# Patient Record
Sex: FEMALE | Race: WHITE | Employment: OTHER | URBAN - METROPOLITAN AREA
[De-identification: names, ages, dates, MRNs, and addresses within clinical notes are randomized per-mention and may not be internally consistent; named-entity substitution may affect disease eponyms.]

---

## 2017-01-25 ENCOUNTER — HOSPITAL ENCOUNTER (OUTPATIENT)
Dept: GENERAL RADIOLOGY | Age: 82
Discharge: HOME | End: 2017-01-25
Attending: INTERNAL MEDICINE
Payer: MEDICARE

## 2017-01-25 ENCOUNTER — HOSPITAL ENCOUNTER (OUTPATIENT)
Dept: LAB | Age: 82
Discharge: HOME | End: 2017-01-25
Attending: INTERNAL MEDICINE
Payer: MEDICARE

## 2017-01-25 DIAGNOSIS — I50.32 CHRONIC DIASTOLIC HEART FAILURE (HCC): ICD-10-CM

## 2017-01-25 DIAGNOSIS — R06.09 OTHER FORM OF DYSPNEA: ICD-10-CM

## 2017-01-25 DIAGNOSIS — D64.9 ANEMIA, UNSPECIFIED TYPE: ICD-10-CM

## 2017-01-25 PROBLEM — C90.01 MULTIPLE MYELOMA IN REMISSION (HCC): Status: ACTIVE | Noted: 2017-01-25

## 2017-01-25 PROBLEM — R06.00 DYSPNEA: Status: ACTIVE | Noted: 2017-01-25

## 2017-01-25 LAB
ANION GAP BLD CALC-SCNC: 7 MMOL/L
BASOPHILS # BLD AUTO: 0 K/UL (ref 0–0.2)
BASOPHILS # BLD: 0 % (ref 0–2)
BNP SERPL-MCNC: 366 PG/ML
BUN SERPL-MCNC: 18 MG/DL (ref 8–23)
CALCIUM SERPL-MCNC: 8.4 MG/DL (ref 8.3–10.4)
CHLORIDE SERPL-SCNC: 96 MMOL/L (ref 98–107)
CO2 SERPL-SCNC: 32 MMOL/L (ref 23–32)
CREAT SERPL-MCNC: 1.2 MG/DL (ref 0.6–1)
DIFFERENTIAL METHOD BLD: ABNORMAL
EOSINOPHIL # BLD: 0.1 K/UL (ref 0–0.8)
EOSINOPHIL NFR BLD: 2 % (ref 0.5–7.8)
ERYTHROCYTE [DISTWIDTH] IN BLOOD BY AUTOMATED COUNT: 13.9 % (ref 11.9–14.6)
GLUCOSE SERPL-MCNC: 98 MG/DL (ref 65–100)
HCT VFR BLD AUTO: 29.5 % (ref 35.8–46.3)
HGB BLD-MCNC: 9.5 G/DL (ref 11.7–15.4)
LYMPHOCYTES # BLD AUTO: 45 % (ref 13–44)
LYMPHOCYTES # BLD: 1.5 K/UL (ref 0.5–4.6)
MCH RBC QN AUTO: 31.3 PG (ref 26.1–32.9)
MCHC RBC AUTO-ENTMCNC: 32.2 G/DL (ref 31.4–35)
MCV RBC AUTO: 97 FL (ref 79.6–97.8)
MONOCYTES # BLD: 0.4 K/UL (ref 0.1–1.3)
MONOCYTES NFR BLD AUTO: 11 % (ref 4–12)
NEUTS SEG # BLD: 1.5 K/UL (ref 1.7–8.2)
NEUTS SEG NFR BLD AUTO: 42 % (ref 43–78)
PLATELET # BLD AUTO: 183 K/UL (ref 150–450)
PMV BLD AUTO: 10.9 FL (ref 10.8–14.1)
POTASSIUM SERPL-SCNC: 3.2 MMOL/L (ref 3.5–5.1)
RBC # BLD AUTO: 3.04 M/UL (ref 4.05–5.25)
SODIUM SERPL-SCNC: 135 MMOL/L (ref 136–145)
WBC # BLD AUTO: 3.5 K/UL (ref 4.3–11.1)

## 2017-01-25 PROCEDURE — 83880 ASSAY OF NATRIURETIC PEPTIDE: CPT | Performed by: INTERNAL MEDICINE

## 2017-01-25 PROCEDURE — 85025 COMPLETE CBC W/AUTO DIFF WBC: CPT | Performed by: INTERNAL MEDICINE

## 2017-01-25 PROCEDURE — 80048 BASIC METABOLIC PNL TOTAL CA: CPT | Performed by: INTERNAL MEDICINE

## 2017-01-25 PROCEDURE — 71020 XR CHEST PA LAT: CPT

## 2017-01-25 PROCEDURE — 36415 COLL VENOUS BLD VENIPUNCTURE: CPT | Performed by: INTERNAL MEDICINE

## 2017-02-02 ENCOUNTER — HOSPITAL ENCOUNTER (OUTPATIENT)
Dept: LAB | Age: 82
Discharge: HOME | End: 2017-02-02
Payer: MEDICARE

## 2017-02-02 ENCOUNTER — PATIENT OUTREACH (OUTPATIENT)
Dept: CASE MANAGEMENT | Age: 82
End: 2017-02-02

## 2017-02-02 DIAGNOSIS — E78.2 MIXED HYPERLIPIDEMIA: ICD-10-CM

## 2017-02-02 DIAGNOSIS — C90.00 MULTIPLE MYELOMA, REMISSION STATUS UNSPECIFIED (HCC): Chronic | ICD-10-CM

## 2017-02-02 DIAGNOSIS — I10 ESSENTIAL HYPERTENSION, BENIGN: Chronic | ICD-10-CM

## 2017-02-02 DIAGNOSIS — I48.19 PERSISTENT ATRIAL FIBRILLATION (HCC): Chronic | ICD-10-CM

## 2017-02-02 DIAGNOSIS — Z95.0 PRESENCE OF CARDIAC PACEMAKER: Chronic | ICD-10-CM

## 2017-02-02 LAB
ALBUMIN SERPL BCP-MCNC: 3.3 G/DL (ref 3.2–4.6)
ALBUMIN/GLOB SERPL: 0.8 {RATIO} (ref 1.2–3.5)
ALP SERPL-CCNC: 87 U/L (ref 50–136)
ALT SERPL-CCNC: 64 U/L (ref 12–65)
ANION GAP BLD CALC-SCNC: 4 MMOL/L (ref 7–16)
AST SERPL W P-5'-P-CCNC: 54 U/L (ref 15–37)
BASOPHILS # BLD AUTO: 0.1 K/UL (ref 0–0.2)
BASOPHILS NFR BLD MANUAL: 2 % (ref 0–2)
BILIRUB SERPL-MCNC: 0.5 MG/DL (ref 0.2–1.1)
BUN SERPL-MCNC: 21 MG/DL (ref 8–23)
CALCIUM SERPL-MCNC: 8.4 MG/DL (ref 8.3–10.4)
CHLORIDE SERPL-SCNC: 98 MMOL/L (ref 98–107)
CO2 SERPL-SCNC: 32 MMOL/L (ref 23–32)
CREAT SERPL-MCNC: 1.42 MG/DL (ref 0.6–1)
DIFFERENTIAL METHOD BLD: ABNORMAL
ERYTHROCYTE [DISTWIDTH] IN BLOOD BY AUTOMATED COUNT: 14.5 % (ref 11.9–14.6)
GLOBULIN SER CALC-MCNC: 3.9 G/DL (ref 2.3–3.5)
GLUCOSE SERPL-MCNC: 105 MG/DL (ref 65–100)
HCT VFR BLD AUTO: 29.1 % (ref 35.8–46.3)
HGB BLD-MCNC: 9.2 G/DL (ref 11.7–15.4)
LYMPHOCYTES # BLD: 1.6 K/UL (ref 0.5–4.6)
LYMPHOCYTES NFR BLD MANUAL: 43 % (ref 16–44)
MCH RBC QN AUTO: 30.1 PG (ref 26.1–32.9)
MCHC RBC AUTO-ENTMCNC: 31.6 G/DL (ref 31.4–35)
MCV RBC AUTO: 95.1 FL (ref 79.6–97.8)
MONOCYTES # BLD: 0.1 K/UL (ref 0.1–1.3)
MONOCYTES NFR BLD MANUAL: 4 % (ref 3–9)
NEUTS BAND NFR BLD MANUAL: 8 % (ref 0–10)
NEUTS SEG # BLD: 1.9 K/UL (ref 1.7–8.2)
NEUTS SEG NFR BLD MANUAL: 43 % (ref 47–75)
NRBC # BLD: 0 K/UL (ref 0–0.2)
PLATELET # BLD AUTO: 192 K/UL (ref 150–450)
PLATELET COMMENTS,PCOM: ADEQUATE
PMV BLD AUTO: 10.3 FL (ref 10.8–14.1)
POTASSIUM SERPL-SCNC: 3.4 MMOL/L (ref 3.5–5.1)
PROT SERPL-MCNC: 7.2 G/DL (ref 6.3–8.2)
RBC # BLD AUTO: 3.06 M/UL (ref 4.05–5.25)
RBC MORPH BLD: ABNORMAL
RBC MORPH BLD: ABNORMAL
SODIUM SERPL-SCNC: 134 MMOL/L (ref 136–145)
WBC # BLD AUTO: 3.7 K/UL (ref 4.3–11.1)
WBC MORPH BLD: ABNORMAL

## 2017-02-02 PROCEDURE — 84165 PROTEIN E-PHORESIS SERUM: CPT | Performed by: INTERNAL MEDICINE

## 2017-02-02 PROCEDURE — 80053 COMPREHEN METABOLIC PANEL: CPT | Performed by: INTERNAL MEDICINE

## 2017-02-02 PROCEDURE — 36415 COLL VENOUS BLD VENIPUNCTURE: CPT | Performed by: INTERNAL MEDICINE

## 2017-02-02 PROCEDURE — 85025 COMPLETE CBC W/AUTO DIFF WBC: CPT | Performed by: INTERNAL MEDICINE

## 2017-02-02 PROCEDURE — 86334 IMMUNOFIX E-PHORESIS SERUM: CPT | Performed by: INTERNAL MEDICINE

## 2017-02-03 LAB
ALBUMIN SERPL ELPH-MCNC: 3.51 G/DL (ref 3.2–5.6)
ALBUMIN/GLOB SERPL: 1.1 {RATIO}
ALPHA1 GLOB SERPL ELPH-MCNC: 0.3 G/DL (ref 0.1–0.4)
ALPHA2 GLOB SERPL ELPH-MCNC: 0.7 G/DL (ref 0.4–1.2)
B-GLOBULIN SERPL QL ELPH: 0.91 G/DL (ref 0.6–1.3)
GAMMA GLOB MFR SERPL ELPH: 1.27 G/DL (ref 0.5–1.6)
IGA SERPL-MCNC: 29 MG/DL (ref 85–499)
IGG SERPL-MCNC: 1345 MG/DL (ref 610–1616)
IGM SERPL-MCNC: 23 MG/DL (ref 35–242)
M PROTEIN SERPL ELPH-MCNC: 0.94 G/DL
PROT PATTERN SERPL ELPH-IMP: ABNORMAL
PROT PATTERN SPEC IFE-IMP: ABNORMAL
PROT SERPL-MCNC: 6.7 G/DL (ref 6.3–8.2)

## 2017-03-09 ENCOUNTER — HOSPITAL ENCOUNTER (OUTPATIENT)
Dept: LAB | Age: 82
Discharge: HOME | End: 2017-03-09
Payer: MEDICARE

## 2017-03-09 ENCOUNTER — PATIENT OUTREACH (OUTPATIENT)
Dept: CASE MANAGEMENT | Age: 82
End: 2017-03-09

## 2017-03-09 DIAGNOSIS — C90.00 MULTIPLE MYELOMA, REMISSION STATUS UNSPECIFIED (HCC): Chronic | ICD-10-CM

## 2017-03-09 DIAGNOSIS — I10 ESSENTIAL HYPERTENSION WITH GOAL BLOOD PRESSURE LESS THAN 130/85: ICD-10-CM

## 2017-03-09 DIAGNOSIS — D50.8 OTHER IRON DEFICIENCY ANEMIA: Chronic | ICD-10-CM

## 2017-03-09 DIAGNOSIS — I10 ESSENTIAL HYPERTENSION, BENIGN: Chronic | ICD-10-CM

## 2017-03-09 DIAGNOSIS — E78.2 MIXED HYPERLIPIDEMIA: ICD-10-CM

## 2017-03-09 LAB
ALBUMIN SERPL BCP-MCNC: 3.1 G/DL (ref 3.2–4.6)
ALBUMIN/GLOB SERPL: 0.8 {RATIO} (ref 1.2–3.5)
ALP SERPL-CCNC: 98 U/L (ref 50–136)
ALT SERPL-CCNC: 32 U/L (ref 12–65)
ANION GAP BLD CALC-SCNC: 9 MMOL/L (ref 7–16)
AST SERPL W P-5'-P-CCNC: 25 U/L (ref 15–37)
BASOPHILS # BLD AUTO: 0 K/UL (ref 0–0.2)
BASOPHILS # BLD: 1 % (ref 0–2)
BILIRUB SERPL-MCNC: 0.5 MG/DL (ref 0.2–1.1)
BUN SERPL-MCNC: 20 MG/DL (ref 8–23)
CALCIUM SERPL-MCNC: 8.5 MG/DL (ref 8.3–10.4)
CHLORIDE SERPL-SCNC: 98 MMOL/L (ref 98–107)
CO2 SERPL-SCNC: 27 MMOL/L (ref 23–32)
CREAT SERPL-MCNC: 1.34 MG/DL (ref 0.6–1)
DIFFERENTIAL METHOD BLD: ABNORMAL
EOSINOPHIL # BLD: 0 K/UL (ref 0–0.8)
EOSINOPHIL NFR BLD: 1 % (ref 0.5–7.8)
ERYTHROCYTE [DISTWIDTH] IN BLOOD BY AUTOMATED COUNT: 14.4 % (ref 11.9–14.6)
GLOBULIN SER CALC-MCNC: 4 G/DL (ref 2.3–3.5)
GLUCOSE SERPL-MCNC: 95 MG/DL (ref 65–100)
HCT VFR BLD AUTO: 27.4 % (ref 35.8–46.3)
HGB BLD-MCNC: 8.7 G/DL (ref 11.7–15.4)
LYMPHOCYTES # BLD AUTO: 46 % (ref 13–44)
LYMPHOCYTES # BLD: 1.4 K/UL (ref 0.5–4.6)
MCH RBC QN AUTO: 29.9 PG (ref 26.1–32.9)
MCHC RBC AUTO-ENTMCNC: 31.8 G/DL (ref 31.4–35)
MCV RBC AUTO: 94.2 FL (ref 79.6–97.8)
MONOCYTES # BLD: 0.3 K/UL (ref 0.1–1.3)
MONOCYTES NFR BLD AUTO: 9 % (ref 4–12)
NEUTS SEG # BLD: 1.3 K/UL (ref 1.7–8.2)
NEUTS SEG NFR BLD AUTO: 43 % (ref 43–78)
NRBC # BLD: 0 K/UL (ref 0–0.2)
NRBC BLD-RTO: 0 PER 100 WBC (ref 0–2)
PLATELET # BLD AUTO: 233 K/UL (ref 150–450)
PLATELET COMMENTS,PCOM: ADEQUATE
PMV BLD AUTO: 10.2 FL (ref 10.8–14.1)
POTASSIUM SERPL-SCNC: 3.4 MMOL/L (ref 3.5–5.1)
PROT SERPL-MCNC: 7.1 G/DL (ref 6.3–8.2)
RBC # BLD AUTO: 2.91 M/UL (ref 4.05–5.25)
RBC MORPH BLD: ABNORMAL
SODIUM SERPL-SCNC: 134 MMOL/L (ref 136–145)
WBC # BLD AUTO: 3 K/UL (ref 4.3–11.1)
WBC MORPH BLD: ABNORMAL

## 2017-03-09 PROCEDURE — 36415 COLL VENOUS BLD VENIPUNCTURE: CPT | Performed by: INTERNAL MEDICINE

## 2017-03-09 PROCEDURE — 84165 PROTEIN E-PHORESIS SERUM: CPT | Performed by: INTERNAL MEDICINE

## 2017-03-09 PROCEDURE — 86334 IMMUNOFIX E-PHORESIS SERUM: CPT | Performed by: INTERNAL MEDICINE

## 2017-03-09 PROCEDURE — 80053 COMPREHEN METABOLIC PANEL: CPT | Performed by: INTERNAL MEDICINE

## 2017-03-09 PROCEDURE — 83883 ASSAY NEPHELOMETRY NOT SPEC: CPT | Performed by: INTERNAL MEDICINE

## 2017-03-09 PROCEDURE — 85025 COMPLETE CBC W/AUTO DIFF WBC: CPT | Performed by: INTERNAL MEDICINE

## 2017-03-09 NOTE — ACP (ADVANCE CARE PLANNING)
3/9/17 Dr Abelino Pearson reviewed labs with patient and family. New orders received to add Ninlaro to Revlimid. This is one pill once a week 3 weeks on 1 week off. Анна Moon took RX to [facility]. Patient is to return in 4 weeks for labs and OV. Chemo education scheduled.

## 2017-03-10 LAB
ALBUMIN SERPL ELPH-MCNC: 3.29 G/DL (ref 3.2–5.6)
ALBUMIN/GLOB SERPL: 1 {RATIO}
ALPHA1 GLOB SERPL ELPH-MCNC: 0.4 G/DL (ref 0.1–0.4)
ALPHA2 GLOB SERPL ELPH-MCNC: 0.86 G/DL (ref 0.4–1.2)
B-GLOBULIN SERPL QL ELPH: 0.89 G/DL (ref 0.6–1.3)
GAMMA GLOB MFR SERPL ELPH: 1.16 G/DL (ref 0.5–1.6)
IGA SERPL-MCNC: 25 MG/DL (ref 85–499)
IGG SERPL-MCNC: 1233 MG/DL (ref 610–1616)
IGM SERPL-MCNC: 26 MG/DL (ref 35–242)
KAPPA LC FREE SER-MCNC: 342.7 MG/L (ref 3.3–19.4)
KAPPA LC FREE/LAMBDA FREE SER: 19.37 {RATIO} (ref 0.26–1.65)
LAMBDA LC FREE SERPL-MCNC: 17.69 MG/L (ref 5.71–26.3)
M PROTEIN SERPL ELPH-MCNC: 0.75 G/DL
PROT PATTERN SERPL ELPH-IMP: ABNORMAL
PROT PATTERN SPEC IFE-IMP: ABNORMAL
PROT SERPL-MCNC: 6.6 G/DL (ref 6.3–8.2)

## 2017-03-13 ENCOUNTER — DOCUMENTATION ONLY (OUTPATIENT)
Dept: HEMATOLOGY | Age: 82
End: 2017-03-13

## 2017-03-13 NOTE — PROGRESS NOTES
I spoke with Gabriela Mendes regarding her Medicare and Brianna Craig. Patient has met Ded and OOP Max. Patient and her daughter had   no concerns at the present time for the cost of treatment. Stated that insurance has been covering all medications. I gave Ms. Kennedy the Oncology Care Model participation letter. I let her know that the average patient responsibility for 6 months of treatment for type cancer is $6,054 after Medicare pays. Next, I spoke with patient regarding potential oral medication authorizations. I told her that if she ever had any problems getting her oral medications filled to give the dedicated [address]  coordinator Brinda Rajput a call. Most of the time, it is simply an authorization that needs to be done with the insurance company. Next, I spoke with Ms. Kennedy regarding enrolling with [facility]. I went over some of the services that [facility] offers and the enrollment process. Lastly, I gave Ms. Karla Gomez a form with various resource organizations that could assist with specific needs (example:  transportation, lodging, preparing meals, home cleaning)                Faxed Patient Referral form to the [address] [place], [place] [address]kenzie Arreola at [phone]. Phone [phone]. Form scanned into chart. Faxed Physician's Statement to the [address] [place] [address] at [phone]. Phone [phone]. Form scanned into chart.

## 2017-03-20 ENCOUNTER — HOSPITAL ENCOUNTER (OUTPATIENT)
Dept: GENERAL RADIOLOGY | Age: 82
Discharge: HOME | End: 2017-03-20
Attending: FAMILY MEDICINE
Payer: MEDICARE

## 2017-03-20 DIAGNOSIS — M54.50 ACUTE MIDLINE LOW BACK PAIN WITHOUT SCIATICA: ICD-10-CM

## 2017-03-20 DIAGNOSIS — M25.561 ACUTE PAIN OF RIGHT KNEE: ICD-10-CM

## 2017-03-20 PROCEDURE — 72100 X-RAY EXAM L-S SPINE 2/3 VWS: CPT

## 2017-04-06 ENCOUNTER — PATIENT OUTREACH (OUTPATIENT)
Dept: CASE MANAGEMENT | Age: 82
End: 2017-04-06

## 2017-04-06 ENCOUNTER — HOSPITAL ENCOUNTER (OUTPATIENT)
Dept: LAB | Age: 82
Discharge: HOME | End: 2017-04-06
Payer: MEDICARE

## 2017-04-06 DIAGNOSIS — C90.00 MULTIPLE MYELOMA, REMISSION STATUS UNSPECIFIED (HCC): Chronic | ICD-10-CM

## 2017-04-06 LAB
ALBUMIN SERPL BCP-MCNC: 3.2 G/DL (ref 3.2–4.6)
ALBUMIN/GLOB SERPL: 1 {RATIO} (ref 1.2–3.5)
ALP SERPL-CCNC: 86 U/L (ref 50–136)
ALT SERPL-CCNC: 42 U/L (ref 12–65)
ANION GAP BLD CALC-SCNC: 8 MMOL/L (ref 7–16)
AST SERPL W P-5'-P-CCNC: 38 U/L (ref 15–37)
BASOPHILS # BLD AUTO: 0 K/UL (ref 0–0.2)
BASOPHILS # BLD: 1 % (ref 0–2)
BILIRUB SERPL-MCNC: 0.3 MG/DL (ref 0.2–1.1)
BUN SERPL-MCNC: 21 MG/DL (ref 8–23)
CALCIUM SERPL-MCNC: 8.2 MG/DL (ref 8.3–10.4)
CHLORIDE SERPL-SCNC: 102 MMOL/L (ref 98–107)
CO2 SERPL-SCNC: 24 MMOL/L (ref 23–32)
CREAT SERPL-MCNC: 1.95 MG/DL (ref 0.6–1)
DIFFERENTIAL METHOD BLD: ABNORMAL
EOSINOPHIL # BLD: 0 K/UL (ref 0–0.8)
EOSINOPHIL NFR BLD: 1 % (ref 0.5–7.8)
ERYTHROCYTE [DISTWIDTH] IN BLOOD BY AUTOMATED COUNT: 17.7 % (ref 11.9–14.6)
GLOBULIN SER CALC-MCNC: 3.2 G/DL (ref 2.3–3.5)
GLUCOSE SERPL-MCNC: 107 MG/DL (ref 65–100)
HCT VFR BLD AUTO: 25.7 % (ref 35.8–46.3)
HGB BLD-MCNC: 8.1 G/DL (ref 11.7–15.4)
LYMPHOCYTES # BLD AUTO: 47 % (ref 13–44)
LYMPHOCYTES # BLD: 1.5 K/UL (ref 0.5–4.6)
MCH RBC QN AUTO: 30.7 PG (ref 26.1–32.9)
MCHC RBC AUTO-ENTMCNC: 31.5 G/DL (ref 31.4–35)
MCV RBC AUTO: 97.3 FL (ref 79.6–97.8)
MONOCYTES # BLD: 0.3 K/UL (ref 0.1–1.3)
MONOCYTES NFR BLD AUTO: 10 % (ref 4–12)
NEUTS SEG # BLD: 1.3 K/UL (ref 1.7–8.2)
NEUTS SEG NFR BLD AUTO: 42 % (ref 43–78)
NRBC # BLD: 0 K/UL (ref 0–0.2)
PLATELET # BLD AUTO: 133 K/UL (ref 150–450)
PMV BLD AUTO: 12 FL (ref 10.8–14.1)
POTASSIUM SERPL-SCNC: 4.2 MMOL/L (ref 3.5–5.1)
PROT SERPL-MCNC: 6.4 G/DL (ref 6.3–8.2)
RBC # BLD AUTO: 2.64 M/UL (ref 4.05–5.25)
SODIUM SERPL-SCNC: 134 MMOL/L (ref 136–145)
WBC # BLD AUTO: 3.1 K/UL (ref 4.3–11.1)

## 2017-04-06 PROCEDURE — 85025 COMPLETE CBC W/AUTO DIFF WBC: CPT | Performed by: INTERNAL MEDICINE

## 2017-04-06 PROCEDURE — 86334 IMMUNOFIX E-PHORESIS SERUM: CPT | Performed by: INTERNAL MEDICINE

## 2017-04-06 PROCEDURE — 84165 PROTEIN E-PHORESIS SERUM: CPT | Performed by: INTERNAL MEDICINE

## 2017-04-06 PROCEDURE — 36415 COLL VENOUS BLD VENIPUNCTURE: CPT | Performed by: INTERNAL MEDICINE

## 2017-04-06 PROCEDURE — 80053 COMPREHEN METABOLIC PANEL: CPT | Performed by: INTERNAL MEDICINE

## 2017-04-06 NOTE — ACP (ADVANCE CARE PLANNING)
4/6/17 Dr Joseph Sanchez reviewed labs with patient and family. Patient is to continue current treatment of Revlimid every other day and Ninlaro once a week for 3 weeks and off 1 week. Patient to return in 4 weeks and aware of appointments.

## 2017-04-07 LAB
ALBUMIN SERPL ELPH-MCNC: 3.45 G/DL (ref 3.2–5.6)
ALBUMIN/GLOB SERPL: 1.2 {RATIO}
ALPHA1 GLOB SERPL ELPH-MCNC: 0.32 G/DL (ref 0.1–0.4)
ALPHA2 GLOB SERPL ELPH-MCNC: 0.77 G/DL (ref 0.4–1.2)
B-GLOBULIN SERPL QL ELPH: 0.83 G/DL (ref 0.6–1.3)
GAMMA GLOB MFR SERPL ELPH: 1.02 G/DL (ref 0.5–1.6)
IGA SERPL-MCNC: 20 MG/DL (ref 85–499)
IGG SERPL-MCNC: 945 MG/DL (ref 610–1616)
IGM SERPL-MCNC: 18 MG/DL (ref 35–242)
M PROTEIN SERPL ELPH-MCNC: 0.55 G/DL
PROT PATTERN SERPL ELPH-IMP: ABNORMAL
PROT PATTERN SPEC IFE-IMP: ABNORMAL
PROT SERPL-MCNC: 6.4 G/DL (ref 6.3–8.2)

## 2017-05-04 ENCOUNTER — HOSPITAL ENCOUNTER (OUTPATIENT)
Dept: LAB | Age: 82
Discharge: HOME | End: 2017-05-04
Payer: MEDICARE

## 2017-05-04 ENCOUNTER — PATIENT OUTREACH (OUTPATIENT)
Dept: CASE MANAGEMENT | Age: 82
End: 2017-05-04

## 2017-05-04 DIAGNOSIS — I10 ESSENTIAL HYPERTENSION, BENIGN: Chronic | ICD-10-CM

## 2017-05-04 DIAGNOSIS — C90.02 MULTIPLE MYELOMA IN RELAPSE (HCC): Chronic | ICD-10-CM

## 2017-05-04 DIAGNOSIS — E78.5 OTHER AND UNSPECIFIED HYPERLIPIDEMIA: Chronic | ICD-10-CM

## 2017-05-04 DIAGNOSIS — D50.9 IRON DEFICIENCY ANEMIA, UNSPECIFIED IRON DEFICIENCY ANEMIA TYPE: Chronic | ICD-10-CM

## 2017-05-04 LAB
ALBUMIN SERPL BCP-MCNC: 3.1 G/DL (ref 3.2–4.6)
ALBUMIN/GLOB SERPL: 1 {RATIO} (ref 1.2–3.5)
ALP SERPL-CCNC: 91 U/L (ref 50–136)
ALT SERPL-CCNC: 32 U/L (ref 12–65)
ANION GAP BLD CALC-SCNC: 6 MMOL/L (ref 7–16)
AST SERPL W P-5'-P-CCNC: 32 U/L (ref 15–37)
BASOPHILS # BLD AUTO: 0 K/UL (ref 0–0.2)
BASOPHILS # BLD: 1 % (ref 0–2)
BILIRUB SERPL-MCNC: 0.7 MG/DL (ref 0.2–1.1)
BUN SERPL-MCNC: 14 MG/DL (ref 8–23)
CALCIUM SERPL-MCNC: 8.3 MG/DL (ref 8.3–10.4)
CHLORIDE SERPL-SCNC: 98 MMOL/L (ref 98–107)
CO2 SERPL-SCNC: 31 MMOL/L (ref 21–32)
CREAT SERPL-MCNC: 1.14 MG/DL (ref 0.6–1)
DIFFERENTIAL METHOD BLD: ABNORMAL
EOSINOPHIL # BLD: 0 K/UL (ref 0–0.8)
EOSINOPHIL NFR BLD: 2 % (ref 0.5–7.8)
ERYTHROCYTE [DISTWIDTH] IN BLOOD BY AUTOMATED COUNT: 16.8 % (ref 11.9–14.6)
GLOBULIN SER CALC-MCNC: 3.1 G/DL (ref 2.3–3.5)
GLUCOSE SERPL-MCNC: 97 MG/DL (ref 65–100)
HCT VFR BLD AUTO: 25.6 % (ref 35.8–46.3)
HGB BLD-MCNC: 8.2 G/DL (ref 11.7–15.4)
LYMPHOCYTES # BLD AUTO: 54 % (ref 13–44)
LYMPHOCYTES # BLD: 1 K/UL (ref 0.5–4.6)
MCH RBC QN AUTO: 31.3 PG (ref 26.1–32.9)
MCHC RBC AUTO-ENTMCNC: 32 G/DL (ref 31.4–35)
MCV RBC AUTO: 97.7 FL (ref 79.6–97.8)
MONOCYTES # BLD: 0.1 K/UL (ref 0.1–1.3)
MONOCYTES NFR BLD AUTO: 8 % (ref 4–12)
NEUTS SEG # BLD: 0.7 K/UL (ref 1.7–8.2)
NEUTS SEG NFR BLD AUTO: 36 % (ref 43–78)
NRBC # BLD: 0 K/UL (ref 0–0.2)
NRBC BLD-RTO: 0 PER 100 WBC (ref 0–2)
PLATELET # BLD AUTO: 85 K/UL (ref 150–450)
PMV BLD AUTO: 12.5 FL (ref 10.8–14.1)
POTASSIUM SERPL-SCNC: 3.3 MMOL/L (ref 3.5–5.1)
PROT SERPL-MCNC: 6.2 G/DL (ref 6.3–8.2)
RBC # BLD AUTO: 2.62 M/UL (ref 4.05–5.25)
SODIUM SERPL-SCNC: 135 MMOL/L (ref 136–145)
WBC # BLD AUTO: 1.9 K/UL (ref 4.3–11.1)

## 2017-05-04 PROCEDURE — 36415 COLL VENOUS BLD VENIPUNCTURE: CPT | Performed by: INTERNAL MEDICINE

## 2017-05-04 PROCEDURE — 85025 COMPLETE CBC W/AUTO DIFF WBC: CPT | Performed by: INTERNAL MEDICINE

## 2017-05-04 PROCEDURE — 86334 IMMUNOFIX E-PHORESIS SERUM: CPT | Performed by: INTERNAL MEDICINE

## 2017-05-04 PROCEDURE — 84165 PROTEIN E-PHORESIS SERUM: CPT | Performed by: INTERNAL MEDICINE

## 2017-05-04 PROCEDURE — 80053 COMPREHEN METABOLIC PANEL: CPT | Performed by: INTERNAL MEDICINE

## 2017-05-04 NOTE — PROGRESS NOTES
Pt seen and labs reviewed by Dr. Yovanny Berman. Lab called with critical WBC 1.9. Dr Yovanny Berman aware. Will change Revlimid to 10 mg every 3 days (instead of every other day) to allow for count recovery. Will continue Current dose of Ninlaro once a week, 3 weeks on 1 week off. Both scripts [facility] to [facility]. Will add type and screen to labs when pt returns on June 8 in case pt needs transfusion. Pt c/o fatigue, but hopefully change in Revlimid will help with anemia. Pt aware of future appts. Will cont to follow.

## 2017-05-05 LAB
ALBUMIN SERPL ELPH-MCNC: 3.24 G/DL (ref 3.2–5.6)
ALBUMIN/GLOB SERPL: 1.1 {RATIO}
ALPHA1 GLOB SERPL ELPH-MCNC: 0.33 G/DL (ref 0.1–0.4)
ALPHA2 GLOB SERPL ELPH-MCNC: 0.75 G/DL (ref 0.4–1.2)
B-GLOBULIN SERPL QL ELPH: 0.82 G/DL (ref 0.6–1.3)
GAMMA GLOB MFR SERPL ELPH: 0.97 G/DL (ref 0.5–1.6)
IGA SERPL-MCNC: 24 MG/DL (ref 85–499)
IGG SERPL-MCNC: 897 MG/DL (ref 610–1616)
IGM SERPL-MCNC: 13 MG/DL (ref 35–242)
M PROTEIN SERPL ELPH-MCNC: 0.58 G/DL
PROT PATTERN SERPL ELPH-IMP: ABNORMAL
PROT PATTERN SPEC IFE-IMP: ABNORMAL
PROT SERPL-MCNC: 6.1 G/DL (ref 6.3–8.2)

## 2017-06-08 ENCOUNTER — PATIENT OUTREACH (OUTPATIENT)
Dept: CASE MANAGEMENT | Age: 82
End: 2017-06-08

## 2017-06-08 ENCOUNTER — HOSPITAL ENCOUNTER (OUTPATIENT)
Dept: LAB | Age: 82
Discharge: HOME | End: 2017-06-08
Payer: MEDICARE

## 2017-06-08 DIAGNOSIS — I10 ESSENTIAL HYPERTENSION WITH GOAL BLOOD PRESSURE LESS THAN 130/85: ICD-10-CM

## 2017-06-08 DIAGNOSIS — I10 ESSENTIAL HYPERTENSION, BENIGN: Chronic | ICD-10-CM

## 2017-06-08 DIAGNOSIS — F41.9 ANXIETY: ICD-10-CM

## 2017-06-08 DIAGNOSIS — C90.00 MULTIPLE MYELOMA, REMISSION STATUS UNSPECIFIED (HCC): Chronic | ICD-10-CM

## 2017-06-08 DIAGNOSIS — D50.9 IRON DEFICIENCY ANEMIA, UNSPECIFIED IRON DEFICIENCY ANEMIA TYPE: Chronic | ICD-10-CM

## 2017-06-08 DIAGNOSIS — C90.02 MULTIPLE MYELOMA IN RELAPSE (HCC): Chronic | ICD-10-CM

## 2017-06-08 DIAGNOSIS — E78.5 OTHER AND UNSPECIFIED HYPERLIPIDEMIA: Chronic | ICD-10-CM

## 2017-06-08 DIAGNOSIS — E78.2 MIXED HYPERLIPIDEMIA: ICD-10-CM

## 2017-06-08 LAB
ALBUMIN SERPL BCP-MCNC: 3.4 G/DL (ref 3.2–4.6)
ALBUMIN/GLOB SERPL: 1 {RATIO} (ref 1.2–3.5)
ALP SERPL-CCNC: 92 U/L (ref 50–136)
ALT SERPL-CCNC: 36 U/L (ref 12–65)
ANION GAP BLD CALC-SCNC: 7 MMOL/L (ref 7–16)
AST SERPL W P-5'-P-CCNC: 36 U/L (ref 15–37)
BASOPHILS # BLD AUTO: 0 K/UL (ref 0–0.2)
BASOPHILS # BLD: 1 % (ref 0–2)
BILIRUB SERPL-MCNC: 0.5 MG/DL (ref 0.2–1.1)
BUN SERPL-MCNC: 17 MG/DL (ref 8–23)
CALCIUM SERPL-MCNC: 8.3 MG/DL (ref 8.3–10.4)
CHLORIDE SERPL-SCNC: 102 MMOL/L (ref 98–107)
CO2 SERPL-SCNC: 29 MMOL/L (ref 21–32)
CREAT SERPL-MCNC: 1.11 MG/DL (ref 0.6–1)
DIFFERENTIAL METHOD BLD: ABNORMAL
EOSINOPHIL # BLD: 0.1 K/UL (ref 0–0.8)
EOSINOPHIL NFR BLD: 1 % (ref 0.5–7.8)
ERYTHROCYTE [DISTWIDTH] IN BLOOD BY AUTOMATED COUNT: 15 % (ref 11.9–14.6)
GLOBULIN SER CALC-MCNC: 3.4 G/DL (ref 2.3–3.5)
GLUCOSE SERPL-MCNC: 86 MG/DL (ref 65–100)
HCT VFR BLD AUTO: 25.9 % (ref 35.8–46.3)
HGB BLD-MCNC: 8.2 G/DL (ref 11.7–15.4)
LYMPHOCYTES # BLD AUTO: 35 % (ref 13–44)
LYMPHOCYTES # BLD: 1.4 K/UL (ref 0.5–4.6)
MCH RBC QN AUTO: 31.1 PG (ref 26.1–32.9)
MCHC RBC AUTO-ENTMCNC: 31.7 G/DL (ref 31.4–35)
MCV RBC AUTO: 98.1 FL (ref 79.6–97.8)
MONOCYTES # BLD: 0.4 K/UL (ref 0.1–1.3)
MONOCYTES NFR BLD AUTO: 10 % (ref 4–12)
NEUTS SEG # BLD: 2.1 K/UL (ref 1.7–8.2)
NEUTS SEG NFR BLD AUTO: 53 % (ref 43–78)
NRBC # BLD: 0 K/UL (ref 0–0.2)
PLATELET # BLD AUTO: 178 K/UL (ref 150–450)
PMV BLD AUTO: 12.4 FL (ref 10.8–14.1)
POTASSIUM SERPL-SCNC: 3.7 MMOL/L (ref 3.5–5.1)
PROT SERPL-MCNC: 6.8 G/DL (ref 6.3–8.2)
RBC # BLD AUTO: 2.64 M/UL (ref 4.05–5.25)
SODIUM SERPL-SCNC: 138 MMOL/L (ref 136–145)
WBC # BLD AUTO: 4 K/UL (ref 4.3–11.1)

## 2017-06-08 PROCEDURE — 80053 COMPREHEN METABOLIC PANEL: CPT | Performed by: INTERNAL MEDICINE

## 2017-06-08 PROCEDURE — 86644 CMV ANTIBODY: CPT | Performed by: INTERNAL MEDICINE

## 2017-06-08 PROCEDURE — 85025 COMPLETE CBC W/AUTO DIFF WBC: CPT | Performed by: INTERNAL MEDICINE

## 2017-06-08 PROCEDURE — 86900 BLOOD TYPING SEROLOGIC ABO: CPT | Performed by: INTERNAL MEDICINE

## 2017-06-08 PROCEDURE — 86923 COMPATIBILITY TEST ELECTRIC: CPT | Performed by: INTERNAL MEDICINE

## 2017-06-08 PROCEDURE — 36415 COLL VENOUS BLD VENIPUNCTURE: CPT | Performed by: INTERNAL MEDICINE

## 2017-06-10 ENCOUNTER — HOSPITAL ENCOUNTER (OUTPATIENT)
Dept: INFUSION THERAPY | Age: 82
Discharge: HOME | End: 2017-06-10
Payer: MEDICARE

## 2017-06-10 VITALS
TEMPERATURE: 97.9 F | DIASTOLIC BLOOD PRESSURE: 62 MMHG | WEIGHT: 125.6 LBS | HEART RATE: 60 BPM | OXYGEN SATURATION: 99 % | RESPIRATION RATE: 18 BRPM | BODY MASS INDEX: 22.25 KG/M2 | SYSTOLIC BLOOD PRESSURE: 137 MMHG

## 2017-06-10 PROCEDURE — 74011250636 HC RX REV CODE- 250/636: Performed by: INTERNAL MEDICINE

## 2017-06-10 PROCEDURE — 74011250637 HC RX REV CODE- 250/637: Performed by: INTERNAL MEDICINE

## 2017-06-10 PROCEDURE — P9040 RBC LEUKOREDUCED IRRADIATED: HCPCS | Performed by: INTERNAL MEDICINE

## 2017-06-10 PROCEDURE — 77030018667 ADMN ST IV BLD FENW -A

## 2017-06-10 PROCEDURE — 36430 TRANSFUSION BLD/BLD COMPNT: CPT

## 2017-06-10 RX ORDER — SODIUM CHLORIDE 9 MG/ML
250 INJECTION, SOLUTION INTRAVENOUS AS NEEDED
Status: COMPLETED | OUTPATIENT
Start: 2017-06-10 | End: 2017-06-10

## 2017-06-10 RX ORDER — DIPHENHYDRAMINE HCL 25 MG
25 CAPSULE ORAL ONCE
Status: COMPLETED | OUTPATIENT
Start: 2017-06-10 | End: 2017-06-10

## 2017-06-10 RX ORDER — ACETAMINOPHEN 325 MG/1
650 TABLET ORAL ONCE
Status: COMPLETED | OUTPATIENT
Start: 2017-06-10 | End: 2017-06-10

## 2017-06-10 RX ADMIN — ACETAMINOPHEN 650 MG: 325 TABLET ORAL at 12:05

## 2017-06-10 RX ADMIN — DIPHENHYDRAMINE HYDROCHLORIDE 25 MG: 25 CAPSULE ORAL at 12:05

## 2017-06-10 RX ADMIN — SODIUM CHLORIDE 250 ML: 900 INJECTION, SOLUTION INTRAVENOUS at 11:46

## 2017-06-10 NOTE — PROGRESS NOTES
Arrived to the [facility]. Transfusion completed. Patient tolerated well. PIV removed intact, site clear. Any issues or concerns during appointment: None. Patient aware no future infusion appointments. Patient to follow up with physician. Discharged via wheelchair in stable condition accompanied by nurse and family.

## 2017-06-11 LAB
ABO + RH BLD: NORMAL
BLD PROD TYP BPU: NORMAL
BLD PROD TYP BPU: NORMAL
BLOOD GROUP ANTIBODIES SERPL: NORMAL
BPU ID: NORMAL
BPU ID: NORMAL
CROSSMATCH RESULT,%XM: NORMAL
CROSSMATCH RESULT,%XM: NORMAL
SPECIMEN EXP DATE BLD: NORMAL
STATUS OF UNIT,%ST: NORMAL
STATUS OF UNIT,%ST: NORMAL
UNIT DIVISION, %UDIV: 0
UNIT DIVISION, %UDIV: 0

## 2017-07-06 ENCOUNTER — PATIENT OUTREACH (OUTPATIENT)
Dept: CASE MANAGEMENT | Age: 82
End: 2017-07-06

## 2017-07-06 ENCOUNTER — HOSPITAL ENCOUNTER (OUTPATIENT)
Dept: LAB | Age: 82
Discharge: HOME | End: 2017-07-06
Payer: MEDICARE

## 2017-07-06 DIAGNOSIS — C90.02 MULTIPLE MYELOMA IN RELAPSE (HCC): Chronic | ICD-10-CM

## 2017-07-06 LAB
ALBUMIN SERPL BCP-MCNC: 3.2 G/DL (ref 3.2–4.6)
ALBUMIN/GLOB SERPL: 1 {RATIO} (ref 1.2–3.5)
ALP SERPL-CCNC: 94 U/L (ref 50–136)
ALT SERPL-CCNC: 38 U/L (ref 12–65)
ANION GAP BLD CALC-SCNC: 8 MMOL/L (ref 7–16)
AST SERPL W P-5'-P-CCNC: 43 U/L (ref 15–37)
BASOPHILS # BLD AUTO: 0 K/UL (ref 0–0.2)
BASOPHILS # BLD: 1 % (ref 0–2)
BILIRUB SERPL-MCNC: 0.6 MG/DL (ref 0.2–1.1)
BUN SERPL-MCNC: 15 MG/DL (ref 8–23)
CALCIUM SERPL-MCNC: 7.8 MG/DL (ref 8.3–10.4)
CHLORIDE SERPL-SCNC: 102 MMOL/L (ref 98–107)
CO2 SERPL-SCNC: 29 MMOL/L (ref 21–32)
CREAT SERPL-MCNC: 1.14 MG/DL (ref 0.6–1)
DIFFERENTIAL METHOD BLD: ABNORMAL
EOSINOPHIL # BLD: 0.1 K/UL (ref 0–0.8)
EOSINOPHIL NFR BLD: 1 % (ref 0.5–7.8)
ERYTHROCYTE [DISTWIDTH] IN BLOOD BY AUTOMATED COUNT: 14.9 % (ref 11.9–14.6)
GLOBULIN SER CALC-MCNC: 3.2 G/DL (ref 2.3–3.5)
GLUCOSE SERPL-MCNC: 87 MG/DL (ref 65–100)
HCT VFR BLD AUTO: 29.3 % (ref 35.8–46.3)
HGB BLD-MCNC: 9.5 G/DL (ref 11.7–15.4)
LYMPHOCYTES # BLD AUTO: 37 % (ref 13–44)
LYMPHOCYTES # BLD: 1.6 K/UL (ref 0.5–4.6)
MCH RBC QN AUTO: 30.4 PG (ref 26.1–32.9)
MCHC RBC AUTO-ENTMCNC: 32.4 G/DL (ref 31.4–35)
MCV RBC AUTO: 93.9 FL (ref 79.6–97.8)
MONOCYTES # BLD: 0.3 K/UL (ref 0.1–1.3)
MONOCYTES NFR BLD AUTO: 8 % (ref 4–12)
NEUTS SEG # BLD: 2.3 K/UL (ref 1.7–8.2)
NEUTS SEG NFR BLD AUTO: 54 % (ref 43–78)
NRBC # BLD: 0 K/UL (ref 0–0.2)
NRBC BLD-RTO: 0 PER 100 WBC (ref 0–2)
PLATELET # BLD AUTO: 126 K/UL (ref 150–450)
PMV BLD AUTO: 12.2 FL (ref 10.8–14.1)
POTASSIUM SERPL-SCNC: 2.9 MMOL/L (ref 3.5–5.1)
PROT SERPL-MCNC: 6.4 G/DL (ref 6.3–8.2)
RBC # BLD AUTO: 3.12 M/UL (ref 4.05–5.25)
SODIUM SERPL-SCNC: 139 MMOL/L (ref 136–145)
WBC # BLD AUTO: 4.3 K/UL (ref 4.3–11.1)

## 2017-07-06 PROCEDURE — 85025 COMPLETE CBC W/AUTO DIFF WBC: CPT | Performed by: INTERNAL MEDICINE

## 2017-07-06 PROCEDURE — 84165 PROTEIN E-PHORESIS SERUM: CPT | Performed by: INTERNAL MEDICINE

## 2017-07-06 PROCEDURE — 86334 IMMUNOFIX E-PHORESIS SERUM: CPT | Performed by: INTERNAL MEDICINE

## 2017-07-06 PROCEDURE — 80053 COMPREHEN METABOLIC PANEL: CPT | Performed by: INTERNAL MEDICINE

## 2017-07-06 PROCEDURE — 36415 COLL VENOUS BLD VENIPUNCTURE: CPT | Performed by: INTERNAL MEDICINE

## 2017-07-06 NOTE — PROGRESS NOTES
7/6/17 Dr Ese Subramanian reviewed labs. Patient continue Rev every other 3 days and Ninlaro once a week. Increase Potassium to 10 meq take 2 pills BID.  Patient to return in 4 weeks

## 2017-07-07 LAB
ALBUMIN SERPL ELPH-MCNC: 3.38 G/DL (ref 3.2–5.6)
ALBUMIN/GLOB SERPL: 1.2 {RATIO}
ALPHA1 GLOB SERPL ELPH-MCNC: 0.26 G/DL (ref 0.1–0.4)
ALPHA2 GLOB SERPL ELPH-MCNC: 0.7 G/DL (ref 0.4–1.2)
B-GLOBULIN SERPL QL ELPH: 0.82 G/DL (ref 0.6–1.3)
GAMMA GLOB MFR SERPL ELPH: 1.04 G/DL (ref 0.5–1.6)
IGA SERPL-MCNC: 42 MG/DL (ref 85–499)
IGG SERPL-MCNC: 939 MG/DL (ref 610–1616)
IGM SERPL-MCNC: 27 MG/DL (ref 35–242)
M PROTEIN SERPL ELPH-MCNC: 0.49 G/DL
PROT PATTERN SERPL ELPH-IMP: ABNORMAL
PROT PATTERN SPEC IFE-IMP: ABNORMAL
PROT SERPL-MCNC: 6.2 G/DL (ref 6.3–8.2)

## 2017-08-03 ENCOUNTER — HOSPITAL ENCOUNTER (OUTPATIENT)
Dept: LAB | Age: 82
Discharge: HOME | End: 2017-08-03
Payer: MEDICARE

## 2017-08-03 DIAGNOSIS — I10 ESSENTIAL HYPERTENSION, BENIGN: Chronic | ICD-10-CM

## 2017-08-03 DIAGNOSIS — I48.19 PERSISTENT ATRIAL FIBRILLATION (HCC): Chronic | ICD-10-CM

## 2017-08-03 DIAGNOSIS — E78.5 OTHER AND UNSPECIFIED HYPERLIPIDEMIA: Chronic | ICD-10-CM

## 2017-08-03 DIAGNOSIS — C90.00 MULTIPLE MYELOMA NOT HAVING ACHIEVED REMISSION (HCC): Chronic | ICD-10-CM

## 2017-08-03 LAB
ALBUMIN SERPL BCP-MCNC: 3.2 G/DL (ref 3.2–4.6)
ALBUMIN/GLOB SERPL: 0.9 {RATIO} (ref 1.2–3.5)
ALP SERPL-CCNC: 100 U/L (ref 50–136)
ALT SERPL-CCNC: 31 U/L (ref 12–65)
ANION GAP BLD CALC-SCNC: 5 MMOL/L (ref 7–16)
AST SERPL W P-5'-P-CCNC: 31 U/L (ref 15–37)
BASOPHILS # BLD AUTO: 0 K/UL (ref 0–0.2)
BASOPHILS # BLD: 0 % (ref 0–2)
BILIRUB SERPL-MCNC: 0.4 MG/DL (ref 0.2–1.1)
BUN SERPL-MCNC: 17 MG/DL (ref 8–23)
CALCIUM SERPL-MCNC: 8.4 MG/DL (ref 8.3–10.4)
CHLORIDE SERPL-SCNC: 99 MMOL/L (ref 98–107)
CO2 SERPL-SCNC: 31 MMOL/L (ref 21–32)
CREAT SERPL-MCNC: 1.17 MG/DL (ref 0.6–1)
DIFFERENTIAL METHOD BLD: ABNORMAL
EOSINOPHIL # BLD: 0 K/UL (ref 0–0.8)
EOSINOPHIL NFR BLD: 1 % (ref 0.5–7.8)
ERYTHROCYTE [DISTWIDTH] IN BLOOD BY AUTOMATED COUNT: 15.6 % (ref 11.9–14.6)
GLOBULIN SER CALC-MCNC: 3.5 G/DL (ref 2.3–3.5)
GLUCOSE SERPL-MCNC: 92 MG/DL (ref 65–100)
HCT VFR BLD AUTO: 28.4 % (ref 35.8–46.3)
HGB BLD-MCNC: 9.1 G/DL (ref 11.7–15.4)
LYMPHOCYTES # BLD AUTO: 38 % (ref 13–44)
LYMPHOCYTES # BLD: 1.6 K/UL (ref 0.5–4.6)
MAGNESIUM SERPL-MCNC: 2.2 MG/DL (ref 1.8–2.4)
MCH RBC QN AUTO: 30.8 PG (ref 26.1–32.9)
MCHC RBC AUTO-ENTMCNC: 32 G/DL (ref 31.4–35)
MCV RBC AUTO: 96.3 FL (ref 79.6–97.8)
MONOCYTES # BLD: 0.4 K/UL (ref 0.1–1.3)
MONOCYTES NFR BLD AUTO: 11 % (ref 4–12)
NEUTS SEG # BLD: 2.1 K/UL (ref 1.7–8.2)
NEUTS SEG NFR BLD AUTO: 50 % (ref 43–78)
NRBC # BLD: 0 K/UL (ref 0–0.2)
PLATELET # BLD AUTO: 95 K/UL (ref 150–450)
PMV BLD AUTO: 13.4 FL (ref 10.8–14.1)
POTASSIUM SERPL-SCNC: 4 MMOL/L (ref 3.5–5.1)
PROT SERPL-MCNC: 6.7 G/DL (ref 6.3–8.2)
RBC # BLD AUTO: 2.95 M/UL (ref 4.05–5.25)
SODIUM SERPL-SCNC: 135 MMOL/L (ref 136–145)
WBC # BLD AUTO: 4.1 K/UL (ref 4.3–11.1)

## 2017-08-03 PROCEDURE — 85025 COMPLETE CBC W/AUTO DIFF WBC: CPT | Performed by: INTERNAL MEDICINE

## 2017-08-03 PROCEDURE — 83735 ASSAY OF MAGNESIUM: CPT | Performed by: INTERNAL MEDICINE

## 2017-08-03 PROCEDURE — 80053 COMPREHEN METABOLIC PANEL: CPT | Performed by: INTERNAL MEDICINE

## 2017-08-03 PROCEDURE — 36415 COLL VENOUS BLD VENIPUNCTURE: CPT | Performed by: INTERNAL MEDICINE

## 2017-08-31 ENCOUNTER — PATIENT OUTREACH (OUTPATIENT)
Dept: CASE MANAGEMENT | Age: 82
End: 2017-08-31

## 2017-08-31 ENCOUNTER — HOSPITAL ENCOUNTER (OUTPATIENT)
Dept: LAB | Age: 82
Discharge: HOME | End: 2017-08-31
Payer: MEDICARE

## 2017-08-31 DIAGNOSIS — C90.00 MULTIPLE MYELOMA, REMISSION STATUS UNSPECIFIED (HCC): Chronic | ICD-10-CM

## 2017-08-31 DIAGNOSIS — I48.19 PERSISTENT ATRIAL FIBRILLATION (HCC): Chronic | ICD-10-CM

## 2017-08-31 DIAGNOSIS — I10 ESSENTIAL HYPERTENSION, BENIGN: Chronic | ICD-10-CM

## 2017-08-31 DIAGNOSIS — E78.5 OTHER AND UNSPECIFIED HYPERLIPIDEMIA: Chronic | ICD-10-CM

## 2017-08-31 DIAGNOSIS — E03.9 ACQUIRED HYPOTHYROIDISM: Chronic | ICD-10-CM

## 2017-08-31 LAB
ALBUMIN SERPL-MCNC: 2.8 G/DL (ref 3.2–4.6)
ALBUMIN/GLOB SERPL: 0.8 {RATIO} (ref 1.2–3.5)
ALP SERPL-CCNC: 83 U/L (ref 50–136)
ALT SERPL-CCNC: 32 U/L (ref 12–65)
ANION GAP SERPL CALC-SCNC: 7 MMOL/L (ref 7–16)
AST SERPL-CCNC: 33 U/L (ref 15–37)
BASOPHILS # BLD: 0 K/UL (ref 0–0.2)
BASOPHILS NFR BLD: 1 % (ref 0–2)
BILIRUB SERPL-MCNC: 0.4 MG/DL (ref 0.2–1.1)
BUN SERPL-MCNC: 15 MG/DL (ref 8–23)
CALCIUM SERPL-MCNC: 8 MG/DL (ref 8.3–10.4)
CHLORIDE SERPL-SCNC: 104 MMOL/L (ref 98–107)
CO2 SERPL-SCNC: 23 MMOL/L (ref 21–32)
CREAT SERPL-MCNC: 1.21 MG/DL (ref 0.6–1)
DIFFERENTIAL METHOD BLD: ABNORMAL
EOSINOPHIL # BLD: 0 K/UL (ref 0–0.8)
EOSINOPHIL NFR BLD: 1 % (ref 0.5–7.8)
ERYTHROCYTE [DISTWIDTH] IN BLOOD BY AUTOMATED COUNT: 15 % (ref 11.9–14.6)
GLOBULIN SER CALC-MCNC: 3.5 G/DL (ref 2.3–3.5)
GLUCOSE SERPL-MCNC: 99 MG/DL (ref 65–100)
HCT VFR BLD AUTO: 30.9 % (ref 35.8–46.3)
HGB BLD-MCNC: 9.4 G/DL (ref 11.7–15.4)
LYMPHOCYTES # BLD: 1.5 K/UL (ref 0.5–4.6)
LYMPHOCYTES NFR BLD: 44 % (ref 13–44)
MAGNESIUM SERPL-MCNC: 2.2 MG/DL (ref 1.8–2.4)
MCH RBC QN AUTO: 30.5 PG (ref 26.1–32.9)
MCHC RBC AUTO-ENTMCNC: 30.4 G/DL (ref 31.4–35)
MCV RBC AUTO: 100.3 FL (ref 79.6–97.8)
MONOCYTES # BLD: 0.4 K/UL (ref 0.1–1.3)
MONOCYTES NFR BLD: 11 % (ref 4–12)
NEUTS SEG # BLD: 1.6 K/UL (ref 1.7–8.2)
NEUTS SEG NFR BLD: 43 % (ref 43–78)
NRBC # BLD: 0 K/UL (ref 0–0.2)
PLATELET # BLD AUTO: 94 K/UL (ref 150–450)
PMV BLD AUTO: 12.9 FL (ref 10.8–14.1)
POTASSIUM SERPL-SCNC: 4.1 MMOL/L (ref 3.5–5.1)
PROT SERPL-MCNC: 6.3 G/DL (ref 6.3–8.2)
RBC # BLD AUTO: 3.08 M/UL (ref 4.05–5.25)
SODIUM SERPL-SCNC: 134 MMOL/L (ref 136–145)
WBC # BLD AUTO: 3.5 K/UL (ref 4.3–11.1)

## 2017-08-31 PROCEDURE — 85025 COMPLETE CBC W/AUTO DIFF WBC: CPT | Performed by: INTERNAL MEDICINE

## 2017-08-31 PROCEDURE — 83735 ASSAY OF MAGNESIUM: CPT | Performed by: INTERNAL MEDICINE

## 2017-08-31 PROCEDURE — 86334 IMMUNOFIX E-PHORESIS SERUM: CPT | Performed by: INTERNAL MEDICINE

## 2017-08-31 PROCEDURE — 80053 COMPREHEN METABOLIC PANEL: CPT | Performed by: INTERNAL MEDICINE

## 2017-08-31 PROCEDURE — 84165 PROTEIN E-PHORESIS SERUM: CPT | Performed by: INTERNAL MEDICINE

## 2017-08-31 PROCEDURE — 36415 COLL VENOUS BLD VENIPUNCTURE: CPT | Performed by: INTERNAL MEDICINE

## 2017-09-01 LAB
ALBUMIN SERPL ELPH-MCNC: 3.56 G/DL (ref 3.2–5.6)
ALBUMIN/GLOB SERPL: 1.3 {RATIO}
ALPHA1 GLOB SERPL ELPH-MCNC: 0.25 G/DL (ref 0.1–0.4)
ALPHA2 GLOB SERPL ELPH-MCNC: 0.74 G/DL (ref 0.4–1.2)
B-GLOBULIN SERPL QL ELPH: 0.83 G/DL (ref 0.6–1.3)
GAMMA GLOB MFR SERPL ELPH: 0.91 G/DL (ref 0.5–1.6)
IGA SERPL-MCNC: 39 MG/DL (ref 85–499)
IGG SERPL-MCNC: 887 MG/DL (ref 610–1616)
IGM SERPL-MCNC: 33 MG/DL (ref 35–242)
M PROTEIN SERPL ELPH-MCNC: 0.25 G/DL
PROT PATTERN SERPL ELPH-IMP: ABNORMAL
PROT PATTERN SPEC IFE-IMP: ABNORMAL
PROT SERPL-MCNC: 6.3 G/DL (ref 6.3–8.2)

## 2017-09-28 ENCOUNTER — PATIENT OUTREACH (OUTPATIENT)
Dept: CASE MANAGEMENT | Age: 82
End: 2017-09-28

## 2017-09-28 ENCOUNTER — HOSPITAL ENCOUNTER (OUTPATIENT)
Dept: LAB | Age: 82
Discharge: HOME | End: 2017-09-28
Payer: MEDICARE

## 2017-09-28 DIAGNOSIS — C90.00 MULTIPLE MYELOMA NOT HAVING ACHIEVED REMISSION (HCC): ICD-10-CM

## 2017-09-28 LAB
ALBUMIN SERPL-MCNC: 3.2 G/DL (ref 3.2–4.6)
ALBUMIN/GLOB SERPL: 0.8 {RATIO}
ALP SERPL-CCNC: 107 U/L (ref 50–136)
ALT SERPL-CCNC: 31 U/L (ref 12–65)
ANION GAP SERPL CALC-SCNC: 8 MMOL/L
AST SERPL-CCNC: 31 U/L (ref 15–37)
BASOPHILS # BLD: 0 K/UL (ref 0–0.2)
BASOPHILS NFR BLD: 1 % (ref 0–2)
BILIRUB SERPL-MCNC: 0.5 MG/DL (ref 0.2–1.1)
BUN SERPL-MCNC: 14 MG/DL (ref 8–23)
CALCIUM SERPL-MCNC: 8.2 MG/DL (ref 8.3–10.4)
CHLORIDE SERPL-SCNC: 97 MMOL/L (ref 98–107)
CO2 SERPL-SCNC: 31 MMOL/L (ref 21–32)
CREAT SERPL-MCNC: 1 MG/DL (ref 0.6–1)
DIFFERENTIAL METHOD BLD: ABNORMAL
EOSINOPHIL # BLD: 0 K/UL (ref 0–0.8)
EOSINOPHIL NFR BLD: 0 % (ref 0.5–7.8)
ERYTHROCYTE [DISTWIDTH] IN BLOOD BY AUTOMATED COUNT: 14.6 % (ref 11.9–14.6)
GLOBULIN SER CALC-MCNC: 3.8 G/DL
GLUCOSE SERPL-MCNC: 96 MG/DL (ref 65–100)
HCT VFR BLD AUTO: 30.7 % (ref 35.8–46.3)
HGB BLD-MCNC: 9.9 G/DL (ref 11.7–15.4)
LYMPHOCYTES # BLD: 1.3 K/UL (ref 0.5–4.6)
LYMPHOCYTES NFR BLD: 35 % (ref 13–44)
MCH RBC QN AUTO: 29.6 PG (ref 26.1–32.9)
MCHC RBC AUTO-ENTMCNC: 32.2 G/DL (ref 31.4–35)
MCV RBC AUTO: 91.9 FL (ref 79.6–97.8)
MONOCYTES # BLD: 0.4 K/UL (ref 0.1–1.3)
MONOCYTES NFR BLD: 12 % (ref 4–12)
NEUTS SEG # BLD: 2 K/UL (ref 1.7–8.2)
NEUTS SEG NFR BLD: 52 % (ref 43–78)
NRBC # BLD: 0 K/UL (ref 0–0.2)
NRBC BLD-RTO: 0 PER 100 WBC (ref 0–2)
PLATELET # BLD AUTO: 155 K/UL (ref 150–450)
PMV BLD AUTO: 12.9 FL (ref 10.8–14.1)
POTASSIUM SERPL-SCNC: 3.2 MMOL/L (ref 3.5–5.1)
PROT SERPL-MCNC: 7 G/DL (ref 6.3–8.2)
RBC # BLD AUTO: 3.34 M/UL (ref 4.05–5.25)
SODIUM SERPL-SCNC: 136 MMOL/L (ref 136–145)
WBC # BLD AUTO: 3.7 K/UL (ref 4.3–11.1)

## 2017-09-28 PROCEDURE — 85025 COMPLETE CBC W/AUTO DIFF WBC: CPT | Performed by: INTERNAL MEDICINE

## 2017-09-28 PROCEDURE — 36415 COLL VENOUS BLD VENIPUNCTURE: CPT | Performed by: INTERNAL MEDICINE

## 2017-09-28 PROCEDURE — 80053 COMPREHEN METABOLIC PANEL: CPT | Performed by: INTERNAL MEDICINE

## 2017-09-28 NOTE — PROGRESS NOTES
Pt in for OV with Kalina Prior. Continue on Revlimind and Ninlaro. Doxycycline 100 mg BID x30 days added due to sore on left lower leg. Pt also to keep applying topical antibiotic to area per Dr. Gilman Prior. Pt will return on 10/26 for labs and OV. New scripts also printed for Revilmind and Ninlaro.

## 2017-10-09 ENCOUNTER — HOSPITAL ENCOUNTER (OUTPATIENT)
Dept: GENERAL RADIOLOGY | Age: 82
Discharge: HOME | End: 2017-10-09
Payer: MEDICARE

## 2017-10-09 ENCOUNTER — HOSPITAL ENCOUNTER (OUTPATIENT)
Dept: LAB | Age: 82
Discharge: HOME | End: 2017-10-09
Payer: MEDICARE

## 2017-10-09 DIAGNOSIS — Z79.899 ON AMIODARONE THERAPY: ICD-10-CM

## 2017-10-09 LAB — BNP SERPL-MCNC: 360 PG/ML

## 2017-10-09 PROCEDURE — 71020 XR CHEST PA LAT: CPT

## 2017-10-09 PROCEDURE — 83880 ASSAY OF NATRIURETIC PEPTIDE: CPT | Performed by: INTERNAL MEDICINE

## 2017-10-09 PROCEDURE — 36415 COLL VENOUS BLD VENIPUNCTURE: CPT | Performed by: INTERNAL MEDICINE

## 2017-10-16 NOTE — PROGRESS NOTES
Please call patient - she has some fluid in lung so we are treating with lasix. How is she doing. Plan F/U 6 weeks.   SJ

## 2017-10-24 ENCOUNTER — HOSPITAL ENCOUNTER (OUTPATIENT)
Dept: LAB | Age: 82
Discharge: HOME | End: 2017-10-24
Payer: MEDICARE

## 2017-10-24 DIAGNOSIS — I48.19 PERSISTENT ATRIAL FIBRILLATION (HCC): Chronic | ICD-10-CM

## 2017-10-24 DIAGNOSIS — I50.32 CHRONIC DIASTOLIC HEART FAILURE (HCC): Chronic | ICD-10-CM

## 2017-10-24 LAB
ANION GAP SERPL CALC-SCNC: 7 MMOL/L
BUN SERPL-MCNC: 29 MG/DL (ref 8–23)
CALCIUM SERPL-MCNC: 8.6 MG/DL (ref 8.3–10.4)
CHLORIDE SERPL-SCNC: 95 MMOL/L (ref 98–107)
CO2 SERPL-SCNC: 34 MMOL/L (ref 21–32)
CREAT SERPL-MCNC: 1.6 MG/DL (ref 0.6–1)
GLUCOSE SERPL-MCNC: 91 MG/DL (ref 65–100)
POTASSIUM SERPL-SCNC: 3 MMOL/L (ref 3.5–5.1)
SODIUM SERPL-SCNC: 136 MMOL/L (ref 136–145)

## 2017-10-24 PROCEDURE — 80048 BASIC METABOLIC PNL TOTAL CA: CPT | Performed by: INTERNAL MEDICINE

## 2017-10-24 PROCEDURE — 36415 COLL VENOUS BLD VENIPUNCTURE: CPT | Performed by: INTERNAL MEDICINE

## 2017-10-26 ENCOUNTER — HOSPITAL ENCOUNTER (OUTPATIENT)
Dept: LAB | Age: 82
Discharge: HOME | End: 2017-10-26
Payer: MEDICARE

## 2017-10-26 ENCOUNTER — PATIENT OUTREACH (OUTPATIENT)
Dept: CASE MANAGEMENT | Age: 82
End: 2017-10-26

## 2017-10-26 DIAGNOSIS — C90.00 MULTIPLE MYELOMA, REMISSION STATUS UNSPECIFIED (HCC): Chronic | ICD-10-CM

## 2017-10-26 DIAGNOSIS — C90.01 MULTIPLE MYELOMA IN REMISSION (HCC): ICD-10-CM

## 2017-10-26 LAB
ALBUMIN SERPL-MCNC: 3.3 G/DL (ref 3.2–4.6)
ALBUMIN/GLOB SERPL: 1 {RATIO} (ref 1.2–3.5)
ALP SERPL-CCNC: 83 U/L (ref 50–136)
ALT SERPL-CCNC: 49 U/L (ref 12–65)
ANION GAP SERPL CALC-SCNC: 5 MMOL/L (ref 7–16)
AST SERPL-CCNC: 55 U/L (ref 15–37)
BASOPHILS # BLD: 0 K/UL (ref 0–0.2)
BASOPHILS NFR BLD: 1 % (ref 0–2)
BILIRUB SERPL-MCNC: 0.7 MG/DL (ref 0.2–1.1)
BUN SERPL-MCNC: 30 MG/DL (ref 8–23)
CALCIUM SERPL-MCNC: 9.1 MG/DL (ref 8.3–10.4)
CHLORIDE SERPL-SCNC: 95 MMOL/L (ref 98–107)
CO2 SERPL-SCNC: 33 MMOL/L (ref 21–32)
CREAT SERPL-MCNC: 1.42 MG/DL (ref 0.6–1)
DIFFERENTIAL METHOD BLD: ABNORMAL
EOSINOPHIL # BLD: 0 K/UL (ref 0–0.8)
EOSINOPHIL NFR BLD: 1 % (ref 0.5–7.8)
ERYTHROCYTE [DISTWIDTH] IN BLOOD BY AUTOMATED COUNT: 15 % (ref 11.9–14.6)
GLOBULIN SER CALC-MCNC: 3.3 G/DL (ref 2.3–3.5)
GLUCOSE SERPL-MCNC: 98 MG/DL (ref 65–100)
HCT VFR BLD AUTO: 29.1 % (ref 35.8–46.3)
HGB BLD-MCNC: 9.5 G/DL (ref 11.7–15.4)
LYMPHOCYTES # BLD: 1.1 K/UL (ref 0.5–4.6)
LYMPHOCYTES NFR BLD: 29 % (ref 13–44)
MCH RBC QN AUTO: 29.5 PG (ref 26.1–32.9)
MCHC RBC AUTO-ENTMCNC: 32.6 G/DL (ref 31.4–35)
MCV RBC AUTO: 90.4 FL (ref 79.6–97.8)
MONOCYTES # BLD: 0.4 K/UL (ref 0.1–1.3)
MONOCYTES NFR BLD: 10 % (ref 4–12)
NEUTS SEG # BLD: 2.4 K/UL (ref 1.7–8.2)
NEUTS SEG NFR BLD: 61 % (ref 43–78)
NRBC # BLD: 0.01 K/UL (ref 0–0.2)
PLATELET # BLD AUTO: 85 K/UL (ref 150–450)
PMV BLD AUTO: 14.4 FL (ref 10.8–14.1)
POTASSIUM SERPL-SCNC: 3.5 MMOL/L (ref 3.5–5.1)
PROT SERPL-MCNC: 6.6 G/DL (ref 6.3–8.2)
RBC # BLD AUTO: 3.22 M/UL (ref 4.05–5.25)
SODIUM SERPL-SCNC: 133 MMOL/L (ref 136–145)
WBC # BLD AUTO: 3.9 K/UL (ref 4.3–11.1)

## 2017-10-26 PROCEDURE — 86334 IMMUNOFIX E-PHORESIS SERUM: CPT | Performed by: INTERNAL MEDICINE

## 2017-10-26 PROCEDURE — 86923 COMPATIBILITY TEST ELECTRIC: CPT | Performed by: INTERNAL MEDICINE

## 2017-10-26 PROCEDURE — 80053 COMPREHEN METABOLIC PANEL: CPT | Performed by: INTERNAL MEDICINE

## 2017-10-26 PROCEDURE — 84165 PROTEIN E-PHORESIS SERUM: CPT | Performed by: INTERNAL MEDICINE

## 2017-10-26 PROCEDURE — 36415 COLL VENOUS BLD VENIPUNCTURE: CPT | Performed by: INTERNAL MEDICINE

## 2017-10-26 PROCEDURE — 86900 BLOOD TYPING SEROLOGIC ABO: CPT | Performed by: INTERNAL MEDICINE

## 2017-10-26 PROCEDURE — 85025 COMPLETE CBC W/AUTO DIFF WBC: CPT | Performed by: INTERNAL MEDICINE

## 2017-10-26 NOTE — PROGRESS NOTES
Pt and family in [address] Labs reviewed by Dr. Lalita Read. Pt Hgb at 9.5 but pt states he is veery weak and has a lot of fatigue. Orders for 2 units of PRBC for 10/28. Pt is to stop Ninlaro for now and continue with Revlimid. Pt will be seen on 11/30 for labs and OV. New script of revlimid written today.

## 2017-10-27 LAB
ALBUMIN SERPL ELPH-MCNC: 3.47 G/DL (ref 3.2–5.6)
ALBUMIN/GLOB SERPL: 1.2 {RATIO}
ALPHA1 GLOB SERPL ELPH-MCNC: 0.34 G/DL (ref 0.1–0.4)
ALPHA2 GLOB SERPL ELPH-MCNC: 0.72 G/DL (ref 0.4–1.2)
B-GLOBULIN SERPL QL ELPH: 0.79 G/DL (ref 0.6–1.3)
GAMMA GLOB MFR SERPL ELPH: 1.08 G/DL (ref 0.5–1.6)
IGA SERPL-MCNC: 47 MG/DL (ref 85–499)
IGG SERPL-MCNC: 1055 MG/DL (ref 610–1616)
IGM SERPL-MCNC: 31 MG/DL (ref 35–242)
M PROTEIN SERPL ELPH-MCNC: 0.49 G/DL
PROT PATTERN SERPL ELPH-IMP: ABNORMAL
PROT PATTERN SPEC IFE-IMP: ABNORMAL
PROT SERPL-MCNC: 6.4 G/DL (ref 6.3–8.2)

## 2017-10-28 ENCOUNTER — HOSPITAL ENCOUNTER (OUTPATIENT)
Dept: INFUSION THERAPY | Age: 82
Discharge: HOME | End: 2017-10-28
Payer: MEDICARE

## 2017-10-28 VITALS
BODY MASS INDEX: 21.15 KG/M2 | SYSTOLIC BLOOD PRESSURE: 138 MMHG | TEMPERATURE: 97.6 F | DIASTOLIC BLOOD PRESSURE: 68 MMHG | HEART RATE: 60 BPM | WEIGHT: 119.4 LBS | OXYGEN SATURATION: 99 % | RESPIRATION RATE: 18 BRPM

## 2017-10-28 DIAGNOSIS — C90.01 MULTIPLE MYELOMA IN REMISSION (HCC): ICD-10-CM

## 2017-10-28 PROCEDURE — 36430 TRANSFUSION BLD/BLD COMPNT: CPT

## 2017-10-28 PROCEDURE — P9040 RBC LEUKOREDUCED IRRADIATED: HCPCS | Performed by: INTERNAL MEDICINE

## 2017-10-28 PROCEDURE — 74011250637 HC RX REV CODE- 250/637: Performed by: INTERNAL MEDICINE

## 2017-10-28 PROCEDURE — 74011250636 HC RX REV CODE- 250/636: Performed by: INTERNAL MEDICINE

## 2017-10-28 PROCEDURE — 77030018667 ADMN ST IV BLD FENW -A

## 2017-10-28 RX ORDER — DIPHENHYDRAMINE HCL 25 MG
25 CAPSULE ORAL ONCE
Status: COMPLETED | OUTPATIENT
Start: 2017-10-28 | End: 2017-10-28

## 2017-10-28 RX ORDER — SODIUM CHLORIDE 0.9 % (FLUSH) 0.9 %
10 SYRINGE (ML) INJECTION AS NEEDED
Status: ACTIVE | OUTPATIENT
Start: 2017-10-28 | End: 2017-10-28

## 2017-10-28 RX ORDER — SODIUM CHLORIDE 9 MG/ML
250 INJECTION, SOLUTION INTRAVENOUS ONCE
Status: COMPLETED | OUTPATIENT
Start: 2017-10-28 | End: 2017-10-28

## 2017-10-28 RX ORDER — ACETAMINOPHEN 325 MG/1
650 TABLET ORAL
Status: DISCONTINUED | OUTPATIENT
Start: 2017-10-28 | End: 2017-11-01 | Stop reason: HOSPADM

## 2017-10-28 RX ADMIN — Medication 10 ML: at 13:45

## 2017-10-28 RX ADMIN — ACETAMINOPHEN 650 MG: 325 TABLET ORAL at 13:45

## 2017-10-28 RX ADMIN — SODIUM CHLORIDE 250 ML: 900 INJECTION, SOLUTION INTRAVENOUS at 13:45

## 2017-10-28 RX ADMIN — DIPHENHYDRAMINE HYDROCHLORIDE 25 MG: 25 CAPSULE ORAL at 13:44

## 2017-10-28 NOTE — PROGRESS NOTES
Pt arrived via wheelchair today at 1334, to receive 2 units of blood. Pt tolerated without difficulty. Patient discharged via wheelchair accompanied by daughter. Instructed to notify physician of any problems, questions or concerns. Allowed opportunity for patient/family to ask questions. Verbalized understanding. Next appointment is Nov 30 at [phone] with Carrillo Sutton.

## 2017-10-29 LAB
ABO + RH BLD: NORMAL
BLD PROD TYP BPU: NORMAL
BLD PROD TYP BPU: NORMAL
BLOOD BANK CMNT PATIENT-IMP: NORMAL
BLOOD GROUP ANTIBODIES SERPL: NORMAL
BPU ID: NORMAL
BPU ID: NORMAL
CROSSMATCH RESULT,%XM: NORMAL
CROSSMATCH RESULT,%XM: NORMAL
SPECIMEN EXP DATE BLD: NORMAL
STATUS OF UNIT,%ST: NORMAL
STATUS OF UNIT,%ST: NORMAL
UNIT DIVISION, %UDIV: 0
UNIT DIVISION, %UDIV: 0

## 2017-11-27 PROBLEM — N18.30 STAGE 3 CHRONIC KIDNEY DISEASE (HCC): Status: ACTIVE | Noted: 2017-11-27

## 2017-11-30 ENCOUNTER — HOSPITAL ENCOUNTER (OUTPATIENT)
Dept: LAB | Age: 82
Discharge: HOME | End: 2017-11-30
Payer: MEDICARE

## 2017-11-30 ENCOUNTER — PATIENT OUTREACH (OUTPATIENT)
Dept: CASE MANAGEMENT | Age: 82
End: 2017-11-30

## 2017-11-30 ENCOUNTER — HOSPITAL ENCOUNTER (OUTPATIENT)
Dept: LAB | Age: 82
Discharge: HOME | End: 2017-11-30

## 2017-11-30 DIAGNOSIS — D50.9 IRON DEFICIENCY ANEMIA, UNSPECIFIED IRON DEFICIENCY ANEMIA TYPE: Chronic | ICD-10-CM

## 2017-11-30 DIAGNOSIS — I10 ESSENTIAL HYPERTENSION WITH GOAL BLOOD PRESSURE LESS THAN 130/85: ICD-10-CM

## 2017-11-30 DIAGNOSIS — C90.00 MULTIPLE MYELOMA, REMISSION STATUS UNSPECIFIED (HCC): Chronic | ICD-10-CM

## 2017-11-30 DIAGNOSIS — E78.2 MIXED HYPERLIPIDEMIA: ICD-10-CM

## 2017-11-30 DIAGNOSIS — I10 ESSENTIAL HYPERTENSION, BENIGN: Chronic | ICD-10-CM

## 2017-11-30 DIAGNOSIS — C90.01 MULTIPLE MYELOMA IN REMISSION (HCC): ICD-10-CM

## 2017-11-30 DIAGNOSIS — C90.02 MULTIPLE MYELOMA IN RELAPSE (HCC): Chronic | ICD-10-CM

## 2017-11-30 DIAGNOSIS — F41.9 CHRONIC ANXIETY: Chronic | ICD-10-CM

## 2017-11-30 LAB
ALBUMIN SERPL-MCNC: 3.2 G/DL (ref 3.2–4.6)
ALBUMIN SERPL-MCNC: 3.2 G/DL (ref 3.2–4.6)
ALBUMIN/GLOB SERPL: 1 {RATIO} (ref 1.2–3.5)
ALBUMIN/GLOB SERPL: 1 {RATIO} (ref 1.2–3.5)
ALP SERPL-CCNC: 78 U/L (ref 50–136)
ALP SERPL-CCNC: 78 U/L (ref 50–136)
ALT SERPL-CCNC: 47 U/L (ref 12–65)
ALT SERPL-CCNC: 47 U/L (ref 12–65)
ANION GAP SERPL CALC-SCNC: 7 MMOL/L (ref 7–16)
ANION GAP SERPL CALC-SCNC: 7 MMOL/L (ref 7–16)
AST SERPL-CCNC: 47 U/L (ref 15–37)
AST SERPL-CCNC: 47 U/L (ref 15–37)
BASOPHILS # BLD: 0 K/UL (ref 0–0.2)
BASOPHILS NFR BLD: 1 % (ref 0–2)
BILIRUB SERPL-MCNC: 0.9 MG/DL (ref 0.2–1.1)
BILIRUB SERPL-MCNC: 0.9 MG/DL (ref 0.2–1.1)
BUN SERPL-MCNC: 20 MG/DL (ref 8–23)
BUN SERPL-MCNC: 20 MG/DL (ref 8–23)
CALCIUM SERPL-MCNC: 8.6 MG/DL (ref 8.3–10.4)
CALCIUM SERPL-MCNC: 8.6 MG/DL (ref 8.3–10.4)
CHLORIDE SERPL-SCNC: 99 MMOL/L (ref 98–107)
CHLORIDE SERPL-SCNC: 99 MMOL/L (ref 98–107)
CO2 SERPL-SCNC: 30 MMOL/L (ref 21–32)
CO2 SERPL-SCNC: 30 MMOL/L (ref 21–32)
CREAT SERPL-MCNC: 1.09 MG/DL (ref 0.6–1)
CREAT SERPL-MCNC: 1.09 MG/DL (ref 0.6–1)
DIFFERENTIAL METHOD BLD: ABNORMAL
EOSINOPHIL # BLD: 0 K/UL (ref 0–0.8)
EOSINOPHIL NFR BLD: 1 % (ref 0.5–7.8)
ERYTHROCYTE [DISTWIDTH] IN BLOOD BY AUTOMATED COUNT: 16 % (ref 11.9–14.6)
GLOBULIN SER CALC-MCNC: 3.3 G/DL (ref 2.3–3.5)
GLOBULIN SER CALC-MCNC: 3.3 G/DL (ref 2.3–3.5)
GLUCOSE SERPL-MCNC: 89 MG/DL (ref 65–100)
GLUCOSE SERPL-MCNC: 89 MG/DL (ref 65–100)
HCT VFR BLD AUTO: 32.5 % (ref 35.8–46.3)
HGB BLD-MCNC: 10.7 G/DL (ref 11.7–15.4)
LYMPHOCYTES # BLD: 1.2 K/UL (ref 0.5–4.6)
LYMPHOCYTES NFR BLD: 34 % (ref 13–44)
MCH RBC QN AUTO: 30.2 PG (ref 26.1–32.9)
MCHC RBC AUTO-ENTMCNC: 32.9 G/DL (ref 31.4–35)
MCV RBC AUTO: 91.8 FL (ref 79.6–97.8)
MONOCYTES # BLD: 0.3 K/UL (ref 0.1–1.3)
MONOCYTES NFR BLD: 9 % (ref 4–12)
NEUTS SEG # BLD: 1.9 K/UL (ref 1.7–8.2)
NEUTS SEG NFR BLD: 54 % (ref 43–78)
NRBC # BLD: 0 K/UL (ref 0–0.2)
PLATELET # BLD AUTO: 120 K/UL (ref 150–450)
PMV BLD AUTO: 11.9 FL (ref 10.8–14.1)
POTASSIUM SERPL-SCNC: 3.2 MMOL/L (ref 3.5–5.1)
POTASSIUM SERPL-SCNC: 3.2 MMOL/L (ref 3.5–5.1)
PROT SERPL-MCNC: 6.5 G/DL (ref 6.3–8.2)
PROT SERPL-MCNC: 6.5 G/DL (ref 6.3–8.2)
RBC # BLD AUTO: 3.54 M/UL (ref 4.05–5.25)
SODIUM SERPL-SCNC: 136 MMOL/L (ref 136–145)
SODIUM SERPL-SCNC: 136 MMOL/L (ref 136–145)
WBC # BLD AUTO: 3.4 K/UL (ref 4.3–11.1)

## 2017-11-30 PROCEDURE — 36415 COLL VENOUS BLD VENIPUNCTURE: CPT | Performed by: INTERNAL MEDICINE

## 2017-11-30 PROCEDURE — 85025 COMPLETE CBC W/AUTO DIFF WBC: CPT | Performed by: INTERNAL MEDICINE

## 2017-11-30 PROCEDURE — 80053 COMPREHEN METABOLIC PANEL: CPT | Performed by: INTERNAL MEDICINE

## 2017-11-30 NOTE — PROGRESS NOTES
Pt in [place]. Labs reviewed by Dr. Rah Agrawal. Pt will cont with Revlimind. We will add in Panobinostat she will take this twice a week. She is concerned about some issues with her skin. She will make an appt with Dr. Vishal Lemus her dermatologist. I advised her if they cant get her in soon to call me and will call for her. Printed scripts given to [facility] for chemo Ed with new oral chemo. Pt will return on 12/28 with CBC, CMP and SPEP to see Dr. Rah Agrawal. Advised pt to call with any further questions or concerns.

## 2017-12-05 ENCOUNTER — DOCUMENTATION ONLY (OUTPATIENT)
Dept: HEMATOLOGY | Age: 82
End: 2017-12-05

## 2017-12-05 NOTE — PROGRESS NOTES
I spoke with Kitty Wesley regarding her Medicare and [employer]. Ms. Darek Funez has no financial issues at this time. Her [facility] will [facility] from [facility]. Next, I spoke with Ms. Kennedy regarding potential oral medication authorizations. I told her that if she ever had any problems getting her oral medications filled to give the dedicated  [address] [place] Dom Oropeza  Ariella Pettit a call. Most of the time, it is simply an authorization that needs to be done with the insurance company. Next, I spoke with Ms. Kennedy regarding enrolling with [facility] and [facility]. I went over some of the services that [facility] and [facility] offers and the enrollment process. Ms. Darek Funez declined. Lastly, I gave Ms. Darek Funez a form with various resource organizations that could assist with specific needs (example:  transportation, lodging, preparing meals, home cleaning)

## 2018-01-08 ENCOUNTER — PATIENT OUTREACH (OUTPATIENT)
Dept: CASE MANAGEMENT | Age: 83
End: 2018-01-08

## 2018-01-08 ENCOUNTER — HOSPITAL ENCOUNTER (OUTPATIENT)
Dept: LAB | Age: 83
Discharge: HOME | End: 2018-01-08
Payer: MEDICARE

## 2018-01-08 DIAGNOSIS — C90.00 MULTIPLE MYELOMA NOT HAVING ACHIEVED REMISSION (HCC): ICD-10-CM

## 2018-01-08 LAB
ALBUMIN SERPL-MCNC: 3.2 G/DL (ref 3.2–4.6)
ALBUMIN/GLOB SERPL: 0.9 {RATIO} (ref 1.2–3.5)
ALP SERPL-CCNC: 97 U/L (ref 50–136)
ALT SERPL-CCNC: 41 U/L (ref 12–65)
ANION GAP SERPL CALC-SCNC: 8 MMOL/L (ref 7–16)
AST SERPL-CCNC: 35 U/L (ref 15–37)
BASOPHILS # BLD: 0 K/UL (ref 0–0.2)
BASOPHILS NFR BLD: 1 % (ref 0–2)
BILIRUB SERPL-MCNC: 0.7 MG/DL (ref 0.2–1.1)
BUN SERPL-MCNC: 22 MG/DL (ref 8–23)
CALCIUM SERPL-MCNC: 8.5 MG/DL (ref 8.3–10.4)
CHLORIDE SERPL-SCNC: 97 MMOL/L (ref 98–107)
CO2 SERPL-SCNC: 31 MMOL/L (ref 21–32)
CREAT SERPL-MCNC: 1.17 MG/DL (ref 0.6–1)
DIFFERENTIAL METHOD BLD: ABNORMAL
EOSINOPHIL # BLD: 0 K/UL (ref 0–0.8)
EOSINOPHIL NFR BLD: 0 % (ref 0.5–7.8)
ERYTHROCYTE [DISTWIDTH] IN BLOOD BY AUTOMATED COUNT: 17.2 % (ref 11.9–14.6)
GLOBULIN SER CALC-MCNC: 3.7 G/DL (ref 2.3–3.5)
GLUCOSE SERPL-MCNC: 93 MG/DL (ref 65–100)
HCT VFR BLD AUTO: 29.2 % (ref 35.8–46.3)
HGB BLD-MCNC: 9.6 G/DL (ref 11.7–15.4)
LYMPHOCYTES # BLD: 1.2 K/UL (ref 0.5–4.6)
LYMPHOCYTES NFR BLD: 36 % (ref 13–44)
MCH RBC QN AUTO: 31.5 PG (ref 26.1–32.9)
MCHC RBC AUTO-ENTMCNC: 32.9 G/DL (ref 31.4–35)
MCV RBC AUTO: 95.7 FL (ref 79.6–97.8)
MONOCYTES # BLD: 0.4 K/UL (ref 0.1–1.3)
MONOCYTES NFR BLD: 12 % (ref 4–12)
NEUTS SEG # BLD: 1.7 K/UL (ref 1.7–8.2)
NEUTS SEG NFR BLD: 51 % (ref 43–78)
NRBC # BLD: 0 K/UL (ref 0–0.2)
PLATELET # BLD AUTO: 231 K/UL (ref 150–450)
PMV BLD AUTO: 10.7 FL (ref 10.8–14.1)
POTASSIUM SERPL-SCNC: 3.5 MMOL/L (ref 3.5–5.1)
PROT SERPL-MCNC: 6.9 G/DL (ref 6.3–8.2)
RBC # BLD AUTO: 3.05 M/UL (ref 4.05–5.25)
SODIUM SERPL-SCNC: 136 MMOL/L (ref 136–145)
WBC # BLD AUTO: 3.3 K/UL (ref 4.3–11.1)

## 2018-01-08 PROCEDURE — 36415 COLL VENOUS BLD VENIPUNCTURE: CPT | Performed by: INTERNAL MEDICINE

## 2018-01-08 PROCEDURE — 85025 COMPLETE CBC W/AUTO DIFF WBC: CPT | Performed by: INTERNAL MEDICINE

## 2018-01-08 PROCEDURE — 84165 PROTEIN E-PHORESIS SERUM: CPT | Performed by: INTERNAL MEDICINE

## 2018-01-08 PROCEDURE — 80053 COMPREHEN METABOLIC PANEL: CPT | Performed by: INTERNAL MEDICINE

## 2018-01-08 PROCEDURE — 86334 IMMUNOFIX E-PHORESIS SERUM: CPT | Performed by: INTERNAL MEDICINE

## 2018-01-08 NOTE — PROGRESS NOTES
1/8/18:  Patient in for follow-up with Dr. Matthew Sparrow. Patient on revlimid currently but waiting on appeal for panobinostat. Dr. Matthew Sparrow would like the patient to continue revlimid and return in one month with labs (CBC, CMP only).

## 2018-01-09 LAB
ALBUMIN SERPL ELPH-MCNC: 3.15 G/DL (ref 3.2–5.6)
ALBUMIN/GLOB SERPL: 1 {RATIO}
ALPHA1 GLOB SERPL ELPH-MCNC: 0.39 G/DL (ref 0.1–0.4)
ALPHA2 GLOB SERPL ELPH-MCNC: 0.92 G/DL (ref 0.4–1.2)
B-GLOBULIN SERPL QL ELPH: 1 G/DL (ref 0.6–1.3)
GAMMA GLOB MFR SERPL ELPH: 0.93 G/DL (ref 0.5–1.6)
IGA SERPL-MCNC: 47 MG/DL (ref 85–499)
IGG SERPL-MCNC: 775 MG/DL (ref 610–1616)
IGM SERPL-MCNC: 20 MG/DL (ref 35–242)
M PROTEIN SERPL ELPH-MCNC: 0.33 G/DL
PROT PATTERN SERPL ELPH-IMP: ABNORMAL
PROT PATTERN SPEC IFE-IMP: ABNORMAL
PROT SERPL-MCNC: 6.4 G/DL (ref 6.3–8.2)

## 2018-01-30 ENCOUNTER — DOCUMENTATION ONLY (OUTPATIENT)
Dept: CASE MANAGEMENT | Age: 83
End: 2018-01-30

## 2018-01-30 NOTE — PROGRESS NOTES
Spoke to pt daughter natalya about pt having diarrhea after taking her panobinostat . She said that her BP was low yesterday 98/52 and the home health nurse had her drink and it came up to 110/62. She is going to check on her today and advised her to call me to see if she is better. If not we will need to see her for labs and replacements. Also educated daughter on pt taking imodium when she takes this medication as this is a side effect and to take one with every diarrhea episode up to x8 daily. Daughter VU and will call me back once she sees her mother today and speaks with nurse. She did not have the nurses number to give me at the time of conversation.

## 2018-01-31 ENCOUNTER — DOCUMENTATION ONLY (OUTPATIENT)
Dept: CASE MANAGEMENT | Age: 83
End: 2018-01-31

## 2018-01-31 NOTE — PROGRESS NOTES
Late entry 1/30/2018 16:45. Spoke with pt daughter Bobby Chino about how pt was feeling. She said her breathing was off and that the home health nurse was concerned. She said her BP had come up but that she had some SOB. Ricardo Button since the office was near closing that she would need to take her to the ER. Khanh Nye stated she would take pt to the ER.  Advised her to call in am.

## 2018-01-31 NOTE — PROGRESS NOTES
Spoke to Benjy pt daughter and she says she is feeling better but still has some diarrhea and SOB when ambulating. Adding pt to been seen in am with labs and possible replacements. Will notify natalya with times once confirmed with scheduling.

## 2018-02-01 ENCOUNTER — HOSPITAL ENCOUNTER (OUTPATIENT)
Dept: INFUSION THERAPY | Age: 83
Discharge: HOME | End: 2018-02-01
Payer: MEDICARE

## 2018-02-01 ENCOUNTER — HOSPITAL ENCOUNTER (OUTPATIENT)
Dept: NON INVASIVE DIAGNOSTICS | Age: 83
Discharge: HOME | End: 2018-02-01
Attending: NURSE PRACTITIONER
Payer: MEDICARE

## 2018-02-01 ENCOUNTER — PATIENT OUTREACH (OUTPATIENT)
Dept: CASE MANAGEMENT | Age: 83
End: 2018-02-01

## 2018-02-01 ENCOUNTER — HOSPITAL ENCOUNTER (OUTPATIENT)
Dept: GENERAL RADIOLOGY | Age: 83
Discharge: HOME | End: 2018-02-01
Payer: MEDICARE

## 2018-02-01 ENCOUNTER — APPOINTMENT (OUTPATIENT)
Dept: NON INVASIVE DIAGNOSTICS | Age: 83
End: 2018-02-01
Attending: NURSE PRACTITIONER
Payer: MEDICARE

## 2018-02-01 ENCOUNTER — HOSPITAL ENCOUNTER (OUTPATIENT)
Dept: LAB | Age: 83
Discharge: HOME | End: 2018-02-01
Payer: MEDICARE

## 2018-02-01 DIAGNOSIS — C90.01 MULTIPLE MYELOMA IN REMISSION (HCC): ICD-10-CM

## 2018-02-01 DIAGNOSIS — E86.0 DEHYDRATION: ICD-10-CM

## 2018-02-01 LAB
ALBUMIN SERPL-MCNC: 3 G/DL (ref 3.2–4.6)
ALBUMIN/GLOB SERPL: 0.9 {RATIO} (ref 1.2–3.5)
ALP SERPL-CCNC: 76 U/L (ref 50–136)
ALT SERPL-CCNC: 34 U/L (ref 12–65)
ANION GAP SERPL CALC-SCNC: 7 MMOL/L (ref 7–16)
AST SERPL-CCNC: 35 U/L (ref 15–37)
ATRIAL RATE: 60 BPM
BASOPHILS # BLD: 0 K/UL (ref 0–0.2)
BASOPHILS NFR BLD: 0 % (ref 0–2)
BILIRUB SERPL-MCNC: 1 MG/DL (ref 0.2–1.1)
BUN SERPL-MCNC: 24 MG/DL (ref 8–23)
CALCIUM SERPL-MCNC: 8.5 MG/DL (ref 8.3–10.4)
CALCULATED P AXIS, ECG09: 59 DEGREES
CALCULATED R AXIS, ECG10: 70 DEGREES
CALCULATED T AXIS, ECG11: -99 DEGREES
CHLORIDE SERPL-SCNC: 102 MMOL/L (ref 98–107)
CO2 SERPL-SCNC: 28 MMOL/L (ref 21–32)
CREAT SERPL-MCNC: 1.4 MG/DL (ref 0.6–1)
DIAGNOSIS, 93000: NORMAL
DIFFERENTIAL METHOD BLD: ABNORMAL
EOSINOPHIL # BLD: 0 K/UL (ref 0–0.8)
EOSINOPHIL NFR BLD: 1 % (ref 0.5–7.8)
ERYTHROCYTE [DISTWIDTH] IN BLOOD BY AUTOMATED COUNT: 17.1 % (ref 11.9–14.6)
GLOBULIN SER CALC-MCNC: 3.2 G/DL (ref 2.3–3.5)
GLUCOSE SERPL-MCNC: 94 MG/DL (ref 65–100)
HCT VFR BLD AUTO: 29 % (ref 35.8–46.3)
HGB BLD-MCNC: 9.5 G/DL (ref 11.7–15.4)
LYMPHOCYTES # BLD: 0.9 K/UL (ref 0.5–4.6)
LYMPHOCYTES NFR BLD: 21 % (ref 13–44)
MAGNESIUM SERPL-MCNC: 2.3 MG/DL (ref 1.8–2.4)
MCH RBC QN AUTO: 33.5 PG (ref 26.1–32.9)
MCHC RBC AUTO-ENTMCNC: 32.8 G/DL (ref 31.4–35)
MCV RBC AUTO: 102.1 FL (ref 79.6–97.8)
MONOCYTES # BLD: 1 K/UL (ref 0.1–1.3)
MONOCYTES NFR BLD: 26 % (ref 4–12)
NEUTS SEG # BLD: 2.1 K/UL (ref 1.7–8.2)
NEUTS SEG NFR BLD: 52 % (ref 43–78)
NRBC # BLD: 0 K/UL (ref 0–0.2)
P-R INTERVAL, ECG05: 324 MS
PLATELET # BLD AUTO: 156 K/UL (ref 150–450)
PMV BLD AUTO: 11.2 FL (ref 10.8–14.1)
POTASSIUM SERPL-SCNC: 4 MMOL/L (ref 3.5–5.1)
PROT SERPL-MCNC: 6.2 G/DL (ref 6.3–8.2)
Q-T INTERVAL, ECG07: 480 MS
QRS DURATION, ECG06: 90 MS
QTC CALCULATION (BEZET), ECG08: 480 MS
RBC # BLD AUTO: 2.84 M/UL (ref 4.05–5.25)
SODIUM SERPL-SCNC: 137 MMOL/L (ref 136–145)
VENTRICULAR RATE, ECG03: 60 BPM
WBC # BLD AUTO: 4 K/UL (ref 4.3–11.1)

## 2018-02-01 PROCEDURE — 93005 ELECTROCARDIOGRAM TRACING: CPT | Performed by: NURSE PRACTITIONER

## 2018-02-01 PROCEDURE — 96360 HYDRATION IV INFUSION INIT: CPT

## 2018-02-01 PROCEDURE — 36415 COLL VENOUS BLD VENIPUNCTURE: CPT | Performed by: INTERNAL MEDICINE

## 2018-02-01 PROCEDURE — 74011250636 HC RX REV CODE- 250/636: Performed by: NURSE PRACTITIONER

## 2018-02-01 PROCEDURE — 93306 TTE W/DOPPLER COMPLETE: CPT

## 2018-02-01 PROCEDURE — 80053 COMPREHEN METABOLIC PANEL: CPT | Performed by: INTERNAL MEDICINE

## 2018-02-01 PROCEDURE — 85025 COMPLETE CBC W/AUTO DIFF WBC: CPT | Performed by: INTERNAL MEDICINE

## 2018-02-01 PROCEDURE — 71046 X-RAY EXAM CHEST 2 VIEWS: CPT

## 2018-02-01 PROCEDURE — 83735 ASSAY OF MAGNESIUM: CPT | Performed by: INTERNAL MEDICINE

## 2018-02-01 RX ORDER — SODIUM CHLORIDE 9 MG/ML
1000 INJECTION, SOLUTION INTRAVENOUS ONCE
Status: COMPLETED | OUTPATIENT
Start: 2018-02-01 | End: 2018-02-01

## 2018-02-01 RX ADMIN — SODIUM CHLORIDE 1000 ML: 900 INJECTION, SOLUTION INTRAVENOUS at 11:10

## 2018-02-01 NOTE — PROGRESS NOTES
Arrived to the [facility]. Hydration completed. Patient tolerated well. Any issues or concerns during appointment: none  Patient does not require next infusion appointment. Discharged via wheelchair with daughter.

## 2018-02-01 NOTE — PROGRESS NOTES
Pt seen today by Np due to daughter calling and stating she has had uncontrolled diarrhea and SOB. Labs reviewed by NP. Pt will have Stat EKG, Chest xray and then will have echo next week before next appt. With Dr. Ofe Arvizu to R/O anything heart related. Pt and daughter given instructions from NP on taking imodium with Lalo Cerrato to try to help with diarrhea. Pt added on for NS due to creatine. Advised daughter to call with any further questions or concerns between now and next appt with Dr. Ofe Arvizu. Will advise on results on test today.

## 2018-02-09 ENCOUNTER — HOSPITAL ENCOUNTER (OUTPATIENT)
Dept: LAB | Age: 83
Discharge: HOME | End: 2018-02-09
Payer: MEDICARE

## 2018-02-09 ENCOUNTER — PATIENT OUTREACH (OUTPATIENT)
Dept: CASE MANAGEMENT | Age: 83
End: 2018-02-09

## 2018-02-09 DIAGNOSIS — I10 ESSENTIAL HYPERTENSION, BENIGN: Chronic | ICD-10-CM

## 2018-02-09 DIAGNOSIS — C90.00 MULTIPLE MYELOMA NOT HAVING ACHIEVED REMISSION (HCC): ICD-10-CM

## 2018-02-09 DIAGNOSIS — D64.9 ANEMIA, UNSPECIFIED TYPE: ICD-10-CM

## 2018-02-09 LAB
ALBUMIN SERPL-MCNC: 3.1 G/DL (ref 3.2–4.6)
ALBUMIN/GLOB SERPL: 0.9 {RATIO} (ref 1.2–3.5)
ALP SERPL-CCNC: 72 U/L (ref 50–136)
ALT SERPL-CCNC: 34 U/L (ref 12–65)
ANION GAP SERPL CALC-SCNC: 6 MMOL/L (ref 7–16)
AST SERPL-CCNC: 40 U/L (ref 15–37)
BASOPHILS # BLD: 0 K/UL (ref 0–0.2)
BASOPHILS NFR BLD: 1 % (ref 0–2)
BILIRUB SERPL-MCNC: 0.6 MG/DL (ref 0.2–1.1)
BUN SERPL-MCNC: 28 MG/DL (ref 8–23)
CALCIUM SERPL-MCNC: 8 MG/DL (ref 8.3–10.4)
CHLORIDE SERPL-SCNC: 101 MMOL/L (ref 98–107)
CO2 SERPL-SCNC: 31 MMOL/L (ref 21–32)
CREAT SERPL-MCNC: 1.53 MG/DL (ref 0.6–1)
DIFFERENTIAL METHOD BLD: ABNORMAL
EOSINOPHIL # BLD: 0 K/UL (ref 0–0.8)
EOSINOPHIL NFR BLD: 0 % (ref 0.5–7.8)
ERYTHROCYTE [DISTWIDTH] IN BLOOD BY AUTOMATED COUNT: 17 % (ref 11.9–14.6)
GLOBULIN SER CALC-MCNC: 3.3 G/DL (ref 2.3–3.5)
GLUCOSE SERPL-MCNC: 80 MG/DL (ref 65–100)
HCT VFR BLD AUTO: 29.2 % (ref 35.8–46.3)
HGB BLD-MCNC: 9.5 G/DL (ref 11.7–15.4)
LYMPHOCYTES # BLD: 1.6 K/UL (ref 0.5–4.6)
LYMPHOCYTES NFR BLD: 35 % (ref 13–44)
MCH RBC QN AUTO: 33.7 PG (ref 26.1–32.9)
MCHC RBC AUTO-ENTMCNC: 32.5 G/DL (ref 31.4–35)
MCV RBC AUTO: 103.5 FL (ref 79.6–97.8)
MONOCYTES # BLD: 0.7 K/UL (ref 0.1–1.3)
MONOCYTES NFR BLD: 15 % (ref 4–12)
NEUTS SEG # BLD: 2.1 K/UL (ref 1.7–8.2)
NEUTS SEG NFR BLD: 49 % (ref 43–78)
NRBC # BLD: 0 K/UL (ref 0–0.2)
PLATELET # BLD AUTO: 116 K/UL (ref 150–450)
PMV BLD AUTO: 11.9 FL (ref 10.8–14.1)
POTASSIUM SERPL-SCNC: 3.8 MMOL/L (ref 3.5–5.1)
PROT SERPL-MCNC: 6.4 G/DL (ref 6.3–8.2)
RBC # BLD AUTO: 2.82 M/UL (ref 4.05–5.25)
SODIUM SERPL-SCNC: 138 MMOL/L (ref 136–145)
WBC # BLD AUTO: 4.4 K/UL (ref 4.3–11.1)

## 2018-02-09 PROCEDURE — 85025 COMPLETE CBC W/AUTO DIFF WBC: CPT | Performed by: INTERNAL MEDICINE

## 2018-02-09 PROCEDURE — 80053 COMPREHEN METABOLIC PANEL: CPT | Performed by: INTERNAL MEDICINE

## 2018-02-09 PROCEDURE — 36415 COLL VENOUS BLD VENIPUNCTURE: CPT | Performed by: INTERNAL MEDICINE

## 2018-02-09 NOTE — PROGRESS NOTES
Pt was seen and labs reviewed by Dr. Ni Marroquin. Pt states she has been having SOB even when only walking for very short distances and doing ADLs. O2 sat 99% here. Pt also reports extreme fatigue. Pt was instructed to stop [place] now. She should continue her current Revlimid Rx until it runs out, then she will get a break from therapy to help her body recover. Pt will return to clinic on 3/16 and she will start Pomalyst 4 mg 21 days on, 7 days off. Will send Rx to [facility], but patient and family verbalize understanding to not start taking the Pom until after she sees Dr. Ni Marroquin. Pt instructed to call provider with concerns.

## 2018-03-05 ENCOUNTER — APPOINTMENT (RX ONLY)
Dept: URBAN - METROPOLITAN AREA CLINIC 23 | Facility: CLINIC | Age: 83
Setting detail: DERMATOLOGY
End: 2018-03-05

## 2018-03-05 DIAGNOSIS — L57.0 ACTINIC KERATOSIS: ICD-10-CM

## 2018-03-05 DIAGNOSIS — D485 NEOPLASM OF UNCERTAIN BEHAVIOR OF SKIN: ICD-10-CM

## 2018-03-05 DIAGNOSIS — L82.1 OTHER SEBORRHEIC KERATOSIS: ICD-10-CM

## 2018-03-05 PROBLEM — H91.90 UNSPECIFIED HEARING LOSS, UNSPECIFIED EAR: Status: ACTIVE | Noted: 2018-03-05

## 2018-03-05 PROBLEM — D48.5 NEOPLASM OF UNCERTAIN BEHAVIOR OF SKIN: Status: ACTIVE | Noted: 2018-03-05

## 2018-03-05 PROCEDURE — ? LIQUID NITROGEN

## 2018-03-05 PROCEDURE — 11100: CPT | Mod: 59

## 2018-03-05 PROCEDURE — 17000 DESTRUCT PREMALG LESION: CPT

## 2018-03-05 PROCEDURE — ? COUNSELING

## 2018-03-05 PROCEDURE — ? BIOPSY BY SHAVE METHOD

## 2018-03-05 PROCEDURE — 17003 DESTRUCT PREMALG LES 2-14: CPT

## 2018-03-05 PROCEDURE — 99213 OFFICE O/P EST LOW 20 MIN: CPT | Mod: 25

## 2018-03-05 ASSESSMENT — LOCATION SIMPLE DESCRIPTION DERM
LOCATION SIMPLE: LEFT ZYGOMA
LOCATION SIMPLE: LEFT CHEEK
LOCATION SIMPLE: LEFT PRETIBIAL REGION
LOCATION SIMPLE: RIGHT PRETIBIAL REGION

## 2018-03-05 ASSESSMENT — LOCATION DETAILED DESCRIPTION DERM
LOCATION DETAILED: RIGHT MEDIAL DISTAL PRETIBIAL REGION
LOCATION DETAILED: LEFT LATERAL ZYGOMA
LOCATION DETAILED: LEFT CENTRAL ZYGOMA
LOCATION DETAILED: LEFT SUPERIOR CENTRAL MALAR CHEEK
LOCATION DETAILED: LEFT LATERAL DISTAL PRETIBIAL REGION

## 2018-03-05 ASSESSMENT — LOCATION ZONE DERM
LOCATION ZONE: LEG
LOCATION ZONE: FACE

## 2018-03-05 NOTE — PROCEDURE: BIOPSY BY SHAVE METHOD
Anticipated Plan (Based On Presumed Biopsy Results): MOHs
Consent: Written consent was obtained and risks were reviewed including but not limited to scarring, infection, bleeding, scabbing, incomplete removal, nerve damage and allergy to anesthesia.
Additional Anesthesia Volume In Cc (Will Not Render If 0): 0
Cryotherapy Text: The wound bed was treated with cryotherapy after the biopsy was performed.
Detail Level: Detailed
Wound Care: Vaseline
Biopsy Type: H and E
Silver Nitrate Text: The wound bed was treated with silver nitrate after the biopsy was performed.
Curettage Text: The wound bed was treated with curettage after the biopsy was performed.
Notification Instructions: Patient will be notified of biopsy results. However, patient instructed to call the office if not contacted within 2 weeks.
Biopsy Method: Dermablade
Post-Care Instructions: I reviewed with the patient in detail post-care instructions. Patient is to keep the biopsy site dry overnight, and then apply bacitracin twice daily until healed. Patient may apply hydrogen peroxide soaks to remove any crusting.
Type Of Destruction Used: Curettage
Dressing: bandage
Billing Type: Third-Party Bill
Electrodesiccation And Curettage Text: The wound bed was treated with electrodesiccation and curettage after the biopsy was performed.
Destruction After The Procedure: No
Electrodesiccation Text: The wound bed was treated with electrodesiccation after the biopsy was performed.
Accession #: [id number]
Anesthesia Type: 1% lidocaine with epinephrine
Anesthesia Volume In Cc: 0.5
Hemostasis: Aluminum Chloride

## 2018-03-05 NOTE — PROCEDURE: LIQUID NITROGEN
Detail Level: Detailed
Duration Of Freeze Thaw-Cycle (Seconds): 20
Render Post-Care Instructions In Note?: yes
Post-Care Instructions: I reviewed with the patient in detail post-care instructions. Patient is to wear sunprotection, and avoid picking at any of the treated lesions. Pt may apply Vaseline to crusted or scabbing areas.
Consent: The patient's consent was obtained including but not limited to risks of crusting, scabbing, blistering, scarring, darker or lighter pigmentary change, recurrence, incomplete removal and infection.
Number Of Freeze-Thaw Cycles: 1 freeze-thaw cycle

## 2018-03-05 NOTE — HPI: SKIN LESION
How Severe Is Your Skin Lesion?: moderate
Has Your Skin Lesion Been Treated?: not been treated
Is This A New Presentation, Or A Follow-Up?: Skin Lesion
Additional History: Pt was diagnosed with melonnia blood cancer in 2016.\\nStates that the cancer center gave her Abx ointment but that it didn't help

## 2018-03-16 ENCOUNTER — PATIENT OUTREACH (OUTPATIENT)
Dept: CASE MANAGEMENT | Age: 83
End: 2018-03-16

## 2018-03-16 ENCOUNTER — HOSPITAL ENCOUNTER (OUTPATIENT)
Dept: LAB | Age: 83
Discharge: HOME | End: 2018-03-16
Payer: MEDICARE

## 2018-03-16 DIAGNOSIS — C90.00 MULTIPLE MYELOMA NOT HAVING ACHIEVED REMISSION (HCC): ICD-10-CM

## 2018-03-16 PROBLEM — N18.9 ANEMIA DUE TO CHRONIC RENAL FAILURE TREATED WITH ERYTHROPOIETIN, UNSPECIFIED STAGE: Status: ACTIVE | Noted: 2018-03-16

## 2018-03-16 PROBLEM — D63.1 ANEMIA DUE TO CHRONIC RENAL FAILURE TREATED WITH ERYTHROPOIETIN, UNSPECIFIED STAGE: Status: ACTIVE | Noted: 2018-03-16

## 2018-03-16 LAB
ALBUMIN SERPL-MCNC: 3.3 G/DL (ref 3.2–4.6)
ALBUMIN/GLOB SERPL: 1 {RATIO} (ref 1.2–3.5)
ALP SERPL-CCNC: 71 U/L (ref 50–136)
ALT SERPL-CCNC: 21 U/L (ref 12–65)
ANION GAP SERPL CALC-SCNC: 8 MMOL/L (ref 7–16)
AST SERPL-CCNC: 21 U/L (ref 15–37)
BASOPHILS # BLD: 0 K/UL (ref 0–0.2)
BASOPHILS NFR BLD: 0 % (ref 0–2)
BILIRUB SERPL-MCNC: 0.4 MG/DL (ref 0.2–1.1)
BUN SERPL-MCNC: 24 MG/DL (ref 8–23)
CALCIUM SERPL-MCNC: 8.2 MG/DL (ref 8.3–10.4)
CHLORIDE SERPL-SCNC: 105 MMOL/L (ref 98–107)
CO2 SERPL-SCNC: 28 MMOL/L (ref 21–32)
CREAT SERPL-MCNC: 1.14 MG/DL (ref 0.6–1)
DIFFERENTIAL METHOD BLD: ABNORMAL
EOSINOPHIL # BLD: 0 K/UL (ref 0–0.8)
EOSINOPHIL NFR BLD: 0 % (ref 0.5–7.8)
ERYTHROCYTE [DISTWIDTH] IN BLOOD BY AUTOMATED COUNT: 15.8 % (ref 11.9–14.6)
GLOBULIN SER CALC-MCNC: 3.4 G/DL (ref 2.3–3.5)
GLUCOSE SERPL-MCNC: 89 MG/DL (ref 65–100)
HCT VFR BLD AUTO: 26.9 % (ref 35.8–46.3)
HGB BLD-MCNC: 8.7 G/DL (ref 11.7–15.4)
LYMPHOCYTES # BLD: 1.1 K/UL (ref 0.5–4.6)
LYMPHOCYTES NFR BLD: 41 % (ref 13–44)
MCH RBC QN AUTO: 34.5 PG (ref 26.1–32.9)
MCHC RBC AUTO-ENTMCNC: 32.3 G/DL (ref 31.4–35)
MCV RBC AUTO: 106.7 FL (ref 79.6–97.8)
MONOCYTES # BLD: 0.3 K/UL (ref 0.1–1.3)
MONOCYTES NFR BLD: 9 % (ref 4–12)
NEUTS SEG # BLD: 1.3 K/UL (ref 1.7–8.2)
NEUTS SEG NFR BLD: 49 % (ref 43–78)
NRBC # BLD: 0 K/UL (ref 0–0.2)
PLATELET # BLD AUTO: 104 K/UL (ref 150–450)
PMV BLD AUTO: 9.8 FL (ref 10.8–14.1)
POTASSIUM SERPL-SCNC: 3.7 MMOL/L (ref 3.5–5.1)
PROT SERPL-MCNC: 6.7 G/DL (ref 6.3–8.2)
RBC # BLD AUTO: 2.52 M/UL (ref 4.05–5.25)
SODIUM SERPL-SCNC: 141 MMOL/L (ref 136–145)
WBC # BLD AUTO: 2.7 K/UL (ref 4.3–11.1)

## 2018-03-16 PROCEDURE — 36415 COLL VENOUS BLD VENIPUNCTURE: CPT | Performed by: INTERNAL MEDICINE

## 2018-03-16 PROCEDURE — 86334 IMMUNOFIX E-PHORESIS SERUM: CPT | Performed by: INTERNAL MEDICINE

## 2018-03-16 PROCEDURE — 85025 COMPLETE CBC W/AUTO DIFF WBC: CPT | Performed by: INTERNAL MEDICINE

## 2018-03-16 PROCEDURE — 84165 PROTEIN E-PHORESIS SERUM: CPT | Performed by: INTERNAL MEDICINE

## 2018-03-16 PROCEDURE — 80053 COMPREHEN METABOLIC PANEL: CPT | Performed by: INTERNAL MEDICINE

## 2018-03-16 NOTE — PROGRESS NOTES
Pt seen today by Dr. Natalie Peters. Labs reviewed. Pt has stopped taking Revlimid last week. We will give her another week break. She will start taking her Pomalyst 3/23. She has this at home. She will return on 4/13 the day she ends her 21 days of pomalyst to see Dr. Natalie Peters. Procrit will be added in for infusion on 4/13. Advised to call with any further questions or concerns before next visit.

## 2018-03-19 LAB
ALBUMIN SERPL ELPH-MCNC: 3.61 G/DL (ref 3.2–5.6)
ALBUMIN/GLOB SERPL: 1.3 {RATIO}
ALPHA1 GLOB SERPL ELPH-MCNC: 0.31 G/DL (ref 0.1–0.4)
ALPHA2 GLOB SERPL ELPH-MCNC: 0.7 G/DL (ref 0.4–1.2)
B-GLOBULIN SERPL QL ELPH: 0.96 G/DL (ref 0.6–1.3)
GAMMA GLOB MFR SERPL ELPH: 0.82 G/DL (ref 0.5–1.6)
IGA SERPL-MCNC: 38 MG/DL (ref 85–499)
IGG SERPL-MCNC: 921 MG/DL (ref 610–1616)
IGM SERPL-MCNC: 21 MG/DL (ref 35–242)
M PROTEIN SERPL ELPH-MCNC: 0.3 G/DL
PROT PATTERN SERPL ELPH-IMP: ABNORMAL
PROT PATTERN SPEC IFE-IMP: ABNORMAL
PROT SERPL-MCNC: 6.4 G/DL (ref 6.3–8.2)

## 2018-04-10 ENCOUNTER — RX ONLY (OUTPATIENT)
Age: 83
Setting detail: RX ONLY
End: 2018-04-10

## 2018-04-10 ENCOUNTER — APPOINTMENT (RX ONLY)
Dept: URBAN - METROPOLITAN AREA CLINIC 23 | Facility: CLINIC | Age: 83
Setting detail: DERMATOLOGY
End: 2018-04-10

## 2018-04-10 PROBLEM — D04.71 CARCINOMA IN SITU OF SKIN OF RIGHT LOWER LIMB, INCLUDING HIP: Status: ACTIVE | Noted: 2018-04-10

## 2018-04-10 PROCEDURE — 17313 MOHS 1 STAGE T/A/L: CPT

## 2018-04-10 PROCEDURE — ? MOHS SURGERY

## 2018-04-10 PROCEDURE — 17314 MOHS ADDL STAGE T/A/L: CPT

## 2018-04-10 RX ORDER — CEPHALEXIN 500 MG/1
CAPSULE ORAL
Qty: 20 | Refills: 0 | Status: ERX

## 2018-04-10 RX ORDER — FLUCONAZOLE 150 MG/1
TABLET ORAL
Qty: 2 | Refills: 1 | Status: ERX | COMMUNITY
Start: 2018-04-10

## 2018-04-10 NOTE — PROCEDURE: MOHS SURGERY
Partial Purse String (Intermediate) Text: Given the location of the defect and the characteristics of the surrounding skin an intermediate purse string closure was deemed most appropriate.  Undermining was performed circumfirentially around the surgical defect.  A purse string suture was then placed and tightened. Wound tension only allowed a partial closure of the circular defect.
Integrate Histology Into Note?: No
Quadrants Reporting?: 0
Stage 14: Additional Anesthesia Type: 1% lidocaine with epinephrine
Ear Wedge Repair Text: A wedge excision was completed by carrying down an excision through the full thickness of the ear and cartilage with an inward facing Burow's triangle. The wound was then closed in a layered fashion.
Referring Physician (Optional): Magda
Include Size Of Lesion In Location Indication Statement: Yes
O-T Plasty Text: The defect edges were debeveled with a #15 scalpel blade.  Given the location of the defect, shape of the defect and the proximity to free margins an O-T plasty was deemed most appropriate.  Using a sterile surgical marker, an appropriate O-T plasty was drawn incorporating the defect and placing the expected incisions within the relaxed skin tension lines where possible.    The area thus outlined was incised deep to adipose tissue with a #15 scalpel blade.  The skin margins were undermined to an appropriate distance in all directions utilizing iris scissors.
Modified Advancement Flap Text: The defect edges were debeveled with a #15 scalpel blade.  Given the location of the defect, shape of the defect and the proximity to free margins a modified advancement flap was deemed most appropriate.  Using a sterile surgical marker, an appropriate advancement flap was drawn incorporating the defect and placing the expected incisions within the relaxed skin tension lines where possible.    The area thus outlined was incised deep to adipose tissue with a #15 scalpel blade.  The skin margins were undermined to an appropriate distance in all directions utilizing iris scissors.
Advancement Flap (Double) Text: The defect edges were debeveled with a #15 scalpel blade.  Given the location of the defect and the proximity to free margins a double advancement flap was deemed most appropriate.  Using a sterile surgical marker, the appropriate advancement flaps were drawn incorporating the defect and placing the expected incisions within the relaxed skin tension lines where possible.    The area thus outlined was incised deep to adipose tissue with a #15 scalpel blade.  The skin margins were undermined to an appropriate distance in all directions utilizing iris scissors.
Cheek Interpolation Flap Text: A decision was made to reconstruct the defect utilizing an interpolation axial flap and a staged reconstruction.  A telfa template was made of the defect.  This telfa template was then used to outline the Cheek Interpolation flap.  The donor area for the pedicle flap was then injected with anesthesia.  The flap was excised through the skin and subcutaneous tissue down to the layer of the underlying musculature.  The interpolation flap was carefully excised within this deep plane to maintain its blood supply.  The edges of the donor site were undermined.   The donor site was closed in a primary fashion.  The pedicle was then rotated into position and sutured.  Once the tube was sutured into place, adequate blood supply was confirmed with blanching and refill.  The pedicle was then wrapped with xeroform gauze and dressed appropriately with a telfa and gauze bandage to ensure continued blood supply and protect the attached pedicle.
Closure 4 Information: This tab is for additional flaps and grafts above and beyond our usual structured repairs.  Please note if you enter information here it will not currently bill and you will need to add the billing information manually.
Intermediate Repair Preamble Text (Leave Blank If You Do Not Want): Undermining was performed with blunt dissection.
Mastoid Interpolation Flap Text: A decision was made to reconstruct the defect utilizing an interpolation axial flap and a staged reconstruction.  A telfa template was made of the defect.  This telfa template was then used to outline the mastoid interpolation flap.  The donor area for the pedicle flap was then injected with anesthesia.  The flap was excised through the skin and subcutaneous tissue down to the layer of the underlying musculature.  The pedicle flap was carefully excised within this deep plane to maintain its blood supply.  The edges of the donor site were undermined.   The donor site was closed in a primary fashion.  The pedicle was then rotated into position and sutured.  Once the tube was sutured into place, adequate blood supply was confirmed with blanching and refill.  The pedicle was then wrapped with xeroform gauze and dressed appropriately with a telfa and gauze bandage to ensure continued blood supply and protect the attached pedicle.
Star Wedge Flap Text: The defect edges were debeveled with a #15 scalpel blade.  Given the location of the defect, shape of the defect and the proximity to free margins a star wedge flap was deemed most appropriate.  Using a sterile surgical marker, an appropriate rotation flap was drawn incorporating the defect and placing the expected incisions within the relaxed skin tension lines where possible. The area thus outlined was incised deep to adipose tissue with a #15 scalpel blade.  The skin margins were undermined to an appropriate distance in all directions utilizing iris scissors.
Trilobed Flap Text: The defect edges were debeveled with a #15 scalpel blade.  Given the location of the defect and the proximity to free margins a trilobed flap was deemed most appropriate.  Using a sterile surgical marker, an appropriate trilobed flap drawn around the defect.    The area thus outlined was incised deep to adipose tissue with a #15 scalpel blade.  The skin margins were undermined to an appropriate distance in all directions utilizing iris scissors.
Tarsorrhaphy Text: A tarsorrhaphy was performed using Frost sutures.
Referred To Mid-Level For Closure Text (Leave Blank If You Do Not Want): After obtaining clear surgical margins the patient was sent to a mid-level provider for surgical repair.  The patient understands they will receive post-surgical care and follow-up from the mid-level provider.
Cheiloplasty (Less Than 50%) Text: A decision was made to reconstruct the defect with a  cheiloplasty.  The defect was undermined extensively.  Additional obicularis oris muscle was excised with a 15 blade scalpel.  The defect was converted into a full thickness wedge, of less than 50% of the vertical height of the lip, to facilite a better cosmetic result.  Small vessels were then tied off with 5-0 monocyrl. The obicularis oris, superficial fascia, adipose and dermis were then reapproximated.  After the deeper layers were approximated the epidermis was reapproximated with particular care given to realign the vermillion border.
Mohs Case Number: [id number]
Purse String (Simple) Text: Given the location of the defect and the characteristics of the surrounding skin a pursestring closure was deemed most appropriate.  Undermining was performed circumfirentially around the surgical defect.  A purstring suture was then placed and tightened.
Bilobed Flap Text: The defect edges were debeveled with a #15 scalpel blade.  Given the location of the defect and the proximity to free margins a bilobe flap was deemed most appropriate.  Using a sterile surgical marker, an appropriate bilobe flap drawn around the defect.    The area thus outlined was incised deep to adipose tissue with a #15 scalpel blade.  The skin margins were undermined to an appropriate distance in all directions utilizing iris scissors.
Estimated Blood Loss (Cc): minimal
Anesthesia Volume In Cc: 2
Initial Size Of Lesion: 1.4
Consent (Temporal Branch)/Introductory Paragraph: The rationale for Mohs was explained to the patient and consent was obtained. The risks, benefits and alternatives to therapy were discussed in detail. Specifically, the risks of damage to the temporal branch of the facial nerve, infection, scarring, bleeding, prolonged wound healing, incomplete removal, allergy to anesthesia, and recurrence were addressed. Prior to the procedure, the treatment site was clearly identified and confirmed by the patient. All components of Universal Protocol/PAUSE Rule completed.
Crescentic Complex Repair Preamble Text (Leave Blank If You Do Not Want): Extensive wide undermining was performed.
Consent (Near Eyelid Margin)/Introductory Paragraph: The rationale for Mohs was explained to the patient and consent was obtained. The risks, benefits and alternatives to therapy were discussed in detail. Specifically, the risks of ectropion or eyelid deformity, infection, scarring, bleeding, prolonged wound healing, incomplete removal, allergy to anesthesia, nerve injury and recurrence were addressed. Prior to the procedure, the treatment site was clearly identified and confirmed by the patient. All components of Universal Protocol/PAUSE Rule completed.
Area L Indication Text: Tumors in this location are included in Area L (trunk and extremities).  Mohs surgery is indicated for larger tumors, 2 cm or larger, in these anatomic locations.
Subsequent Stages Histo Method Verbiage: Using a similar technique to that described above, a thin layer of tissue was removed from all areas where tumor was visible on the previous stage.  The tissue was again oriented, mapped, dyed, and processed as above.
Paramedian Forehead Flap Text: A decision was made to reconstruct the defect utilizing an interpolation axial flap and a staged reconstruction.  A telfa template was made of the defect.  This telfa template was then used to outline the paramedian forehead pedicle flap.  The donor area for the pedicle flap was then injected with anesthesia.  The flap was excised through the skin and subcutaneous tissue down to the layer of the underlying musculature.  The pedicle flap was carefully excised within this deep plane to maintain its blood supply.  The edges of the donor site were undermined.   The donor site was closed in a primary fashion.  The pedicle was then rotated into position and sutured.  Once the tube was sutured into place, adequate blood supply was confirmed with blanching and refill.  The pedicle was then wrapped with xeroform gauze and dressed appropriately with a telfa and gauze bandage to ensure continued blood supply and protect the attached pedicle.
Mauc Instructions: By selecting yes to the question below the MAUC number will be added into the note.  This will be calculated automatically based on the diagnosis chosen, the size entered, the body zone selected (H,M,L) and the specific indications you chose. You will also have the option to override the Mohs AUC if you disagree with the automatically calculated number and this option is found in the Case Summary tab.
Alternatives Discussed Intro (Do Not Add Period): I discussed alternative treatments to Mohs surgery and specifically discussed the risks and benefits of
Additional Epidermal Closure (Optional): vertical mattress
Manual Repair Warning Statement: We plan on removing the manually selected variable below in favor of our much easier automatic structured text blocks found in the previous tab. We decided to do this to help make the flow better and give you the full power of structured data. Manual selection is never going to be ideal in our platform and I would encourage you to avoid using manual selection from this point on, especially since I will be sunsetting this feature. It is important that you do one of two things with the customized text below. First, you can save all of the text in a word file so you can have it for future reference. Second, transfer the text to the appropriate area in the Library tab. Lastly, if there is a flap or graft type which we do not have you need to let us know right away so I can add it in before the variable is hidden. No need to panic, we plan to give you roughly 6 months to make the change.
Secondary Intention Text (Leave Blank If You Do Not Want): The defect will heal with secondary intention.
O-T Advancement Flap Text: The defect edges were debeveled with a #15 scalpel blade.  Given the location of the defect, shape of the defect and the proximity to free margins an O-T advancement flap was deemed most appropriate.  Using a sterile surgical marker, an appropriate advancement flap was drawn incorporating the defect and placing the expected incisions within the relaxed skin tension lines where possible.    The area thus outlined was incised deep to adipose tissue with a #15 scalpel blade.  The skin margins were undermined to an appropriate distance in all directions utilizing iris scissors.
Full Thickness Lip Wedge Repair (Flap) Text: Given the location of the defect and the proximity to free margins a full thickness wedge repair was deemed most appropriate.  Using a sterile surgical marker, the appropriate repair was drawn incorporating the defect and placing the expected incisions perpendicular to the vermillion border.  The vermillion border was also meticulously outlined to ensure appropriate reapproximation during the repair.  The area thus outlined was incised through and through with a #15 scalpel blade.  The muscularis and dermis were reaproximated with deep sutures following hemostasis. Care was taken to realign the vermillion border before proceeding with the superficial closure.  Once the vermillion was realigned the superfical and mucosal closure was finished.
Location Indication Override (Is Already Calculated Based On Selected Body Location): Area M
Post-Care Instructions: I reviewed with the patient in detail post-care instructions. Patient is not to engage in any heavy lifting, exercise, or swimming for the next 14 days. Should the patient develop any fevers, chills, bleeding, severe pain patient will contact the office immediately..\\n.
V-Y Flap Text: The defect edges were debeveled with a #15 scalpel blade.  Given the location of the defect, shape of the defect and the proximity to free margins a V-Y flap was deemed most appropriate.  Using a sterile surgical marker, an appropriate advancement flap was drawn incorporating the defect and placing the expected incisions within the relaxed skin tension lines where possible.    The area thus outlined was incised deep to adipose tissue with a #15 scalpel blade.  The skin margins were undermined to an appropriate distance in all directions utilizing iris scissors.
Complex Repair And Flap Additional Text (Will Appearing After The Standard Complex Repair Text): The complex repair was not sufficient to completely close the primary defect. The remaining additional defect was repaired with the flap mentioned below.
V-Y Plasty Text: The defect edges were debeveled with a #15 scalpel blade.  Given the location of the defect, shape of the defect and the proximity to free margins an V-Y advancement flap was deemed most appropriate.  Using a sterile surgical marker, an appropriate advancement flap was drawn incorporating the defect and placing the expected incisions within the relaxed skin tension lines where possible.    The area thus outlined was incised deep to adipose tissue with a #15 scalpel blade.  The skin margins were undermined to an appropriate distance in all directions utilizing iris scissors.
Xenograft Text: The defect edges were debeveled with a #15 scalpel blade.  Given the location of the defect, shape of the defect and the proximity to free margins a xenograft was deemed most appropriate.  The graft was then trimmed to fit the size of the defect.  The graft was then placed in the primary defect and oriented appropriately.
Posterior Auricular Interpolation Flap Text: A decision was made to reconstruct the defect utilizing an interpolation axial flap and a staged reconstruction.  A telfa template was made of the defect.  This telfa template was then used to outline the posterior auricular interpolation flap.  The donor area for the pedicle flap was then injected with anesthesia.  The flap was excised through the skin and subcutaneous tissue down to the layer of the underlying musculature.  The pedicle flap was carefully excised within this deep plane to maintain its blood supply.  The edges of the donor site were undermined.   The donor site was closed in a primary fashion.  The pedicle was then rotated into position and sutured.  Once the tube was sutured into place, adequate blood supply was confirmed with blanching and refill.  The pedicle was then wrapped with xeroform gauze and dressed appropriately with a telfa and gauze bandage to ensure continued blood supply and protect the attached pedicle.
Island Pedicle Flap With Canthal Suspension Text: The defect edges were debeveled with a #15 scalpel blade.  Given the location of the defect, shape of the defect and the proximity to free margins an island pedicle advancement flap was deemed most appropriate.  Using a sterile surgical marker, an appropriate advancement flap was drawn incorporating the defect, outlining the appropriate donor tissue and placing the expected incisions within the relaxed skin tension lines where possible. The area thus outlined was incised deep to adipose tissue with a #15 scalpel blade.  The skin margins were undermined to an appropriate distance in all directions around the primary defect and laterally outward around the island pedicle utilizing iris scissors.  There was minimal undermining beneath the pedicle flap. A suspension suture was placed in the canthal tendon to prevent tension and prevent ectropion.
Dermal Autograft Text: The defect edges were debeveled with a #15 scalpel blade.  Given the location of the defect, shape of the defect and the proximity to free margins a dermal autograft was deemed most appropriate.  Using a sterile surgical marker, the primary defect shape was transferred to the donor site. The area thus outlined was incised deep to adipose tissue with a #15 scalpel blade.  The harvested graft was then trimmed of adipose and epidermal tissue until only dermis was left.  The skin graft was then placed in the primary defect and oriented appropriately.
A-T Advancement Flap Text: The defect edges were debeveled with a #15 scalpel blade.  Given the location of the defect, shape of the defect and the proximity to free margins an A-T advancement flap was deemed most appropriate.  Using a sterile surgical marker, an appropriate advancement flap was drawn incorporating the defect and placing the expected incisions within the relaxed skin tension lines where possible.    The area thus outlined was incised deep to adipose tissue with a #15 scalpel blade.  The skin margins were undermined to an appropriate distance in all directions utilizing iris scissors.
Referred To Asc For Closure Text (Leave Blank If You Do Not Want): After obtaining clear surgical margins the patient was sent to an ASC for surgical repair.  The patient understands they will receive post-surgical care and follow-up from the ASC physician.
Referred To Otolaryngology For Closure Text (Leave Blank If You Do Not Want): After obtaining clear surgical margins the patient was sent to otolaryngology for surgical repair.  The patient understands they will receive post-surgical care and follow-up from the referring physician's office.
Anesthesia Type: 1% lidocaine without epinephrine and a 1:10 solution of 8.4% sodium bicarbonate
Postop Diagnosis: same
Localized Dermabrasion With Wire Brush Text: The patient was draped in routine manner.  Localized dermabrasion using 3 x 17 mm wire brush was performed in routine manner to papillary dermis. This spot dermabrasion is being performed to complete skin cancer reconstruction. It also will eliminate the other sun damaged precancerous cells that are known to be part of the regional effect of a lifetime's worth of sun exposure. This localized dermabrasion is therapeutic and should not be considered cosmetic in any regard.
Cheek-To-Nose Interpolation Flap Text: A decision was made to reconstruct the defect utilizing an interpolation axial flap and a staged reconstruction.  A telfa template was made of the defect.  This telfa template was then used to outline the Cheek-To-Nose Interpolation flap.  The donor area for the pedicle flap was then injected with anesthesia.  The flap was excised through the skin and subcutaneous tissue down to the layer of the underlying musculature.  The interpolation flap was carefully excised within this deep plane to maintain its blood supply.  The edges of the donor site were undermined.   The donor site was closed in a primary fashion.  The pedicle was then rotated into position and sutured.  Once the tube was sutured into place, adequate blood supply was confirmed with blanching and refill.  The pedicle was then wrapped with xeroform gauze and dressed appropriately with a telfa and gauze bandage to ensure continued blood supply and protect the attached pedicle.
Crescentic Advancement Flap Text: The defect edges were debeveled with a #15 scalpel blade.  Given the location of the defect and the proximity to free margins a crescentic advancement flap was deemed most appropriate.  Using a sterile surgical marker, the appropriate advancement flap was drawn incorporating the defect and placing the expected incisions within the relaxed skin tension lines where possible.    The area thus outlined was incised deep to adipose tissue with a #15 scalpel blade.  The skin margins were undermined to an appropriate distance in all directions utilizing iris scissors.
Double Island Pedicle Flap Text: The defect edges were debeveled with a #15 scalpel blade.  Given the location of the defect, shape of the defect and the proximity to free margins a double island pedicle advancement flap was deemed most appropriate.  Using a sterile surgical marker, an appropriate advancement flap was drawn incorporating the defect, outlining the appropriate donor tissue and placing the expected incisions within the relaxed skin tension lines where possible.    The area thus outlined was incised deep to adipose tissue with a #15 scalpel blade.  The skin margins were undermined to an appropriate distance in all directions around the primary defect and laterally outward around the island pedicle utilizing iris scissors.  There was minimal undermining beneath the pedicle flap.
Composite Graft Text: The defect edges were debeveled with a #15 scalpel blade.  Given the location of the defect, shape of the defect, the proximity to free margins and the fact the defect was full thickness a composite graft was deemed most appropriate.  The defect was outline and then transferred to the donor site.  A full thickness graft was then excised from the donor site. The graft was then placed in the primary defect, oriented appropriately and then sutured into place.  The secondary defect was then repaired using a primary closure.
Mucosal Advancement Flap Text: Given the location of the defect, shape of the defect and the proximity to free margins a mucosal advancement flap was deemed most appropriate. Incisions were made with a 15 blade scalpel in the appropriate fashion along the cutaneous vermillion border and the mucosal lip. The remaining actinically damaged mucosal tissue was excised.  The mucosal advancement flap was then elevated to the gingival sulcus with care taken to preserve the neurovascular structures and advanced into the primary defect. Care was taken to ensure that precise realignment of the vermillion border was achieved.
Complex Repair And Graft Additional Text (Will Appearing After The Standard Complex Repair Text): The complex repair was not sufficient to completely close the primary defect. The remaining additional defect was repaired with the graft mentioned below.
Helical Rim Advancement Flap Text: The defect edges were debeveled with a #15 blade scalpel.  Given the location of the defect and the proximity to free margins (helical rim) a double helical rim advancement flap was deemed most appropriate.  Using a sterile surgical marker, the appropriate advancement flaps were drawn incorporating the defect and placing the expected incisions between the helical rim and antihelix where possible.  The area thus outlined was incised through and through with a #15 scalpel blade.  With a skin hook and iris scissors, the flaps were gently and sharply undermined and freed up.
Partial Purse String (Simple) Text: Given the location of the defect and the characteristics of the surrounding skin a simple purse string closure was deemed most appropriate.  Undermining was performed circumfirentially around the surgical defect.  A purse string suture was then placed and tightened. Wound tension only allowed a partial closure of the circular defect.
Deep Sutures: 5-0 Vicryl
Graft Donor Site Dermal Sutures (Optional): 5-0 PDS
Consent Type: Consent 1 (Standard)
Skin Substitute Text: The defect edges were debeveled with a #15 scalpel blade.  Given the location of the defect, shape of the defect and the proximity to free margins a skin substitute graft was deemed most appropriate.  The graft material was trimmed to fit the size of the defect. The graft was then placed in the primary defect and oriented appropriately.
Wound Care: Bacitracin
Same Histology In Subsequent Stages Text: The pattern and morphology of the tumor is as described in the first stage.
Mohs Rapid Report Verbiage: The area of clinically evident tumor was marked with skin marking ink and appropriately hatched.  The initial incision was made following the Mohs approach through the skin.  The specimen was taken to the lab, divided into the necessary number of pieces, chromacoded and processed according to the Mohs protocol.  This was repeated in successive stages until a tumor free defect was achieved.
Dorsal Nasal Flap Text: The defect edges were debeveled with a #15 scalpel blade.  Given the location of the defect and the proximity to free margins a dorsal nasal flap was deemed most appropriate.  Using a sterile surgical marker, an appropriate dorsal nasal flap was drawn around the defect.    The area thus outlined was incised deep to adipose tissue with a #15 scalpel blade.  The skin margins were undermined to an appropriate distance in all directions utilizing iris scissors.
Spiral Flap Text: The defect edges were debeveled with a #15 scalpel blade.  Given the location of the defect, shape of the defect and the proximity to free margins a spiral flap was deemed most appropriate.  Using a sterile surgical marker, an appropriate rotation flap was drawn incorporating the defect and placing the expected incisions within the relaxed skin tension lines where possible. The area thus outlined was incised deep to adipose tissue with a #15 scalpel blade.  The skin margins were undermined to an appropriate distance in all directions utilizing iris scissors.
Split-Thickness Skin Graft Text: The defect edges were debeveled with a #15 scalpel blade.  Given the location of the defect, shape of the defect and the proximity to free margins a split thickness skin graft was deemed most appropriate.  Using a sterile surgical marker, the primary defect shape was transferred to the donor site. The split thickness graft was then harvested.  The skin graft was then placed in the primary defect and oriented appropriately.
No Repair - Repaired With Adjacent Surgical Defect Text (Leave Blank If You Do Not Want): After obtaining clear surgical margins the defect was repaired concurrently with another surgical defect which was in close approximation.
Z Plasty Text: The lesion was extirpated to the level of the fat with a #15 scalpel blade.  Given the location of the defect, shape of the defect and the proximity to free margins a Z-plasty was deemed most appropriate for repair.  Using a sterile surgical marker, the appropriate transposition arms of the Z-plasty were drawn incorporating the defect and placing the expected incisions within the relaxed skin tension lines where possible.    The area thus outlined was incised deep to adipose tissue with a #15 scalpel blade.  The skin margins were undermined to an appropriate distance in all directions utilizing iris scissors.  The opposing transposition arms were then transposed into place in opposite direction and anchored with interrupted buried subcutaneous sutures.
Melolabial Interpolation Flap Text: A decision was made to reconstruct the defect utilizing an interpolation axial flap and a staged reconstruction.  A telfa template was made of the defect.  This telfa template was then used to outline the melolabial interpolation flap.  The donor area for the pedicle flap was then injected with anesthesia.  The flap was excised through the skin and subcutaneous tissue down to the layer of the underlying musculature.  The pedicle flap was carefully excised within this deep plane to maintain its blood supply.  The edges of the donor site were undermined.   The donor site was closed in a primary fashion.  The pedicle was then rotated into position and sutured.  Once the tube was sutured into place, adequate blood supply was confirmed with blanching and refill.  The pedicle was then wrapped with xeroform gauze and dressed appropriately with a telfa and gauze bandage to ensure continued blood supply and protect the attached pedicle.
Consent (Nose)/Introductory Paragraph: The rationale for Mohs was explained to the patient and consent was obtained. The risks, benefits and alternatives to therapy were discussed in detail. Specifically, the risks of nasal deformity, changes in the flow of air through the nose, infection, scarring, bleeding, prolonged wound healing, incomplete removal, allergy to anesthesia, nerve injury and recurrence were addressed. Prior to the procedure, the treatment site was clearly identified and confirmed by the patient. All components of Universal Protocol/PAUSE Rule completed.
Wound Care (No Sutures): Petrolatum
Bcc Histology Text: There were numerous aggregates of basaloid cells.
Island Pedicle Flap Text: The defect edges were debeveled with a #15 scalpel blade.  Given the location of the defect, shape of the defect and the proximity to free margins an island pedicle advancement flap was deemed most appropriate.  Using a sterile surgical marker, an appropriate advancement flap was drawn incorporating the defect, outlining the appropriate donor tissue and placing the expected incisions within the relaxed skin tension lines where possible.    The area thus outlined was incised deep to adipose tissue with a #15 scalpel blade.  The skin margins were undermined to an appropriate distance in all directions around the primary defect and laterally outward around the island pedicle utilizing iris scissors.  There was minimal undermining beneath the pedicle flap.
Muscle Hinge Flap Text: The defect edges were debeveled with a #15 scalpel blade.  Given the size, depth and location of the defect and the proximity to free margins a muscle hinge flap was deemed most appropriate.  Using a sterile surgical marker, an appropriate hinge flap was drawn incorporating the defect. The area thus outlined was incised with a #15 scalpel blade.  The skin margins were undermined to an appropriate distance in all directions utilizing iris scissors.
Bi-Rhombic Flap Text: The defect edges were debeveled with a #15 scalpel blade.  Given the location of the defect and the proximity to free margins a bi-rhombic flap was deemed most appropriate.  Using a sterile surgical marker, an appropriate rhombic flap was drawn incorporating the defect. The area thus outlined was incised deep to adipose tissue with a #15 scalpel blade.  The skin margins were undermined to an appropriate distance in all directions utilizing iris scissors.
Mohs Method Verbiage: An incision at a 45 degree angle following the standard Mohs approach was done and the specimen was harvested as a microscopic controlled layer.
Epidermal Sutures: 5-0 Prolene
Consent 1/Introductory Paragraph: The rationale for Mohs was explained to the patient and consent was obtained. The risks, benefits and alternatives to therapy were discussed in detail. Specifically, the risks of infection, scarring, bleeding, prolonged wound healing, incomplete removal, allergy to anesthesia, nerve injury and recurrence were addressed. Prior to the procedure, the treatment site was clearly identified and confirmed by the patient. All components of Universal Protocol/PAUSE Rule completed.
Stage 3: Additional Anesthesia Type: 0.5% lidocaine with 1:200,000 epinephrine and a 1:10 solution of 8.4% sodium bicarbonate
No Residual Tumor Seen Histology Text: There were no malignant cells seen in the sections examined.
Inflammation Suggestive Of Cancer Camouflage Histology Text: There was a dense lymphocytic infiltrate which prevented adequate histologic evaluation of adjacent structures.
Burow's Advancement Flap Text: The defect edges were debeveled with a #15 scalpel blade.  Given the location of the defect and the proximity to free margins a Burow's advancement flap was deemed most appropriate.  Using a sterile surgical marker, the appropriate advancement flap was drawn incorporating the defect and placing the expected incisions within the relaxed skin tension lines where possible.    The area thus outlined was incised deep to adipose tissue with a #15 scalpel blade.  The skin margins were undermined to an appropriate distance in all directions utilizing iris scissors.
Medical Necessity Statement: Based on my medical judgement, Mohs surgery is the most appropriate treatment for this cancer compared to other treatments.
Advancement-Rotation Flap Text: The defect edges were debeveled with a #15 scalpel blade.  Given the location of the defect, shape of the defect and the proximity to free margins an advancement-rotation flap was deemed most appropriate.  Using a sterile surgical marker, an appropriate flap was drawn incorporating the defect and placing the expected incisions within the relaxed skin tension lines where possible. The area thus outlined was incised deep to adipose tissue with a #15 scalpel blade.  The skin margins were undermined to an appropriate distance in all directions utilizing iris scissors.
Surgeon: Daniele suarez
Bilateral Helical Rim Advancement Flap Text: The defect edges were debeveled with a #15 blade scalpel.  Given the location of the defect and the proximity to free margins (helical rim) a bilateral helical rim advancement flap was deemed most appropriate.  Using a sterile surgical marker, the appropriate advancement flaps were drawn incorporating the defect and placing the expected incisions between the helical rim and antihelix where possible.  The area thus outlined was incised through and through with a #15 scalpel blade.  With a skin hook and iris scissors, the flaps were gently and sharply undermined and freed up.
Keystone Flap Text: The defect edges were debeveled with a #15 scalpel blade.  Given the location of the defect, shape of the defect a keystone flap was deemed most appropriate.  Using a sterile surgical marker, an appropriate keystone flap was drawn incorporating the defect, outlining the appropriate donor tissue and placing the expected incisions within the relaxed skin tension lines where possible. The area thus outlined was incised deep to adipose tissue with a #15 scalpel blade.  The skin margins were undermined to an appropriate distance in all directions around the primary defect and laterally outward around the flap utilizing iris scissors.
Surgeon/Pathologist Verbiage (Will Incorporate Name Of Surgeon From Intro If Not Blank): operated in two distinct and integrated capacities as the surgeon and pathologist.
Tumor Debulked?: curette
H Plasty Text: Given the location of the defect, shape of the defect and the proximity to free margins a H-plasty was deemed most appropriate for repair.  Using a sterile surgical marker, the appropriate advancement arms of the H-plasty were drawn incorporating the defect and placing the expected incisions within the relaxed skin tension lines where possible. The area thus outlined was incised deep to adipose tissue with a #15 scalpel blade. The skin margins were undermined to an appropriate distance in all directions utilizing iris scissors.  The opposing advancement arms were then advanced into place in opposite direction and anchored with interrupted buried subcutaneous sutures.
Hemostasis: Electrocautery
Donor Site Anesthesia Type: same as repair anesthesia
Hatchet Flap Text: The defect edges were debeveled with a #15 scalpel blade.  Given the location of the defect, shape of the defect and the proximity to free margins a hatchet flap was deemed most appropriate.  Using a sterile surgical marker, an appropriate hatchet flap was drawn incorporating the defect and placing the expected incisions within the relaxed skin tension lines where possible.    The area thus outlined was incised deep to adipose tissue with a #15 scalpel blade.  The skin margins were undermined to an appropriate distance in all directions utilizing iris scissors.
Interpolation Flap Text: A decision was made to reconstruct the defect utilizing an interpolation axial flap and a staged reconstruction.  A telfa template was made of the defect.  This telfa template was then used to outline the interpolation flap.  The donor area for the pedicle flap was then injected with anesthesia.  The flap was excised through the skin and subcutaneous tissue down to the layer of the underlying musculature.  The interpolation flap was carefully excised within this deep plane to maintain its blood supply.  The edges of the donor site were undermined.   The donor site was closed in a primary fashion.  The pedicle was then rotated into position and sutured.  Once the tube was sutured into place, adequate blood supply was confirmed with blanching and refill.  The pedicle was then wrapped with xeroform gauze and dressed appropriately with a telfa and gauze bandage to ensure continued blood supply and protect the attached pedicle.
Anesthesia Type: 1% lidocaine without epinephrine
Detail Level: Detailed
Tissue Cultured Epidermal Autograft Text: The defect edges were debeveled with a #15 scalpel blade.  Given the location of the defect, shape of the defect and the proximity to free margins a tissue cultured epidermal autograft was deemed most appropriate.  The graft was then trimmed to fit the size of the defect.  The graft was then placed in the primary defect and oriented appropriately.
S Plasty Text: Given the location and shape of the defect, and the orientation of relaxed skin tension lines, an S-plasty was deemed most appropriate for repair.  Using a sterile surgical marker, the appropriate outline of the S-plasty was drawn, incorporating the defect and placing the expected incisions within the relaxed skin tension lines where possible.  The area thus outlined was incised deep to adipose tissue with a #15 scalpel blade.  The skin margins were undermined to an appropriate distance in all directions utilizing iris scissors. The skin flaps were advanced over the defect.  The opposing margins were then approximated with interrupted buried subcutaneous sutures.
Repair Performed By Another Provider Text (Leave Blank If You Do Not Want): After obtaining clear surgical margins the defect was repaired by another provider.
Dressing (No Sutures): dry sterile dressing
Melolabial Transposition Flap Text: The defect edges were debeveled with a #15 scalpel blade.  Given the location of the defect and the proximity to free margins a melolabial flap was deemed most appropriate.  Using a sterile surgical marker, an appropriate melolabial transposition flap was drawn incorporating the defect.    The area thus outlined was incised deep to adipose tissue with a #15 scalpel blade.  The skin margins were undermined to an appropriate distance in all directions utilizing iris scissors.
Cartilage Graft Text: The defect edges were debeveled with a #15 scalpel blade.  Given the location of the defect, shape of the defect, the fact the defect involved a full thickness cartilage defect a cartilage graft was deemed most appropriate.  An appropriate donor site was identified, cleansed, and anesthetized. The cartilage graft was then harvested and transferred to the recipient site, oriented appropriately and then sutured into place.  The secondary defect was then repaired using a primary closure.
Epidermal Autograft Text: The defect edges were debeveled with a #15 scalpel blade.  Given the location of the defect, shape of the defect and the proximity to free margins an epidermal autograft was deemed most appropriate.  Using a sterile surgical marker, the primary defect shape was transferred to the donor site. The epidermal graft was then harvested.  The skin graft was then placed in the primary defect and oriented appropriately.
Area M Indication Text: Tumors in this location are included in Area M (cheek, forehead, scalp, neck, jawline and pretibial skin).  Mohs surgery is indicated for tumors 1 cm or larger in these anatomic locations.
Bcc Infiltrative Histology Text: There were numerous aggregates of basaloid cells demonstrating an infiltrative pattern.
Advancement Flap (Single) Text: The defect edges were debeveled with a #15 scalpel blade.  Given the location of the defect and the proximity to free margins a single advancement flap was deemed most appropriate.  Using a sterile surgical marker, an appropriate advancement flap was drawn incorporating the defect and placing the expected incisions within the relaxed skin tension lines where possible.    The area thus outlined was incised deep to adipose tissue with a #15 scalpel blade.  The skin margins were undermined to an appropriate distance in all directions utilizing iris scissors.
Eye Protection Verbiage: Before proceeding with the stage, a plastic scleral shield was inserted. The globe was anesthetized with a few drops of 1% lidocaine with 1:100,000 epinephrine. Then, an appropriate sized scleral shield was chosen and coated with lacrilube ointment. The shield was gently inserted and left in place for the duration of each stage. After the stage was completed, the shield was gently removed.
Consent (Spinal Accessory)/Introductory Paragraph: The rationale for Mohs was explained to the patient and consent was obtained. The risks, benefits and alternatives to therapy were discussed in detail. Specifically, the risks of damage to the spinal accessory nerve, infection, scarring, bleeding, prolonged wound healing, incomplete removal, allergy to anesthesia, and recurrence were addressed. Prior to the procedure, the treatment site was clearly identified and confirmed by the patient. All components of Universal Protocol/PAUSE Rule completed.
Consent 2/Introductory Paragraph: Mohs surgery was explained to the patient and consent was obtained. The risks, benefits and alternatives to therapy were discussed in detail. Specifically, the risks of infection, scarring, bleeding, prolonged wound healing, incomplete removal, allergy to anesthesia, nerve injury and recurrence were addressed. Prior to the procedure, the treatment site was clearly identified and confirmed by the patient. All components of Universal Protocol/PAUSE Rule completed.
Stage 2: Additional Anesthesia Type: 1% lidocaine with 1:100,000 epinephrine
Alar Island Pedicle Flap Text: The defect edges were debeveled with a #15 scalpel blade.  Given the location of the defect, shape of the defect and the proximity to the alar rim an island pedicle advancement flap was deemed most appropriate.  Using a sterile surgical marker, an appropriate advancement flap was drawn incorporating the defect, outlining the appropriate donor tissue and placing the expected incisions within the nasal ala running parallel to the alar rim. The area thus outlined was incised with a #15 scalpel blade.  The skin margins were undermined minimally to an appropriate distance in all directions around the primary defect and laterally outward around the island pedicle utilizing iris scissors.  There was minimal undermining beneath the pedicle flap.
Consent (Ear)/Introductory Paragraph: The rationale for Mohs was explained to the patient and consent was obtained. The risks, benefits and alternatives to therapy were discussed in detail. Specifically, the risks of ear deformity, infection, scarring, bleeding, prolonged wound healing, incomplete removal, allergy to anesthesia, nerve injury and recurrence were addressed. Prior to the procedure, the treatment site was clearly identified and confirmed by the patient. All components of Universal Protocol/PAUSE Rule completed.
Area H Indication Text: Tumors in this location are included in Area H (eyelids, eyebrows, nose, lips, chin, ear, pre-auricular, post-auricular, temple, genitalia, hands, feet, ankles and areola).  Tissue conservation is critical in these anatomic locations.
Consent (Scalp)/Introductory Paragraph: The rationale for Mohs was explained to the patient and consent was obtained. The risks, benefits and alternatives to therapy were discussed in detail. Specifically, the risks of changes in hair growth pattern secondary to repair, infection, scarring, bleeding, prolonged wound healing, incomplete removal, allergy to anesthesia, nerve injury and recurrence were addressed. Prior to the procedure, the treatment site was clearly identified and confirmed by the patient. All components of Universal Protocol/PAUSE Rule completed.
Consent 3/Introductory Paragraph: I gave the patient a chance to ask questions they had about the procedure.  Following this I explained the Mohs procedure and consent was obtained. The risks, benefits and alternatives to therapy were discussed in detail. Specifically, the risks of infection, scarring, bleeding, prolonged wound healing, incomplete removal, allergy to anesthesia, nerve injury and recurrence were addressed. Prior to the procedure, the treatment site was clearly identified and confirmed by the patient. All components of Universal Protocol/PAUSE Rule completed.
Consent (Marginal Mandibular)/Introductory Paragraph: The rationale for Mohs was explained to the patient and consent was obtained. The risks, benefits and alternatives to therapy were discussed in detail. Specifically, the risks of damage to the marginal mandibular branch of the facial nerve, infection, scarring, bleeding, prolonged wound healing, incomplete removal, allergy to anesthesia, and recurrence were addressed. Prior to the procedure, the treatment site was clearly identified and confirmed by the patient. All components of Universal Protocol/PAUSE Rule completed.
Mohs Histo Method Verbiage: The section(s) were then chromacoded and processed in the Mohs lab using the Mohs protocol and submitted for frozen section.
Repair Type: None (only Mohs)
Unna Boot Text: An Unna boot was placed to help immobilize the limb and facilitate more rapid healing.
Consent (Lip)/Introductory Paragraph: The rationale for Mohs was explained to the patient and consent was obtained. The risks, benefits and alternatives to therapy were discussed in detail. Specifically, the risks of lip deformity, changes in the oral aperture, infection, scarring, bleeding, prolonged wound healing, incomplete removal, allergy to anesthesia, nerve injury and recurrence were addressed. Prior to the procedure, the treatment site was clearly identified and confirmed by the patient. All components of Universal Protocol/PAUSE Rule completed.
Island Pedicle Flap-Requiring Vessel Identification Text: The defect edges were debeveled with a #15 scalpel blade.  Given the location of the defect, shape of the defect and the proximity to free margins an island pedicle advancement flap was deemed most appropriate.  Using a sterile surgical marker, an appropriate advancement flap was drawn, based on the axial vessel mentioned above, incorporating the defect, outlining the appropriate donor tissue and placing the expected incisions within the relaxed skin tension lines where possible.    The area thus outlined was incised deep to adipose tissue with a #15 scalpel blade.  The skin margins were undermined to an appropriate distance in all directions around the primary defect and laterally outward around the island pedicle utilizing iris scissors.  There was minimal undermining beneath the pedicle flap.
Rotation Flap Text: The defect edges were debeveled with a #15 scalpel blade.  Given the location of the defect, shape of the defect and the proximity to free margins a rotation flap was deemed most appropriate.  Using a sterile surgical marker, an appropriate rotation flap was drawn incorporating the defect and placing the expected incisions within the relaxed skin tension lines where possible.    The area thus outlined was incised deep to adipose tissue with a #15 scalpel blade.  The skin margins were undermined to an appropriate distance in all directions utilizing iris scissors.
Graft Donor Site Epidermal Sutures (Optional): 6-0 Prolene
Bilobed Transposition Flap Text: The defect edges were debeveled with a #15 scalpel blade.  Given the location of the defect and the proximity to free margins a bilobed transposition flap was deemed most appropriate.  Using a sterile surgical marker, an appropriate bilobe flap drawn around the defect.    The area thus outlined was incised deep to adipose tissue with a #15 scalpel blade.  The skin margins were undermined to an appropriate distance in all directions utilizing iris scissors.
O-Z Plasty Text: The defect edges were debeveled with a #15 scalpel blade.  Given the location of the defect, shape of the defect and the proximity to free margins an O-Z plasty (double transposition flap) was deemed most appropriate.  Using a sterile surgical marker, the appropriate transposition flaps were drawn incorporating the defect and placing the expected incisions within the relaxed skin tension lines where possible.    The area thus outlined was incised deep to adipose tissue with a #15 scalpel blade.  The skin margins were undermined to an appropriate distance in all directions utilizing iris scissors.  Hemostasis was achieved with electrocautery.  The flaps were then transposed into place, one clockwise and the other counterclockwise, and anchored with interrupted buried subcutaneous sutures.
Referred To Plastics For Closure Text (Leave Blank If You Do Not Want): After obtaining clear surgical margins the patient was sent to plastics for surgical repair.  The patient understands they will receive post-surgical care and follow-up from the referring physician's office.
O-L Flap Text: The defect edges were debeveled with a #15 scalpel blade.  Given the location of the defect, shape of the defect and the proximity to free margins an O-L flap was deemed most appropriate.  Using a sterile surgical marker, an appropriate advancement flap was drawn incorporating the defect and placing the expected incisions within the relaxed skin tension lines where possible.    The area thus outlined was incised deep to adipose tissue with a #15 scalpel blade.  The skin margins were undermined to an appropriate distance in all directions utilizing iris scissors.
Ftsg Text: The defect edges were debeveled with a #15 scalpel blade.  Given the location of the defect, shape of the defect and the proximity to free margins a full thickness skin graft was deemed most appropriate.  Using a sterile surgical marker, the primary defect shape was transferred to the donor site. The area thus outlined was incised deep to adipose tissue with a #15 scalpel blade.  The harvested graft was then trimmed of adipose tissue until only dermis and epidermis was left.  The skin margins of the secondary defect were undermined to an appropriate distance in all directions utilizing iris scissors.  The secondary defect was closed with interrupted buried subcutaneous sutures.  The skin edges were then re-apposed with running  sutures.  The skin graft was then placed in the primary defect and oriented appropriately.
Home Suture Removal Text: Patient was provided instructions on removing sutures and will remove their sutures at home.  If they have any questions or difficulties they will call the office.
Graft Donor Site Bandage (Optional-Leave Blank If You Don't Want In Note): Steri-strips and a pressure bandage were applied to the donor site.
Cheiloplasty (Complex) Text: A decision was made to reconstruct the defect with a  cheiloplasty.  The defect was undermined extensively.  Additional obicularis oris muscle was excised with a 15 blade scalpel.  The defect was converted into a full thickness wedge to facilite a better cosmetic result.  Small vessels were then tied off with 5-0 monocyrl. The obicularis oris, superficial fascia, adipose and dermis were then reapproximated.  After the deeper layers were approximated the epidermis was reapproximated with particular care given to realign the vermillion border.
Rhombic Flap Text: The defect edges were debeveled with a #15 scalpel blade.  Given the location of the defect and the proximity to free margins a rhombic flap was deemed most appropriate.  Using a sterile surgical marker, an appropriate rhombic flap was drawn incorporating the defect.    The area thus outlined was incised deep to adipose tissue with a #15 scalpel blade.  The skin margins were undermined to an appropriate distance in all directions utilizing iris scissors.
Ear Star Wedge Flap Text: The defect edges were debeveled with a #15 blade scalpel.  Given the location of the defect and the proximity to free margins (helical rim) an ear star wedge flap was deemed most appropriate.  Using a sterile surgical marker, the appropriate flap was drawn incorporating the defect and placing the expected incisions between the helical rim and antihelix where possible.  The area thus outlined was incised through and through with a #15 scalpel blade.
Repair Anesthesia Method: local infiltration
Epidermal Closure: running and interrupted
W Plasty Text: The lesion was extirpated to the level of the fat with a #15 scalpel blade.  Given the location of the defect, shape of the defect and the proximity to free margins a W-plasty was deemed most appropriate for repair.  Using a sterile surgical marker, the appropriate transposition arms of the W-plasty were drawn incorporating the defect and placing the expected incisions within the relaxed skin tension lines where possible.    The area thus outlined was incised deep to adipose tissue with a #15 scalpel blade.  The skin margins were undermined to an appropriate distance in all directions utilizing iris scissors.  The opposing transposition arms were then transposed into place in opposite direction and anchored with interrupted buried subcutaneous sutures.
Purse String (Intermediate) Text: Given the location of the defect and the characteristics of the surrounding skin a pursestring intermediate closure was deemed most appropriate.  Undermining was performed circumfirentially around the surgical defect.  A purstring suture was then placed and tightened.
Closure 2 Information: This tab is for additional flaps and grafts, including complex repair and grafts and complex repair and flaps. You can also specify a different location for the additional defect, if the location is the same you do not need to select a new one. We will insert the automated text for the repair you select below just as we do for solitary flaps and grafts. Please note that at this time if you select a location with a different insurance zone you will need to override the ICD10 and CPT if appropriate.
Transposition Flap Text: The defect edges were debeveled with a #15 scalpel blade.  Given the location of the defect and the proximity to free margins a transposition flap was deemed most appropriate.  Using a sterile surgical marker, an appropriate transposition flap was drawn incorporating the defect.    The area thus outlined was incised deep to adipose tissue with a #15 scalpel blade.  The skin margins were undermined to an appropriate distance in all directions utilizing iris scissors.
Referred To Oculoplastics For Closure Text (Leave Blank If You Do Not Want): After obtaining clear surgical margins the patient was sent to oculoplastics for surgical repair.  The patient understands they will receive post-surgical care and follow-up from the referring physician's office.

## 2018-04-13 ENCOUNTER — HOSPITAL ENCOUNTER (OUTPATIENT)
Dept: LAB | Age: 83
Discharge: HOME | End: 2018-04-13
Payer: MEDICARE

## 2018-04-13 ENCOUNTER — PATIENT OUTREACH (OUTPATIENT)
Dept: CASE MANAGEMENT | Age: 83
End: 2018-04-13

## 2018-04-13 ENCOUNTER — HOSPITAL ENCOUNTER (OUTPATIENT)
Dept: INFUSION THERAPY | Age: 83
Discharge: HOME | End: 2018-04-13
Payer: MEDICARE

## 2018-04-13 DIAGNOSIS — C90.01 MULTIPLE MYELOMA IN REMISSION (HCC): ICD-10-CM

## 2018-04-13 DIAGNOSIS — D63.1 ANEMIA DUE TO CHRONIC RENAL FAILURE TREATED WITH ERYTHROPOIETIN, UNSPECIFIED STAGE: ICD-10-CM

## 2018-04-13 DIAGNOSIS — C90.00 MULTIPLE MYELOMA NOT HAVING ACHIEVED REMISSION (HCC): ICD-10-CM

## 2018-04-13 DIAGNOSIS — N18.9 ANEMIA DUE TO CHRONIC RENAL FAILURE TREATED WITH ERYTHROPOIETIN, UNSPECIFIED STAGE: ICD-10-CM

## 2018-04-13 LAB
ALBUMIN SERPL-MCNC: 3.1 G/DL (ref 3.2–4.6)
ALBUMIN/GLOB SERPL: 1 {RATIO} (ref 1.2–3.5)
ALP SERPL-CCNC: 80 U/L (ref 50–136)
ALT SERPL-CCNC: 23 U/L (ref 12–65)
ANION GAP SERPL CALC-SCNC: 6 MMOL/L (ref 7–16)
AST SERPL-CCNC: 19 U/L (ref 15–37)
BILIRUB SERPL-MCNC: 0.5 MG/DL (ref 0.2–1.1)
BUN SERPL-MCNC: 20 MG/DL (ref 8–23)
CALCIUM SERPL-MCNC: 7.5 MG/DL (ref 8.3–10.4)
CHLORIDE SERPL-SCNC: 101 MMOL/L (ref 98–107)
CO2 SERPL-SCNC: 32 MMOL/L (ref 21–32)
CREAT SERPL-MCNC: 1.18 MG/DL (ref 0.6–1)
DIFFERENTIAL METHOD BLD: ABNORMAL
ERYTHROCYTE [DISTWIDTH] IN BLOOD BY AUTOMATED COUNT: 15 % (ref 11.9–14.6)
GLOBULIN SER CALC-MCNC: 3.2 G/DL (ref 2.3–3.5)
GLUCOSE SERPL-MCNC: 89 MG/DL (ref 65–100)
HCT VFR BLD AUTO: 28.1 % (ref 35.8–46.3)
HGB BLD-MCNC: 8.7 G/DL (ref 11.7–15.4)
LYMPHOCYTES # BLD: 0.6 K/UL (ref 0.5–4.6)
LYMPHOCYTES NFR BLD MANUAL: 47 % (ref 16–44)
MCH RBC QN AUTO: 33.1 PG (ref 26.1–32.9)
MCHC RBC AUTO-ENTMCNC: 31 G/DL (ref 31.4–35)
MCV RBC AUTO: 106.8 FL (ref 79.6–97.8)
MONOCYTES # BLD: 0.1 K/UL (ref 0.1–1.3)
MONOCYTES NFR BLD MANUAL: 10 % (ref 3–9)
NEUTS BAND NFR BLD MANUAL: 6 % (ref 0–10)
NEUTS SEG # BLD: 0.6 K/UL (ref 1.7–8.2)
NEUTS SEG NFR BLD MANUAL: 37 % (ref 47–75)
NRBC # BLD: 0.01 K/UL (ref 0–0.2)
PLATELET # BLD AUTO: 166 K/UL (ref 150–450)
PLATELET COMMENTS,PCOM: ADEQUATE
PMV BLD AUTO: 10.2 FL (ref 10.8–14.1)
POTASSIUM SERPL-SCNC: 3.2 MMOL/L (ref 3.5–5.1)
PROT SERPL-MCNC: 6.3 G/DL (ref 6.3–8.2)
RBC # BLD AUTO: 2.63 M/UL (ref 4.05–5.25)
RBC MORPH BLD: ABNORMAL
SODIUM SERPL-SCNC: 139 MMOL/L (ref 136–145)
WBC # BLD AUTO: 1.3 K/UL (ref 4.3–11.1)
WBC MORPH BLD: ABNORMAL

## 2018-04-13 PROCEDURE — 80053 COMPREHEN METABOLIC PANEL: CPT | Performed by: INTERNAL MEDICINE

## 2018-04-13 PROCEDURE — 96372 THER/PROPH/DIAG INJ SC/IM: CPT

## 2018-04-13 PROCEDURE — 74011250636 HC RX REV CODE- 250/636: Performed by: INTERNAL MEDICINE

## 2018-04-13 PROCEDURE — 86334 IMMUNOFIX E-PHORESIS SERUM: CPT | Performed by: INTERNAL MEDICINE

## 2018-04-13 PROCEDURE — 83090 ASSAY OF HOMOCYSTEINE: CPT | Performed by: INTERNAL MEDICINE

## 2018-04-13 PROCEDURE — 85025 COMPLETE CBC W/AUTO DIFF WBC: CPT | Performed by: INTERNAL MEDICINE

## 2018-04-13 PROCEDURE — 36415 COLL VENOUS BLD VENIPUNCTURE: CPT | Performed by: INTERNAL MEDICINE

## 2018-04-13 PROCEDURE — 84165 PROTEIN E-PHORESIS SERUM: CPT | Performed by: INTERNAL MEDICINE

## 2018-04-13 RX ADMIN — ERYTHROPOIETIN 40000 UNITS: 40000 INJECTION, SOLUTION INTRAVENOUS; SUBCUTANEOUS at 14:21

## 2018-04-13 NOTE — PROGRESS NOTES
Pt. Discharged via wheelchair accompanied by family. Tolerated injection well. No distress noted. To return to [facility] on 5/11/18.

## 2018-04-13 NOTE — PROGRESS NOTES
Pt seen today by Dr. Rey Parra. Labs reviewed. Pt will be reduced to 3 mg of pomalyst for 21 days instead of the 4mg she previously took. New prescription taken to [facility] since [facility] is not here. Pt will start procrit today. She will need this every 4 weeks until her Hgb improves. Advised to call if any issues with pomalsyt prescription as pt will start next Friday.

## 2018-04-16 LAB
ALBUMIN SERPL ELPH-MCNC: 3.29 G/DL (ref 3.2–5.6)
ALBUMIN/GLOB SERPL: 1.3 {RATIO}
ALPHA1 GLOB SERPL ELPH-MCNC: 0.28 G/DL (ref 0.1–0.4)
ALPHA2 GLOB SERPL ELPH-MCNC: 0.72 G/DL (ref 0.4–1.2)
B-GLOBULIN SERPL QL ELPH: 0.87 G/DL (ref 0.6–1.3)
GAMMA GLOB MFR SERPL ELPH: 0.65 G/DL (ref 0.5–1.6)
HCYS SERPL-SCNC: 13.4 UMOL/L (ref 0–15)
IGA SERPL-MCNC: 46 MG/DL (ref 85–499)
IGG SERPL-MCNC: 739 MG/DL (ref 610–1616)
IGM SERPL-MCNC: 21 MG/DL (ref 35–242)
M PROTEIN SERPL ELPH-MCNC: 0.33 G/DL
PROT PATTERN SERPL ELPH-IMP: ABNORMAL
PROT PATTERN SPEC IFE-IMP: ABNORMAL
PROT SERPL-MCNC: 5.8 G/DL (ref 6.3–8.2)

## 2018-04-19 ENCOUNTER — TELEPHONE (OUTPATIENT)
Dept: CASE MANAGEMENT | Age: 83
End: 2018-04-19

## 2018-04-19 ENCOUNTER — DOCUMENTATION ONLY (OUTPATIENT)
Dept: CASE MANAGEMENT | Age: 83
End: 2018-04-19

## 2018-04-24 ENCOUNTER — DOCUMENTATION ONLY (OUTPATIENT)
Dept: CASE MANAGEMENT | Age: 83
End: 2018-04-24

## 2018-04-24 NOTE — PROGRESS NOTES
Spoke to daughter Helder Guevara about note from [facility]. that pt stated she was swelling when taking her pomalsyt. Pt pomalyst was decreased to 3 mg at her last office visit. She received this on sat 4/21 and did not start taking it because she was afraid she would have peripheral edema again. Her daughter stated her cardiac MD increased his lasix to 40 mg daily since she last took the pomalyst This was discussed in her office visit with Dr. Norbert Suarez. Let her know to go ahead and start the 3mg of pomalyst and cont to take her lasix as her Dr advised and to call in a few days if the symptoms got worse. She also has a home health nurse whom visit's 3 times a week. Helder Ismael V/U and will call if anything changes. Pt denies SOB at this time and advised if this was to occur to call or if emergent to go to the ER.

## 2018-05-04 ENCOUNTER — DOCUMENTATION ONLY (OUTPATIENT)
Dept: CASE MANAGEMENT | Age: 83
End: 2018-05-04

## 2018-05-04 NOTE — PROGRESS NOTES
Daughter called and stated pt is having swelling from her toes to knees. She is taking 40 mg BID of lasix per daughter. She denies any SOB. Per Dr. Maximino Duverney could be procrit we started her on. But we will hold her pomalyst for 1 week and then resume every other day, When she returns we will reduce her dose to 2mg. Cinthya Gonzalez informed of changess and advised to call if anything changes.

## 2018-05-11 ENCOUNTER — HOSPITAL ENCOUNTER (OUTPATIENT)
Dept: INFUSION THERAPY | Age: 83
Discharge: HOME | End: 2018-05-11

## 2018-05-11 ENCOUNTER — PATIENT OUTREACH (OUTPATIENT)
Dept: CASE MANAGEMENT | Age: 83
End: 2018-05-11

## 2018-05-11 ENCOUNTER — HOSPITAL ENCOUNTER (OUTPATIENT)
Dept: LAB | Age: 83
Discharge: HOME | End: 2018-05-11
Payer: MEDICARE

## 2018-05-11 DIAGNOSIS — N18.9 ANEMIA DUE TO CHRONIC RENAL FAILURE TREATED WITH ERYTHROPOIETIN, UNSPECIFIED STAGE: ICD-10-CM

## 2018-05-11 DIAGNOSIS — D63.1 ANEMIA DUE TO CHRONIC RENAL FAILURE TREATED WITH ERYTHROPOIETIN, UNSPECIFIED STAGE: ICD-10-CM

## 2018-05-11 DIAGNOSIS — C90.00 MULTIPLE MYELOMA NOT HAVING ACHIEVED REMISSION (HCC): ICD-10-CM

## 2018-05-11 LAB
ALBUMIN SERPL-MCNC: 3 G/DL (ref 3.2–4.6)
ALBUMIN/GLOB SERPL: 0.8 {RATIO} (ref 1.2–3.5)
ALP SERPL-CCNC: 81 U/L (ref 50–136)
ALT SERPL-CCNC: 17 U/L (ref 12–65)
ANION GAP SERPL CALC-SCNC: 5 MMOL/L (ref 7–16)
AST SERPL-CCNC: 17 U/L (ref 15–37)
BASOPHILS # BLD: 0.1 K/UL (ref 0–0.2)
BASOPHILS NFR BLD: 2 % (ref 0–2)
BILIRUB SERPL-MCNC: 0.5 MG/DL (ref 0.2–1.1)
BUN SERPL-MCNC: 22 MG/DL (ref 8–23)
CALCIUM SERPL-MCNC: 8.5 MG/DL (ref 8.3–10.4)
CHLORIDE SERPL-SCNC: 104 MMOL/L (ref 98–107)
CO2 SERPL-SCNC: 30 MMOL/L (ref 21–32)
CREAT SERPL-MCNC: 1.15 MG/DL (ref 0.6–1)
DIFFERENTIAL METHOD BLD: ABNORMAL
EOSINOPHIL # BLD: 0.3 K/UL (ref 0–0.8)
EOSINOPHIL NFR BLD: 9 % (ref 0.5–7.8)
ERYTHROCYTE [DISTWIDTH] IN BLOOD BY AUTOMATED COUNT: 14 % (ref 11.9–14.6)
GLOBULIN SER CALC-MCNC: 3.6 G/DL (ref 2.3–3.5)
GLUCOSE SERPL-MCNC: 94 MG/DL (ref 65–100)
HCT VFR BLD AUTO: 32 % (ref 35.8–46.3)
HGB BLD-MCNC: 10 G/DL (ref 11.7–15.4)
LYMPHOCYTES # BLD: 1.1 K/UL (ref 0.5–4.6)
LYMPHOCYTES NFR BLD: 34 % (ref 13–44)
MAGNESIUM SERPL-MCNC: 2.4 MG/DL (ref 1.8–2.4)
MCH RBC QN AUTO: 32.5 PG (ref 26.1–32.9)
MCHC RBC AUTO-ENTMCNC: 31.3 G/DL (ref 31.4–35)
MCV RBC AUTO: 103.9 FL (ref 79.6–97.8)
MONOCYTES # BLD: 0.7 K/UL (ref 0.1–1.3)
MONOCYTES NFR BLD: 22 % (ref 4–12)
NEUTS SEG # BLD: 1.1 K/UL (ref 1.7–8.2)
NEUTS SEG NFR BLD: 33 % (ref 43–78)
NRBC # BLD: 0 K/UL (ref 0–0.2)
PLATELET # BLD AUTO: 171 K/UL (ref 150–450)
PMV BLD AUTO: 10 FL (ref 10.8–14.1)
POTASSIUM SERPL-SCNC: 3.9 MMOL/L (ref 3.5–5.1)
PROT SERPL-MCNC: 6.6 G/DL (ref 6.3–8.2)
RBC # BLD AUTO: 3.08 M/UL (ref 4.05–5.25)
SODIUM SERPL-SCNC: 139 MMOL/L (ref 136–145)
WBC # BLD AUTO: 3.3 K/UL (ref 4.3–11.1)

## 2018-05-11 PROCEDURE — 86334 IMMUNOFIX E-PHORESIS SERUM: CPT | Performed by: INTERNAL MEDICINE

## 2018-05-11 PROCEDURE — 36415 COLL VENOUS BLD VENIPUNCTURE: CPT | Performed by: INTERNAL MEDICINE

## 2018-05-11 PROCEDURE — 85025 COMPLETE CBC W/AUTO DIFF WBC: CPT | Performed by: INTERNAL MEDICINE

## 2018-05-11 PROCEDURE — 80053 COMPREHEN METABOLIC PANEL: CPT | Performed by: INTERNAL MEDICINE

## 2018-05-11 PROCEDURE — 82784 ASSAY IGA/IGD/IGG/IGM EACH: CPT | Performed by: INTERNAL MEDICINE

## 2018-05-11 PROCEDURE — 83735 ASSAY OF MAGNESIUM: CPT | Performed by: INTERNAL MEDICINE

## 2018-05-14 ENCOUNTER — APPOINTMENT (RX ONLY)
Dept: URBAN - METROPOLITAN AREA CLINIC 23 | Facility: CLINIC | Age: 83
Setting detail: DERMATOLOGY
End: 2018-05-14

## 2018-05-14 DIAGNOSIS — Z48.01 ENCOUNTER FOR CHANGE OR REMOVAL OF SURGICAL WOUND DRESSING: ICD-10-CM

## 2018-05-14 DIAGNOSIS — D485 NEOPLASM OF UNCERTAIN BEHAVIOR OF SKIN: ICD-10-CM

## 2018-05-14 DIAGNOSIS — Z85.828 PERSONAL HISTORY OF OTHER MALIGNANT NEOPLASM OF SKIN: ICD-10-CM

## 2018-05-14 PROBLEM — D48.5 NEOPLASM OF UNCERTAIN BEHAVIOR OF SKIN: Status: ACTIVE | Noted: 2018-05-14

## 2018-05-14 LAB
ALBUMIN SERPL ELPH-MCNC: 3.02 G/DL (ref 3.2–5.6)
ALBUMIN/GLOB SERPL: 1.1 {RATIO}
ALPHA1 GLOB SERPL ELPH-MCNC: 0.36 G/DL (ref 0.1–0.4)
ALPHA2 GLOB SERPL ELPH-MCNC: 0.89 G/DL (ref 0.4–1.2)
B-GLOBULIN SERPL QL ELPH: 0.98 G/DL (ref 0.6–1.3)
GAMMA GLOB MFR SERPL ELPH: 0.65 G/DL (ref 0.5–1.6)
IGA SERPL-MCNC: 47 MG/DL (ref 85–499)
IGG SERPL-MCNC: 722 MG/DL (ref 610–1616)
IGM SERPL-MCNC: 23 MG/DL (ref 35–242)
M PROTEIN SERPL ELPH-MCNC: 0.3 G/DL
PROT PATTERN SERPL ELPH-IMP: ABNORMAL
PROT PATTERN SPEC IFE-IMP: ABNORMAL
PROT SERPL-MCNC: 5.9 G/DL (ref 6.3–8.2)

## 2018-05-14 PROCEDURE — ? BIOPSY BY SHAVE METHOD

## 2018-05-14 PROCEDURE — 99213 OFFICE O/P EST LOW 20 MIN: CPT | Mod: 25

## 2018-05-14 PROCEDURE — 11100: CPT

## 2018-05-14 PROCEDURE — ? COUNSELING

## 2018-05-14 PROCEDURE — ? PHOTO-DOCUMENTATION

## 2018-05-14 ASSESSMENT — LOCATION DETAILED DESCRIPTION DERM
LOCATION DETAILED: LEFT SUPERIOR MEDIAL FOREHEAD
LOCATION DETAILED: LEFT DISTAL PRETIBIAL REGION
LOCATION DETAILED: RIGHT LATERAL ZYGOMA
LOCATION DETAILED: RIGHT DISTAL PRETIBIAL REGION
LOCATION DETAILED: LEFT PROXIMAL LATERAL PRETIBIAL REGION

## 2018-05-14 ASSESSMENT — LOCATION SIMPLE DESCRIPTION DERM
LOCATION SIMPLE: LEFT LOWER LEG
LOCATION SIMPLE: LEFT FOREHEAD
LOCATION SIMPLE: RIGHT ZYGOMA
LOCATION SIMPLE: LEFT PRETIBIAL REGION
LOCATION SIMPLE: RIGHT PRETIBIAL REGION

## 2018-05-14 ASSESSMENT — LOCATION ZONE DERM
LOCATION ZONE: FACE
LOCATION ZONE: LEG

## 2018-05-14 NOTE — PROCEDURE: BIOPSY BY SHAVE METHOD
Curettage Text: The wound bed was treated with curettage after the biopsy was performed.
Accession #: pc
Bill [id number] For Specimen Handling/Conveyance To Laboratory?: no
Electrodesiccation And Curettage Text: The wound bed was treated with electrodesiccation and curettage after the biopsy was performed.
Consent: Written consent was obtained and risks were reviewed including but not limited to scarring, infection, bleeding, scabbing, incomplete removal, nerve damage and allergy to anesthesia.
Wound Care: Vaseline
Silver Nitrate Text: The wound bed was treated with silver nitrate after the biopsy was performed.
Type Of Destruction Used: Curettage
Post-Care Instructions: I reviewed with the patient in detail post-care instructions. Patient is to keep the biopsy site dry overnight, and then apply bacitracin twice daily until healed. Patient may apply hydrogen peroxide soaks to remove any crusting.
Hemostasis: Aluminum Chloride
Size Of Lesion In Cm: 0
Biopsy Type: H and E
Notification Instructions: Patient will be notified of biopsy results. However, patient instructed to call the office if not contacted within 2 weeks.
Was A Bandage Applied: Yes
Dressing: bandage
Billing Type: Third-Party Bill
Electrodesiccation Text: The wound bed was treated with electrodesiccation after the biopsy was performed.
Anesthesia Type: 1% lidocaine with epinephrine
Anesthesia Volume In Cc: 0.5
Biopsy Method: Dermablade
Cryotherapy Text: The wound bed was treated with cryotherapy after the biopsy was performed.
Detail Level: Detailed

## 2018-05-14 NOTE — PROCEDURE: MIPS QUALITY
Quality 111:Pneumonia Vaccination Status For Older Adults: Pneumococcal Vaccination not Administered or Previously Received, Reason not Otherwise Specified
Detail Level: Detailed
Quality 110: Preventive Care And Screening: Influenza Immunization: Influenza Immunization not Administered because Patient Refused.
Quality 130: Documentation Of Current Medications In The Medical Record: Current Medications Documented

## 2018-05-26 ENCOUNTER — HOSPITAL ENCOUNTER (OUTPATIENT)
Dept: INFUSION THERAPY | Age: 83
End: 2018-05-26

## 2018-05-27 ENCOUNTER — APPOINTMENT (OUTPATIENT)
Dept: INFUSION THERAPY | Age: 83
End: 2018-05-27

## 2018-05-28 ENCOUNTER — APPOINTMENT (OUTPATIENT)
Dept: INFUSION THERAPY | Age: 83
End: 2018-05-28

## 2018-06-12 ENCOUNTER — RX ONLY (OUTPATIENT)
Age: 83
Setting detail: RX ONLY
End: 2018-06-12

## 2018-06-12 RX ORDER — FLUOROURACIL 2 G/40G
CREAM TOPICAL
Qty: 1 | Refills: 0 | Status: ERX | COMMUNITY
Start: 2018-06-12

## 2018-06-13 ENCOUNTER — PATIENT OUTREACH (OUTPATIENT)
Dept: CASE MANAGEMENT | Age: 83
End: 2018-06-13

## 2018-06-13 ENCOUNTER — HOSPITAL ENCOUNTER (OUTPATIENT)
Dept: INFUSION THERAPY | Age: 83
End: 2018-06-13

## 2018-06-13 ENCOUNTER — HOSPITAL ENCOUNTER (OUTPATIENT)
Dept: LAB | Age: 83
Discharge: HOME | End: 2018-06-13
Payer: MEDICARE

## 2018-06-13 DIAGNOSIS — C90.01 MULTIPLE MYELOMA IN REMISSION (HCC): ICD-10-CM

## 2018-06-13 PROBLEM — C90.00 MULTIPLE MYELOMA NOT HAVING ACHIEVED REMISSION (HCC): Status: ACTIVE | Noted: 2018-06-13

## 2018-06-13 LAB
ALBUMIN SERPL-MCNC: 3.3 G/DL (ref 3.2–4.6)
ALBUMIN/GLOB SERPL: 1 {RATIO} (ref 1.2–3.5)
ALP SERPL-CCNC: 93 U/L (ref 50–136)
ALT SERPL-CCNC: 13 U/L (ref 12–65)
ANION GAP SERPL CALC-SCNC: 6 MMOL/L (ref 7–16)
AST SERPL-CCNC: 16 U/L (ref 15–37)
BASOPHILS # BLD: 0 K/UL (ref 0–0.2)
BASOPHILS NFR BLD: 1 % (ref 0–2)
BILIRUB SERPL-MCNC: 0.8 MG/DL (ref 0.2–1.1)
BUN SERPL-MCNC: 20 MG/DL (ref 8–23)
CALCIUM SERPL-MCNC: 8.2 MG/DL (ref 8.3–10.4)
CHLORIDE SERPL-SCNC: 103 MMOL/L (ref 98–107)
CO2 SERPL-SCNC: 30 MMOL/L (ref 21–32)
CREAT SERPL-MCNC: 1.03 MG/DL (ref 0.6–1)
DIFFERENTIAL METHOD BLD: ABNORMAL
EOSINOPHIL # BLD: 0.1 K/UL (ref 0–0.8)
EOSINOPHIL NFR BLD: 3 % (ref 0.5–7.8)
ERYTHROCYTE [DISTWIDTH] IN BLOOD BY AUTOMATED COUNT: 14.7 % (ref 11.9–14.6)
GLOBULIN SER CALC-MCNC: 3.2 G/DL (ref 2.3–3.5)
GLUCOSE SERPL-MCNC: 93 MG/DL (ref 65–100)
HCT VFR BLD AUTO: 32 % (ref 35.8–46.3)
HGB BLD-MCNC: 10.2 G/DL (ref 11.7–15.4)
LYMPHOCYTES # BLD: 0.8 K/UL (ref 0.5–4.6)
LYMPHOCYTES NFR BLD: 39 % (ref 13–44)
MCH RBC QN AUTO: 31.8 PG (ref 26.1–32.9)
MCHC RBC AUTO-ENTMCNC: 31.9 G/DL (ref 31.4–35)
MCV RBC AUTO: 99.7 FL (ref 79.6–97.8)
MONOCYTES # BLD: 0.2 K/UL (ref 0.1–1.3)
MONOCYTES NFR BLD: 8 % (ref 4–12)
NEUTS SEG # BLD: 1 K/UL (ref 1.7–8.2)
NEUTS SEG NFR BLD: 49 % (ref 43–78)
NRBC # BLD: 0.01 K/UL (ref 0–0.2)
PLATELET # BLD AUTO: 201 K/UL (ref 150–450)
PLATELET COMMENTS,PCOM: ADEQUATE
PMV BLD AUTO: 9.8 FL (ref 10.8–14.1)
POTASSIUM SERPL-SCNC: 3.4 MMOL/L (ref 3.5–5.1)
PROT SERPL-MCNC: 6.5 G/DL (ref 6.3–8.2)
RBC # BLD AUTO: 3.21 M/UL (ref 4.05–5.25)
RBC MORPH BLD: ABNORMAL
RBC MORPH BLD: ABNORMAL
SODIUM SERPL-SCNC: 139 MMOL/L (ref 136–145)
WBC # BLD AUTO: 2.1 K/UL (ref 4.3–11.1)
WBC MORPH BLD: ABNORMAL

## 2018-06-13 PROCEDURE — 36415 COLL VENOUS BLD VENIPUNCTURE: CPT | Performed by: INTERNAL MEDICINE

## 2018-06-13 PROCEDURE — 84165 PROTEIN E-PHORESIS SERUM: CPT | Performed by: INTERNAL MEDICINE

## 2018-06-13 PROCEDURE — 80053 COMPREHEN METABOLIC PANEL: CPT | Performed by: INTERNAL MEDICINE

## 2018-06-13 PROCEDURE — 85025 COMPLETE CBC W/AUTO DIFF WBC: CPT | Performed by: INTERNAL MEDICINE

## 2018-06-13 PROCEDURE — 86334 IMMUNOFIX E-PHORESIS SERUM: CPT | Performed by: INTERNAL MEDICINE

## 2018-06-13 NOTE — PROGRESS NOTES
Pt seen today by Dr. Brisa De Los Santos. Labs reviewed. Pt will not need any procrit today. She will return in 5 weeks. Her daughter will call me tomorrow to let me know when she needs a refill on pomalyst. She should be completed this Sunday with her 21 day and restart on 6/25. Advised to call with any further questions or concerns before next appt.

## 2018-06-15 LAB
ALBUMIN SERPL ELPH-MCNC: 3.5 G/DL (ref 3.2–5.6)
ALBUMIN/GLOB SERPL: 1.3 {RATIO}
ALPHA1 GLOB SERPL ELPH-MCNC: 0.28 G/DL (ref 0.1–0.4)
ALPHA2 GLOB SERPL ELPH-MCNC: 0.75 G/DL (ref 0.4–1.2)
B-GLOBULIN SERPL QL ELPH: 0.94 G/DL (ref 0.6–1.3)
GAMMA GLOB MFR SERPL ELPH: 0.74 G/DL (ref 0.5–1.6)
IGA SERPL-MCNC: 48 MG/DL (ref 85–499)
IGG SERPL-MCNC: 711 MG/DL (ref 610–1616)
IGM SERPL-MCNC: 37 MG/DL (ref 35–242)
M PROTEIN SERPL ELPH-MCNC: 0.35 G/DL
PROT PATTERN SERPL ELPH-IMP: ABNORMAL
PROT PATTERN SPEC IFE-IMP: ABNORMAL
PROT SERPL-MCNC: 6.2 G/DL (ref 6.3–8.2)

## 2018-07-18 ENCOUNTER — PATIENT OUTREACH (OUTPATIENT)
Dept: CASE MANAGEMENT | Age: 83
End: 2018-07-18

## 2018-07-18 ENCOUNTER — HOSPITAL ENCOUNTER (OUTPATIENT)
Dept: INFUSION THERAPY | Age: 83
End: 2018-07-18

## 2018-07-18 ENCOUNTER — HOSPITAL ENCOUNTER (OUTPATIENT)
Dept: LAB | Age: 83
Discharge: HOME | End: 2018-07-18
Payer: MEDICARE

## 2018-07-18 DIAGNOSIS — C90.00 MULTIPLE MYELOMA NOT HAVING ACHIEVED REMISSION (HCC): ICD-10-CM

## 2018-07-18 LAB
ALBUMIN SERPL-MCNC: 3.7 G/DL (ref 3.2–4.6)
ALBUMIN/GLOB SERPL: 1.1 {RATIO} (ref 1.2–3.5)
ALP SERPL-CCNC: 78 U/L (ref 50–136)
ALT SERPL-CCNC: 20 U/L (ref 12–65)
ANION GAP SERPL CALC-SCNC: 8 MMOL/L (ref 7–16)
AST SERPL-CCNC: 13 U/L (ref 15–37)
BASOPHILS # BLD: 0 K/UL (ref 0–0.2)
BASOPHILS NFR BLD: 0 % (ref 0–2)
BILIRUB SERPL-MCNC: 1 MG/DL (ref 0.2–1.1)
BUN SERPL-MCNC: 27 MG/DL (ref 8–23)
CALCIUM SERPL-MCNC: 8.5 MG/DL (ref 8.3–10.4)
CHLORIDE SERPL-SCNC: 101 MMOL/L (ref 98–107)
CO2 SERPL-SCNC: 29 MMOL/L (ref 21–32)
CREAT SERPL-MCNC: 1.28 MG/DL (ref 0.6–1)
DIFFERENTIAL METHOD BLD: ABNORMAL
EOSINOPHIL # BLD: 0.1 K/UL (ref 0–0.8)
EOSINOPHIL NFR BLD: 2 % (ref 0.5–7.8)
ERYTHROCYTE [DISTWIDTH] IN BLOOD BY AUTOMATED COUNT: 15.9 % (ref 11.9–14.6)
GLOBULIN SER CALC-MCNC: 3.3 G/DL (ref 2.3–3.5)
GLUCOSE SERPL-MCNC: 85 MG/DL (ref 65–100)
HCT VFR BLD AUTO: 35.2 % (ref 35.8–46.3)
HGB BLD-MCNC: 11.1 G/DL (ref 11.7–15.4)
LYMPHOCYTES # BLD: 0.9 K/UL (ref 0.5–4.6)
LYMPHOCYTES NFR BLD: 30 % (ref 13–44)
MCH RBC QN AUTO: 31.4 PG (ref 26.1–32.9)
MCHC RBC AUTO-ENTMCNC: 31.5 G/DL (ref 31.4–35)
MCV RBC AUTO: 99.4 FL (ref 79.6–97.8)
MONOCYTES # BLD: 0.5 K/UL (ref 0.1–1.3)
MONOCYTES NFR BLD: 15 % (ref 4–12)
NEUTS SEG # BLD: 1.6 K/UL (ref 1.7–8.2)
NEUTS SEG NFR BLD: 53 % (ref 43–78)
NRBC # BLD: 0.01 K/UL (ref 0–0.2)
PLATELET # BLD AUTO: 131 K/UL (ref 150–450)
PMV BLD AUTO: 10.3 FL (ref 10.8–14.1)
POTASSIUM SERPL-SCNC: 3.4 MMOL/L (ref 3.5–5.1)
PROT SERPL-MCNC: 7 G/DL (ref 6.3–8.2)
RBC # BLD AUTO: 3.54 M/UL (ref 4.05–5.25)
SODIUM SERPL-SCNC: 138 MMOL/L (ref 136–145)
WBC # BLD AUTO: 3.1 K/UL (ref 4.3–11.1)

## 2018-07-18 PROCEDURE — 80053 COMPREHEN METABOLIC PANEL: CPT | Performed by: INTERNAL MEDICINE

## 2018-07-18 PROCEDURE — 36415 COLL VENOUS BLD VENIPUNCTURE: CPT | Performed by: INTERNAL MEDICINE

## 2018-07-18 PROCEDURE — 85025 COMPLETE CBC W/AUTO DIFF WBC: CPT | Performed by: INTERNAL MEDICINE

## 2018-07-18 PROCEDURE — 86334 IMMUNOFIX E-PHORESIS SERUM: CPT | Performed by: INTERNAL MEDICINE

## 2018-07-18 PROCEDURE — 84165 PROTEIN E-PHORESIS SERUM: CPT | Performed by: INTERNAL MEDICINE

## 2018-07-18 NOTE — PROGRESS NOTES
P was seen today by Dr. Daina Horta. Labs reviewed. No procrit needed today. She is doing well with her Pomalyst. She will start her off week next week and will restart on 7/30. Advised to call with any further questions or concerns before next visit.

## 2018-07-19 LAB
ALBUMIN SERPL ELPH-MCNC: 3.68 G/DL (ref 3.2–5.6)
ALBUMIN/GLOB SERPL: 1.3 {RATIO}
ALPHA1 GLOB SERPL ELPH-MCNC: 0.3 G/DL (ref 0.1–0.4)
ALPHA2 GLOB SERPL ELPH-MCNC: 0.79 G/DL (ref 0.4–1.2)
B-GLOBULIN SERPL QL ELPH: 1.01 G/DL (ref 0.6–1.3)
GAMMA GLOB MFR SERPL ELPH: 0.83 G/DL (ref 0.5–1.6)
IGA SERPL-MCNC: 54 MG/DL (ref 85–499)
IGG SERPL-MCNC: 762 MG/DL (ref 610–1616)
IGM SERPL-MCNC: 40 MG/DL (ref 35–242)
M PROTEIN SERPL ELPH-MCNC: 0.14 G/DL
PROT PATTERN SERPL ELPH-IMP: ABNORMAL
PROT PATTERN SPEC IFE-IMP: ABNORMAL
PROT SERPL-MCNC: 6.6 G/DL (ref 6.3–8.2)

## 2018-08-15 ENCOUNTER — HOSPITAL ENCOUNTER (OUTPATIENT)
Dept: INFUSION THERAPY | Age: 83
End: 2018-08-15

## 2018-08-23 ENCOUNTER — HOSPITAL ENCOUNTER (OUTPATIENT)
Dept: INFUSION THERAPY | Age: 83
Discharge: HOME | End: 2018-08-23

## 2018-08-23 ENCOUNTER — HOSPITAL ENCOUNTER (OUTPATIENT)
Dept: LAB | Age: 83
Discharge: HOME | End: 2018-08-23
Payer: MEDICARE

## 2018-08-23 ENCOUNTER — PATIENT OUTREACH (OUTPATIENT)
Dept: CASE MANAGEMENT | Age: 83
End: 2018-08-23

## 2018-08-23 DIAGNOSIS — C90.00 MULTIPLE MYELOMA NOT HAVING ACHIEVED REMISSION (HCC): ICD-10-CM

## 2018-08-23 LAB
ALBUMIN SERPL-MCNC: 3.5 G/DL (ref 3.2–4.6)
ALBUMIN/GLOB SERPL: 1 {RATIO} (ref 1.2–3.5)
ALP SERPL-CCNC: 85 U/L (ref 50–136)
ALT SERPL-CCNC: 19 U/L (ref 12–65)
ANION GAP SERPL CALC-SCNC: 7 MMOL/L (ref 7–16)
AST SERPL-CCNC: 16 U/L (ref 15–37)
BASOPHILS # BLD: 0 K/UL (ref 0–0.2)
BASOPHILS NFR BLD: 0 % (ref 0–2)
BILIRUB SERPL-MCNC: 1.1 MG/DL (ref 0.2–1.1)
BUN SERPL-MCNC: 20 MG/DL (ref 8–23)
CALCIUM SERPL-MCNC: 8.3 MG/DL (ref 8.3–10.4)
CHLORIDE SERPL-SCNC: 100 MMOL/L (ref 98–107)
CO2 SERPL-SCNC: 29 MMOL/L (ref 21–32)
CREAT SERPL-MCNC: 1.23 MG/DL (ref 0.6–1)
DIFFERENTIAL METHOD BLD: ABNORMAL
EOSINOPHIL # BLD: 0 K/UL (ref 0–0.8)
EOSINOPHIL NFR BLD: 1 % (ref 0.5–7.8)
ERYTHROCYTE [DISTWIDTH] IN BLOOD BY AUTOMATED COUNT: 15.7 % (ref 11.9–14.6)
GLOBULIN SER CALC-MCNC: 3.5 G/DL (ref 2.3–3.5)
GLUCOSE SERPL-MCNC: 100 MG/DL (ref 65–100)
HCT VFR BLD AUTO: 35.1 % (ref 35.8–46.3)
HGB BLD-MCNC: 10.9 G/DL (ref 11.7–15.4)
IMM GRANULOCYTES # BLD: 0 K/UL (ref 0–0.5)
IMM GRANULOCYTES NFR BLD AUTO: 1 % (ref 0–5)
LYMPHOCYTES # BLD: 0.9 K/UL (ref 0.5–4.6)
LYMPHOCYTES NFR BLD: 32 % (ref 13–44)
MCH RBC QN AUTO: 31.2 PG (ref 26.1–32.9)
MCHC RBC AUTO-ENTMCNC: 31.1 G/DL (ref 31.4–35)
MCV RBC AUTO: 100.6 FL (ref 79.6–97.8)
MONOCYTES # BLD: 0.3 K/UL (ref 0.1–1.3)
MONOCYTES NFR BLD: 11 % (ref 4–12)
NEUTS SEG # BLD: 1.6 K/UL (ref 1.7–8.2)
NEUTS SEG NFR BLD: 55 % (ref 43–78)
NRBC # BLD: 0 K/UL (ref 0–0.2)
PLATELET # BLD AUTO: 146 K/UL (ref 150–450)
PLATELET COMMENTS,PCOM: ADEQUATE
PMV BLD AUTO: 9.9 FL (ref 9.4–12.3)
POTASSIUM SERPL-SCNC: 4.1 MMOL/L (ref 3.5–5.1)
PROT SERPL-MCNC: 7 G/DL (ref 6.3–8.2)
RBC # BLD AUTO: 3.49 M/UL (ref 4.05–5.25)
RBC MORPH BLD: ABNORMAL
RBC MORPH BLD: ABNORMAL
SODIUM SERPL-SCNC: 136 MMOL/L (ref 136–145)
WBC # BLD AUTO: 2.8 K/UL (ref 4.3–11.1)
WBC MORPH BLD: ABNORMAL

## 2018-08-23 PROCEDURE — 80053 COMPREHEN METABOLIC PANEL: CPT

## 2018-08-23 PROCEDURE — 36415 COLL VENOUS BLD VENIPUNCTURE: CPT

## 2018-08-23 PROCEDURE — 85025 COMPLETE CBC W/AUTO DIFF WBC: CPT

## 2018-08-23 NOTE — PROGRESS NOTES
Pt was seen today by Dr. Kaiden Mckeon. Labs reviewed. No need for procrit today. She will restart her pomalyst on 8/27. Macrobid 100 mg BID x 7 days called in for a UTI that was not resolved with previous prescription. She is otherwise feeling well with fatigue as her biggest complaint. Advised pt to call with any further questions or concerns before next visit.

## 2018-09-20 ENCOUNTER — PATIENT OUTREACH (OUTPATIENT)
Dept: CASE MANAGEMENT | Age: 83
End: 2018-09-20

## 2018-09-20 ENCOUNTER — HOSPITAL ENCOUNTER (OUTPATIENT)
Dept: INFUSION THERAPY | Age: 83
Discharge: HOME | End: 2018-09-20

## 2018-09-20 ENCOUNTER — HOSPITAL ENCOUNTER (OUTPATIENT)
Dept: LAB | Age: 83
Discharge: HOME | End: 2018-09-20
Payer: MEDICARE

## 2018-09-20 DIAGNOSIS — C90.00 MULTIPLE MYELOMA NOT HAVING ACHIEVED REMISSION (HCC): ICD-10-CM

## 2018-09-20 DIAGNOSIS — N30.00 ACUTE CYSTITIS WITHOUT HEMATURIA: ICD-10-CM

## 2018-09-20 PROBLEM — M79.89 LEG SWELLING: Status: ACTIVE | Noted: 2018-09-20

## 2018-09-20 LAB
ALBUMIN SERPL-MCNC: 3.3 G/DL (ref 3.2–4.6)
ALBUMIN/GLOB SERPL: 1 {RATIO} (ref 1.2–3.5)
ALP SERPL-CCNC: 64 U/L (ref 50–136)
ALT SERPL-CCNC: 17 U/L (ref 12–65)
ANION GAP SERPL CALC-SCNC: 6 MMOL/L (ref 7–16)
AST SERPL-CCNC: 17 U/L (ref 15–37)
BASOPHILS # BLD: 0 K/UL (ref 0–0.2)
BASOPHILS NFR BLD: 0 % (ref 0–2)
BILIRUB SERPL-MCNC: 0.8 MG/DL (ref 0.2–1.1)
BUN SERPL-MCNC: 21 MG/DL (ref 8–23)
CALCIUM SERPL-MCNC: 8.1 MG/DL (ref 8.3–10.4)
CHLORIDE SERPL-SCNC: 101 MMOL/L (ref 98–107)
CO2 SERPL-SCNC: 30 MMOL/L (ref 21–32)
CREAT SERPL-MCNC: 1.24 MG/DL (ref 0.6–1)
DIFFERENTIAL METHOD BLD: ABNORMAL
EOSINOPHIL # BLD: 0.1 K/UL (ref 0–0.8)
EOSINOPHIL NFR BLD: 4 % (ref 0.5–7.8)
ERYTHROCYTE [DISTWIDTH] IN BLOOD BY AUTOMATED COUNT: 15.7 % (ref 11.9–14.6)
GLOBULIN SER CALC-MCNC: 3.2 G/DL (ref 2.3–3.5)
GLUCOSE SERPL-MCNC: 92 MG/DL (ref 65–100)
HCT VFR BLD AUTO: 32.1 % (ref 35.8–46.3)
HGB BLD-MCNC: 10.2 G/DL (ref 11.7–15.4)
IGA SERPL-MCNC: 63 MG/DL (ref 70–400)
IGG SERPL-MCNC: 683 MG/DL (ref 700–1600)
IGM SERPL-MCNC: 25 MG/DL (ref 40–230)
IMM GRANULOCYTES # BLD: 0 K/UL (ref 0–0.5)
IMM GRANULOCYTES NFR BLD AUTO: 1 % (ref 0–5)
LYMPHOCYTES # BLD: 1 K/UL (ref 0.5–4.6)
LYMPHOCYTES NFR BLD: 46 % (ref 13–44)
MCH RBC QN AUTO: 32.2 PG (ref 26.1–32.9)
MCHC RBC AUTO-ENTMCNC: 31.8 G/DL (ref 31.4–35)
MCV RBC AUTO: 101.3 FL (ref 79.6–97.8)
MONOCYTES # BLD: 0.4 K/UL (ref 0.1–1.3)
MONOCYTES NFR BLD: 17 % (ref 4–12)
NEUTS SEG # BLD: 0.7 K/UL (ref 1.7–8.2)
NEUTS SEG NFR BLD: 32 % (ref 43–78)
NRBC # BLD: 0 K/UL (ref 0–0.2)
PLATELET # BLD AUTO: 156 K/UL (ref 150–450)
PMV BLD AUTO: 9.6 FL (ref 9.4–12.3)
POTASSIUM SERPL-SCNC: 4.3 MMOL/L (ref 3.5–5.1)
PROT SERPL-MCNC: 6.5 G/DL (ref 6.3–8.2)
RBC # BLD AUTO: 3.17 M/UL (ref 4.05–5.25)
SODIUM SERPL-SCNC: 137 MMOL/L (ref 136–145)
WBC # BLD AUTO: 2.2 K/UL (ref 4.3–11.1)

## 2018-09-20 PROCEDURE — 80053 COMPREHEN METABOLIC PANEL: CPT

## 2018-09-20 PROCEDURE — 86334 IMMUNOFIX E-PHORESIS SERUM: CPT

## 2018-09-20 PROCEDURE — 36415 COLL VENOUS BLD VENIPUNCTURE: CPT

## 2018-09-20 PROCEDURE — 85025 COMPLETE CBC W/AUTO DIFF WBC: CPT

## 2018-09-20 PROCEDURE — 82784 ASSAY IGA/IGD/IGG/IGM EACH: CPT

## 2018-09-20 NOTE — PROGRESS NOTES
Pt was seen today by Dr. Maribell Larsen. Labs reviewed. We will dose reduce her pomalyst to 2 mg every 14 days and then off 14 days. She has some left leg swelling an US of L lower ext placed. She will be seen in 5 weeks per Maribell Larsen on 10/25. Advised to call with any further issues before next visit. No procrit needed today.

## 2018-09-21 LAB
ALBUMIN SERPL ELPH-MCNC: 3.48 G/DL (ref 3.2–5.6)
ALBUMIN/GLOB SERPL: 1.4 {RATIO}
ALPHA1 GLOB SERPL ELPH-MCNC: 0.24 G/DL (ref 0.1–0.4)
ALPHA2 GLOB SERPL ELPH-MCNC: 0.75 G/DL (ref 0.4–1.2)
B-GLOBULIN SERPL QL ELPH: 0.87 G/DL (ref 0.6–1.3)
GAMMA GLOB MFR SERPL ELPH: 0.66 G/DL (ref 0.5–1.6)
IGA SERPL-MCNC: ABNORMAL MG/DL (ref 85–499)
IGG SERPL-MCNC: ABNORMAL MG/DL (ref 610–1616)
IGM SERPL-MCNC: ABNORMAL MG/DL (ref 35–242)
M PROTEIN SERPL ELPH-MCNC: 0.21 G/DL
PROT PATTERN SERPL ELPH-IMP: ABNORMAL
PROT PATTERN SPEC IFE-IMP: ABNORMAL
PROT SERPL-MCNC: 6 G/DL (ref 6.3–8.2)

## 2018-09-26 ENCOUNTER — HOSPITAL ENCOUNTER (OUTPATIENT)
Dept: ULTRASOUND IMAGING | Age: 83
Discharge: HOME | End: 2018-09-26
Attending: INTERNAL MEDICINE
Payer: MEDICARE

## 2018-09-26 DIAGNOSIS — C90.00 MULTIPLE MYELOMA NOT HAVING ACHIEVED REMISSION (HCC): ICD-10-CM

## 2018-09-26 DIAGNOSIS — M79.89 LEG SWELLING: ICD-10-CM

## 2018-09-26 PROCEDURE — 93971 EXTREMITY STUDY: CPT

## 2018-10-25 ENCOUNTER — PATIENT OUTREACH (OUTPATIENT)
Dept: CASE MANAGEMENT | Age: 83
End: 2018-10-25

## 2018-10-25 ENCOUNTER — HOSPITAL ENCOUNTER (OUTPATIENT)
Dept: INFUSION THERAPY | Age: 83
Discharge: HOME | End: 2018-10-25

## 2018-10-25 ENCOUNTER — HOSPITAL ENCOUNTER (OUTPATIENT)
Dept: LAB | Age: 83
Discharge: HOME | End: 2018-10-25
Payer: MEDICARE

## 2018-10-25 DIAGNOSIS — C90.00 MULTIPLE MYELOMA NOT HAVING ACHIEVED REMISSION (HCC): ICD-10-CM

## 2018-10-25 DIAGNOSIS — M79.89 LEG SWELLING: ICD-10-CM

## 2018-10-25 LAB
ALBUMIN SERPL-MCNC: 3.4 G/DL (ref 3.2–4.6)
ALBUMIN/GLOB SERPL: 1 {RATIO} (ref 1.2–3.5)
ALP SERPL-CCNC: 74 U/L (ref 50–136)
ALT SERPL-CCNC: 18 U/L (ref 12–65)
ANION GAP SERPL CALC-SCNC: 4 MMOL/L (ref 7–16)
AST SERPL-CCNC: 17 U/L (ref 15–37)
BASOPHILS # BLD: 0 K/UL (ref 0–0.2)
BASOPHILS NFR BLD: 1 % (ref 0–2)
BILIRUB SERPL-MCNC: 1.1 MG/DL (ref 0.2–1.1)
BUN SERPL-MCNC: 17 MG/DL (ref 8–23)
CALCIUM SERPL-MCNC: 8.5 MG/DL (ref 8.3–10.4)
CHLORIDE SERPL-SCNC: 102 MMOL/L (ref 98–107)
CO2 SERPL-SCNC: 33 MMOL/L (ref 21–32)
CREAT SERPL-MCNC: 1.25 MG/DL (ref 0.6–1)
DIFFERENTIAL METHOD BLD: ABNORMAL
EOSINOPHIL # BLD: 0 K/UL (ref 0–0.8)
EOSINOPHIL NFR BLD: 1 % (ref 0.5–7.8)
ERYTHROCYTE [DISTWIDTH] IN BLOOD BY AUTOMATED COUNT: 14.4 % (ref 11.9–14.6)
GLOBULIN SER CALC-MCNC: 3.3 G/DL (ref 2.3–3.5)
GLUCOSE SERPL-MCNC: 93 MG/DL (ref 65–100)
HCT VFR BLD AUTO: 33.4 % (ref 35.8–46.3)
HGB BLD-MCNC: 10.8 G/DL (ref 11.7–15.4)
IMM GRANULOCYTES # BLD: 0 K/UL (ref 0–0.5)
IMM GRANULOCYTES NFR BLD AUTO: 1 % (ref 0–5)
LYMPHOCYTES # BLD: 1.2 K/UL (ref 0.5–4.6)
LYMPHOCYTES NFR BLD: 36 % (ref 13–44)
MCH RBC QN AUTO: 33 PG (ref 26.1–32.9)
MCHC RBC AUTO-ENTMCNC: 32.3 G/DL (ref 31.4–35)
MCV RBC AUTO: 102.1 FL (ref 79.6–97.8)
MONOCYTES # BLD: 0.6 K/UL (ref 0.1–1.3)
MONOCYTES NFR BLD: 19 % (ref 4–12)
NEUTS SEG # BLD: 1.4 K/UL (ref 1.7–8.2)
NEUTS SEG NFR BLD: 43 % (ref 43–78)
NRBC # BLD: 0 K/UL (ref 0–0.2)
PLATELET # BLD AUTO: 163 K/UL (ref 150–450)
PMV BLD AUTO: 10.1 FL (ref 9.4–12.3)
POTASSIUM SERPL-SCNC: 3.4 MMOL/L (ref 3.5–5.1)
PROT SERPL-MCNC: 6.7 G/DL (ref 6.3–8.2)
RBC # BLD AUTO: 3.27 M/UL (ref 4.05–5.25)
SODIUM SERPL-SCNC: 139 MMOL/L (ref 136–145)
WBC # BLD AUTO: 3.3 K/UL (ref 4.3–11.1)

## 2018-10-25 PROCEDURE — 82784 ASSAY IGA/IGD/IGG/IGM EACH: CPT

## 2018-10-25 PROCEDURE — 85025 COMPLETE CBC W/AUTO DIFF WBC: CPT

## 2018-10-25 PROCEDURE — 86334 IMMUNOFIX E-PHORESIS SERUM: CPT

## 2018-10-25 PROCEDURE — 36415 COLL VENOUS BLD VENIPUNCTURE: CPT

## 2018-10-25 PROCEDURE — 80053 COMPREHEN METABOLIC PANEL: CPT

## 2018-10-25 NOTE — PROGRESS NOTES
Pt was seen today by Dr. Britton Escoto. labs reviewed. Has ha been feeling well with some edema in bilateral legs. She has had this happening but takes an extra lasix per her cardiologist. She will start [facility] next month. osiel has been trying to call the pt for her pomalsyt delivery. She will all them to have this delivered. Advised to callow with any further needs before next visit.

## 2018-10-26 LAB
ALBUMIN SERPL ELPH-MCNC: 3.5 G/DL (ref 3.2–5.6)
ALBUMIN/GLOB SERPL: 1.3 {RATIO}
ALPHA1 GLOB SERPL ELPH-MCNC: 0.31 G/DL (ref 0.1–0.4)
ALPHA2 GLOB SERPL ELPH-MCNC: 0.77 G/DL (ref 0.4–1.2)
B-GLOBULIN SERPL QL ELPH: 0.93 G/DL (ref 0.6–1.3)
GAMMA GLOB MFR SERPL ELPH: 0.69 G/DL (ref 0.5–1.6)
IGA SERPL-MCNC: 47 MG/DL (ref 85–499)
IGG SERPL-MCNC: 680 MG/DL (ref 610–1616)
IGM SERPL-MCNC: 19 MG/DL (ref 35–242)
M PROTEIN SERPL ELPH-MCNC: 0.16 G/DL
PROT PATTERN SERPL ELPH-IMP: ABNORMAL
PROT PATTERN SPEC IFE-IMP: ABNORMAL
PROT SERPL-MCNC: 6.2 G/DL (ref 6.3–8.2)

## 2018-11-29 ENCOUNTER — HOSPITAL ENCOUNTER (OUTPATIENT)
Dept: INFUSION THERAPY | Age: 83
Discharge: HOME | End: 2018-11-29
Payer: MEDICARE

## 2018-11-29 ENCOUNTER — HOSPITAL ENCOUNTER (OUTPATIENT)
Dept: LAB | Age: 83
Discharge: HOME | End: 2018-11-29
Payer: MEDICARE

## 2018-11-29 ENCOUNTER — PATIENT OUTREACH (OUTPATIENT)
Dept: CASE MANAGEMENT | Age: 83
End: 2018-11-29

## 2018-11-29 DIAGNOSIS — C90.00 MULTIPLE MYELOMA NOT HAVING ACHIEVED REMISSION (HCC): Primary | ICD-10-CM

## 2018-11-29 DIAGNOSIS — C90.00 MULTIPLE MYELOMA NOT HAVING ACHIEVED REMISSION (HCC): ICD-10-CM

## 2018-11-29 LAB
ALBUMIN SERPL-MCNC: 3.4 G/DL (ref 3.2–4.6)
ALBUMIN/GLOB SERPL: 1.1 {RATIO} (ref 1.2–3.5)
ALP SERPL-CCNC: 69 U/L (ref 50–136)
ALT SERPL-CCNC: 25 U/L (ref 12–65)
ANION GAP SERPL CALC-SCNC: 5 MMOL/L (ref 7–16)
AST SERPL-CCNC: 22 U/L (ref 15–37)
BASOPHILS # BLD: 0 K/UL (ref 0–0.2)
BASOPHILS NFR BLD: 1 % (ref 0–2)
BILIRUB SERPL-MCNC: 0.8 MG/DL (ref 0.2–1.1)
BUN SERPL-MCNC: 16 MG/DL (ref 8–23)
CALCIUM SERPL-MCNC: 8.4 MG/DL (ref 8.3–10.4)
CHLORIDE SERPL-SCNC: 102 MMOL/L (ref 98–107)
CO2 SERPL-SCNC: 29 MMOL/L (ref 21–32)
CREAT SERPL-MCNC: 1.23 MG/DL (ref 0.6–1)
DIFFERENTIAL METHOD BLD: ABNORMAL
EOSINOPHIL # BLD: 0 K/UL (ref 0–0.8)
EOSINOPHIL NFR BLD: 1 % (ref 0.5–7.8)
ERYTHROCYTE [DISTWIDTH] IN BLOOD BY AUTOMATED COUNT: 13.6 % (ref 11.9–14.6)
GLOBULIN SER CALC-MCNC: 3.2 G/DL (ref 2.3–3.5)
GLUCOSE SERPL-MCNC: 92 MG/DL (ref 65–100)
HCT VFR BLD AUTO: 33.9 % (ref 35.8–46.3)
HGB BLD-MCNC: 10.8 G/DL (ref 11.7–15.4)
IMM GRANULOCYTES # BLD: 0 K/UL (ref 0–0.5)
IMM GRANULOCYTES NFR BLD AUTO: 0 % (ref 0–5)
LYMPHOCYTES # BLD: 1.2 K/UL (ref 0.5–4.6)
LYMPHOCYTES NFR BLD: 39 % (ref 13–44)
MCH RBC QN AUTO: 32.4 PG (ref 26.1–32.9)
MCHC RBC AUTO-ENTMCNC: 31.9 G/DL (ref 31.4–35)
MCV RBC AUTO: 101.8 FL (ref 79.6–97.8)
MONOCYTES # BLD: 0.5 K/UL (ref 0.1–1.3)
MONOCYTES NFR BLD: 16 % (ref 4–12)
NEUTS SEG # BLD: 1.4 K/UL (ref 1.7–8.2)
NEUTS SEG NFR BLD: 44 % (ref 43–78)
NRBC # BLD: 0 K/UL (ref 0–0.2)
PLATELET # BLD AUTO: 177 K/UL (ref 150–450)
PMV BLD AUTO: 10 FL (ref 9.4–12.3)
POTASSIUM SERPL-SCNC: 4.4 MMOL/L (ref 3.5–5.1)
PROT SERPL-MCNC: 6.6 G/DL (ref 6.3–8.2)
RBC # BLD AUTO: 3.33 M/UL (ref 4.05–5.25)
SODIUM SERPL-SCNC: 136 MMOL/L (ref 136–145)
WBC # BLD AUTO: 3.1 K/UL (ref 4.3–11.1)

## 2018-11-29 PROCEDURE — 96372 THER/PROPH/DIAG INJ SC/IM: CPT

## 2018-11-29 PROCEDURE — 85025 COMPLETE CBC W/AUTO DIFF WBC: CPT

## 2018-11-29 PROCEDURE — 82784 ASSAY IGA/IGD/IGG/IGM EACH: CPT

## 2018-11-29 PROCEDURE — 86334 IMMUNOFIX E-PHORESIS SERUM: CPT

## 2018-11-29 PROCEDURE — 36415 COLL VENOUS BLD VENIPUNCTURE: CPT

## 2018-11-29 PROCEDURE — 74011250636 HC RX REV CODE- 250/636: Performed by: INTERNAL MEDICINE

## 2018-11-29 PROCEDURE — 80053 COMPREHEN METABOLIC PANEL: CPT

## 2018-11-29 RX ADMIN — DENOSUMAB 120 MG: 120 INJECTION SUBCUTANEOUS at 13:15

## 2018-11-29 NOTE — PROGRESS NOTES
Pt was seen today by Dr. Uzma Muñoz. Labs reviewed. She is on her 14 day off with her pomalyst and should restart 12/5. They called the daughter this morning to set up delivery. She will start xgeva today. She will be seen in 4 weeks. No procrit. Advised pt to call with any further questions or concerns.

## 2018-11-30 LAB
ALBUMIN SERPL ELPH-MCNC: 3.55 G/DL (ref 3.2–5.6)
ALBUMIN/GLOB SERPL: 1.3 {RATIO}
ALPHA1 GLOB SERPL ELPH-MCNC: 0.3 G/DL (ref 0.1–0.4)
ALPHA2 GLOB SERPL ELPH-MCNC: 0.75 G/DL (ref 0.4–1.2)
B-GLOBULIN SERPL QL ELPH: 0.79 G/DL (ref 0.6–1.3)
GAMMA GLOB MFR SERPL ELPH: 0.81 G/DL (ref 0.5–1.6)
IGA SERPL-MCNC: 43 MG/DL (ref 85–499)
IGG SERPL-MCNC: 642 MG/DL (ref 610–1616)
IGM SERPL-MCNC: 16 MG/DL (ref 35–242)
M PROTEIN SERPL ELPH-MCNC: 0.18 G/DL
PROT PATTERN SERPL ELPH-IMP: ABNORMAL
PROT PATTERN SPEC IFE-IMP: ABNORMAL
PROT SERPL-MCNC: 6.2 G/DL (ref 6.3–8.2)

## 2018-12-27 ENCOUNTER — HOSPITAL ENCOUNTER (OUTPATIENT)
Dept: LAB | Age: 83
Discharge: HOME | DRG: 193 | End: 2018-12-27
Payer: MEDICARE

## 2018-12-27 ENCOUNTER — HOSPITAL ENCOUNTER (OUTPATIENT)
Dept: INFUSION THERAPY | Age: 83
Discharge: HOME | End: 2018-12-27

## 2018-12-27 ENCOUNTER — PATIENT OUTREACH (OUTPATIENT)
Dept: CASE MANAGEMENT | Age: 83
End: 2018-12-27

## 2018-12-27 DIAGNOSIS — C90.00 MULTIPLE MYELOMA NOT HAVING ACHIEVED REMISSION (HCC): ICD-10-CM

## 2018-12-27 LAB
ALBUMIN SERPL-MCNC: 3.5 G/DL (ref 3.2–4.6)
ALBUMIN/GLOB SERPL: 1.2 {RATIO} (ref 1.2–3.5)
ALP SERPL-CCNC: 69 U/L (ref 50–136)
ALT SERPL-CCNC: 19 U/L (ref 12–65)
ANION GAP SERPL CALC-SCNC: 5 MMOL/L (ref 7–16)
AST SERPL-CCNC: 22 U/L (ref 15–37)
BASOPHILS # BLD: 0 K/UL (ref 0–0.2)
BASOPHILS NFR BLD: 1 % (ref 0–2)
BILIRUB SERPL-MCNC: 0.6 MG/DL (ref 0.2–1.1)
BUN SERPL-MCNC: 18 MG/DL (ref 8–23)
CALCIUM SERPL-MCNC: 7.1 MG/DL (ref 8.3–10.4)
CHLORIDE SERPL-SCNC: 102 MMOL/L (ref 98–107)
CO2 SERPL-SCNC: 31 MMOL/L (ref 21–32)
CREAT SERPL-MCNC: 1.23 MG/DL (ref 0.6–1)
DIFFERENTIAL METHOD BLD: ABNORMAL
EOSINOPHIL # BLD: 0 K/UL (ref 0–0.8)
EOSINOPHIL NFR BLD: 1 % (ref 0.5–7.8)
ERYTHROCYTE [DISTWIDTH] IN BLOOD BY AUTOMATED COUNT: 13.3 % (ref 11.9–14.6)
GLOBULIN SER CALC-MCNC: 3 G/DL (ref 2.3–3.5)
GLUCOSE SERPL-MCNC: 95 MG/DL (ref 65–100)
HCT VFR BLD AUTO: 34.7 % (ref 35.8–46.3)
HGB BLD-MCNC: 11 G/DL (ref 11.7–15.4)
IMM GRANULOCYTES # BLD: 0.1 K/UL (ref 0–0.5)
IMM GRANULOCYTES NFR BLD AUTO: 1 % (ref 0–5)
LYMPHOCYTES # BLD: 1.1 K/UL (ref 0.5–4.6)
LYMPHOCYTES NFR BLD: 28 % (ref 13–44)
MCH RBC QN AUTO: 32.4 PG (ref 26.1–32.9)
MCHC RBC AUTO-ENTMCNC: 31.7 G/DL (ref 31.4–35)
MCV RBC AUTO: 102.1 FL (ref 79.6–97.8)
MONOCYTES # BLD: 0.7 K/UL (ref 0.1–1.3)
MONOCYTES NFR BLD: 18 % (ref 4–12)
NEUTS SEG # BLD: 2 K/UL (ref 1.7–8.2)
NEUTS SEG NFR BLD: 52 % (ref 43–78)
NRBC # BLD: 0 K/UL (ref 0–0.2)
PLATELET # BLD AUTO: 129 K/UL (ref 150–450)
PMV BLD AUTO: 10.3 FL (ref 9.4–12.3)
POTASSIUM SERPL-SCNC: 3.6 MMOL/L (ref 3.5–5.1)
PROT SERPL-MCNC: 6.5 G/DL (ref 6.3–8.2)
RBC # BLD AUTO: 3.4 M/UL (ref 4.05–5.25)
SODIUM SERPL-SCNC: 138 MMOL/L (ref 136–145)
WBC # BLD AUTO: 3.8 K/UL (ref 4.3–11.1)

## 2018-12-27 PROCEDURE — 82784 ASSAY IGA/IGD/IGG/IGM EACH: CPT

## 2018-12-27 PROCEDURE — 85025 COMPLETE CBC W/AUTO DIFF WBC: CPT

## 2018-12-27 PROCEDURE — 86334 IMMUNOFIX E-PHORESIS SERUM: CPT

## 2018-12-27 PROCEDURE — 36415 COLL VENOUS BLD VENIPUNCTURE: CPT

## 2018-12-27 PROCEDURE — 80053 COMPREHEN METABOLIC PANEL: CPT

## 2018-12-27 NOTE — PROGRESS NOTES
Pt was seen today by Dr. Shonna Sanders. Labs reviewed. No xgeva today due to corrected calcium being 7.5. She will start taking OTC Calcium and Vit D. Her Pomalyst has been called in on 12/20. She will return in 4 weeks. Advised to call back with any further questions or concerns.

## 2018-12-28 LAB
ALBUMIN SERPL ELPH-MCNC: 3.4 G/DL (ref 3.2–5.6)
ALBUMIN/GLOB SERPL: 1.3 {RATIO}
ALPHA1 GLOB SERPL ELPH-MCNC: 0.28 G/DL (ref 0.1–0.4)
ALPHA2 GLOB SERPL ELPH-MCNC: 0.73 G/DL (ref 0.4–1.2)
B-GLOBULIN SERPL QL ELPH: 0.9 G/DL (ref 0.6–1.3)
GAMMA GLOB MFR SERPL ELPH: 0.7 G/DL (ref 0.5–1.6)
IGA SERPL-MCNC: 54 MG/DL (ref 85–499)
IGG SERPL-MCNC: 589 MG/DL (ref 610–1616)
IGM SERPL-MCNC: 26 MG/DL (ref 35–242)
M PROTEIN SERPL ELPH-MCNC: 0.17 G/DL
PROT PATTERN SERPL ELPH-IMP: ABNORMAL
PROT PATTERN SPEC IFE-IMP: ABNORMAL
PROT SERPL-MCNC: 6 G/DL (ref 6.3–8.2)

## 2018-12-30 ENCOUNTER — HOSPITAL ENCOUNTER (INPATIENT)
Age: 83
LOS: 10 days | Discharge: HOME HEALTH | DRG: 193 | End: 2019-01-09
Attending: INTERNAL MEDICINE | Admitting: INTERNAL MEDICINE
Payer: MEDICARE

## 2018-12-30 DIAGNOSIS — R09.02 HYPOXEMIA: ICD-10-CM

## 2018-12-30 DIAGNOSIS — J90 PLEURAL EFFUSION, BILATERAL: ICD-10-CM

## 2018-12-30 DIAGNOSIS — I50.32 CHRONIC DIASTOLIC HEART FAILURE (HCC): Chronic | ICD-10-CM

## 2018-12-30 DIAGNOSIS — R53.81 DEBILITY: Chronic | ICD-10-CM

## 2018-12-30 DIAGNOSIS — J18.9 PNEUMONIA OF RIGHT LUNG DUE TO INFECTIOUS ORGANISM, UNSPECIFIED PART OF LUNG: ICD-10-CM

## 2018-12-30 DIAGNOSIS — C90.00 MULTIPLE MYELOMA NOT HAVING ACHIEVED REMISSION (HCC): ICD-10-CM

## 2018-12-30 DIAGNOSIS — J18.9 PNEUMONIA OF BOTH LUNGS DUE TO INFECTIOUS ORGANISM, UNSPECIFIED PART OF LUNG: ICD-10-CM

## 2018-12-30 DIAGNOSIS — J96.01 ACUTE RESPIRATORY FAILURE WITH HYPOXEMIA (HCC): ICD-10-CM

## 2018-12-30 DIAGNOSIS — C90.01 MULTIPLE MYELOMA IN REMISSION (HCC): ICD-10-CM

## 2018-12-30 DIAGNOSIS — F03.90 DEMENTIA WITHOUT BEHAVIORAL DISTURBANCE, UNSPECIFIED DEMENTIA TYPE: Chronic | ICD-10-CM

## 2018-12-30 DIAGNOSIS — R53.83 FATIGUE, UNSPECIFIED TYPE: ICD-10-CM

## 2018-12-30 DIAGNOSIS — J18.9 PNEUMONIA OF BOTH LOWER LOBES DUE TO INFECTIOUS ORGANISM: ICD-10-CM

## 2018-12-30 DIAGNOSIS — E87.6 ACUTE HYPOKALEMIA: ICD-10-CM

## 2018-12-30 DIAGNOSIS — N18.30 STAGE 3 CHRONIC KIDNEY DISEASE (HCC): Chronic | ICD-10-CM

## 2018-12-30 LAB
APPEARANCE UR: CLEAR
BACTERIA URNS QL MICRO: ABNORMAL /HPF
BILIRUB UR QL: NEGATIVE
CASTS URNS QL MICRO: 0 /LPF
COLOR UR: YELLOW
EPI CELLS #/AREA URNS HPF: ABNORMAL /HPF
GLUCOSE UR STRIP.AUTO-MCNC: NEGATIVE MG/DL
HGB UR QL STRIP: ABNORMAL
KETONES UR QL STRIP.AUTO: NEGATIVE MG/DL
LEUKOCYTE ESTERASE UR QL STRIP.AUTO: NEGATIVE
NITRITE UR QL STRIP.AUTO: NEGATIVE
PH UR STRIP: 7 [PH] (ref 5–9)
PROT UR STRIP-MCNC: ABNORMAL MG/DL
RBC #/AREA URNS HPF: ABNORMAL /HPF
SP GR UR REFRACTOMETRY: 1.01 (ref 1–1.02)
UROBILINOGEN UR QL STRIP.AUTO: 0.2 EU/DL (ref 0.2–1)
WBC URNS QL MICRO: ABNORMAL /HPF

## 2018-12-30 PROCEDURE — 65270000029 HC RM PRIVATE

## 2018-12-30 PROCEDURE — 74011000258 HC RX REV CODE- 258: Performed by: INTERNAL MEDICINE

## 2018-12-30 PROCEDURE — 86580 TB INTRADERMAL TEST: CPT | Performed by: INTERNAL MEDICINE

## 2018-12-30 PROCEDURE — 74011000302 HC RX REV CODE- 302: Performed by: INTERNAL MEDICINE

## 2018-12-30 PROCEDURE — 87040 BLOOD CULTURE FOR BACTERIA: CPT

## 2018-12-30 PROCEDURE — 74011250636 HC RX REV CODE- 250/636: Performed by: INTERNAL MEDICINE

## 2018-12-30 PROCEDURE — 74011250637 HC RX REV CODE- 250/637: Performed by: INTERNAL MEDICINE

## 2018-12-30 PROCEDURE — 77030020263 HC SOL INJ SOD CL0.9% LFCR 1000ML

## 2018-12-30 PROCEDURE — 81001 URINALYSIS AUTO W/SCOPE: CPT

## 2018-12-30 PROCEDURE — 36415 COLL VENOUS BLD VENIPUNCTURE: CPT

## 2018-12-30 RX ORDER — POTASSIUM CHLORIDE 20 MEQ/1
40 TABLET, EXTENDED RELEASE ORAL
Status: COMPLETED | OUTPATIENT
Start: 2018-12-30 | End: 2018-12-30

## 2018-12-30 RX ORDER — LEVOTHYROXINE SODIUM 50 UG/1
50 TABLET ORAL
Status: DISCONTINUED | OUTPATIENT
Start: 2018-12-31 | End: 2019-01-09 | Stop reason: HOSPADM

## 2018-12-30 RX ORDER — NALOXONE HYDROCHLORIDE 0.4 MG/ML
0.4 INJECTION, SOLUTION INTRAMUSCULAR; INTRAVENOUS; SUBCUTANEOUS AS NEEDED
Status: DISCONTINUED | OUTPATIENT
Start: 2018-12-30 | End: 2019-01-09 | Stop reason: HOSPADM

## 2018-12-30 RX ORDER — ACETAMINOPHEN 325 MG/1
650 TABLET ORAL
Status: DISCONTINUED | OUTPATIENT
Start: 2018-12-30 | End: 2019-01-09 | Stop reason: HOSPADM

## 2018-12-30 RX ORDER — HYDROCODONE BITARTRATE AND ACETAMINOPHEN 5; 325 MG/1; MG/1
1 TABLET ORAL
Status: DISCONTINUED | OUTPATIENT
Start: 2018-12-30 | End: 2019-01-09 | Stop reason: HOSPADM

## 2018-12-30 RX ORDER — VANCOMYCIN 2 GRAM/500 ML IN 0.9 % SODIUM CHLORIDE INTRAVENOUS
2000 ONCE
Status: COMPLETED | OUTPATIENT
Start: 2018-12-30 | End: 2018-12-31

## 2018-12-30 RX ORDER — LANOLIN ALCOHOL/MO/W.PET/CERES
1 CREAM (GRAM) TOPICAL
Status: DISCONTINUED | OUTPATIENT
Start: 2018-12-31 | End: 2019-01-09 | Stop reason: HOSPADM

## 2018-12-30 RX ORDER — CLORAZEPATE DIPOTASSIUM 7.5 MG/1
7.5 TABLET ORAL 2 TIMES DAILY
Status: DISCONTINUED | OUTPATIENT
Start: 2018-12-31 | End: 2019-01-09 | Stop reason: HOSPADM

## 2018-12-30 RX ORDER — AMLODIPINE BESYLATE 5 MG/1
2.5 TABLET ORAL DAILY
Status: DISCONTINUED | OUTPATIENT
Start: 2018-12-31 | End: 2019-01-09 | Stop reason: HOSPADM

## 2018-12-30 RX ORDER — SODIUM CHLORIDE 0.9 % (FLUSH) 0.9 %
5-10 SYRINGE (ML) INJECTION AS NEEDED
Status: DISCONTINUED | OUTPATIENT
Start: 2018-12-30 | End: 2019-01-09 | Stop reason: HOSPADM

## 2018-12-30 RX ORDER — SODIUM CHLORIDE 0.9 % (FLUSH) 0.9 %
5-10 SYRINGE (ML) INJECTION EVERY 8 HOURS
Status: DISCONTINUED | OUTPATIENT
Start: 2018-12-30 | End: 2019-01-09 | Stop reason: HOSPADM

## 2018-12-30 RX ORDER — FAMOTIDINE 20 MG/1
20 TABLET, FILM COATED ORAL EVERY 12 HOURS
Status: DISCONTINUED | OUTPATIENT
Start: 2018-12-30 | End: 2018-12-31

## 2018-12-30 RX ORDER — ASPIRIN 81 MG/1
81 TABLET ORAL DAILY
Status: DISCONTINUED | OUTPATIENT
Start: 2018-12-31 | End: 2019-01-09 | Stop reason: HOSPADM

## 2018-12-30 RX ORDER — MIRTAZAPINE 15 MG/1
15 TABLET, FILM COATED ORAL
Status: DISCONTINUED | OUTPATIENT
Start: 2018-12-30 | End: 2019-01-09 | Stop reason: HOSPADM

## 2018-12-30 RX ORDER — ONDANSETRON 2 MG/ML
4 INJECTION INTRAMUSCULAR; INTRAVENOUS
Status: DISCONTINUED | OUTPATIENT
Start: 2018-12-30 | End: 2019-01-09 | Stop reason: HOSPADM

## 2018-12-30 RX ORDER — AMIODARONE HYDROCHLORIDE 200 MG/1
200 TABLET ORAL DAILY
Status: DISCONTINUED | OUTPATIENT
Start: 2018-12-31 | End: 2019-01-02

## 2018-12-30 RX ORDER — ATORVASTATIN CALCIUM 40 MG/1
80 TABLET, FILM COATED ORAL
Status: DISCONTINUED | OUTPATIENT
Start: 2018-12-30 | End: 2019-01-09 | Stop reason: HOSPADM

## 2018-12-30 RX ORDER — SODIUM CHLORIDE 9 MG/ML
50 INJECTION, SOLUTION INTRAVENOUS CONTINUOUS
Status: DISCONTINUED | OUTPATIENT
Start: 2018-12-30 | End: 2019-01-03

## 2018-12-30 RX ORDER — NADOLOL 40 MG/1
80 TABLET ORAL DAILY
Status: DISCONTINUED | OUTPATIENT
Start: 2018-12-31 | End: 2019-01-09 | Stop reason: HOSPADM

## 2018-12-30 RX ORDER — VANCOMYCIN 2 GRAM/500 ML IN 0.9 % SODIUM CHLORIDE INTRAVENOUS
2000 ONCE
Status: DISCONTINUED | OUTPATIENT
Start: 2018-12-30 | End: 2018-12-30

## 2018-12-30 RX ADMIN — TUBERCULIN PURIFIED PROTEIN DERIVATIVE 5 UNITS: 5 INJECTION, SOLUTION INTRADERMAL at 19:59

## 2018-12-30 RX ADMIN — ATORVASTATIN CALCIUM 80 MG: 40 TABLET, FILM COATED ORAL at 22:01

## 2018-12-30 RX ADMIN — VANCOMYCIN HYDROCHLORIDE 2000 MG: 10 INJECTION, POWDER, LYOPHILIZED, FOR SOLUTION INTRAVENOUS at 23:20

## 2018-12-30 RX ADMIN — SODIUM CHLORIDE 100 ML/HR: 900 INJECTION, SOLUTION INTRAVENOUS at 18:19

## 2018-12-30 RX ADMIN — Medication 10 ML: at 22:05

## 2018-12-30 RX ADMIN — FAMOTIDINE 20 MG: 20 TABLET ORAL at 22:01

## 2018-12-30 RX ADMIN — PIPERACILLIN SODIUM,TAZOBACTAM SODIUM 4.5 G: 4; .5 INJECTION, POWDER, FOR SOLUTION INTRAVENOUS at 19:58

## 2018-12-30 RX ADMIN — MIRTAZAPINE 15 MG: 15 TABLET, FILM COATED ORAL at 22:01

## 2018-12-30 RX ADMIN — ACETAMINOPHEN 650 MG: 325 TABLET ORAL at 18:14

## 2018-12-30 RX ADMIN — HYDROCODONE BITARTRATE AND ACETAMINOPHEN 1 TABLET: 5; 325 TABLET ORAL at 22:54

## 2018-12-30 RX ADMIN — POTASSIUM CHLORIDE 40 MEQ: 20 TABLET, EXTENDED RELEASE ORAL at 18:14

## 2018-12-30 NOTE — PROGRESS NOTES
Pt arrived to 5th floor via [facility] transport from [facility]. NAD, resting quietly. Phone report received from [place], [facility] @ [phone]. Dr. Adamaris Valencia, admitting hospitalist, notified of pt's arrival. Awaiting orders.

## 2018-12-30 NOTE — H&P
Hospitalist H&P Note Admit Date:  2018  5:11 PM  
Name:  Maximino Mills Age:  80 y.o. 
:  1930 MRN:  [id number] PCP:  Chau Cabrera MD 
Treatment Team: Attending Provider: Yony Kennedy MD 
 
HPI:  
 
CC:  Pneumonia Ms. Gerardo Leija is a 79 yo female with PMH of PAFIB with pacer, multiple myeloma followed by Dr. Alicia Laboy on current chemo, HTN, CKD, dCHF, who is sent as a direct admit from urgent care due to RUL pneumonia. She admits to several days of cough/ malaise and headache. CXR at urgent care showed RUL pneumonia. She denies fever, has anorexia and no ulices dyspnea. 10 systems reviewed and negative except as noted in HPI. - has throat pain, needs glasses, has constipation, has decreased urination with dysuria, has myalgias, has memory loss, has cold intolerance and weight gain Past Medical History:  
Diagnosis Date  Anemia, unspecified  Chest pain, unspecified 3/21/2016  Chronic anxiety 3/21/2016  Diastolic heart failure ([address] 3/21/2016  Essential hypertension, benign  Flatulence, eructation, and gas pain  Lump or mass in breast   
 Other and unspecified hyperlipidemia  Paroxysmal atrial fibrillation ([address] 3/21/2016  Paroxysmal tachycardia ([address] 3/21/2016  Pernicious anemia  Postmenopausal atrophic vaginitis  Unspecified deficiency anemia  Unspecified hypothyroidism Past Surgical History:  
Procedure Laterality Date  HX HYSTERECTOMY  HX ORTHOPAEDIC    
 heel spur  HX VEIN STRIPPING No Known Allergies Social History Tobacco Use  Smoking status: Never Smoker  Smokeless tobacco: Never Used Substance Use Topics  Alcohol use: No  
  Alcohol/week: 0.0 oz Family History Problem Relation Age of Onset  No Known Problems Mother  Heart Disease Father Immunization History Administered Date(s) Administered  TB Skin Test (PPD) Intradermal 2016, 2016 PTA Medications: 
Prior to Admission Medications Prescriptions Last Dose Informant Patient Reported? Taking? Aspirin, Buffered 81 mg tab   Yes No  
Sig: Take  by mouth daily. DISABLED PLACARD (DISABLED PLACARD) DMV   No No  
Sig: Apply to car. HYDROcodone-acetaminophen (NORCO) 5-325 mg per tablet   No No  
Sig: Take 1 Tab by mouth every six (6) hours as needed for Pain. Max Daily Amount: 4 Tabs. OXYGEN-AIR DELIVERY SYSTEMS   Yes No  
Sig: 3 L by Does Not Apply route nightly. STOOL SOFTENER 100 mg capsule   No No  
Sig: Take 1 Cap by mouth two (2) times a day. amLODIPine (NORVASC) 2.5 mg tablet   No No  
Sig: Take 1 Tab by mouth daily. amiodarone (CORDARONE) 200 mg tablet   No No  
Sig: Take 1 Tab by mouth daily. atorvastatin (LIPITOR) 80 mg tablet   No No  
Sig: TAKE 1 TABLET BY MOUTH DAILY  
cholecalciferol (VITAMIN D3) 1,000 unit tablet   No No  
Sig: Take 1 Tab by mouth daily. clorazepate (TRANXENE) 7.5 mg tablet   No No  
Sig: TAKE 1 TABLET BY MOUTH 2 TIMES DAILY  
estradiol (ESTRACE) 1 mg tablet   No No  
Sig: TAKE 1 TABLET BY MOUTH DAILY ferrous sulfate 325 mg (65 mg iron) tablet   No No  
Sig: TAKE 1 TABLET BY MOUTH DAILY. furosemide (LASIX) 40 mg tablet   No No  
Sig: Take 1 Tab by mouth daily. Taking 2 po qd  
lactulose (KRISTALOSE) 20 gram packet   No No  
Sig: Take 1 Packet by mouth three (3) times daily. levothyroxine (SYNTHROID) 50 mcg tablet   No No  
Sig: Take 1 Tab by mouth Daily (before breakfast). mirtazapine (REMERON) 15 mg tablet   Yes No  
mupirocin (BACTROBAN) 2 % ointment   No No  
Sig: Apply  to affected area daily. nadolol (CORGARD) 80 mg tablet   No No  
Sig: Take 1 Tab by mouth daily. ondansetron hcl (ZOFRAN, AS HYDROCHLORIDE,) 8 mg tablet   No No  
Sig: Take 1 Tab by mouth every eight (8) hours as needed for Nausea. pomalidomide (POMALYST) 2 mg cap   No No  
Sig: Take 1 Cap by mouth daily. Take 1 capsule daily on days 1-14. Then off for 14 days potassium chloride SR (KLOR-CON 10) 10 mEq tablet   No No  
Sig: Take 2 Tabs by mouth daily. prochlorperazine (COMPAZINE) 10 mg tablet   No No  
Sig: Take 1 Tab by mouth every six (6) hours as needed. raNITIdine (ZANTAC) 75 mg tablet   Yes No  
Sig: Take 75 mg by mouth two (2) times a day. traZODone (DESYREL) 100 mg tablet   No No  
Sig: TAKE 1 TABLET BY MOUTH NIGHTLY.  
trimethoprim-sulfamethoxazole (BACTRIM DS, SEPTRA DS) 160-800 mg per tablet   No No  
Sig: Take 1 Tab by mouth two (2) times a day. Facility-Administered Medications: None Objective:  
No data found. No intake or output data in the 24 hours ending 12/30/18 1804 Physical Exam: 
General:    Alert. No distress, elderly, Eyes:   Normal sclera. Extraocular movements intact. PERRLA 
ENT:  Normocephalic, atraumatic. Dry mucous membranes CV:   RRR. No m/r/g. . No edema Lungs:  CTAB. No wheezing, rhonchi, or rales. Abdomen: Soft, nontender, nondistended. Present BS Extremities: Warm and dry. . 
Neurologic:  grossly intact. Skin:     No rashes or jaundice. Normal coloration Psych:  Normal mood and affect. I reviewed the labs, imaging, EKGs, telemetry, and other studies done this admission. Data Review: No results found for this or any previous visit (from the past 24 hour(s)). All Micro Results None Other Studies: No results found. Assessment and Plan:  
 
Hospital Problems as of 12/30/2018 Date Reviewed: 8/14/2018 Codes Class Noted - Resolved POA * (Principal) Pneumonia ICD-10-CM: J18.9 ICD-9-CM: 327  12/30/2018 - Present Yes Stage 3 chronic kidney disease (HCC) ICD-10-CM: N18.3 ICD-9-CM: 585.3  11/27/2017 - Present Yes Multiple myeloma in remission [facility]) ICD-10-CM: C90.01 
ICD-9-CM: 203.01  1/25/2017 - Present Yes Essential hypertension with goal blood pressure less than 130/85 ICD-10-CM: I10 
ICD-9-CM: 401.9  12/22/2016 - Present Yes Anxiety ICD-10-CM: F41.9 ICD-9-CM: 300.00  12/22/2016 - Present Yes Chronic diastolic heart failure ([address] ICD-10-CM: I50.32 
ICD-9-CM: 428.32  11/16/2016 - Present Yes Episodic atrial fibrillation (HCC) ICD-10-CM: I48.0 ICD-9-CM: 427.31  8/16/2016 - Present Yes Dementia without behavioral disturbance ICD-10-CM: F03.90 ICD-9-CM: 294.20  8/16/2016 - Present Yes Presence of cardiac pacemaker (Chronic) ICD-10-CM: Z95.0 ICD-9-CM: V45.01  3/21/2016 - Present Yes Hypothyroidism (Chronic) ICD-10-CM: E03.9 ICD-9-CM: 244.9  7/9/2015 - Present Yes · RUL pneumonia: in immunocompromised patient on chemo, admit to medical bed, add broad spectrum antibiotics of vancomycin/zosyn, check BC x 2, symptomatic care · Multiple myeloma: defer to oncology consult · PAFIB: not anticoagulated, continue amiodarone · Dysuria: check UA · DCHF: compensated, holding lasix · HTN: continue propranolol, norvasc, · Hypothyroid: continue synthroid · Hypokalemia: replace and repeat Discharge planning:  PPD, PT/OT/ case management DVT ppx: SCD Code status:  Full Estimated LOS:  Greater than 2 midnights Risk:  high Care plan: fany Cordova [phone] Signed: Betty Shaw MD

## 2018-12-31 LAB
ALBUMIN SERPL-MCNC: 2.5 G/DL (ref 3.2–4.6)
ALBUMIN/GLOB SERPL: 0.8 {RATIO} (ref 1.2–3.5)
ALP SERPL-CCNC: 57 U/L (ref 50–136)
ALT SERPL-CCNC: 20 U/L (ref 12–65)
ANION GAP SERPL CALC-SCNC: 6 MMOL/L (ref 7–16)
AST SERPL-CCNC: 25 U/L (ref 15–37)
BASOPHILS # BLD: 0 K/UL (ref 0–0.2)
BASOPHILS NFR BLD: 1 % (ref 0–2)
BILIRUB SERPL-MCNC: 1.4 MG/DL (ref 0.2–1.1)
BUN SERPL-MCNC: 11 MG/DL (ref 8–23)
CALCIUM SERPL-MCNC: 6 MG/DL (ref 8.3–10.4)
CHLORIDE SERPL-SCNC: 108 MMOL/L (ref 98–107)
CO2 SERPL-SCNC: 25 MMOL/L (ref 21–32)
CREAT SERPL-MCNC: 1.18 MG/DL (ref 0.6–1)
DIFFERENTIAL METHOD BLD: ABNORMAL
EOSINOPHIL # BLD: 0 K/UL (ref 0–0.8)
EOSINOPHIL NFR BLD: 1 % (ref 0.5–7.8)
ERYTHROCYTE [DISTWIDTH] IN BLOOD BY AUTOMATED COUNT: 13.4 % (ref 11.9–14.6)
GLOBULIN SER CALC-MCNC: 3.2 G/DL (ref 2.3–3.5)
GLUCOSE SERPL-MCNC: 95 MG/DL (ref 65–100)
HCT VFR BLD AUTO: 31.2 % (ref 35.8–46.3)
HGB BLD-MCNC: 9.8 G/DL (ref 11.7–15.4)
IMM GRANULOCYTES # BLD: 0 K/UL (ref 0–0.5)
IMM GRANULOCYTES NFR BLD AUTO: 0 % (ref 0–5)
LYMPHOCYTES # BLD: 1.1 K/UL (ref 0.5–4.6)
LYMPHOCYTES NFR BLD: 34 % (ref 13–44)
MCH RBC QN AUTO: 32.2 PG (ref 26.1–32.9)
MCHC RBC AUTO-ENTMCNC: 31.4 G/DL (ref 31.4–35)
MCV RBC AUTO: 102.6 FL (ref 79.6–97.8)
MM INDURATION POC: NORMAL MM (ref 0–5)
MONOCYTES # BLD: 0.8 K/UL (ref 0.1–1.3)
MONOCYTES NFR BLD: 23 % (ref 4–12)
NEUTS SEG # BLD: 1.4 K/UL (ref 1.7–8.2)
NEUTS SEG NFR BLD: 41 % (ref 43–78)
NRBC # BLD: 0 K/UL (ref 0–0.2)
PLATELET # BLD AUTO: 143 K/UL (ref 150–450)
PMV BLD AUTO: 10.5 FL (ref 9.4–12.3)
POTASSIUM SERPL-SCNC: 3.2 MMOL/L (ref 3.5–5.1)
PPD POC: NORMAL NEGATIVE
PROT SERPL-MCNC: 5.7 G/DL (ref 6.3–8.2)
RBC # BLD AUTO: 3.04 M/UL (ref 4.05–5.2)
SODIUM SERPL-SCNC: 139 MMOL/L (ref 136–145)
WBC # BLD AUTO: 3.3 K/UL (ref 4.3–11.1)

## 2018-12-31 PROCEDURE — 97530 THERAPEUTIC ACTIVITIES: CPT

## 2018-12-31 PROCEDURE — 77030020263 HC SOL INJ SOD CL0.9% LFCR 1000ML

## 2018-12-31 PROCEDURE — 85025 COMPLETE CBC W/AUTO DIFF WBC: CPT

## 2018-12-31 PROCEDURE — 99222 1ST HOSP IP/OBS MODERATE 55: CPT | Performed by: INTERNAL MEDICINE

## 2018-12-31 PROCEDURE — 74011250636 HC RX REV CODE- 250/636: Performed by: INTERNAL MEDICINE

## 2018-12-31 PROCEDURE — 36415 COLL VENOUS BLD VENIPUNCTURE: CPT

## 2018-12-31 PROCEDURE — 74011250637 HC RX REV CODE- 250/637: Performed by: INTERNAL MEDICINE

## 2018-12-31 PROCEDURE — 65270000029 HC RM PRIVATE

## 2018-12-31 PROCEDURE — 87449 NOS EACH ORGANISM AG IA: CPT

## 2018-12-31 PROCEDURE — 74011000258 HC RX REV CODE- 258: Performed by: INTERNAL MEDICINE

## 2018-12-31 PROCEDURE — 80053 COMPREHEN METABOLIC PANEL: CPT

## 2018-12-31 PROCEDURE — 97162 PT EVAL MOD COMPLEX 30 MIN: CPT

## 2018-12-31 PROCEDURE — 97166 OT EVAL MOD COMPLEX 45 MIN: CPT

## 2018-12-31 RX ORDER — FAMOTIDINE 20 MG/1
20 TABLET, FILM COATED ORAL DAILY
Status: DISCONTINUED | OUTPATIENT
Start: 2019-01-01 | End: 2019-01-09 | Stop reason: HOSPADM

## 2018-12-31 RX ORDER — POTASSIUM CHLORIDE 20 MEQ/1
40 TABLET, EXTENDED RELEASE ORAL
Status: COMPLETED | OUTPATIENT
Start: 2018-12-31 | End: 2018-12-31

## 2018-12-31 RX ORDER — NYSTATIN 100000 [USP'U]/ML
500000 SUSPENSION ORAL 4 TIMES DAILY
Status: DISCONTINUED | OUTPATIENT
Start: 2018-12-31 | End: 2019-01-09 | Stop reason: HOSPADM

## 2018-12-31 RX ADMIN — ATORVASTATIN CALCIUM 80 MG: 40 TABLET, FILM COATED ORAL at 21:36

## 2018-12-31 RX ADMIN — Medication 10 ML: at 05:51

## 2018-12-31 RX ADMIN — Medication 10 ML: at 14:49

## 2018-12-31 RX ADMIN — ASPIRIN 81 MG: 81 TABLET, COATED ORAL at 07:41

## 2018-12-31 RX ADMIN — VANCOMYCIN HYDROCHLORIDE 1000 MG: 1 INJECTION, POWDER, LYOPHILIZED, FOR SOLUTION INTRAVENOUS at 22:36

## 2018-12-31 RX ADMIN — NADOLOL 80 MG: 40 TABLET ORAL at 07:41

## 2018-12-31 RX ADMIN — POTASSIUM CHLORIDE 40 MEQ: 20 TABLET, EXTENDED RELEASE ORAL at 11:34

## 2018-12-31 RX ADMIN — CLORAZEPATE DIPOTASSIUM 7.5 MG: 7.5 TABLET ORAL at 07:42

## 2018-12-31 RX ADMIN — CLORAZEPATE DIPOTASSIUM 7.5 MG: 7.5 TABLET ORAL at 17:19

## 2018-12-31 RX ADMIN — NYSTATIN 500000 UNITS: 500000 SUSPENSION ORAL at 12:03

## 2018-12-31 RX ADMIN — NYSTATIN 500000 UNITS: 500000 SUSPENSION ORAL at 17:19

## 2018-12-31 RX ADMIN — LEVOTHYROXINE SODIUM 50 MCG: 50 TABLET ORAL at 07:41

## 2018-12-31 RX ADMIN — PIPERACILLIN SODIUM,TAZOBACTAM SODIUM 4.5 G: 4; .5 INJECTION, POWDER, FOR SOLUTION INTRAVENOUS at 11:35

## 2018-12-31 RX ADMIN — ACETAMINOPHEN 650 MG: 325 TABLET ORAL at 19:49

## 2018-12-31 RX ADMIN — PIPERACILLIN SODIUM,TAZOBACTAM SODIUM 4.5 G: 4; .5 INJECTION, POWDER, FOR SOLUTION INTRAVENOUS at 03:33

## 2018-12-31 RX ADMIN — SODIUM CHLORIDE 100 ML/HR: 900 INJECTION, SOLUTION INTRAVENOUS at 19:29

## 2018-12-31 RX ADMIN — Medication 10 ML: at 21:37

## 2018-12-31 RX ADMIN — FAMOTIDINE 20 MG: 20 TABLET ORAL at 07:42

## 2018-12-31 RX ADMIN — AMIODARONE HYDROCHLORIDE 200 MG: 200 TABLET ORAL at 07:42

## 2018-12-31 RX ADMIN — FERROUS SULFATE TAB 325 MG (65 MG ELEMENTAL FE) 325 MG: 325 (65 FE) TAB at 07:41

## 2018-12-31 RX ADMIN — NYSTATIN 500000 UNITS: 500000 SUSPENSION ORAL at 21:36

## 2018-12-31 RX ADMIN — AMLODIPINE BESYLATE 2.5 MG: 5 TABLET ORAL at 07:42

## 2018-12-31 RX ADMIN — HYDROCODONE BITARTRATE AND ACETAMINOPHEN 1 TABLET: 5; 325 TABLET ORAL at 19:35

## 2018-12-31 RX ADMIN — MIRTAZAPINE 15 MG: 15 TABLET, FILM COATED ORAL at 21:37

## 2018-12-31 RX ADMIN — PIPERACILLIN SODIUM,TAZOBACTAM SODIUM 4.5 G: 4; .5 INJECTION, POWDER, FOR SOLUTION INTRAVENOUS at 19:25

## 2018-12-31 NOTE — PROGRESS NOTES
END OF SHIFT NOTE: 
 
Intake/Output 12/30 1901 - 12/31 0700 In: 4587 [P.O.:350; I.V.:1022] Out: 300 [Urine:300] Voiding: YES Catheter: NO 
Drain:   
 
 
 
 
Stool:  2 occurrences. Stool Assessment Stool Color: Brown;Green (12/31/18 0330) Stool Appearance: Loose (12/31/18 0330) Stool Amount: Medium (12/31/18 0330) Stool Source/Status: Rectum (12/31/18 0330) Emesis:  0 occurrences. VITAL SIGNS Patient Vitals for the past 12 hrs: 
 Temp Pulse Resp BP SpO2  
12/31/18 0326 98 °F (36.7 °C) 61 16 121/58 93 % 12/30/18 2248 97.9 °F (36.6 °C) 60 16 125/56 91 % 12/30/18 1943 99.8 °F (37.7 °C)      
12/30/18 1902 98.5 °F (36.9 °C)     Pain Assessment Pain 1 Pain Scale 1: Visual (12/31/18 0217) Pain Intensity 1: 0 (12/31/18 0217) Patient Stated Pain Goal: 0 (12/31/18 0217) Pain Reassessment 1: Patient sleeping (12/31/18 0217) Pain Onset 1: Frontal H/A (12/30/18 2213) Pain Location 1: Head (12/30/18 2254) Pain Orientation 1: Anterior (12/30/18 2254) Pain Description 1: Aching (12/30/18 2254) Pain Intervention(s) 1: Medication (see MAR) (12/30/18 2254) Ambulating Yes Additional Information:  
 
Ambulated to bathroom several times with assistance. UA sent during shift. Pt to have oncology consult today. BCs collected 12/30. Had several bowel movements during shift. Norco 5mg given x1 for headache. No further needs expressed, pt slept well. Shift report given to oncoming nurse at the bedside.  
 
Sherlyn Lau RN

## 2018-12-31 NOTE — PROGRESS NOTES
Problem: Self Care Deficits Care Plan (Adult) Goal: *Acute Goals and Plan of Care (Insert Text) LTG 1: Pt will be mod I with toileting by 1/6/19 to prevent skin breakdown. LTG 2: Pt will be mod I with LB dressing by 1/6/19 to reduce risk of falls. LTG 3: Pt will be mod I with bathing by 1/6/19 to promote good skin integrity. LTG 4: Pt will be mod I with toilet transfers by 1/6/19 to promote quality of life. LTG 5: Pt will be mod I with HEP by 1/6/19 to prevent deconditioning. OCCUPATIONAL THERAPY: Initial Assessment 12/31/2018INPATIENT: Hospital Day: 2 [place] / Product Type: Medicare /  
  
NAME/AGE/GENDER: Tanika Berumen is a 80 y.o. female PRIMARY DIAGNOSIS:  pneumonia Pneumonia Pneumonia Pneumonia ICD-10: Treatment Diagnosis:  
 · Generalized Muscle Weakness (M62.81) Precautions/Allergies: 
   Patient has no known allergies. ASSESSMENT:  
Ms. Silke Sadler presents in recliner and agreeable to tx. Pt was agreeable to tx. Pt discussed home environment. Pt walked across room with HHA. Pt reports she was furniture walking at home and using a w/c at the doctor's office when she goes for appointments. Pt reports she is weaker, but thinks she would do fine at home. Pt would benefit from skilled services and possibly cancer outpatient. This section established at most recent assessment PROBLEM LIST (Impairments causing functional limitations): 1. Decreased Strength 2. Decreased ADL/Functional Activities 3. Decreased Transfer Abilities 4. Decreased Activity Tolerance 5. Decreased Pacing Skills INTERVENTIONS PLANNED: (Benefits and precautions of occupational therapy have been discussed with the patient.) 1. Activities of daily living training 2. Therapeutic activity 3. Therapeutic exercise TREATMENT PLAN: Frequency/Duration: Follow patient 3x a week to address above goals. Rehabilitation Potential For Stated Goals: Excellent RECOMMENDED REHABILITATION/EQUIPMENT: (at time of discharge pending progress): Due to the probability of continued deficits (see above) this patient will not likely need continued skilled occupational therapy after discharge. Equipment:  
? None at this time OCCUPATIONAL PROFILE AND HISTORY:  
History of Present Injury/Illness (Reason for Referral): Please see H&P Past Medical History/Comorbidities:  
Ms. Hui Lewis  has a past medical history of Anemia, unspecified, Chest pain, unspecified, Chronic anxiety, Diastolic heart failure ([address] Essential hypertension, benign, Flatulence, eructation, and gas pain, Lump or mass in breast, Other and unspecified hyperlipidemia, Paroxysmal atrial fibrillation ([address] Paroxysmal tachycardia ([address] Pernicious anemia, Postmenopausal atrophic vaginitis, Unspecified deficiency anemia, and Unspecified hypothyroidism. Ms. Hui Lewis  has a past surgical history that includes hx hysterectomy; hx orthopaedic; hx vein stripping; THORACENTESIS (Left, 6/23/2016); ULTRASOUND (Bilateral, 6/23/2016); ESOPHAGOGASTRODUODENOSCOPY (EGD)  BMI 30  ROOM 309 (N/A, 6/21/2016); ULTRASOUND (Bilateral, 6/20/2016); THORACENTESIS (Bilateral, 6/20/2016); THORACENTESIS (Bilateral, 6/17/2016); and ULTRASOUND (Bilateral, 6/17/2016). Social History/Living Environment:  
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 Number of Personal Factors/Comorbidities that affect the Plan of Care: Expanded review of therapy/medical records (1-2):  MODERATE COMPLEXITY ASSESSMENT OF OCCUPATIONAL PERFORMANCE[de-identified]  
Activities of Daily Living: Basic ADLs (From Assessment) Complex ADLs (From Assessment) Feeding: Independent Oral Facial Hygiene/Grooming: Setup Bathing: Minimum assistance Upper Body Dressing: Setup Lower Body Dressing: Minimum assistance Toileting: Minimum assistance Grooming/Bathing/Dressing Activities of Daily Living Cognitive Retraining Safety/Judgement: Awareness of environment Bed/Mat Mobility Rolling: Independent Supine to Sit: Supervision Sit to Stand: Contact guard assistance;Supervision Most Recent Physical Functioning:  
Gross Assessment: 
AROM: Within functional limits PROM: Within functional limits Strength: Generally decreased, functional 
         
  
Posture: 
Posture (WDL): Exceptions to [facility] Posture Assessment: Forward head, Rounded shoulders Balance: 
Sitting: Intact Standing: Impaired Standing - Static: Fair Standing - Dynamic : Fair Bed Mobility: 
Rolling: Independent Supine to Sit: Supervision Wheelchair Mobility: 
  
Transfers: 
Sit to Stand: Contact guard assistance;Supervision Stand to Sit: Stand-by assistance Patient Vitals for the past 6 hrs: 
 BP SpO2 Pulse 18 0712 112/55 95 % 63  
18 1100 107/51 94 % 60 Mental Status Neurologic State: Alert Orientation Level: Oriented X4 Cognition: Appropriate decision making, Appropriate for age attention/concentration, Appropriate safety awareness Safety/Judgement: Awareness of environment Physical Skills Involved: 
1. Balance 2. Activity Tolerance 3. [place] Cognitive Skills Affected (resulting in the inability to perform in a timely and safe manner): 
1. N/A Psychosocial Skills Affected: 
1. N/A Number of elements that affect the Plan of Care: 3-5:  MODERATE COMPLEXITY CLINICAL DECISION MAKIN [facility] [address] [address] AM-PAC 6 Clicks Daily Activity Inpatient Short Form How much help from another person does the patient currently need. ..  Total A Lot A Little None 1. Putting on and taking off regular lower body clothing? [] 1   [] 2   [x] 3   [] 4  
2. Bathing (including washing, rinsing, drying)? [] 1   [] 2   [x] 3   [] 4  
3. Toileting, which includes using toilet, bedpan or urinal?   [] 1   [] 2   [x] 3   [] 4  
4. Putting on and taking off regular upper body clothing? [] 1   [] 2   [x] 3   [] 4  
5. Taking care of personal grooming such as brushing teeth? [] 1   [] 2   [x] 3   [] 4  
6. Eating meals? [] 1   [] 2   [] 3   [x] 4  
© 2007, Trustees of [facility] [employer], under license to [employer]. All rights reserved Score:  Initial: 19 Most Recent: X (Date: -- ) Interpretation of Tool:  Represents activities that are increasingly more difficult (i.e. Bed mobility, Transfers, Gait). Score 24 23 22-20 19-15 14-10 9-7 6 Modifier CH CI CJ CK CL CM CN   
 
? Self Care:  
  - CURRENT STATUS: CK - 40%-59% impaired, limited or restricted  - GOAL STATUS: CJ - 20%-39% impaired, limited or restricted  - D/C STATUS:  ---------------To be determined--------------- [place] / Product Type: Medicare /   
 
Medical Necessity:    
· Skilled intervention continues to be required due to decreased activity tolerance. Reason for Services/Other Comments: 
· Patient continues to require skilled intervention due to being worse than PLOF. Use of outcome tool(s) and clinical judgement create a POC that gives a: MODERATE COMPLEXITY  
 
 
 
TREATMENT:  
(In addition to Assessment/Re-Assessment sessions the following treatments were rendered) Pre-treatment Symptoms/Complaints:   
Pain: Initial:  
  None Post Session:  None Assessment/Reassessment only, no treatment provided today Braces/Orthotics/Lines/Etc:  
· Treatment/Session Assessment:   
· Response to Treatment:  Agreeable · Interdisciplinary Collaboration:  
o Registered Nurse · After treatment position/precautions:  
o Bed/Chair-wheels locked 
o Call light within reach 
o Nurse at bedside · Compliance with Program/Exercises: Will assess as treatment progresses. · Recommendations/Intent for next treatment session: \"Next visit will focus on advancements to more challenging activities and reduction in assistance provided\". Total Treatment Duration: OT Patient Time In/Time Out Time In: 1594 Time Out: 9999 Boubacar Casey OT

## 2018-12-31 NOTE — PROGRESS NOTES
Problem: Mobility Impaired (Adult and Pediatric) Goal: *Acute Goals and Plan of Care (Insert Text) 1. Ms. Won Lemus will perform supine to sit and sit to supine independently in 7 days. 2.  Ms. Won Lemus will perform sit to stand and bed to chair independently in 7 days. 3.  Ms. Won Lemus will perform gait with least restrictive device 250 ft independently in 7 days. 4.  Ms. Won Lemus will go up and down 3 steps with rail independently in 7 days. PHYSICAL THERAPY: Initial Assessment, Treatment Day: Day of Assessment 12/31/2018INPATIENT: Hospital Day: 2 [place] / Product Type: Medicare /  
  
NAME/AGE/GENDER: Nina Sam is a 80 y.o. female PRIMARY DIAGNOSIS: pneumonia Pneumonia Pneumonia Pneumonia ICD-10: Treatment Diagnosis:  
 · Generalized Muscle Weakness (M62.81) · Difficulty in walking, Not elsewhere classified (R26.2) Precaution/Allergies: 
Patient has no known allergies. ASSESSMENT:  
Ms. Won Lemus presents with decreased mobility and decreased gait. She lives by herself and gets meals on wheels. She tells me she has a walker and has falling a few times but she usually just holds to furniture around her house and uses a grocery cart if she goes to the store. She says her daughter comes by like 1 time per month. She is visually impaired. Ms hammond was able to get out of bed with supervision and also stand with supervision. She reached for external support all the way to the bathroom. She says this is what she does. Her O2 sat was 92% on RA. She was able clean herself on the toilet. Ms. Won Lemus is functioning slightly less than baseline and is appropriate for skilled PT to maximize her rehab potential.  She could benefit from a rehab stay or home with home health. It depends on what Ms. Won Lemus wants. Ms. Won Lemus has a walker at home and should use it but she uses furniture instead. This section established at most recent assessment PROBLEM LIST (Impairments causing functional limitations): 1. Decreased Strength 2. Decreased Transfer Abilities 3. Decreased Ambulation Ability/Technique 4. Decreased Activity Tolerance 5. Decreased Pacing Skills INTERVENTIONS PLANNED: (Benefits and precautions of physical therapy have been discussed with the patient.) 1. Bed Mobility 2. Gait Training 3. Therapeutic Activites 4. Transfer Training TREATMENT PLAN: Frequency/Duration: 4 times a week for duration of hospital stay Rehabilitation Potential For Stated Goals: Good RECOMMENDED REHABILITATION/EQUIPMENT: (at time of discharge pending progress): Due to the probability of continued deficits (see above) this patient will likely need continued skilled physical therapy after discharge. Equipment:  
? None at this time HISTORY:  
History of Present Injury/Illness (Reason for Referral): Ms. Tali Kapadia is a 81 yo female with PMH of PAFIB with pacer, multiple myeloma followed by Dr. Hodgson Husbands on current chemo, HTN, CKD, dCHF, who is sent as a direct admit from urgent care due to RUL pneumonia. She admits to several days of cough/ malaise and headache. CXR at urgent care showed RUL pneumonia. She denies fever, has anorexia and no ulices dyspnea.  
  
10 systems reviewed and negative except as noted in HPI. - has throat pain, needs glasses, has constipation, has decreased urination with dysuria, has myalgias, has memory loss, has cold intolerance and weight gain Past Medical History/Comorbidities:  
Ms. Tali Kapadia  has a past medical history of Anemia, unspecified, Chest pain, unspecified, Chronic anxiety, Diastolic heart failure ([address] Essential hypertension, benign, Flatulence, eructation, and gas pain, Lump or mass in breast, Other and unspecified hyperlipidemia, Paroxysmal atrial fibrillation ([address] Paroxysmal tachycardia ([address] Pernicious anemia, Postmenopausal atrophic vaginitis, Unspecified deficiency anemia, and Unspecified hypothyroidism. Ms. Fredna Goldberg  has a past surgical history that includes hx hysterectomy; hx orthopaedic; hx vein stripping; THORACENTESIS (Left, 6/23/2016); ULTRASOUND (Bilateral, 6/23/2016); ESOPHAGOGASTRODUODENOSCOPY (EGD)  BMI 30  ROOM 309 (N/A, 6/21/2016); ULTRASOUND (Bilateral, 6/20/2016); THORACENTESIS (Bilateral, 6/20/2016); THORACENTESIS (Bilateral, 6/17/2016); and ULTRASOUND (Bilateral, 6/17/2016). Social History/Living Environment:  
Home Environment: Private residence # Steps to Enter: 3 One/Two Story Residence: One story Living Alone: Yes Support Systems: Family member(s) Patient Expects to be Discharged to[de-identified] Private residence Current DME Used/Available at Home: Walker, rolling Prior Level of Function/Work/Activity: 
Independent in her own home. A \"few\" falls. age Number of Personal Factors/Comorbidities that affect the Plan of Care: 1-2: MODERATE COMPLEXITY EXAMINATION:  
Most Recent Physical Functioning:  
Gross Assessment: 
AROM: Within functional limits PROM: Within functional limits Strength: Generally decreased, functional 
         
  
Posture: 
Posture (WDL): Exceptions to [facility] Posture Assessment: Forward head, Rounded shoulders Balance: 
Sitting: Intact Standing: Impaired Standing - Static: Fair Standing - Dynamic : Fair Bed Mobility: 
Rolling: Independent Supine to Sit: Supervision Wheelchair Mobility: 
  
Transfers: 
Sit to Stand: Contact guard assistance;Supervision Stand to Sit: Stand-by assistance Gait: 
  
Base of Support: Widened Step Length: Right shortened;Left shortened Distance (ft): 20 Feet (ft)(x 2 to the bathroom and back to the chair. ) Ambulation - Level of Assistance: Stand-by assistance Interventions: Verbal cues; Safety awareness training Body Structures Involved: 1. Muscles Body Functions Affected: 1. Movement Related Activities and Participation Affected: 1. Mobility Number of elements that affect the Plan of Care: 3: MODERATE COMPLEXITY CLINICAL PRESENTATION:  
Presentation: Evolving clinical presentation with changing clinical characteristics: MODERATE COMPLEXITY CLINICAL DECISION MAKIN [facility] [address] [address] AM-PAC 6 Clicks Basic Mobility Inpatient Short Form How much difficulty does the patient currently have. .. Unable A Lot A Little None 1. Turning over in bed (including adjusting bedclothes, sheets and blankets)? [] 1   [] 2   [] 3   [x] 4  
2. Sitting down on and standing up from a chair with arms ( e.g., wheelchair, bedside commode, etc.)   [] 1   [] 2   [x] 3   [] 4  
3. Moving from lying on back to sitting on the side of the bed? [] 1   [] 2   [] 3   [x] 4 How much help from another person does the patient currently need. .. Total A Lot A Little None 4. Moving to and from a bed to a chair (including a wheelchair)? [] 1   [] 2   [x] 3   [] 4  
5. Need to walk in hospital room? [] 1   [] 2   [x] 3   [] 4  
6. Climbing 3-5 steps with a railing? [] 1   [] 2   [x] 3   [] 4  
© , Trustees of [address] [place] [address] [address] [id number], under license to [employer]. All rights reserved Score:  Initial: 20 Most Recent: X (Date: -- ) Interpretation of Tool:  Represents activities that are increasingly more difficult (i.e. Bed mobility, Transfers, Gait). Score 24 23 22-20 19-15 14-10 9-7 6 Modifier CH CI CJ CK CL CM CN   
 
? Mobility - Walking and Moving Around:  
  - CURRENT STATUS: CJ - 20%-39% impaired, limited or restricted  - GOAL STATUS: CJ - 20%-39% impaired, limited or restricted  - D/C STATUS:  ---------------To be determined--------------- [place] / Product Type: Medicare /   
 
Medical Necessity:    
· Patient is expected to demonstrate progress in functional technique to increase independence with mobility and gait. . 
Reason for Services/Other Comments: · Patient continues to require present interventions due to patient's inability to function at baseline. .  
Use of outcome tool(s) and clinical judgement create a POC that gives a: Questionable prediction of patient's progress: MODERATE COMPLEXITY  
  
 
 
 
TREATMENT:  
(In addition to Assessment/Re-Assessment sessions the following treatments were rendered) Pre-treatment Symptoms/Complaints:  none Pain: Initial:  
Pain Intensity 1: 0  Post Session:  none Therapeutic Activity: (    10): Therapeutic activities including Bed transfers, Chair transfers, Toilet transfers and Ambulation on level ground to improve mobility. Required minimal Verbal cues; Safety awareness training to promote motor control of upper extremity(s), lower extremity(s). Braces/Orthotics/Lines/Etc:  
· IV Treatment/Session Assessment:   
· Response to Treatment:  good · Interdisciplinary Collaboration:  
o Registered Nurse · After treatment position/precautions:  
o Up in chair 
o Call light within reach 
o RN notified · Compliance with Program/Exercises: Will assess as treatment progresses · Recommendations/Intent for next treatment session: \"Next visit will focus on advancements to more challenging activities and reduction in assistance provided\". Total Treatment Duration: PT Patient Time In/Time Out Time In: 6755 Time Out: 0930 Medardo Rios, PT

## 2018-12-31 NOTE — PROGRESS NOTES
Hospitalist Progress Note Admit Date:  2018  5:11 PM  
Name:  Esvin Younger Age:  80 y.o. 
:  1930 MRN:  [id number] PCP:  Costa Gudino MD 
Treatment Team: Attending Provider: Traci Pabon MD; Consulting Provider: Marco A Akers MD; Utilization Review: Aren Rico RN Subjective:  
 
Ms. Fahad Jorge is a 79 yo female with PMH of PAFIB with pacer, multiple myeloma followed by Dr. Dejuan Hernandez on current chemo, HTN, CKD, dCHF, who is sent as a direct admit from urgent care due to RUL pneumonia. She admits to several days of cough/ malaise and headache. CXR at urgent care showed RUL pneumonia. She is day 2 vancomycin/zosyn, BC NGTD. Hematology consulted. Plans for discharge pending. 18 per nursing has throat soreness/ concern for thrush, had BM, some anorexia, less short of breath and overall feels better, no cough Objective:  
 
Patient Vitals for the past 24 hrs: 
 Temp Pulse Resp BP SpO2  
18 1100 99.3 °F (37.4 °C) 60 16 107/51 94 % 18 0712 98.3 °F (36.8 °C) 63 16 112/55 95 % 18 0326 98 °F (36.7 °C) 61 16 121/58 93 % 18 2248 97.9 °F (36.6 °C) 60 16 125/56 91 % 18 1943 99.8 °F (37.7 °C)      
18 1902 98.5 °F (36.9 °C)      
18 1803 (!) 100.5 °F (38.1 °C) 65 16 125/52 93 % Oxygen Therapy O2 Sat (%): 94 % (18 1100) Intake/Output Summary (Last 24 hours) at 2018 1141 Last data filed at 2018 1004 Gross per 24 hour Intake 1923 ml Output 1000 ml Net 923 ml  
   
*Note that automatically entered I/Os may not be accurate; dependent on patient compliance with collection and accurate  by assistants. General:    Well nourished. Alert. No distress CV:   RRR. No murmur, rub, or gallop. No edema Lungs:   CTAB. No wheezing, rhonchi, or rales. Abdomen:   Soft, nontender, nondistended. Decreased BS Extremities: Warm and dry. Skin:     No rashes or jaundice. Neuro:  No gross focal deficits Data Review: 
I have reviewed all labs, meds, telemetry events, and studies from the last 24 hours: 
 
Recent Results (from the past 24 hour(s)) CULTURE, BLOOD Collection Time: 12/30/18  7:21 PM  
Result Value Ref Range Special Requests: RIGHT Antecubital 
    
 Culture result: NO GROWTH AFTER 14 HOURS    
CULTURE, BLOOD Collection Time: 12/30/18  7:28 PM  
Result Value Ref Range Special Requests: RIGHT 
HAND Culture result: NO GROWTH AFTER 14 HOURS    
URINALYSIS W/ RFLX MICROSCOPIC Collection Time: 12/30/18 10:35 PM  
Result Value Ref Range Color YELLOW Appearance CLEAR Specific gravity 1.009 1.001 - 1.023    
 pH (UA) 7.0 5.0 - 9.0 Protein TRACE (A) NEG mg/dL Glucose NEGATIVE  mg/dL Ketone NEGATIVE  NEG mg/dL Bilirubin NEGATIVE  NEG Blood TRACE (A) NEG Urobilinogen 0.2 0.2 - 1.0 EU/dL Nitrites NEGATIVE  NEG Leukocyte Esterase NEGATIVE  NEG    
 WBC 0-3 0 /hpf  
 RBC 5-10 0 /hpf Epithelial cells 0-3 0 /hpf Bacteria TRACE 0 /hpf Casts 0 0 /lpf METABOLIC PANEL, COMPREHENSIVE Collection Time: 12/31/18  5:46 AM  
Result Value Ref Range Sodium 139 136 - 145 mmol/L Potassium 3.2 (L) 3.5 - 5.1 mmol/L Chloride 108 (H) 98 - 107 mmol/L  
 CO2 25 21 - 32 mmol/L Anion gap 6 (L) 7 - 16 mmol/L Glucose 95 65 - 100 mg/dL BUN 11 8 - 23 MG/DL Creatinine 1.18 (H) 0.6 - 1.0 MG/DL  
 GFR est AA 56 (L) >60 ml/min/1.73m2 GFR est non-AA 46 (L) >60 ml/min/1.73m2 Calcium 6.0 (L) 8.3 - 10.4 MG/DL Bilirubin, total 1.4 (H) 0.2 - 1.1 MG/DL  
 ALT (SGPT) 20 12 - 65 U/L  
 AST (SGOT) 25 15 - 37 U/L Alk. phosphatase 57 50 - 136 U/L Protein, total 5.7 (L) 6.3 - 8.2 g/dL Albumin 2.5 (L) 3.2 - 4.6 g/dL Globulin 3.2 2.3 - 3.5 g/dL A-G Ratio 0.8 (L) 1.2 - 3.5    
CBC WITH AUTOMATED DIFF Collection Time: 12/31/18  5:46 AM  
Result Value Ref Range WBC 3.3 (L) 4.3 - 11.1 K/uL RBC 3.04 (L) 4.05 - 5.2 M/uL HGB 9.8 (L) 11.7 - 15.4 g/dL HCT 31.2 (L) 35.8 - 46.3 % .6 (H) 79.6 - 97.8 FL  
 MCH 32.2 26.1 - 32.9 PG  
 MCHC 31.4 31.4 - 35.0 g/dL  
 RDW 13.4 11.9 - 14.6 % PLATELET 480 (L) 442 - 450 K/uL MPV 10.5 9.4 - 12.3 FL ABSOLUTE NRBC 0.00 0.0 - 0.2 K/uL  
 DF AUTOMATED NEUTROPHILS 41 (L) 43 - 78 % LYMPHOCYTES 34 13 - 44 % MONOCYTES 23 (H) 4.0 - 12.0 % EOSINOPHILS 1 0.5 - 7.8 % BASOPHILS 1 0.0 - 2.0 % IMMATURE GRANULOCYTES 0 0.0 - 5.0 %  
 ABS. NEUTROPHILS 1.4 (L) 1.7 - 8.2 K/UL  
 ABS. LYMPHOCYTES 1.1 0.5 - 4.6 K/UL  
 ABS. MONOCYTES 0.8 0.1 - 1.3 K/UL  
 ABS. EOSINOPHILS 0.0 0.0 - 0.8 K/UL  
 ABS. BASOPHILS 0.0 0.0 - 0.2 K/UL  
 ABS. IMM. GRANS. 0.0 0.0 - 0.5 K/UL All Micro Results Procedure Component Value Units Date/Time CULTURE, BLOOD [[id number]] Collected:  12/30/18 1921 Order Status:  Completed Specimen:  Blood Updated:  12/31/18 1164 Special Requests: --     
  RIGHT Antecubital 
  
  Culture result: NO GROWTH AFTER 14 HOURS     
 CULTURE, BLOOD [[id number]] Collected:  12/30/18 1928 Order Status:  Completed Specimen:  Blood Updated:  12/31/18 7597 Special Requests: --     
  RIGHT 
HAND Culture result: NO GROWTH AFTER 14 HOURS No results found for this visit on 12/30/18. Current Meds: 
Current Facility-Administered Medications Medication Dose Route Frequency  [START ON 1/1/2019] famotidine (PEPCID) tablet 20 mg  20 mg Oral DAILY  nystatin (MYCOSTATIN) 100,000 unit/mL oral suspension 500,000 Units  500,000 Units Oral QID  amiodarone (CORDARONE) tablet 200 mg  200 mg Oral DAILY  amLODIPine (NORVASC) tablet 2.5 mg  2.5 mg Oral DAILY  aspirin delayed-release tablet 81 mg  81 mg Oral DAILY  atorvastatin (LIPITOR) tablet 80 mg  80 mg Oral QHS  ferrous sulfate tablet 325 mg  1 Tab Oral DAILY WITH BREAKFAST  levothyroxine (SYNTHROID) tablet 50 mcg  50 mcg Oral ACB  mirtazapine (REMERON) tablet 15 mg  15 mg Oral QHS  nadolol (CORGARD) tablet 80 mg  80 mg Oral DAILY  tuberculin injection 5 Units  5 Units IntraDERMal ONCE  piperacillin-tazobactam (ZOSYN) 4.5 g in 0.9% sodium chloride (MBP/ADV) 100 mL  4.5 g IntraVENous Q8H  
 0.9% sodium chloride infusion  100 mL/hr IntraVENous CONTINUOUS  
 sodium chloride (NS) flush 5-10 mL  5-10 mL IntraVENous Q8H  
 sodium chloride (NS) flush 5-10 mL  5-10 mL IntraVENous PRN  
 acetaminophen (TYLENOL) tablet 650 mg  650 mg Oral Q6H PRN  
 HYDROcodone-acetaminophen (NORCO) 5-325 mg per tablet 1 Tab  1 Tab Oral Q4H PRN  
 naloxone (NARCAN) injection 0.4 mg  0.4 mg IntraVENous PRN  
 ondansetron (ZOFRAN) injection 4 mg  4 mg IntraVENous Q4H PRN  
 clorazepate (TRANXENE) tablet 7.5 mg  7.5 mg Oral BID  vancomycin (VANCOCIN) 1,000 mg in 0.9% sodium chloride (MBP/ADV) 250 mL  1,000 mg IntraVENous Q24H Other Studies (last 24 hours): No results found. Assessment and Plan:  
 
Hospital Problems as of 12/31/2018 Date Reviewed: 8/14/2018 Codes Class Noted - Resolved POA * (Principal) Pneumonia ICD-10-CM: J18.9 ICD-9-CM: 464  12/30/2018 - Present Yes Stage 3 chronic kidney disease (HCC) ICD-10-CM: N18.3 ICD-9-CM: 585.3  11/27/2017 - Present Yes Multiple myeloma in remission [facility]) ICD-10-CM: C90.01 
ICD-9-CM: 203.01  1/25/2017 - Present Yes Essential hypertension with goal blood pressure less than 130/85 ICD-10-CM: I10 
ICD-9-CM: 401.9  12/22/2016 - Present Yes Anxiety ICD-10-CM: F41.9 ICD-9-CM: 300.00  12/22/2016 - Present Yes Chronic diastolic heart failure ([facility] ICD-10-CM: I50.32 
ICD-9-CM: 428.32  11/16/2016 - Present Yes Episodic atrial fibrillation (HCC) ICD-10-CM: I48.0 ICD-9-CM: 427.31  8/16/2016 - Present Yes Dementia without behavioral disturbance ICD-10-CM: F03.90 ICD-9-CM: 294.20  8/16/2016 - Present Yes Presence of cardiac pacemaker (Chronic) ICD-10-CM: Z95.0 ICD-9-CM: V45.01  3/21/2016 - Present Yes Hypothyroidism (Chronic) ICD-10-CM: E03.9 ICD-9-CM: 244.9  7/9/2015 - Present Yes Plan: · RUL pneumonia: in immunocompromised patient on chemo, continue vancomycin/zosyn, followup BC x 2, symptomatic care · Multiple myeloma: defer to oncology consult, per Juan David Salazar of oncology they are ok to assume care · PAFIB: not anticoagulated, continue amiodarone · Dysuria: negative UA · DCHF: compensated, holding lasix · HTN: continue propranolol, norvasc, · Hypothyroid: continue synthroid · Hypokalemia: replace and repeat · CKD: stable, followup BMP Spoke with Antonio Lomas and will sign off once hematology assumes care, please call as needed thanks 
  
Discharge planning:  PPD, PT/OT/ case management DVT ppx: SCD Code status:  Full Estimated LOS:  Greater than 2 midnights Risk:  high Signed: Shakir Alba MD

## 2018-12-31 NOTE — CONSULTS
3 [facility] Hematology & Oncology Inpatient Hematology / Oncology Consult NoteReason for Consult:  pneumonia Pneumonia Referring Physician:  Magda Miles MD 
History of Present Illness: 
Ms. Ann Kaur is a 80 y.o. female admitted on 12/30/2018. PMH PAFib (no AC) with pacer, HTN, CKD, dCHF. Additionally, she is a patient of Dr. Natalie Garza with multiple myeloma. Oncologic history: In 4/2016 she was diagnosed with Multiple Myeloma, IgG Kappa, monosomy 13, del.16q, ISS Stage II, at diagnosis her IgG level was 2951 ( with an M-spike of 1.78 ). She received 2 cycles of Decadron and achieved a PA; her IgG level decreased to 1902 ( with an M-spike of 1.11 ). In 8/2016 she was started on Revlimid and her IgG level decreased to 1370 ( with an M-spike of 0.55 ), in 2/2017 her M-spike increased to 0.94 hence in 3/2017 Ixazomib 2.3 mg PO weekly was added to her treatment regimen and in 8/2017 her M-spike decreased to 0.25 but in 10/2017 Ixazomib was stopped due to cytopenias, in 1/2018 Panabinostat was added to her treatment regimen but she developed severe asthenia and dyspnea and did not want to take it anymore. In 3/2018 she was started on Pomalidomide. Her last myeloma last in late December showed stable disease. IgG levels adequate (589). She presented to the ER on 12/30/18 from urgent care for RUL PNA (imaging not in system). She reported cough, malaise, headache, throat pain, constipation and decreased urination with dysuria. She has been started on zosyn/vancomycin. Tmax on admission was 100.5. We were consulted for further recommendations and to assume care. Review of Systems: 
Constitutional + fatigue/malaise. Denies fever, chills, weight loss, appetite changes, night sweats. HEENT Denies trauma, blurry vision, hearing loss, ear pain, nosebleeds, sore throat, neck pain Skin Denies lesions or rashes. Lungs + dyspnea, cough with yellow sputum- both improving Cardiovascular Denies chest pain, palpitations, or lower extremity edema. Gastrointestinal Denies nausea, vomiting, changes in bowel habits, bloody or black stools, abdominal pain.  Denies dysuria, frequency or hesitancy of urination. Neuro Denies headaches, visual changes or ataxia. Denies dizziness, tingling, tremors, sensory change, speech change, focal weakness Hematology Denies easy bruising or bleeding, denies gingival bleeding or epistaxis. Endo Denies heat/cold intolerance, denies diabetes or thyroid abnormalities. MSK Denies back pain, arthralgias, myalgias or frequent falls. Psychiatric/Behavioral Denies depression and substance abuse. The patient is not nervous/anxious. No Known Allergies Past Medical History:  
Diagnosis Date  Anemia, unspecified  Chest pain, unspecified 3/21/2016  Chronic anxiety 3/21/2016  Diastolic heart failure ([address] 3/21/2016  Essential hypertension, benign  Flatulence, eructation, and gas pain  Lump or mass in breast   
 Other and unspecified hyperlipidemia  Paroxysmal atrial fibrillation ([address] 3/21/2016  Paroxysmal tachycardia ([facility] [address] 3/21/2016  Pernicious anemia  Postmenopausal atrophic vaginitis  Unspecified deficiency anemia  Unspecified hypothyroidism Past Surgical History:  
Procedure Laterality Date  HX HYSTERECTOMY  HX ORTHOPAEDIC    
 heel spur  HX VEIN STRIPPING Family History Problem Relation Age of Onset  No Known Problems Mother  Heart Disease Father Social History Socioeconomic History  Marital status:  Spouse name: Not on file  Number of children: Not on file  Years of education: Not on file  Highest education level: Not on file Social Needs  Financial resource strain: Not on file  Food insecurity - worry: Not on file  Food insecurity - inability: Not on file  Transportation needs - medical: Not on file  Transportation needs - non-medical: Not on file Occupational History  Not on file Tobacco Use  Smoking status: Never Smoker  Smokeless tobacco: Never Used Substance and Sexual Activity  Alcohol use: No  
  Alcohol/week: 0.0 oz  Drug use: No  
 Sexual activity: Not on file Other Topics Concern  Not on file Social History Narrative , lives alone. Worked in [employer] x 30 years as a [employer]. Current Facility-Administered Medications Medication Dose Route Frequency Provider Last Rate Last Dose  [START ON 1/1/2019] famotidine (PEPCID) tablet 20 mg  20 mg Oral DAILY Jens Chavarria MD      
 amiodarone (CORDARONE) tablet 200 mg  200 mg Oral DAILY Darren Goss MD   200 mg at 12/31/18 1323  amLODIPine (NORVASC) tablet 2.5 mg  2.5 mg Oral DAILY Darren Goss MD   2.5 mg at 12/31/18 8414  aspirin delayed-release tablet 81 mg  81 mg Oral DAILY Darren Goss MD   81 mg at 12/31/18 0741  
 atorvastatin (LIPITOR) tablet 80 mg  80 mg Oral QHS Darren Goss MD   80 mg at 12/30/18 2201  ferrous sulfate tablet 325 mg  1 Tab Oral DAILY WITH BREAKFAST Darren Goss MD   325 mg at 12/31/18 9420  levothyroxine (SYNTHROID) tablet 50 mcg  50 mcg Oral ACB Darren Goss MD   50 mcg at 12/31/18 4010  mirtazapine (REMERON) tablet 15 mg  15 mg Oral QHS Joellen Goss MD   15 mg at 12/30/18 2201  
 nadolol (CORGARD) tablet 80 mg  80 mg Oral DAILY Darren Goss MD   80 mg at 12/31/18 0011  tuberculin injection 5 Units  5 Units IntraDERMal ONCE Darren Goss MD   5 Units at 12/30/18 6476  piperacillin-tazobactam (ZOSYN) 4.5 g in 0.9% sodium chloride (MBP/ADV) 100 mL  4.5 g IntraVENous Q8H Joellen Goss MD 25 mL/hr at 12/31/18 0333 4.5 g at 12/31/18 0333  
 0.9% sodium chloride infusion  100 mL/hr IntraVENous CONTINUOUS Hazel Sanders  mL/hr at 18 1819 100 mL/hr at 18 1819  
 sodium chloride (NS) flush 5-10 mL  5-10 mL IntraVENous Q8H Hazel Sanders MD   10 mL at 18 2450  sodium chloride (NS) flush 5-10 mL  5-10 mL IntraVENous PRN Davis-Pachter, Kem Apgar, MD      
 acetaminophen (TYLENOL) tablet 650 mg  650 mg Oral Q6H PRN Davis-Pachter, Kem Apgar, MD   650 mg at 18 1814  
 HYDROcodone-acetaminophen (NORCO) 5-325 mg per tablet 1 Tab  1 Tab Oral Q4H PRN Hazel Sanders MD   1 Tab at 18 2254  naloxone (NARCAN) injection 0.4 mg  0.4 mg IntraVENous PRN Davis-Pachter, Kem Apgar, MD      
 ondansetron (ZOFRAN) injection 4 mg  4 mg IntraVENous Q4H PRN Davis-Pachter, Kem Apgar, MD      
 clorazepate (TRANXENE) tablet 7.5 mg  7.5 mg Oral BID Brandon Juarez MD   7.5 mg at 18 0742  
 vancomycin (VANCOCIN) 1,000 mg in 0.9% sodium chloride (MBP/ADV) 250 mL  1,000 mg IntraVENous Q24H Nani Gonzalez MD      
 
 
OBJECTIVE: 
Patient Vitals for the past 8 hrs: 
 BP Temp Pulse Resp SpO2  
18 0712 112/55 98.3 °F (36.8 °C) 63 16 95 % 18 0326 121/58 98 °F (36.7 °C) 61 16 93 % Temp (24hrs), Av.8 °F (37.1 °C), Min:97.9 °F (36.6 °C), Max:100.5 °F (38.1 °C) 
 
701 - 1900 In: 551 [P.O.:490; I.V.:61] Out: 700 [Urine:700] Physical Exam: 
Constitutional: Well developed, well nourished female in no acute distress, sitting comfortably in the hospital bed. HEENT: Normocephalic and atraumatic. Oropharynx is clear, mucous membranes are moist.  Neck supple Lymph node Deferred Skin Warm and dry. No bruising and no rash noted. No erythema. No pallor. Respiratory Lungs are clear to auscultation bilaterally without wheezes, rales or rhonchi, normal air exchange without accessory muscle use. CVS Normal rate, regular rhythm and normal S1 and S2. No murmurs, gallops, or rubs. Abdomen Soft, nontender and nondistended, normoactive bowel sounds. No palpable mass. No hepatosplenomegaly. Neuro Grossly nonfocal with no obvious sensory or motor deficits. MSK Normal range of motion in general.  No edema and no tenderness. Psych Appropriate mood and affect. Labs:   
Recent Results (from the past 24 hour(s)) CULTURE, BLOOD Collection Time: 12/30/18  7:21 PM  
Result Value Ref Range Special Requests: RIGHT Antecubital 
    
 Culture result: NO GROWTH AFTER 14 HOURS    
CULTURE, BLOOD Collection Time: 12/30/18  7:28 PM  
Result Value Ref Range Special Requests: RIGHT 
HAND Culture result: NO GROWTH AFTER 14 HOURS    
URINALYSIS W/ RFLX MICROSCOPIC Collection Time: 12/30/18 10:35 PM  
Result Value Ref Range Color YELLOW Appearance CLEAR Specific gravity 1.009 1.001 - 1.023    
 pH (UA) 7.0 5.0 - 9.0 Protein TRACE (A) NEG mg/dL Glucose NEGATIVE  mg/dL Ketone NEGATIVE  NEG mg/dL Bilirubin NEGATIVE  NEG Blood TRACE (A) NEG Urobilinogen 0.2 0.2 - 1.0 EU/dL Nitrites NEGATIVE  NEG Leukocyte Esterase NEGATIVE  NEG    
 WBC 0-3 0 /hpf  
 RBC 5-10 0 /hpf Epithelial cells 0-3 0 /hpf Bacteria TRACE 0 /hpf Casts 0 0 /lpf METABOLIC PANEL, COMPREHENSIVE Collection Time: 12/31/18  5:46 AM  
Result Value Ref Range Sodium 139 136 - 145 mmol/L Potassium 3.2 (L) 3.5 - 5.1 mmol/L Chloride 108 (H) 98 - 107 mmol/L  
 CO2 25 21 - 32 mmol/L Anion gap 6 (L) 7 - 16 mmol/L Glucose 95 65 - 100 mg/dL BUN 11 8 - 23 MG/DL Creatinine 1.18 (H) 0.6 - 1.0 MG/DL  
 GFR est AA 56 (L) >60 ml/min/1.73m2 GFR est non-AA 46 (L) >60 ml/min/1.73m2 Calcium 6.0 (L) 8.3 - 10.4 MG/DL Bilirubin, total 1.4 (H) 0.2 - 1.1 MG/DL  
 ALT (SGPT) 20 12 - 65 U/L  
 AST (SGOT) 25 15 - 37 U/L Alk. phosphatase 57 50 - 136 U/L Protein, total 5.7 (L) 6.3 - 8.2 g/dL Albumin 2.5 (L) 3.2 - 4.6 g/dL Globulin 3.2 2.3 - 3.5 g/dL A-G Ratio 0.8 (L) 1.2 - 3.5    
CBC WITH AUTOMATED DIFF Collection Time: 12/31/18  5:46 AM  
Result Value Ref Range WBC 3.3 (L) 4.3 - 11.1 K/uL  
 RBC 3.04 (L) 4.05 - 5.2 M/uL HGB 9.8 (L) 11.7 - 15.4 g/dL HCT 31.2 (L) 35.8 - 46.3 % .6 (H) 79.6 - 97.8 FL  
 MCH 32.2 26.1 - 32.9 PG  
 MCHC 31.4 31.4 - 35.0 g/dL  
 RDW 13.4 11.9 - 14.6 % PLATELET 397 (L) 503 - 450 K/uL MPV 10.5 9.4 - 12.3 FL ABSOLUTE NRBC 0.00 0.0 - 0.2 K/uL  
 DF AUTOMATED NEUTROPHILS 41 (L) 43 - 78 % LYMPHOCYTES 34 13 - 44 % MONOCYTES 23 (H) 4.0 - 12.0 % EOSINOPHILS 1 0.5 - 7.8 % BASOPHILS 1 0.0 - 2.0 % IMMATURE GRANULOCYTES 0 0.0 - 5.0 %  
 ABS. NEUTROPHILS 1.4 (L) 1.7 - 8.2 K/UL  
 ABS. LYMPHOCYTES 1.1 0.5 - 4.6 K/UL  
 ABS. MONOCYTES 0.8 0.1 - 1.3 K/UL  
 ABS. EOSINOPHILS 0.0 0.0 - 0.8 K/UL  
 ABS. BASOPHILS 0.0 0.0 - 0.2 K/UL  
 ABS. IMM. GRANS. 0.0 0.0 - 0.5 K/UL Imaging: 
NA 
 
 
ASSESSMENT: 
Problem List  Date Reviewed: 8/14/2018 Codes Class Noted * (Principal) Pneumonia ICD-10-CM: J18.9 ICD-9-CM: 562  12/30/2018 Leg swelling ICD-10-CM: M79.89 ICD-9-CM: 729.81  9/20/2018 Multiple myeloma not having achieved remission ([facility] ICD-10-CM: C90.00 ICD-9-CM: 203.00  6/13/2018 Anemia due to chronic renal failure treated with erythropoietin, unspecified stage ICD-10-CM: N18.9, D63.1 ICD-9-CM: 285.21, 585.9  3/16/2018 Stage 3 chronic kidney disease (HCC) ICD-10-CM: N18.3 ICD-9-CM: 585.3  11/27/2017 On amiodarone therapy ICD-10-CM: Z79.899 ICD-9-CM: V58.69  10/9/2017 Dyspnea ICD-10-CM: R06.00 
ICD-9-CM: 786.09  1/25/2017 Multiple myeloma in remission [facility]) ICD-10-CM: C90.01 
ICD-9-CM: 203.01  1/25/2017 Pacemaker ICD-10-CM: Z95.0 ICD-9-CM: V45.01  12/22/2016 Anemia ICD-10-CM: D64.9 ICD-9-CM: 285.9  12/22/2016  Essential hypertension with goal blood pressure less than 130/85 ICD-10-CM: I10 
ICD-9-CM: 401.9  12/22/2016 Anxiety ICD-10-CM: F41.9 ICD-9-CM: 300.00  12/22/2016 Chronic diastolic heart failure ([facility] [address] ICD-10-CM: I50.32 
ICD-9-CM: 428.32  11/16/2016 Mixed hyperlipidemia ICD-10-CM: E78.2 ICD-9-CM: 272.2  8/16/2016 Episodic atrial fibrillation (HCC) ICD-10-CM: I48.0 ICD-9-CM: 427.31  8/16/2016 Dementia without behavioral disturbance ICD-10-CM: F03.90 ICD-9-CM: 294.20  8/16/2016 Melena ICD-10-CM: K92.1 ICD-9-CM: 578.1  6/19/2016 Persistent atrial fibrillation (HCC) (Chronic) ICD-10-CM: I48.1 ICD-9-CM: 427.31  6/18/2016 Iron deficiency anemia (Chronic) ICD-10-CM: D50.9 ICD-9-CM: 280.9  6/18/2016 Debility (Chronic) ICD-10-CM: R53.81 ICD-9-CM: 799.3  6/18/2016 Hypoxemia ICD-10-CM: R09.02 
ICD-9-CM: 799.02  6/17/2016 Pleural effusion on left ICD-10-CM: J90 ICD-9-CM: 511.9  6/17/2016 Overview Addendum 6/25/2016 12:32 PM by Patience Gee NP  
  6/17/216 L thoracentesis - 800 ml 
6/20/2016 - L thoracentesis - 700 ml 
6/23/2016 L thoracentesis - 850 ml Pleural effusion on right ICD-10-CM: J90 ICD-9-CM: 511.9  6/17/2016 Overview Addendum 6/28/2016 11:46 AM by Marylene Prom, PA  
  6/17/2016 R thoracentesis - 700 ml 
6/20/2016 R thoracentesis 700 ml 
6/25/2016: R thoracentesis 1100mL Presence of cardiac pacemaker (Chronic) ICD-10-CM: Z95.0 ICD-9-CM: V45.01  3/21/2016 Chronic anxiety (Chronic) ICD-10-CM: F41.9 ICD-9-CM: 300.00  3/21/2016 Fatigue ICD-10-CM: R53.83 ICD-9-CM: 780.79  9/23/2015 Essential hypertension, benign (Chronic) ICD-10-CM: I10 
ICD-9-CM: 401.1  7/9/2015 Other and unspecified hyperlipidemia (Chronic) ICD-10-CM: D79.2 ICD-9-CM: 272.4  7/9/2015 Hypothyroidism (Chronic) ICD-10-CM: E03.9 ICD-9-CM: 244.9  7/9/2015 Postmenopausal atrophic vaginitis ICD-10-CM: N95.2 ICD-9-CM: 627.3  7/9/2015 RECOMMENDATIONS: 
Pneumonia - On zosyn/vancomycin day 2. BC pending. On RA Multiple myeloma - On pomalyst (14 days on/14 days off, due to resume 1/8). Counts adequate, not neutropenic. Last myeloma labs with stable mspike Weakness 
- PT/OT consulted. Rehab vs home health Lab studies were personally reviewed. Thank you for allowing us to participate in the care of Ms. Won Lemus. We will gladly assume care of our patient Idania Stewart NP [facility] Hematology & Oncology [address] [place] [address] Office : [phone] Fax : [phone] Attending Addendum: 
Patient seen with NP Jovon Galaviz. Ms Won Lemus is a pt of Dr Lawanda Neumann with known MM. She was admitted on 12/30 form urgent care with RUL PNA. She has a cough, malaise, HA, sore throat, constipation and dysuria. On vanco/Zosyn. T max 100.5. As for her MM, she was diagnosed in 4/2016, IgG kappa, -13, del.16q, ISS Stage II. S/p multiple treatments per details above. Most recently on pomalidomide with stability of disease per most recent labs in late 12/2018. Today, she feels slightly better. Still with a cough. No pain. I personally performed a face to face diagnostic evaluation on this patient. My findings are as follows: Alert, lungs with scattered rhonchi on right, heart regular, abdomen benign and no LE edema. C/w treatment for PNA per primary team.  Not due to resume pomalidomide until 1/8/18. She is not neutropenic. May need rehab upon d/c. Bili at 1.4 - monitor. She will need to follow up with Dr Lawanda Neumann shortly (within 1 week) upon discharge. Thank you for the opportunity to participate in Ms Kennedy's care. Will assume care of this pt. I have reviewed and agree with the care plan. Theron Renee MD 
[facility] Hematology and Oncology [id number] [place] [address] Office : [phone] Fax : [phone]

## 2018-12-31 NOTE — PROGRESS NOTES
Pharmacokinetic Consult to Pharmacist 
 
Katlyn Irizarry is a 80 y.o. female being treated for CAP with Vancomycin. Height: 5' 4\" (162.6 cm)  Weight: 70.8 kg (156 lb) Lab Results Component Value Date/Time BUN 18 12/27/2018 01:20 PM  
 Creatinine 1.23 (H) 12/27/2018 01:20 PM  
 WBC 3.8 (L) 12/27/2018 01:20 PM  
 Procalcitonin <0.1 06/13/2016 08:51 AM  
 Lactic acid 1.7 06/13/2016 02:24 PM  
 Lactic acid 2.3 (H) 06/13/2016 08:51 AM  
  
Estimated Creatinine Clearance: 30.5 mL/min (A) (based on SCr of 1.23 mg/dL (H)). CULTURES: 
All Micro Results Procedure Component Value Units Date/Time CULTURE, BLOOD [[id number]] Collected:  12/30/18 1928 Order Status:  Completed Specimen:  Blood Updated:  12/30/18 1944 CULTURE, BLOOD [[id number]] Collected:  12/30/18 1921 Order Status:  Completed Specimen:  Blood Updated:  12/30/18 1944 Day 1 of vancomycin. Goal trough is 15-20. Vancomycin dose initiated at 2000 mg x 1 dose; followed by Vanc 1000 mg IV q24h. Will continue to follow patient. Thank you, James Huff, Pharm D.

## 2018-12-31 NOTE — PROGRESS NOTES
12/30/18 1801 Dual Skin Pressure Injury Assessment Dual Skin Pressure Injury Assessment X Second Care Provider (Based on [address] [place] [address]) Charisse Alfaro, [facility] Foot Left Pt has small healing ulcer to medial lt foot/great toe. Lower legs with brawny discoloration. No sacral pressure ulcer.

## 2018-12-31 NOTE — PROGRESS NOTES
END OF SHIFT NOTE: 
 
Intake/Output 12/31 0701 - 12/31 1900 In: 727 [P.O.:730; I.V.:61] Out: 900 [Urine:900] Voiding: YES Catheter: NO 
Drain:   
 
 
 
 
Stool:  4 occurrences. Stool Assessment Stool Color: Green (12/31/18 1608) Stool Appearance: Loose (12/31/18 1608) Stool Amount: Small (12/31/18 1608) Stool Source/Status: Rectum (12/31/18 1608) Emesis:  0 occurrences. VITAL SIGNS Patient Vitals for the past 12 hrs: 
 Temp Pulse Resp BP SpO2  
12/31/18 1423 99.2 °F (37.3 °C) 60 16 110/53 93 % 12/31/18 1100 99.3 °F (37.4 °C) 60 16 107/51 94 % 12/31/18 0712 98.3 °F (36.8 °C) 63 16 112/55 95 % Pain Assessment Pain 1 Pain Scale 1: Visual (12/31/18 0930) Pain Intensity 1: 0 (12/31/18 0930) Patient Stated Pain Goal: 0 (12/31/18 0217) Pain Reassessment 1: Patient sleeping (12/31/18 0217) Pain Onset 1: Frontal H/A (12/30/18 2213) Pain Location 1: Head (12/30/18 2254) Pain Orientation 1: Anterior (12/30/18 2254) Pain Description 1: Aching (12/30/18 2254) Pain Intervention(s) 1: Medication (see MAR) (12/30/18 2254) Ambulating Yes Additional Information: O2 dropped this AM while pt was sleeping, on 2L NC ~ 2 hours now on RA. C/o of raw tongue and sore throat, tongue looks concerning for thrush- started on nystatin- pt reports tongue is feeling better. Multiple loose stools, sent for cdiff and placed on isolation. Shift report will be given to oncoming nurse at the bedside.  
 
Romayne Spain, RN

## 2018-12-31 NOTE — PROGRESS NOTES
Problem: Falls - Risk of 
Goal: *Absence of Falls Document Samira Mccarty Fall Risk and appropriate interventions in the flowsheet. Outcome: Progressing Towards Goal 
Fall Risk Interventions: 
Mobility Interventions: Communicate number of staff needed for ambulation/transfer, Patient to call before getting OOB, Bed/chair exit alarm Medication Interventions: Patient to call before getting OOB, Teach patient to arise slowly, Bed/chair exit alarm Elimination Interventions: Call light in reach, Patient to call for help with toileting needs, Bed/chair exit alarm

## 2019-01-01 ENCOUNTER — PATIENT OUTREACH (OUTPATIENT)
Dept: CASE MANAGEMENT | Age: 84
End: 2019-01-01

## 2019-01-01 ENCOUNTER — APPOINTMENT (OUTPATIENT)
Dept: GENERAL RADIOLOGY | Age: 84
End: 2019-01-01
Attending: EMERGENCY MEDICINE
Payer: MEDICARE

## 2019-01-01 ENCOUNTER — APPOINTMENT (RX ONLY)
Dept: URBAN - METROPOLITAN AREA CLINIC 23 | Facility: CLINIC | Age: 84
Setting detail: DERMATOLOGY
End: 2019-01-01

## 2019-01-01 ENCOUNTER — APPOINTMENT (OUTPATIENT)
Dept: GENERAL RADIOLOGY | Age: 84
DRG: 193 | End: 2019-01-01
Attending: INTERNAL MEDICINE
Payer: MEDICARE

## 2019-01-01 ENCOUNTER — HOSPITAL ENCOUNTER (OUTPATIENT)
Dept: LAB | Age: 84
Discharge: HOME | End: 2019-10-11
Payer: MEDICARE

## 2019-01-01 ENCOUNTER — HOSPITAL ENCOUNTER (OUTPATIENT)
Dept: LAB | Age: 84
Discharge: HOME | End: 2019-12-12
Payer: MEDICARE

## 2019-01-01 ENCOUNTER — HOSPITAL ENCOUNTER (OUTPATIENT)
Dept: GENERAL RADIOLOGY | Age: 84
Discharge: HOME | End: 2019-11-20

## 2019-01-01 ENCOUNTER — HOSPITAL ENCOUNTER (OUTPATIENT)
Dept: GENERAL RADIOLOGY | Age: 84
Discharge: HOME | End: 2019-12-03
Attending: NURSE PRACTITIONER
Payer: MEDICARE

## 2019-01-01 ENCOUNTER — APPOINTMENT (OUTPATIENT)
Dept: CT IMAGING | Age: 84
DRG: 193 | End: 2019-01-01
Attending: INTERNAL MEDICINE
Payer: MEDICARE

## 2019-01-01 ENCOUNTER — HOSPITAL ENCOUNTER (OUTPATIENT)
Dept: LAB | Age: 84
Discharge: HOME | End: 2019-11-15
Payer: MEDICARE

## 2019-01-01 ENCOUNTER — HOSPITAL ENCOUNTER (OUTPATIENT)
Dept: LAB | Age: 84
Discharge: HOME | End: 2019-11-20
Payer: MEDICARE

## 2019-01-01 ENCOUNTER — HOSPITAL ENCOUNTER (EMERGENCY)
Age: 84
Discharge: HOME | End: 2019-12-27
Attending: EMERGENCY MEDICINE
Payer: MEDICARE

## 2019-01-01 VITALS
RESPIRATION RATE: 15 BRPM | OXYGEN SATURATION: 97 % | HEIGHT: 63 IN | TEMPERATURE: 97.7 F | SYSTOLIC BLOOD PRESSURE: 136 MMHG | BODY MASS INDEX: 26.58 KG/M2 | DIASTOLIC BLOOD PRESSURE: 66 MMHG | HEART RATE: 61 BPM | WEIGHT: 150 LBS

## 2019-01-01 DIAGNOSIS — C90.00 MULTIPLE MYELOMA NOT HAVING ACHIEVED REMISSION (HCC): ICD-10-CM

## 2019-01-01 DIAGNOSIS — L82.0 INFLAMED SEBORRHEIC KERATOSIS: ICD-10-CM

## 2019-01-01 DIAGNOSIS — S99.911A INJURY OF RIGHT ANKLE, INITIAL ENCOUNTER: ICD-10-CM

## 2019-01-01 DIAGNOSIS — Z48.01 ENCOUNTER FOR CHANGE OR REMOVAL OF SURGICAL WOUND DRESSING: ICD-10-CM

## 2019-01-01 DIAGNOSIS — C90.01 MULTIPLE MYELOMA IN REMISSION (HCC): ICD-10-CM

## 2019-01-01 DIAGNOSIS — S61.411A LACERATION OF RIGHT HAND WITHOUT FOREIGN BODY, INITIAL ENCOUNTER: Primary | ICD-10-CM

## 2019-01-01 DIAGNOSIS — D485 NEOPLASM OF UNCERTAIN BEHAVIOR OF SKIN: ICD-10-CM

## 2019-01-01 DIAGNOSIS — R06.09 DYSPNEA ON EFFORT: ICD-10-CM

## 2019-01-01 LAB
ALBUMIN SERPL ELPH-MCNC: 3.27 G/DL (ref 3.2–5.6)
ALBUMIN SERPL ELPH-MCNC: 3.37 G/DL (ref 3.2–5.6)
ALBUMIN SERPL ELPH-MCNC: 3.51 G/DL (ref 3.2–5.6)
ALBUMIN SERPL ELPH-MCNC: 3.61 G/DL (ref 3.2–5.6)
ALBUMIN SERPL-MCNC: 2.6 G/DL (ref 3.2–4.6)
ALBUMIN SERPL-MCNC: 3.3 G/DL (ref 3.2–4.6)
ALBUMIN SERPL-MCNC: 3.4 G/DL (ref 3.2–4.6)
ALBUMIN SERPL-MCNC: 3.6 G/DL (ref 3.2–4.6)
ALBUMIN/GLOB SERPL: 0.9 {RATIO} (ref 1.2–3.5)
ALBUMIN/GLOB SERPL: 1.1 {RATIO} (ref 1.2–3.5)
ALBUMIN/GLOB SERPL: 1.1 {RATIO} (ref 1.2–3.5)
ALBUMIN/GLOB SERPL: 1.3 {RATIO}
ALBUMIN/GLOB SERPL: 1.3 {RATIO}
ALBUMIN/GLOB SERPL: 1.3 {RATIO} (ref 1.2–3.5)
ALBUMIN/GLOB SERPL: 1.5 {RATIO}
ALBUMIN/GLOB SERPL: 1.5 {RATIO}
ALP SERPL-CCNC: 62 U/L (ref 50–136)
ALP SERPL-CCNC: 64 U/L (ref 50–136)
ALP SERPL-CCNC: 66 U/L (ref 50–136)
ALP SERPL-CCNC: 71 U/L (ref 50–136)
ALPHA1 GLOB SERPL ELPH-MCNC: 0.28 G/DL (ref 0.1–0.4)
ALPHA1 GLOB SERPL ELPH-MCNC: 0.29 G/DL (ref 0.1–0.4)
ALPHA1 GLOB SERPL ELPH-MCNC: 0.29 G/DL (ref 0.1–0.4)
ALPHA1 GLOB SERPL ELPH-MCNC: 0.31 G/DL (ref 0.1–0.4)
ALPHA2 GLOB SERPL ELPH-MCNC: 0.92 G/DL (ref 0.4–1.2)
ALPHA2 GLOB SERPL ELPH-MCNC: 0.93 G/DL (ref 0.4–1.2)
ALPHA2 GLOB SERPL ELPH-MCNC: 0.93 G/DL (ref 0.4–1.2)
ALPHA2 GLOB SERPL ELPH-MCNC: 0.95 G/DL (ref 0.4–1.2)
ALT SERPL-CCNC: 22 U/L (ref 12–65)
ALT SERPL-CCNC: 31 U/L (ref 12–65)
ALT SERPL-CCNC: 38 U/L (ref 12–65)
ALT SERPL-CCNC: 42 U/L (ref 12–65)
ANION GAP SERPL CALC-SCNC: 11 MMOL/L (ref 7–16)
ANION GAP SERPL CALC-SCNC: 3 MMOL/L (ref 7–16)
ANION GAP SERPL CALC-SCNC: 4 MMOL/L (ref 7–16)
ANION GAP SERPL CALC-SCNC: 5 MMOL/L (ref 7–16)
ARTERIAL PATENCY WRIST A: ABNORMAL
ARTERIAL PATENCY WRIST A: YES
AST SERPL-CCNC: 22 U/L (ref 15–37)
AST SERPL-CCNC: 25 U/L (ref 15–37)
AST SERPL-CCNC: 30 U/L (ref 15–37)
AST SERPL-CCNC: 32 U/L (ref 15–37)
B-GLOBULIN SERPL QL ELPH: 0.81 G/DL (ref 0.6–1.3)
B-GLOBULIN SERPL QL ELPH: 0.87 G/DL (ref 0.6–1.3)
B-GLOBULIN SERPL QL ELPH: 0.93 G/DL (ref 0.6–1.3)
B-GLOBULIN SERPL QL ELPH: 0.94 G/DL (ref 0.6–1.3)
BASE DEFICIT BLD-SCNC: 11 MMOL/L
BASE DEFICIT BLD-SCNC: 11 MMOL/L
BASOPHILS # BLD: 0 K/UL (ref 0–0.2)
BASOPHILS # BLD: 0 K/UL (ref 0–0.2)
BASOPHILS # BLD: 0.1 K/UL (ref 0–0.2)
BASOPHILS # BLD: 0.1 K/UL (ref 0–0.2)
BASOPHILS NFR BLD: 1 % (ref 0–2)
BDY SITE: ABNORMAL
BDY SITE: ABNORMAL
BILIRUB DIRECT SERPL-MCNC: 0.3 MG/DL
BILIRUB INDIRECT SERPL-MCNC: 0.6 MG/DL (ref 0–1.1)
BILIRUB SERPL-MCNC: 0.7 MG/DL (ref 0.2–1.1)
BILIRUB SERPL-MCNC: 0.8 MG/DL (ref 0.2–1.1)
BILIRUB SERPL-MCNC: 0.8 MG/DL (ref 0.2–1.1)
BILIRUB SERPL-MCNC: 0.9 MG/DL (ref 0.2–1.1)
BILIRUB SERPL-MCNC: 0.9 MG/DL (ref 0.2–1.1)
BNP SERPL-MCNC: 2487 PG/ML
BODY TEMPERATURE: 98.6
BODY TEMPERATURE: 98.6
BUN SERPL-MCNC: 11 MG/DL (ref 8–23)
BUN SERPL-MCNC: 14 MG/DL (ref 8–23)
BUN SERPL-MCNC: 15 MG/DL (ref 8–23)
BUN SERPL-MCNC: 20 MG/DL (ref 8–23)
C DIFF GDH STL QL: NORMAL
C DIFF TOX A+B STL QL IA: NORMAL
CALCIUM SERPL-MCNC: 6 MG/DL (ref 8.3–10.4)
CALCIUM SERPL-MCNC: 8 MG/DL (ref 8.3–10.4)
CALCIUM SERPL-MCNC: 8.2 MG/DL (ref 8.3–10.4)
CALCIUM SERPL-MCNC: 8.4 MG/DL (ref 8.3–10.4)
CHLORIDE SERPL-SCNC: 101 MMOL/L (ref 98–107)
CHLORIDE SERPL-SCNC: 104 MMOL/L (ref 98–107)
CHLORIDE SERPL-SCNC: 104 MMOL/L (ref 98–107)
CHLORIDE SERPL-SCNC: 111 MMOL/L (ref 98–107)
CLINICAL CONSIDERATION: NORMAL
CO2 BLD-SCNC: 14 MMOL/L
CO2 BLD-SCNC: 14 MMOL/L
CO2 SERPL-SCNC: 19 MMOL/L (ref 21–32)
CO2 SERPL-SCNC: 28 MMOL/L (ref 21–32)
CO2 SERPL-SCNC: 29 MMOL/L (ref 21–32)
CO2 SERPL-SCNC: 32 MMOL/L (ref 21–32)
COLLECT TIME,HTIME: 1755
COLLECT TIME,HTIME: 1810
CREAT SERPL-MCNC: 1.21 MG/DL (ref 0.6–1)
CREAT SERPL-MCNC: 1.33 MG/DL (ref 0.6–1)
CREAT SERPL-MCNC: 1.49 MG/DL (ref 0.6–1)
CREAT SERPL-MCNC: 1.59 MG/DL (ref 0.6–1)
DIFFERENTIAL METHOD BLD: ABNORMAL
EOSINOPHIL # BLD: 0 K/UL (ref 0–0.8)
EOSINOPHIL NFR BLD: 0 % (ref 0.5–7.8)
EOSINOPHIL NFR BLD: 1 % (ref 0.5–7.8)
ERYTHROCYTE [DISTWIDTH] IN BLOOD BY AUTOMATED COUNT: 13.6 % (ref 11.9–14.6)
ERYTHROCYTE [DISTWIDTH] IN BLOOD BY AUTOMATED COUNT: 13.7 % (ref 11.9–14.6)
ERYTHROCYTE [DISTWIDTH] IN BLOOD BY AUTOMATED COUNT: 14.3 % (ref 11.9–14.6)
ERYTHROCYTE [DISTWIDTH] IN BLOOD BY AUTOMATED COUNT: 15.4 % (ref 11.9–14.6)
FERRITIN SERPL-MCNC: 199 NG/ML (ref 8–388)
FLOW RATE ISTAT,IFRATE: 5 L/MIN
FLOW RATE ISTAT,IFRATE: 5 L/MIN
FOLATE SERPL-MCNC: 18.5 NG/ML (ref 3.1–17.5)
GAMMA GLOB MFR SERPL ELPH: 0.37 G/DL (ref 0.5–1.6)
GAMMA GLOB MFR SERPL ELPH: 0.37 G/DL (ref 0.5–1.6)
GAMMA GLOB MFR SERPL ELPH: 0.38 G/DL (ref 0.5–1.6)
GAMMA GLOB MFR SERPL ELPH: 0.38 G/DL (ref 0.5–1.6)
GAS FLOW.O2 O2 DELIVERY SYS: ABNORMAL L/MIN
GAS FLOW.O2 O2 DELIVERY SYS: ABNORMAL L/MIN
GLOBULIN SER CALC-MCNC: 2.8 G/DL (ref 2.3–3.5)
GLOBULIN SER CALC-MCNC: 2.8 G/DL (ref 2.3–3.5)
GLOBULIN SER CALC-MCNC: 3 G/DL (ref 2.3–3.5)
GLOBULIN SER CALC-MCNC: 3 G/DL (ref 2.3–3.5)
GLUCOSE SERPL-MCNC: 119 MG/DL (ref 65–100)
GLUCOSE SERPL-MCNC: 89 MG/DL (ref 65–100)
GLUCOSE SERPL-MCNC: 95 MG/DL (ref 65–100)
GLUCOSE SERPL-MCNC: 99 MG/DL (ref 65–100)
HAPTOGLOB SERPL-MCNC: 321 MG/DL (ref 30–200)
HCO3 BLD-SCNC: 13.1 MMOL/L (ref 22–26)
HCO3 BLD-SCNC: 13.2 MMOL/L (ref 22–26)
HCT VFR BLD AUTO: 31.7 % (ref 35.8–46.3)
HCT VFR BLD AUTO: 33 % (ref 35.8–46.3)
HCT VFR BLD AUTO: 34.7 % (ref 35.8–46.3)
HCT VFR BLD AUTO: 35.8 % (ref 35.8–46.3)
HGB BLD-MCNC: 10 G/DL (ref 11.7–15.4)
HGB BLD-MCNC: 10.4 G/DL (ref 11.7–15.4)
HGB BLD-MCNC: 10.9 G/DL (ref 11.7–15.4)
HGB BLD-MCNC: 11 G/DL (ref 11.7–15.4)
IGA SERPL-MCNC: 41 MG/DL (ref 85–499)
IGA SERPL-MCNC: 46 MG/DL (ref 85–499)
IGA SERPL-MCNC: 47 MG/DL (ref 85–499)
IGA SERPL-MCNC: 48 MG/DL (ref 85–499)
IGG SERPL-MCNC: 373 MG/DL (ref 610–1616)
IGG SERPL-MCNC: 397 MG/DL (ref 610–1616)
IGG SERPL-MCNC: 405 MG/DL (ref 610–1616)
IGG SERPL-MCNC: 420 MG/DL (ref 610–1616)
IGM SERPL-MCNC: 13 MG/DL (ref 35–242)
IGM SERPL-MCNC: 14 MG/DL (ref 35–242)
IGM SERPL-MCNC: 15 MG/DL (ref 35–242)
IGM SERPL-MCNC: 15 MG/DL (ref 35–242)
IMM GRANULOCYTES # BLD AUTO: 0 K/UL (ref 0–0.5)
IMM GRANULOCYTES # BLD: 0 K/UL (ref 0–0.5)
IMM GRANULOCYTES NFR BLD AUTO: 0 % (ref 0–5)
IMM GRANULOCYTES NFR BLD AUTO: 0 % (ref 0–5)
IMM GRANULOCYTES NFR BLD AUTO: 1 % (ref 0–5)
IMM GRANULOCYTES NFR BLD AUTO: 1 % (ref 0–5)
INTERPRETATION: NORMAL
IRON SATN MFR SERPL: 10 %
IRON SERPL-MCNC: 24 UG/DL (ref 35–150)
KAPPA LC FREE SER-MCNC: 17.94 MG/L (ref 3.3–19.4)
KAPPA LC FREE SER-MCNC: 20.07 MG/L (ref 3.3–19.4)
KAPPA LC FREE SER-MCNC: 22.79 MG/L (ref 3.3–19.4)
KAPPA LC FREE/LAMBDA FREE SER: 1.72 {RATIO} (ref 0.26–1.65)
KAPPA LC FREE/LAMBDA FREE SER: 1.88 {RATIO} (ref 0.26–1.65)
KAPPA LC FREE/LAMBDA FREE SER: 1.98 {RATIO} (ref 0.26–1.65)
LAMBDA LC FREE SERPL-MCNC: 11.66 MG/L (ref 5.71–26.3)
LAMBDA LC FREE SERPL-MCNC: 12.12 MG/L (ref 5.71–26.3)
LAMBDA LC FREE SERPL-MCNC: 9.06 MG/L (ref 5.71–26.3)
LDH SERPL L TO P-CCNC: 199 U/L (ref 110–210)
LYMPHOCYTES # BLD: 0.9 K/UL (ref 0.5–4.6)
LYMPHOCYTES # BLD: 1.2 K/UL (ref 0.5–4.6)
LYMPHOCYTES # BLD: 1.3 K/UL (ref 0.5–4.6)
LYMPHOCYTES # BLD: 1.3 K/UL (ref 0.5–4.6)
LYMPHOCYTES NFR BLD: 23 % (ref 13–44)
LYMPHOCYTES NFR BLD: 33 % (ref 13–44)
LYMPHOCYTES NFR BLD: 34 % (ref 13–44)
LYMPHOCYTES NFR BLD: 35 % (ref 13–44)
M PROTEIN SERPL ELPH-MCNC: 0.1 G/DL
M PROTEIN SERPL ELPH-MCNC: 0.16 G/DL
M PROTEIN SERPL ELPH-MCNC: ABNORMAL G/DL
M PROTEIN SERPL ELPH-MCNC: ABNORMAL G/DL
MAGNESIUM SERPL-MCNC: 2.1 MG/DL (ref 1.8–2.4)
MAGNESIUM SERPL-MCNC: 2.2 MG/DL (ref 1.8–2.4)
MCH RBC QN AUTO: 30.4 PG (ref 26.1–32.9)
MCH RBC QN AUTO: 30.8 PG (ref 26.1–32.9)
MCH RBC QN AUTO: 31 PG (ref 26.1–32.9)
MCH RBC QN AUTO: 32.2 PG (ref 26.1–32.9)
MCHC RBC AUTO-ENTMCNC: 30.7 G/DL (ref 31.4–35)
MCHC RBC AUTO-ENTMCNC: 31.4 G/DL (ref 31.4–35)
MCHC RBC AUTO-ENTMCNC: 31.5 G/DL (ref 31.4–35)
MCHC RBC AUTO-ENTMCNC: 31.5 G/DL (ref 31.4–35)
MCV RBC AUTO: 101.9 FL (ref 79.6–97.8)
MCV RBC AUTO: 97.6 FL (ref 79.6–97.8)
MCV RBC AUTO: 98.6 FL (ref 79.6–97.8)
MCV RBC AUTO: 98.9 FL (ref 79.6–97.8)
MM INDURATION POC: NORMAL MM (ref 0–5)
MONOCYTES # BLD: 0.4 K/UL (ref 0.1–1.3)
MONOCYTES # BLD: 0.5 K/UL (ref 0.1–1.3)
MONOCYTES # BLD: 0.6 K/UL (ref 0.1–1.3)
MONOCYTES # BLD: 0.9 K/UL (ref 0.1–1.3)
MONOCYTES NFR BLD: 12 % (ref 4–12)
MONOCYTES NFR BLD: 14 % (ref 4–12)
MONOCYTES NFR BLD: 18 % (ref 4–12)
MONOCYTES NFR BLD: 24 % (ref 4–12)
NEUTS SEG # BLD: 1.7 K/UL (ref 1.7–8.2)
NEUTS SEG # BLD: 1.9 K/UL (ref 1.7–8.2)
NEUTS SEG # BLD: 1.9 K/UL (ref 1.7–8.2)
NEUTS SEG # BLD: 2 K/UL (ref 1.7–8.2)
NEUTS SEG NFR BLD: 48 % (ref 43–78)
NEUTS SEG NFR BLD: 50 % (ref 43–78)
NEUTS SEG NFR BLD: 51 % (ref 43–78)
NEUTS SEG NFR BLD: 51 % (ref 43–78)
NRBC # BLD: 0 K/UL (ref 0–0.2)
PCO2 BLD: 22.4 MMHG (ref 35–45)
PCO2 BLD: 24.4 MMHG (ref 35–45)
PCR REFLEX: NORMAL
PH BLD: 7.34 [PH] (ref 7.35–7.45)
PH BLD: 7.38 [PH] (ref 7.35–7.45)
PLATELET # BLD AUTO: 168 K/UL (ref 150–450)
PLATELET # BLD AUTO: 174 K/UL (ref 150–450)
PLATELET # BLD AUTO: 196 K/UL (ref 150–450)
PLATELET # BLD AUTO: 200 K/UL (ref 150–450)
PMV BLD AUTO: 10.2 FL (ref 9.4–12.3)
PMV BLD AUTO: 10.5 FL (ref 9.4–12.3)
PMV BLD AUTO: 10.5 FL (ref 9.4–12.3)
PMV BLD AUTO: 11 FL (ref 9.4–12.3)
PO2 BLD: 39 MMHG (ref 75–100)
PO2 BLD: 42 MMHG (ref 75–100)
POTASSIUM SERPL-SCNC: 3.8 MMOL/L (ref 3.5–5.1)
POTASSIUM SERPL-SCNC: 4.4 MMOL/L (ref 3.5–5.1)
POTASSIUM SERPL-SCNC: 4.5 MMOL/L (ref 3.5–5.1)
POTASSIUM SERPL-SCNC: 4.7 MMOL/L (ref 3.5–5.1)
PPD POC: NORMAL NEGATIVE
PROT PATTERN SERPL ELPH-IMP: ABNORMAL
PROT PATTERN SPEC IFE-IMP: ABNORMAL
PROT SERPL-MCNC: 5.4 G/DL (ref 6.3–8.2)
PROT SERPL-MCNC: 5.8 G/DL (ref 6.3–8.2)
PROT SERPL-MCNC: 5.9 G/DL (ref 6.3–8.2)
PROT SERPL-MCNC: 5.9 G/DL (ref 6.3–8.2)
PROT SERPL-MCNC: 6.1 G/DL (ref 6.3–8.2)
PROT SERPL-MCNC: 6.3 G/DL (ref 6.3–8.2)
PROT SERPL-MCNC: 6.4 G/DL (ref 6.3–8.2)
PROT SERPL-MCNC: 6.4 G/DL (ref 6.3–8.2)
RBC # BLD AUTO: 3.11 M/UL (ref 4.05–5.2)
RBC # BLD AUTO: 3.38 M/UL (ref 4.05–5.25)
RBC # BLD AUTO: 3.52 M/UL (ref 4.05–5.25)
RBC # BLD AUTO: 3.62 M/UL (ref 4.05–5.25)
SAO2 % BLD: 75 % (ref 95–98)
SAO2 % BLD: 75 % (ref 95–98)
SERVICE CMNT-IMP: 1
SERVICE CMNT-IMP: 3
SERVICE CMNT-IMP: ABNORMAL
SERVICE CMNT-IMP: ABNORMAL
SODIUM SERPL-SCNC: 136 MMOL/L (ref 136–145)
SODIUM SERPL-SCNC: 137 MMOL/L (ref 136–145)
SODIUM SERPL-SCNC: 137 MMOL/L (ref 136–145)
SODIUM SERPL-SCNC: 141 MMOL/L (ref 136–145)
SPECIMEN TYPE: ABNORMAL
SPECIMEN TYPE: ABNORMAL
TIBC SERPL-MCNC: 237 UG/DL (ref 250–450)
TRANSFERRIN SERPL-MCNC: 187 MG/DL (ref 202–364)
VANCOMYCIN TROUGH SERPL-MCNC: 10 UG/ML (ref 5–20)
VIT B12 SERPL-MCNC: 564 PG/ML (ref 193–986)
WBC # BLD AUTO: 3.5 K/UL (ref 4.3–11.1)
WBC # BLD AUTO: 3.7 K/UL (ref 4.3–11.1)
WBC # BLD AUTO: 3.8 K/UL (ref 4.3–11.1)
WBC # BLD AUTO: 3.8 K/UL (ref 4.3–11.1)

## 2019-01-01 PROCEDURE — ? COUNSELING

## 2019-01-01 PROCEDURE — 77030002916 HC SUT ETHLN J&J -A: Performed by: EMERGENCY MEDICINE

## 2019-01-01 PROCEDURE — 99283 EMERGENCY DEPT VISIT LOW MDM: CPT | Performed by: EMERGENCY MEDICINE

## 2019-01-01 PROCEDURE — 83883 ASSAY NEPHELOMETRY NOT SPEC: CPT

## 2019-01-01 PROCEDURE — 36415 COLL VENOUS BLD VENIPUNCTURE: CPT

## 2019-01-01 PROCEDURE — 86334 IMMUNOFIX E-PHORESIS SERUM: CPT

## 2019-01-01 PROCEDURE — 82746 ASSAY OF FOLIC ACID SERUM: CPT

## 2019-01-01 PROCEDURE — ? SUTURE REMOVAL (GLOBAL PERIOD)

## 2019-01-01 PROCEDURE — 74011250636 HC RX REV CODE- 250/636: Performed by: INTERNAL MEDICINE

## 2019-01-01 PROCEDURE — 80053 COMPREHEN METABOLIC PANEL: CPT

## 2019-01-01 PROCEDURE — 11102 TANGNTL BX SKIN SINGLE LES: CPT | Mod: 59

## 2019-01-01 PROCEDURE — 80202 ASSAY OF VANCOMYCIN: CPT

## 2019-01-01 PROCEDURE — 74011000258 HC RX REV CODE- 258: Performed by: INTERNAL MEDICINE

## 2019-01-01 PROCEDURE — 84165 PROTEIN E-PHORESIS SERUM: CPT

## 2019-01-01 PROCEDURE — 13121 CMPLX RPR S/A/L 2.6-7.5 CM: CPT | Mod: 59

## 2019-01-01 PROCEDURE — 36600 WITHDRAWAL OF ARTERIAL BLOOD: CPT

## 2019-01-01 PROCEDURE — 99024 POSTOP FOLLOW-UP VISIT: CPT

## 2019-01-01 PROCEDURE — 71045 X-RAY EXAM CHEST 1 VIEW: CPT

## 2019-01-01 PROCEDURE — 83735 ASSAY OF MAGNESIUM: CPT

## 2019-01-01 PROCEDURE — ? EXCISION

## 2019-01-01 PROCEDURE — 77030027688 HC DRSG MEPILEX 16-48IN NO BORD MOLN -A

## 2019-01-01 PROCEDURE — 83615 LACTATE (LD) (LDH) ENZYME: CPT

## 2019-01-01 PROCEDURE — 94640 AIRWAY INHALATION TREATMENT: CPT

## 2019-01-01 PROCEDURE — 74011636320 HC RX REV CODE- 636/320: Performed by: INTERNAL MEDICINE

## 2019-01-01 PROCEDURE — 74011250637 HC RX REV CODE- 250/637: Performed by: INTERNAL MEDICINE

## 2019-01-01 PROCEDURE — 65270000029 HC RM PRIVATE

## 2019-01-01 PROCEDURE — 85025 COMPLETE CBC W/AUTO DIFF WBC: CPT

## 2019-01-01 PROCEDURE — 82728 ASSAY OF FERRITIN: CPT

## 2019-01-01 PROCEDURE — 82248 BILIRUBIN DIRECT: CPT

## 2019-01-01 PROCEDURE — 71260 CT THORAX DX C+: CPT

## 2019-01-01 PROCEDURE — 73610 X-RAY EXAM OF ANKLE: CPT

## 2019-01-01 PROCEDURE — 77010033678 HC OXYGEN DAILY

## 2019-01-01 PROCEDURE — ? BIOPSY BY SHAVE METHOD

## 2019-01-01 PROCEDURE — 82607 VITAMIN B-12: CPT

## 2019-01-01 PROCEDURE — 82803 BLOOD GASES ANY COMBINATION: CPT

## 2019-01-01 PROCEDURE — 11604 EXC TR-EXT MAL+MARG 3.1-4 CM: CPT

## 2019-01-01 PROCEDURE — 73130 X-RAY EXAM OF HAND: CPT

## 2019-01-01 PROCEDURE — 77010033711 HC HIGH FLOW OXYGEN

## 2019-01-01 PROCEDURE — 94760 N-INVAS EAR/PLS OXIMETRY 1: CPT

## 2019-01-01 PROCEDURE — 99212 OFFICE O/P EST SF 10 MIN: CPT | Mod: 25

## 2019-01-01 PROCEDURE — 75810000293 HC SIMP/SUPERF WND  RPR: Performed by: EMERGENCY MEDICINE

## 2019-01-01 PROCEDURE — 83010 ASSAY OF HAPTOGLOBIN QUANT: CPT

## 2019-01-01 PROCEDURE — 74011000250 HC RX REV CODE- 250: Performed by: INTERNAL MEDICINE

## 2019-01-01 PROCEDURE — 77030020263 HC SOL INJ SOD CL0.9% LFCR 1000ML

## 2019-01-01 PROCEDURE — 83880 ASSAY OF NATRIURETIC PEPTIDE: CPT

## 2019-01-01 PROCEDURE — 83540 ASSAY OF IRON: CPT

## 2019-01-01 RX ORDER — SODIUM CHLORIDE 0.9 % (FLUSH) 0.9 %
10 SYRINGE (ML) INJECTION
Status: COMPLETED | OUTPATIENT
Start: 2019-01-01 | End: 2019-01-01

## 2019-01-01 RX ORDER — ALBUTEROL SULFATE 0.83 MG/ML
2.5 SOLUTION RESPIRATORY (INHALATION)
Status: DISCONTINUED | OUTPATIENT
Start: 2019-01-01 | End: 2019-01-08

## 2019-01-01 RX ORDER — ALBUTEROL SULFATE 0.83 MG/ML
1.25 SOLUTION RESPIRATORY (INHALATION)
Status: DISCONTINUED | OUTPATIENT
Start: 2019-01-01 | End: 2019-01-01

## 2019-01-01 RX ADMIN — LEVOTHYROXINE SODIUM 50 MCG: 50 TABLET ORAL at 08:00

## 2019-01-01 RX ADMIN — PIPERACILLIN SODIUM,TAZOBACTAM SODIUM 4.5 G: 4; .5 INJECTION, POWDER, FOR SOLUTION INTRAVENOUS at 12:26

## 2019-01-01 RX ADMIN — AMLODIPINE BESYLATE 2.5 MG: 5 TABLET ORAL at 08:00

## 2019-01-01 RX ADMIN — NYSTATIN 500000 UNITS: 500000 SUSPENSION ORAL at 07:58

## 2019-01-01 RX ADMIN — MIRTAZAPINE 15 MG: 15 TABLET, FILM COATED ORAL at 21:29

## 2019-01-01 RX ADMIN — CLORAZEPATE DIPOTASSIUM 7.5 MG: 7.5 TABLET ORAL at 07:58

## 2019-01-01 RX ADMIN — PIPERACILLIN SODIUM,TAZOBACTAM SODIUM 4.5 G: 4; .5 INJECTION, POWDER, FOR SOLUTION INTRAVENOUS at 04:32

## 2019-01-01 RX ADMIN — FAMOTIDINE 20 MG: 20 TABLET ORAL at 07:59

## 2019-01-01 RX ADMIN — IOPAMIDOL 100 ML: 755 INJECTION, SOLUTION INTRAVENOUS at 20:53

## 2019-01-01 RX ADMIN — NYSTATIN 500000 UNITS: 500000 SUSPENSION ORAL at 21:29

## 2019-01-01 RX ADMIN — NYSTATIN 500000 UNITS: 500000 SUSPENSION ORAL at 12:26

## 2019-01-01 RX ADMIN — CLORAZEPATE DIPOTASSIUM 7.5 MG: 7.5 TABLET ORAL at 17:40

## 2019-01-01 RX ADMIN — Medication 10 ML: at 23:28

## 2019-01-01 RX ADMIN — ATORVASTATIN CALCIUM 80 MG: 40 TABLET, FILM COATED ORAL at 21:29

## 2019-01-01 RX ADMIN — HYDROCODONE BITARTRATE AND ACETAMINOPHEN 1 TABLET: 5; 325 TABLET ORAL at 04:47

## 2019-01-01 RX ADMIN — AMIODARONE HYDROCHLORIDE 200 MG: 200 TABLET ORAL at 07:59

## 2019-01-01 RX ADMIN — NYSTATIN 500000 UNITS: 500000 SUSPENSION ORAL at 17:40

## 2019-01-01 RX ADMIN — SODIUM CHLORIDE 100 ML: 900 INJECTION, SOLUTION INTRAVENOUS at 20:53

## 2019-01-01 RX ADMIN — FERROUS SULFATE TAB 325 MG (65 MG ELEMENTAL FE) 325 MG: 325 (65 FE) TAB at 07:59

## 2019-01-01 RX ADMIN — PIPERACILLIN SODIUM,TAZOBACTAM SODIUM 4.5 G: 4; .5 INJECTION, POWDER, FOR SOLUTION INTRAVENOUS at 21:29

## 2019-01-01 RX ADMIN — NADOLOL 80 MG: 40 TABLET ORAL at 07:59

## 2019-01-01 RX ADMIN — ASPIRIN 81 MG: 81 TABLET, COATED ORAL at 07:59

## 2019-01-01 RX ADMIN — Medication 10 ML: at 05:37

## 2019-01-01 RX ADMIN — VANCOMYCIN HYDROCHLORIDE 1000 MG: 1 INJECTION, POWDER, LYOPHILIZED, FOR SOLUTION INTRAVENOUS at 23:35

## 2019-01-01 RX ADMIN — ACETAMINOPHEN 650 MG: 325 TABLET ORAL at 23:59

## 2019-01-01 RX ADMIN — ALBUTEROL SULFATE 2.5 MG: 2.5 SOLUTION RESPIRATORY (INHALATION) at 19:20

## 2019-01-01 RX ADMIN — Medication 10 ML: at 15:05

## 2019-01-01 RX ADMIN — SODIUM CHLORIDE 100 ML/HR: 900 INJECTION, SOLUTION INTRAVENOUS at 15:05

## 2019-01-01 RX ADMIN — ACETAMINOPHEN 650 MG: 325 TABLET ORAL at 21:29

## 2019-01-01 RX ADMIN — Medication 10 ML: at 20:53

## 2019-01-01 ASSESSMENT — LOCATION SIMPLE DESCRIPTION DERM
LOCATION SIMPLE: CHEST
LOCATION SIMPLE: RIGHT TEMPLE
LOCATION SIMPLE: LEFT FOREARM

## 2019-01-01 ASSESSMENT — LOCATION ZONE DERM
LOCATION ZONE: ARM
LOCATION ZONE: TRUNK
LOCATION ZONE: FACE

## 2019-01-01 ASSESSMENT — LOCATION DETAILED DESCRIPTION DERM
LOCATION DETAILED: RIGHT CENTRAL TEMPLE
LOCATION DETAILED: LEFT PROXIMAL DORSAL FOREARM
LOCATION DETAILED: LOWER STERNUM

## 2019-01-01 NOTE — PROGRESS NOTES
[employer] [facility] Inpatient Hematology / Oncology Progress NoteAdmission Date: 2018  5:11 PM 
Reason for Admission/Hospital Course: pneumonia Pneumonia 24 Hour Events: 
 
 
10 point review of systems is otherwise negative with the exception of the elements mentioned above in the HPI. No Known Allergies OBJECTIVE: 
Patient Vitals for the past 8 hrs: 
 BP Temp Pulse Resp SpO2  
19 0716 124/48 98.9 °F (37.2 °C) 63 12 92 % 19 0451 136/68 99.5 °F (37.5 °C) 65 16 92 % Temp (24hrs), Av.7 °F (37.6 °C), Min:98.9 °F (37.2 °C), Max:101.3 °F (38.5 °C) No intake/output data recorded. Physical Exam: 
Constitutional: Well developed, well nourished female in no acute distress, sitting comfortably in the hospital bed. HEENT: Normocephalic and atraumatic. Oropharynx is clear, mucous membranes are moist.  Pupils are equal, round, and reactive to light. Extraocular muscles are intact. Sclerae anicteric. Neck supple without JVD. No thyromegaly present. Lymph node No palpable submandibular, cervical, supraclavicular, axillary or inguinal lymph nodes. Skin Warm and dry. No bruising and no rash noted. No erythema. No pallor. Respiratory Lungs are clear to auscultation bilaterally without wheezes, rales or rhonchi, normal air exchange without accessory muscle use. CVS Normal rate, regular rhythm and normal S1 and S2. No murmurs, gallops, or rubs. Abdomen Soft, nontender and nondistended, normoactive bowel sounds. No palpable mass. No hepatosplenomegaly. Neuro Grossly nonfocal with no obvious sensory or motor deficits. MSK Normal range of motion in general.  No edema and no tenderness. Psych Appropriate mood and affect. Labs: 
   
Recent Labs 19 
5461 18 
7459 WBC 3.8* 3.3*  
RBC 3.11* 3.04* HGB 10.0* 9.8* HCT 31.7* 31.2*  
.9* 102.6*  
MCH 32.2 32.2 MCHC 31.5 31.4  
RDW 13.6 13.4  143* GRANS 51 41* LYMPH 23 34 MONOS 24* 23* EOS 1 1  
BASOS 1 1 IG 0 0  
DF AUTOMATED AUTOMATED ANEU 2.0 1.4* ABL 0.9 1.1 ABM 0.9 0.8 ERIC 0.0 0.0 ABB 0.0 0.0 AIG 0.0 0.0 Recent Labs 01/01/19 0227 12/31/18 
6902  139  
K 3.8 3.2*  
* 108* CO2 19* 25 AGAP 11 6*  
* 95 BUN 11 11 CREA 1.21* 1.18* GFRAA 54* 56* GFRNA 45* 46*  
CA 6.0* 6.0*  
SGOT 22 25 AP 64 57  
TP 5.4* 5.7* ALB 2.6* 2.5*  
GLOB 2.8 3.2 AGRAT 0.9* 0.8* Imaging: 
 
Medications: 
Current Facility-Administered Medications Medication Dose Route Frequency  famotidine (PEPCID) tablet 20 mg  20 mg Oral DAILY  nystatin (MYCOSTATIN) 100,000 unit/mL oral suspension 500,000 Units  500,000 Units Oral QID  amiodarone (CORDARONE) tablet 200 mg  200 mg Oral DAILY  amLODIPine (NORVASC) tablet 2.5 mg  2.5 mg Oral DAILY  aspirin delayed-release tablet 81 mg  81 mg Oral DAILY  atorvastatin (LIPITOR) tablet 80 mg  80 mg Oral QHS  ferrous sulfate tablet 325 mg  1 Tab Oral DAILY WITH BREAKFAST  levothyroxine (SYNTHROID) tablet 50 mcg  50 mcg Oral ACB  mirtazapine (REMERON) tablet 15 mg  15 mg Oral QHS  nadolol (CORGARD) tablet 80 mg  80 mg Oral DAILY  piperacillin-tazobactam (ZOSYN) 4.5 g in 0.9% sodium chloride (MBP/ADV) 100 mL  4.5 g IntraVENous Q8H  
 0.9% sodium chloride infusion  100 mL/hr IntraVENous CONTINUOUS  
 sodium chloride (NS) flush 5-10 mL  5-10 mL IntraVENous Q8H  
 sodium chloride (NS) flush 5-10 mL  5-10 mL IntraVENous PRN  
 acetaminophen (TYLENOL) tablet 650 mg  650 mg Oral Q6H PRN  
 HYDROcodone-acetaminophen (NORCO) 5-325 mg per tablet 1 Tab  1 Tab Oral Q4H PRN  
 naloxone (NARCAN) injection 0.4 mg  0.4 mg IntraVENous PRN  
 ondansetron (ZOFRAN) injection 4 mg  4 mg IntraVENous Q4H PRN  
 clorazepate (TRANXENE) tablet 7.5 mg  7.5 mg Oral BID  vancomycin (VANCOCIN) 1,000 mg in 0.9% sodium chloride (MBP/ADV) 250 mL 1,000 mg IntraVENous Q24H  
 
 
ASSESSMENT: 
 
Problem List  Date Reviewed: 8/14/2018 Codes Class Noted * (Principal) Pneumonia ICD-10-CM: J18.9 ICD-9-CM: 181  12/30/2018 Leg swelling ICD-10-CM: M79.89 ICD-9-CM: 729.81  9/20/2018 [facility] ICD-10-CM: C90.00 ICD-9-CM: 203.00  6/13/2018 Anemia due to chronic renal failure treated with erythropoietin, unspecified stage ICD-10-CM: N18.9, D63.1 ICD-9-CM: 285.21, 585.9  3/16/2018 Stage 3 chronic kidney disease (HCC) ICD-10-CM: N18.3 ICD-9-CM: 585.3  11/27/2017 On amiodarone therapy ICD-10-CM: Z79.899 ICD-9-CM: V58.69  10/9/2017 Dyspnea ICD-10-CM: R06.00 
ICD-9-CM: 786.09  1/25/2017 Multiple myeloma in remission [facility]) ICD-10-CM: C90.01 
ICD-9-CM: 203.01  1/25/2017 Pacemaker ICD-10-CM: Z95.0 ICD-9-CM: V45.01  12/22/2016 Anemia ICD-10-CM: D64.9 ICD-9-CM: 285.9  12/22/2016 Essential hypertension with goal blood pressure less than 130/85 ICD-10-CM: I10 
ICD-9-CM: 401.9  12/22/2016 Anxiety ICD-10-CM: F41.9 ICD-9-CM: 300.00  12/22/2016 Chronic diastolic heart failure ([facility] ICD-10-CM: I50.32 
ICD-9-CM: 428.32  11/16/2016 Mixed hyperlipidemia ICD-10-CM: E78.2 ICD-9-CM: 272.2  8/16/2016 Episodic atrial fibrillation (HCC) ICD-10-CM: I48.0 ICD-9-CM: 427.31  8/16/2016 Dementia without behavioral disturbance ICD-10-CM: F03.90 ICD-9-CM: 294.20  8/16/2016 Melena ICD-10-CM: K92.1 ICD-9-CM: 578.1  6/19/2016 Persistent atrial fibrillation (HCC) (Chronic) ICD-10-CM: I48.1 ICD-9-CM: 427.31  6/18/2016 Iron deficiency anemia (Chronic) ICD-10-CM: D50.9 ICD-9-CM: 280.9  6/18/2016 Debility (Chronic) ICD-10-CM: R53.81 ICD-9-CM: 799.3  6/18/2016 Hypoxemia ICD-10-CM: R09.02 
ICD-9-CM: 799.02  6/17/2016 Pleural effusion on left ICD-10-CM: J90 ICD-9-CM: 511.9  6/17/2016 Overview Addendum 6/25/2016 12:32 PM by Bárbara Kay NP  
  6/17/216 L thoracentesis - 800 ml 
6/20/2016 - L thoracentesis - 700 ml 
6/23/2016 L thoracentesis - 850 ml Pleural effusion on right ICD-10-CM: J90 ICD-9-CM: 511.9  6/17/2016 Overview Addendum 6/28/2016 11:46 AM by CADEN Márquez  
  6/17/2016 R thoracentesis - 700 ml 
6/20/2016 R thoracentesis 700 ml 
6/25/2016: R thoracentesis 1100mL Presence of cardiac pacemaker (Chronic) ICD-10-CM: Z95.0 ICD-9-CM: V45.01  3/21/2016 Chronic anxiety (Chronic) ICD-10-CM: F41.9 ICD-9-CM: 300.00  3/21/2016 Fatigue ICD-10-CM: R53.83 ICD-9-CM: 780.79  9/23/2015 Essential hypertension, benign (Chronic) ICD-10-CM: I10 
ICD-9-CM: 401.1  7/9/2015 Other and unspecified hyperlipidemia (Chronic) ICD-10-CM: W58.2 ICD-9-CM: 272.4  7/9/2015 Hypothyroidism (Chronic) ICD-10-CM: E03.9 ICD-9-CM: 244.9  7/9/2015 Postmenopausal atrophic vaginitis ICD-10-CM: N95.2 ICD-9-CM: 627.3  7/9/2015 PLAN: 
 
Goals and plan of care reviewed with the patient. All questions answered to the best of our ability. Cherise Arboleda NP [facility] Hematology & Oncology [address] [address] Office : [phone] Fax : [phone]

## 2019-01-01 NOTE — PROGRESS NOTES
Hospitalist Progress Note Admit Date:  2018  5:11 PM  
Name:  Rashida Calhoun Age:  80 y.o. 
:  1930 MRN:  [id number] PCP:  Brayan Allen MD 
Treatment Team: Attending Provider: Noemi Arizmendi MD; Consulting Provider: Duane Wharton MD; Utilization Review: Joshua Pacheco RN Subjective:  
 
Pt is a 81 yo female with PMH of paroxysmal A Fib with pacer, multiple myeloma followed by Dr. Leola Elliott on current chemo, HTN, CKD, dCHF, who is sent as a direct admit from urgent care due to RUL pneumonia. She admits to several days of cough/ malaise and headache. CXR at urgent care showed RUL pneumonia. Pt on 5 L NC with significant desatting during conversation. Pt reports feeling some better but is obviously SOB while we talk. Objective:  
 
Patient Vitals for the past 24 hrs: 
 Temp Pulse Resp BP SpO2  
19 1506 99.8 °F (37.7 °C)      
19 1500 100.3 °F (37.9 °C) 69 18 142/53 91 % 19 1138     93 % 19 1112 98.4 °F (36.9 °C) 80 16 125/63 92 % 19 0758  64  121/76   
19 0716 98.9 °F (37.2 °C) 63 12 124/48 92 % 19 0451 99.5 °F (37.5 °C) 65 16 136/68 92 % 18 2312 99.8 °F (37.7 °C) 62 15 127/55 91 % 18 1942 100.2 °F (37.9 °C)      
18 1933 (!) 101.3 °F (38.5 °C) 64 16 115/49 91 % Oxygen Therapy O2 Sat (%): 91 % (19 1500) O2 Device: Nasal cannula (19 1138) O2 Flow Rate (L/min): 4 l/min (19 1138) Intake/Output Summary (Last 24 hours) at 2019 1635 Last data filed at 2019 1506 Gross per 24 hour Intake 1700 ml Output 200 ml Net 1500 ml General:    Well nourished. Alert. Mildly anxious, SOB during conversation CV:   Diminished Lungs:   CTAB. No wheezing, rhonchi, or rales. Abdomen:   Soft, nontender, nondistended. Extremities: Warm and dry. No cyanosis or edema. Skin:     No rashes or jaundice. Neuro:  No gross focal deficits Data Review: 
I have reviewed all labs, meds, telemetry events, and studies from the last 24 hours: 
 
Recent Results (from the past 24 hour(s)) C. DIFFICILE AG & TOXIN A/B Collection Time: 12/31/18  6:44 PM  
Result Value Ref Range GDH ANTIGEN C. DIFFICILE GDH ANTIGEN-NEGATIVE    
 C. difficile toxin C. DIFFICILE TOXIN-NEGATIVE    
 PCR Reflex NOT APPLICABLE INTERPRETATION NEGATIVE FOR TOXIGENIC C. DIFFICILE NTXCD Clinical Consideration NEGATIVE FOR TOXIGENIC C. DIFFICILE    
PLEASE READ & DOCUMENT PPD TEST IN 24 HRS Collection Time: 12/31/18  7:49 PM  
Result Value Ref Range PPD  Negative  
 mm Induration  mm METABOLIC PANEL, COMPREHENSIVE Collection Time: 01/01/19  5:17 AM  
Result Value Ref Range Sodium 141 136 - 145 mmol/L Potassium 3.8 3.5 - 5.1 mmol/L Chloride 111 (H) 98 - 107 mmol/L  
 CO2 19 (L) 21 - 32 mmol/L Anion gap 11 7 - 16 mmol/L Glucose 119 (H) 65 - 100 mg/dL BUN 11 8 - 23 MG/DL Creatinine 1.21 (H) 0.6 - 1.0 MG/DL  
 GFR est AA 54 (L) >60 ml/min/1.73m2 GFR est non-AA 45 (L) >60 ml/min/1.73m2 Calcium 6.0 (L) 8.3 - 10.4 MG/DL Bilirubin, total 0.9 0.2 - 1.1 MG/DL  
 ALT (SGPT) 22 12 - 65 U/L  
 AST (SGOT) 22 15 - 37 U/L Alk. phosphatase 64 50 - 136 U/L Protein, total 5.4 (L) 6.3 - 8.2 g/dL Albumin 2.6 (L) 3.2 - 4.6 g/dL Globulin 2.8 2.3 - 3.5 g/dL A-G Ratio 0.9 (L) 1.2 - 3.5    
CBC WITH AUTOMATED DIFF Collection Time: 01/01/19  5:17 AM  
Result Value Ref Range WBC 3.8 (L) 4.3 - 11.1 K/uL  
 RBC 3.11 (L) 4.05 - 5.2 M/uL  
 HGB 10.0 (L) 11.7 - 15.4 g/dL HCT 31.7 (L) 35.8 - 46.3 % .9 (H) 79.6 - 97.8 FL  
 MCH 32.2 26.1 - 32.9 PG  
 MCHC 31.5 31.4 - 35.0 g/dL  
 RDW 13.6 11.9 - 14.6 % PLATELET 224 779 - 247 K/uL MPV 10.5 9.4 - 12.3 FL ABSOLUTE NRBC 0.00 0.0 - 0.2 K/uL  
 DF AUTOMATED NEUTROPHILS 51 43 - 78 % LYMPHOCYTES 23 13 - 44 % MONOCYTES 24 (H) 4.0 - 12.0 % EOSINOPHILS 1 0.5 - 7.8 % BASOPHILS 1 0.0 - 2.0 % IMMATURE GRANULOCYTES 0 0.0 - 5.0 %  
 ABS. NEUTROPHILS 2.0 1.7 - 8.2 K/UL  
 ABS. LYMPHOCYTES 0.9 0.5 - 4.6 K/UL  
 ABS. MONOCYTES 0.9 0.1 - 1.3 K/UL  
 ABS. EOSINOPHILS 0.0 0.0 - 0.8 K/UL  
 ABS. BASOPHILS 0.0 0.0 - 0.2 K/UL  
 ABS. IMM. GRANS. 0.0 0.0 - 0.5 K/UL LD Collection Time: 01/01/19  5:17 AM  
Result Value Ref Range  110 - 210 U/L  
HAPTOGLOBIN Collection Time: 01/01/19  5:17 AM  
Result Value Ref Range Haptoglobin 321 (H) 30 - 200 mg/dL TRANSFERRIN SATURATION Collection Time: 01/01/19  5:17 AM  
Result Value Ref Range Iron 24 (L) 35 - 150 ug/dL TIBC 237 (L) 250 - 450 ug/dL Transferrin Saturation 10 (L) >20 % FERRITIN Collection Time: 01/01/19  5:17 AM  
Result Value Ref Range Ferritin 199 8 - 388 NG/ML  
VITAMIN B12 Collection Time: 01/01/19  5:17 AM  
Result Value Ref Range Vitamin B12 564 193 - 986 pg/mL FOLATE Collection Time: 01/01/19  5:17 AM  
Result Value Ref Range Folate 18.5 (H) 3.1 - 17.5 ng/mL TRANSFERRIN Collection Time: 01/01/19  5:17 AM  
Result Value Ref Range Transferrin 187 (L) 202 - 364 mg/dL BILIRUBIN, FRACTIONATED Collection Time: 01/01/19  5:17 AM  
Result Value Ref Range Bilirubin, total 0.9 0.2 - 1.1 MG/DL Bilirubin, direct 0.3 <0.4 MG/DL Bilirubin, indirect 0.6 0.0 - 1.1 MG/DL All Micro Results Procedure Component Value Units Date/Time C. DIFFICILE AG & TOXIN A/B [[id number]] Collected:  12/31/18 0838 Order Status:  Completed Specimen:  Stool Updated:  01/01/19 5871 1202 [place] ANTIGEN    
  C. DIFFICILE GDH ANTIGEN-NEGATIVE  
     
  C. difficile toxin C. DIFFICILE TOXIN-NEGATIVE  
     
  PCR Reflex NOT APPLICABLE INTERPRETATION    
  NEGATIVE FOR TOXIGENIC C. DIFFICILE Clinical Consideration NEGATIVE FOR TOXIGENIC C. DIFFICILE  
     
 CULTURE, BLOOD [[id number]] Collected:  12/30/18 1920 Order Status:  Completed Specimen:  Blood Updated:  01/01/19 8891 Special Requests: --     
  RIGHT 
HAND Culture result: NO GROWTH 2 DAYS     
 CULTURE, BLOOD [[id number]] Collected:  12/30/18 1921 Order Status:  Completed Specimen:  Blood Updated:  01/01/19 2202 Special Requests: --     
  RIGHT Antecubital 
  
  Culture result: NO GROWTH 2 DAYS No results found for this visit on 12/30/18. Current Meds: 
Current Facility-Administered Medications Medication Dose Route Frequency  Vancomycin Trough Reminder   Other ONCE  
 famotidine (PEPCID) tablet 20 mg  20 mg Oral DAILY  nystatin (MYCOSTATIN) 100,000 unit/mL oral suspension 500,000 Units  500,000 Units Oral QID  amiodarone (CORDARONE) tablet 200 mg  200 mg Oral DAILY  amLODIPine (NORVASC) tablet 2.5 mg  2.5 mg Oral DAILY  aspirin delayed-release tablet 81 mg  81 mg Oral DAILY  atorvastatin (LIPITOR) tablet 80 mg  80 mg Oral QHS  ferrous sulfate tablet 325 mg  1 Tab Oral DAILY WITH BREAKFAST  levothyroxine (SYNTHROID) tablet 50 mcg  50 mcg Oral ACB  mirtazapine (REMERON) tablet 15 mg  15 mg Oral QHS  nadolol (CORGARD) tablet 80 mg  80 mg Oral DAILY  piperacillin-tazobactam (ZOSYN) 4.5 g in 0.9% sodium chloride (MBP/ADV) 100 mL  4.5 g IntraVENous Q8H  
 0.9% sodium chloride infusion  100 mL/hr IntraVENous CONTINUOUS  
 sodium chloride (NS) flush 5-10 mL  5-10 mL IntraVENous Q8H  
 sodium chloride (NS) flush 5-10 mL  5-10 mL IntraVENous PRN  
 acetaminophen (TYLENOL) tablet 650 mg  650 mg Oral Q6H PRN  
 HYDROcodone-acetaminophen (NORCO) 5-325 mg per tablet 1 Tab  1 Tab Oral Q4H PRN  
 naloxone (NARCAN) injection 0.4 mg  0.4 mg IntraVENous PRN  
 ondansetron (ZOFRAN) injection 4 mg  4 mg IntraVENous Q4H PRN  
 clorazepate (TRANXENE) tablet 7.5 mg  7.5 mg Oral BID  vancomycin (VANCOCIN) 1,000 mg in 0.9% sodium chloride (MBP/ADV) 250 mL  1,000 mg IntraVENous Q24H Other Studies (last 24 hours): No results found. Assessment and Plan:  
 
Hospital Problems as of 1/1/2019 Date Reviewed: 8/14/2018 Codes Class Noted - Resolved POA * (Principal) Pneumonia ICD-10-CM: J18.9 ICD-9-CM: 978  12/30/2018 - Present Yes Stage 3 chronic kidney disease (HCC) ICD-10-CM: N18.3 ICD-9-CM: 585.3  11/27/2017 - Present Yes Multiple myeloma in remission [facility]) ICD-10-CM: C90.01 
ICD-9-CM: 203.01  1/25/2017 - Present Yes Essential hypertension with goal blood pressure less than 130/85 ICD-10-CM: I10 
ICD-9-CM: 401.9  12/22/2016 - Present Yes Anxiety ICD-10-CM: F41.9 ICD-9-CM: 300.00  12/22/2016 - Present Yes Chronic diastolic heart failure ([facility] ICD-10-CM: I50.32 
ICD-9-CM: 428.32  11/16/2016 - Present Yes Episodic atrial fibrillation (HCC) ICD-10-CM: I48.0 ICD-9-CM: 427.31  8/16/2016 - Present Yes Dementia without behavioral disturbance ICD-10-CM: F03.90 ICD-9-CM: 294.20  8/16/2016 - Present Yes Presence of cardiac pacemaker (Chronic) ICD-10-CM: Z95.0 ICD-9-CM: V45.01  3/21/2016 - Present Yes Hypothyroidism (Chronic) ICD-10-CM: E03.9 ICD-9-CM: 244.9  7/9/2015 - Present Yes A/P: 
 
RUL pneumonia- Cont Vanc, Zosyn D 3. Still with significant hypoxia. Obtain CXR and ABG. PRN Albuterol Multiple Myeloma- Oncology following, due to resume chemo 01/08 Paroxysmal A Fib- Cont home regimen amio. Not on [address]. HFpEF- Gentle IV fluids, Lasix on hold. DC planning- Consult SW.  ? STR vs home health Diet:  DIET CARDIAC 
DVT ppx:  SCDs Signed: 
Dong Stands, NP

## 2019-01-01 NOTE — PROGRESS NOTES
Initial visit to assess pt's spiritual needs. Ministry of presence & prayer to demonstrate caring & concern, convey emotional & spiritual support.  
 
 Suzie Burton, MDiv,ThM,PhD

## 2019-01-01 NOTE — PROGRESS NOTES
END OF SHIFT NOTE: 
 
Intake/Output No intake/output data recorded. Voiding: YES Catheter: NO 
Drain:   
 
 
 
 
Stool:  2 occurrences. Stool Assessment Stool Color: Green (12/31/18 1820) Stool Appearance: Watery (12/31/18 1820) Stool Amount: Medium (12/31/18 1820) Stool Source/Status: Rectum (12/31/18 1820) Emesis:  0 occurrences. VITAL SIGNS Patient Vitals for the past 12 hrs: 
 Temp Pulse Resp BP SpO2  
01/01/19 0451 99.5 °F (37.5 °C) 65 16 136/68 92 % 12/31/18 2312 99.8 °F (37.7 °C) 62 15 127/55 91 % 12/31/18 1942 100.2 °F (37.9 °C)      
12/31/18 1933 (!) 101.3 °F (38.5 °C) 64 16 115/49 91 % Pain Assessment Pain 1 Pain Scale 1: Numeric (0 - 10) (01/01/19 0315) Pain Intensity 1: 0 (01/01/19 0315) Patient Stated Pain Goal: 0 (01/01/19 0315) Pain Reassessment 1: Yes (01/01/19 0315) Pain Onset 1: Frontal H/A (12/30/18 2213) Pain Location 1: Head (12/31/18 1933) Pain Orientation 1: Anterior (12/31/18 1933) Pain Description 1: Aching (12/31/18 1933) Pain Intervention(s) 1: Medication (see MAR) (12/31/18 1933) Ambulating Yes Additional Information: pt on enteric isolation, 2 episodes of diarrhea. Up with assistance, however after getting up without calling bed alarm is now turned on. Woke up around 430 and had some confusion about where she was at. But reoriented and went back to sleep. No other complaints. Shift report given to oncoming nurse at the bedside.  
 
Kyle Peraza RN

## 2019-01-02 PROBLEM — E87.6 ACUTE HYPOKALEMIA: Status: ACTIVE | Noted: 2019-01-02

## 2019-01-02 PROBLEM — J90 PLEURAL EFFUSION, BILATERAL: Status: ACTIVE | Noted: 2019-01-02

## 2019-01-02 LAB
ALBUMIN SERPL-MCNC: 2.1 G/DL (ref 3.2–4.6)
ALBUMIN/GLOB SERPL: 0.6 {RATIO} (ref 1.2–3.5)
ALP SERPL-CCNC: 67 U/L (ref 50–136)
ALT SERPL-CCNC: 25 U/L (ref 12–65)
ANION GAP SERPL CALC-SCNC: 8 MMOL/L (ref 7–16)
ARTERIAL PATENCY WRIST A: YES
AST SERPL-CCNC: 31 U/L (ref 15–37)
BASE DEFICIT BLD-SCNC: 11 MMOL/L
BASOPHILS # BLD: 0 K/UL (ref 0–0.2)
BASOPHILS NFR BLD: 1 % (ref 0–2)
BDY SITE: ABNORMAL
BILIRUB SERPL-MCNC: 1.1 MG/DL (ref 0.2–1.1)
BODY TEMPERATURE: 98.6
BUN SERPL-MCNC: 12 MG/DL (ref 8–23)
CALCIUM SERPL-MCNC: 6.1 MG/DL (ref 8.3–10.4)
CHLORIDE SERPL-SCNC: 114 MMOL/L (ref 98–107)
CO2 BLD-SCNC: 15 MMOL/L
CO2 SERPL-SCNC: 19 MMOL/L (ref 21–32)
COLLECT TIME,HTIME: 934
CREAT SERPL-MCNC: 1.24 MG/DL (ref 0.6–1)
DIFFERENTIAL METHOD BLD: ABNORMAL
EOSINOPHIL # BLD: 0 K/UL (ref 0–0.8)
EOSINOPHIL NFR BLD: 0 % (ref 0.5–7.8)
ERYTHROCYTE [DISTWIDTH] IN BLOOD BY AUTOMATED COUNT: 13.6 % (ref 11.9–14.6)
FLOW RATE ISTAT,IFRATE: 55 L/MIN
GAS FLOW.O2 O2 DELIVERY SYS: ABNORMAL L/MIN
GLOBULIN SER CALC-MCNC: 3.3 G/DL (ref 2.3–3.5)
GLUCOSE SERPL-MCNC: 124 MG/DL (ref 65–100)
HCO3 BLD-SCNC: 13.8 MMOL/L (ref 22–26)
HCT VFR BLD AUTO: 29.1 % (ref 35.8–46.3)
HGB BLD-MCNC: 9.3 G/DL (ref 11.7–15.4)
IMM GRANULOCYTES # BLD: 0 K/UL (ref 0–0.5)
IMM GRANULOCYTES NFR BLD AUTO: 1 % (ref 0–5)
LYMPHOCYTES # BLD: 0.9 K/UL (ref 0.5–4.6)
LYMPHOCYTES NFR BLD: 17 % (ref 13–44)
MCH RBC QN AUTO: 32.6 PG (ref 26.1–32.9)
MCHC RBC AUTO-ENTMCNC: 32 G/DL (ref 31.4–35)
MCV RBC AUTO: 102.1 FL (ref 79.6–97.8)
MONOCYTES # BLD: 1.4 K/UL (ref 0.1–1.3)
MONOCYTES NFR BLD: 27 % (ref 4–12)
NEUTS SEG # BLD: 2.7 K/UL (ref 1.7–8.2)
NEUTS SEG NFR BLD: 55 % (ref 43–78)
NRBC # BLD: 0 K/UL (ref 0–0.2)
O2/TOTAL GAS SETTING VFR VENT: 80 %
PCO2 BLD: 27.1 MMHG (ref 35–45)
PH BLD: 7.32 [PH] (ref 7.35–7.45)
PLATELET # BLD AUTO: 171 K/UL (ref 150–450)
PMV BLD AUTO: 10.8 FL (ref 9.4–12.3)
PO2 BLD: 60 MMHG (ref 75–100)
POTASSIUM SERPL-SCNC: 3.4 MMOL/L (ref 3.5–5.1)
PROT SERPL-MCNC: 5.4 G/DL (ref 6.3–8.2)
RBC # BLD AUTO: 2.85 M/UL (ref 4.05–5.2)
SAO2 % BLD: 89 % (ref 95–98)
SERVICE CMNT-IMP: ABNORMAL
SODIUM SERPL-SCNC: 141 MMOL/L (ref 136–145)
SPECIMEN TYPE: ABNORMAL
WBC # BLD AUTO: 5 K/UL (ref 4.3–11.1)

## 2019-01-02 PROCEDURE — 74011250637 HC RX REV CODE- 250/637: Performed by: INTERNAL MEDICINE

## 2019-01-02 PROCEDURE — 80053 COMPREHEN METABOLIC PANEL: CPT

## 2019-01-02 PROCEDURE — 74011000258 HC RX REV CODE- 258: Performed by: INTERNAL MEDICINE

## 2019-01-02 PROCEDURE — 99231 SBSQ HOSP IP/OBS SF/LOW 25: CPT | Performed by: INTERNAL MEDICINE

## 2019-01-02 PROCEDURE — 97530 THERAPEUTIC ACTIVITIES: CPT

## 2019-01-02 PROCEDURE — 77010033711 HC HIGH FLOW OXYGEN

## 2019-01-02 PROCEDURE — 65270000029 HC RM PRIVATE

## 2019-01-02 PROCEDURE — 94760 N-INVAS EAR/PLS OXIMETRY 1: CPT

## 2019-01-02 PROCEDURE — 74011250636 HC RX REV CODE- 250/636: Performed by: NURSE PRACTITIONER

## 2019-01-02 PROCEDURE — 36415 COLL VENOUS BLD VENIPUNCTURE: CPT

## 2019-01-02 PROCEDURE — 74011250636 HC RX REV CODE- 250/636: Performed by: INTERNAL MEDICINE

## 2019-01-02 PROCEDURE — 94640 AIRWAY INHALATION TREATMENT: CPT

## 2019-01-02 PROCEDURE — 74011000250 HC RX REV CODE- 250: Performed by: INTERNAL MEDICINE

## 2019-01-02 PROCEDURE — 74011000258 HC RX REV CODE- 258: Performed by: NURSE PRACTITIONER

## 2019-01-02 PROCEDURE — 85025 COMPLETE CBC W/AUTO DIFF WBC: CPT

## 2019-01-02 PROCEDURE — 74011250636 HC RX REV CODE- 250/636: Performed by: FAMILY MEDICINE

## 2019-01-02 PROCEDURE — 82803 BLOOD GASES ANY COMBINATION: CPT

## 2019-01-02 PROCEDURE — 99223 1ST HOSP IP/OBS HIGH 75: CPT | Performed by: INTERNAL MEDICINE

## 2019-01-02 PROCEDURE — 77030020263 HC SOL INJ SOD CL0.9% LFCR 1000ML

## 2019-01-02 PROCEDURE — 77030027138 HC INCENT SPIROMETER -A

## 2019-01-02 PROCEDURE — 74011250637 HC RX REV CODE- 250/637: Performed by: NURSE PRACTITIONER

## 2019-01-02 PROCEDURE — 36600 WITHDRAWAL OF ARTERIAL BLOOD: CPT

## 2019-01-02 RX ORDER — GUAIFENESIN 600 MG/1
1200 TABLET, EXTENDED RELEASE ORAL EVERY 12 HOURS
Status: DISCONTINUED | OUTPATIENT
Start: 2019-01-02 | End: 2019-01-09 | Stop reason: HOSPADM

## 2019-01-02 RX ORDER — VANCOMYCIN HYDROCHLORIDE
1250 EVERY 24 HOURS
Status: COMPLETED | OUTPATIENT
Start: 2019-01-02 | End: 2019-01-05

## 2019-01-02 RX ORDER — CALCIUM CARBONATE 200(500)MG
200 TABLET,CHEWABLE ORAL
Status: DISCONTINUED | OUTPATIENT
Start: 2019-01-02 | End: 2019-01-03

## 2019-01-02 RX ORDER — MORPHINE SULFATE 2 MG/ML
1 INJECTION, SOLUTION INTRAMUSCULAR; INTRAVENOUS
Status: DISCONTINUED | OUTPATIENT
Start: 2019-01-02 | End: 2019-01-09 | Stop reason: HOSPADM

## 2019-01-02 RX ORDER — POTASSIUM CHLORIDE 1.5 G/1.77G
20 POWDER, FOR SOLUTION ORAL 2 TIMES DAILY WITH MEALS
Status: DISCONTINUED | OUTPATIENT
Start: 2019-01-02 | End: 2019-01-03

## 2019-01-02 RX ORDER — FUROSEMIDE 10 MG/ML
40 INJECTION INTRAMUSCULAR; INTRAVENOUS ONCE
Status: COMPLETED | OUTPATIENT
Start: 2019-01-02 | End: 2019-01-02

## 2019-01-02 RX ADMIN — CLORAZEPATE DIPOTASSIUM 7.5 MG: 7.5 TABLET ORAL at 17:20

## 2019-01-02 RX ADMIN — MIRTAZAPINE 15 MG: 15 TABLET, FILM COATED ORAL at 21:50

## 2019-01-02 RX ADMIN — METHYLPREDNISOLONE SODIUM SUCCINATE 60 MG: 125 INJECTION, POWDER, FOR SOLUTION INTRAMUSCULAR; INTRAVENOUS at 20:45

## 2019-01-02 RX ADMIN — CALCIUM GLUCONATE 1 G: 98 INJECTION, SOLUTION INTRAVENOUS at 18:42

## 2019-01-02 RX ADMIN — CALCIUM CARBONATE 200 MG: 500 TABLET, CHEWABLE ORAL at 17:20

## 2019-01-02 RX ADMIN — Medication 10 ML: at 21:51

## 2019-01-02 RX ADMIN — ALBUTEROL SULFATE 2.5 MG: 2.5 SOLUTION RESPIRATORY (INHALATION) at 19:41

## 2019-01-02 RX ADMIN — ALBUTEROL SULFATE 2.5 MG: 2.5 SOLUTION RESPIRATORY (INHALATION) at 08:07

## 2019-01-02 RX ADMIN — POTASSIUM CHLORIDE 20 MEQ: 1.5 POWDER, FOR SOLUTION ORAL at 17:20

## 2019-01-02 RX ADMIN — FAMOTIDINE 20 MG: 20 TABLET ORAL at 08:29

## 2019-01-02 RX ADMIN — FERROUS SULFATE TAB 325 MG (65 MG ELEMENTAL FE) 325 MG: 325 (65 FE) TAB at 08:29

## 2019-01-02 RX ADMIN — METHYLPREDNISOLONE SODIUM SUCCINATE 60 MG: 125 INJECTION, POWDER, FOR SOLUTION INTRAMUSCULAR; INTRAVENOUS at 15:52

## 2019-01-02 RX ADMIN — ACETAMINOPHEN 650 MG: 325 TABLET ORAL at 17:20

## 2019-01-02 RX ADMIN — PIPERACILLIN SODIUM,TAZOBACTAM SODIUM 4.5 G: 4; .5 INJECTION, POWDER, FOR SOLUTION INTRAVENOUS at 04:56

## 2019-01-02 RX ADMIN — ONDANSETRON 4 MG: 2 INJECTION INTRAMUSCULAR; INTRAVENOUS at 15:52

## 2019-01-02 RX ADMIN — GUAIFENESIN 1200 MG: 600 TABLET, EXTENDED RELEASE ORAL at 11:50

## 2019-01-02 RX ADMIN — ALBUTEROL SULFATE 2.5 MG: 2.5 SOLUTION RESPIRATORY (INHALATION) at 01:27

## 2019-01-02 RX ADMIN — NADOLOL 80 MG: 40 TABLET ORAL at 08:29

## 2019-01-02 RX ADMIN — AMIODARONE HYDROCHLORIDE 200 MG: 200 TABLET ORAL at 08:29

## 2019-01-02 RX ADMIN — GUAIFENESIN 1200 MG: 600 TABLET, EXTENDED RELEASE ORAL at 20:46

## 2019-01-02 RX ADMIN — NYSTATIN 500000 UNITS: 500000 SUSPENSION ORAL at 14:03

## 2019-01-02 RX ADMIN — FUROSEMIDE 40 MG: 10 INJECTION, SOLUTION INTRAMUSCULAR; INTRAVENOUS at 17:12

## 2019-01-02 RX ADMIN — AMLODIPINE BESYLATE 2.5 MG: 5 TABLET ORAL at 08:29

## 2019-01-02 RX ADMIN — VANCOMYCIN HYDROCHLORIDE 1250 MG: 10 INJECTION, POWDER, LYOPHILIZED, FOR SOLUTION INTRAVENOUS at 15:52

## 2019-01-02 RX ADMIN — ALBUTEROL SULFATE 2.5 MG: 2.5 SOLUTION RESPIRATORY (INHALATION) at 14:40

## 2019-01-02 RX ADMIN — NYSTATIN 500000 UNITS: 500000 SUSPENSION ORAL at 08:29

## 2019-01-02 RX ADMIN — NYSTATIN 500000 UNITS: 500000 SUSPENSION ORAL at 17:21

## 2019-01-02 RX ADMIN — SODIUM CHLORIDE 50 ML/HR: 900 INJECTION, SOLUTION INTRAVENOUS at 21:51

## 2019-01-02 RX ADMIN — NYSTATIN 500000 UNITS: 500000 SUSPENSION ORAL at 21:50

## 2019-01-02 RX ADMIN — ATORVASTATIN CALCIUM 80 MG: 40 TABLET, FILM COATED ORAL at 21:50

## 2019-01-02 RX ADMIN — LEVOTHYROXINE SODIUM 50 MCG: 50 TABLET ORAL at 08:29

## 2019-01-02 RX ADMIN — PIPERACILLIN SODIUM,TAZOBACTAM SODIUM 4.5 G: 4; .5 INJECTION, POWDER, FOR SOLUTION INTRAVENOUS at 11:50

## 2019-01-02 RX ADMIN — CLORAZEPATE DIPOTASSIUM 7.5 MG: 7.5 TABLET ORAL at 08:29

## 2019-01-02 RX ADMIN — ASPIRIN 81 MG: 81 TABLET, COATED ORAL at 08:29

## 2019-01-02 RX ADMIN — PIPERACILLIN SODIUM,TAZOBACTAM SODIUM 4.5 G: 4; .5 INJECTION, POWDER, FOR SOLUTION INTRAVENOUS at 20:46

## 2019-01-02 NOTE — PROGRESS NOTES
Problem: Falls - Risk of 
Goal: *Absence of Falls Document Dalia Warren Fall Risk and appropriate interventions in the flowsheet. Outcome: Progressing Towards Goal 
Fall Risk Interventions: 
Mobility Interventions: Communicate number of staff needed for ambulation/transfer Mentation Interventions: Adequate sleep, hydration, pain control Medication Interventions: Evaluate medications/consider consulting pharmacy Elimination Interventions: Call light in reach Problem: Pressure Injury - Risk of 
Goal: *Prevention of pressure injury Document José Manuel Scale and appropriate interventions in the flowsheet. Outcome: Progressing Towards Goal 
Pressure Injury Interventions: 
Sensory Interventions: Assess changes in LOC Moisture Interventions: Absorbent underpads Activity Interventions: Increase time out of bed Mobility Interventions: Pressure redistribution bed/mattress (bed type) Nutrition Interventions: Document food/fluid/supplement intake

## 2019-01-02 NOTE — PROGRESS NOTES
Patient assisted to [facility] with Primary RN and PCT. Noted large skin tear to left calf area. Small amount of blood noted on bottom sheet, Mepilex dressing found in bed. Wound cleansed and redressed with Tefla and Tegaderm. Patient unaware of when the tear occurred. Not noted on transfer to CT earlier in the shift. Primary RN present during assessment and dressing of wound.

## 2019-01-02 NOTE — PROGRESS NOTES
Hospitalist Progress Note Patient: Tanika Berumen MRN: [id number]  SSN: [id number] YOB: 1930  Age: 80 y.o. Sex: female Admit Date: 12/30/2018 LOS: 3 days Subjective:  
 
From previous notes: \"79 yo female with PMH of paroxysmal A Fib with pacer, multiple myeloma followed by Dr. Jatinder Caraballo on current chemo, HTN, CKD, dCHF, who is sent as a direct admit from urgent care due to RUL pneumonia. She admits to several days of cough/ malaise and headache. CXR at urgent care showed RUL pneumonia.  
Pt on 5 L NC with significant desatting during conversation. Pt reports feeling some better but is obviously SOB while we talk. \" 
 
1/2 - She feels better this AM. Still SOB. Denies cough. Less confused per family. Review of systems negative except stated above. Objective:  
 
Visit Vitals /60 (BP 1 Location: Left arm, BP Patient Position: At rest) Pulse 61 Temp 97.6 °F (36.4 °C) Resp 17 Ht 5' 4\" (1.626 m) Wt 73.9 kg (163 lb) SpO2 99% Breastfeeding? No  
BMI 27.98 kg/m² Oxygen Therapy O2 Sat (%): 99 % (01/02/19 0807) Pulse via Oximetry: 60 beats per minute (01/02/19 0807) O2 Device: Heated; Hi flow nasal cannula (01/02/19 0807) O2 Flow Rate (L/min): 55 l/min (01/02/19 0807) O2 Temperature: 87.8 °F (31 °C) (01/02/19 0807) FIO2 (%): 100 %(decreased to 80% due to SPO2 of 99%) (01/02/19 0807) Intake and Output:  
 
Intake/Output Summary (Last 24 hours) at 1/2/2019 0901 Last data filed at 1/2/2019 4482 Gross per 24 hour Intake 3257 ml Output 300 ml Net 2957 ml Physical Exam:  
GENERAL: alert, cooperative, no distress, appears stated age EYE: conjunctivae/corneas clear. PERRL. THROAT & NECK: normal and no erythema or exudates noted. LUNG: scattered rhonchi, L>R HEART: regular rate and rhythm, S1S2, no murmur, no JVD ABDOMEN: soft, non-tender, non-distended.  Bowel sounds normal.  
 EXTREMITIES:  No edema, 2+ pedal/radial pulses bilaterally SKIN: no rash or abnormalities NEUROLOGIC: Alert. Cranial nerves 2-12 grossly intact. Lab/Data Review: 
Recent Results (from the past 24 hour(s)) POC G3 Collection Time: 01/01/19  5:56 PM  
Result Value Ref Range Device: NASAL CANNULA pH (POC) 7.341 (L) 7.35 - 7.45    
 pCO2 (POC) 24.4 (L) 35 - 45 MMHG  
 pO2 (POC) 42 (LL) 75 - 100 MMHG  
 HCO3 (POC) 13.2 (L) 22 - 26 MMOL/L  
 sO2 (POC) 75 (L) 95 - 98 % Base deficit (POC) 11 mmol/L Allens test (POC) NOT APPLICABLE Site RIGHT BRACHIAL Patient temp. 98.6 Specimen type (POC) ARTERIAL Performed by [employer]   
 CO2, POC 14 MMOL/L Flow rate (POC) 5.000 L/min Respiratory comment: 3 COLLECT TIME 1,755 POC G3 Collection Time: 01/01/19  6:10 PM  
Result Value Ref Range Device: NASAL CANNULA pH (POC) 7.376 7.35 - 7.45    
 pCO2 (POC) 22.4 (L) 35 - 45 MMHG  
 pO2 (POC) 39 (LL) 75 - 100 MMHG  
 HCO3 (POC) 13.1 (L) 22 - 26 MMOL/L  
 sO2 (POC) 75 (L) 95 - 98 % Base deficit (POC) 11 mmol/L Allens test (POC) YES Site LEFT RADIAL Patient temp. 98.6 Specimen type (POC) ARTERIAL Performed by [employer] [facility] [employer]   
 CO2, POC 14 MMOL/L Flow rate (POC) 5.000 L/min Respiratory comment: 1 COLLECT TIME 1,810 PLEASE READ & DOCUMENT PPD TEST IN 48 HRS Collection Time: 01/01/19  7:59 PM  
Result Value Ref Range PPD  Negative  
 mm Induration  mm [place] Collection Time: 01/01/19 10:45 PM  
Result Value Ref Range Vancomycin,trough 10.0 5 - 20 ug/mL CBC WITH AUTOMATED DIFF Collection Time: 01/02/19  3:56 AM  
Result Value Ref Range WBC 5.0 4.3 - 11.1 K/uL  
 RBC 2.85 (L) 4.05 - 5.2 M/uL HGB 9.3 (L) 11.7 - 15.4 g/dL HCT 29.1 (L) 35.8 - 46.3 % .1 (H) 79.6 - 97.8 FL  
 MCH 32.6 26.1 - 32.9 PG  
 MCHC 32.0 31.4 - 35.0 g/dL  
 RDW 13.6 11.9 - 14.6 % PLATELET 394 953 - 195 K/uL MPV 10.8 9.4 - 12.3 FL ABSOLUTE NRBC 0.00 0.0 - 0.2 K/uL  
 DF AUTOMATED NEUTROPHILS 55 43 - 78 % LYMPHOCYTES 17 13 - 44 % MONOCYTES 27 (H) 4.0 - 12.0 % EOSINOPHILS 0 (L) 0.5 - 7.8 % BASOPHILS 1 0.0 - 2.0 % IMMATURE GRANULOCYTES 1 0.0 - 5.0 %  
 ABS. NEUTROPHILS 2.7 1.7 - 8.2 K/UL  
 ABS. LYMPHOCYTES 0.9 0.5 - 4.6 K/UL  
 ABS. MONOCYTES 1.4 (H) 0.1 - 1.3 K/UL  
 ABS. EOSINOPHILS 0.0 0.0 - 0.8 K/UL  
 ABS. BASOPHILS 0.0 0.0 - 0.2 K/UL  
 ABS. IMM. GRANS. 0.0 0.0 - 0.5 K/UL METABOLIC PANEL, COMPREHENSIVE Collection Time: 01/02/19  3:56 AM  
Result Value Ref Range Sodium 141 136 - 145 mmol/L Potassium 3.4 (L) 3.5 - 5.1 mmol/L Chloride 114 (H) 98 - 107 mmol/L  
 CO2 19 (L) 21 - 32 mmol/L Anion gap 8 7 - 16 mmol/L Glucose 124 (H) 65 - 100 mg/dL BUN 12 8 - 23 MG/DL Creatinine 1.24 (H) 0.6 - 1.0 MG/DL  
 GFR est AA 53 (L) >60 ml/min/1.73m2 GFR est non-AA 43 (L) >60 ml/min/1.73m2 Calcium 6.1 (L) 8.3 - 10.4 MG/DL Bilirubin, total 1.1 0.2 - 1.1 MG/DL  
 ALT (SGPT) 25 12 - 65 U/L  
 AST (SGOT) 31 15 - 37 U/L Alk. phosphatase 67 50 - 136 U/L Protein, total 5.4 (L) 6.3 - 8.2 g/dL Albumin 2.1 (L) 3.2 - 4.6 g/dL Globulin 3.3 2.3 - 3.5 g/dL A-G Ratio 0.6 (L) 1.2 - 3.5 Imaging: Xr Chest Sngl V Result Date: 1/1/2019 IMPRESSION:  EXTENSIVE INHOMOGENEOUS BILATERAL INFILTRATES WITH RIGHT UPPER LOBE PREDOMINANCE. Ct Chest W Cont Result Date: 1/1/2019 IMPRESSION:   1. NO DEFINITE ACUTE PULMONARY EMBOLISM. 2.  SMALL BILATERAL PLEURAL EFFUSIONS WITH EXTENSIVE BILATERAL INFILTRATES SUSPICIOUS FOR PNEUMONIA. PULMONARY EDEMA WOULD BE LESS LIKELY. No results found for this visit on 12/30/18. Cultures: All Micro Results Procedure Component Value Units Date/Time CULTURE, BLOOD [[id number]] Collected:  12/30/18 1921 Order Status:  Completed Specimen:  Blood Updated:  01/02/19 2950 Special Requests: --     
  RIGHT Antecubital 
  
 Culture result: NO GROWTH 3 DAYS     
 CULTURE, BLOOD [[id number]] Collected:  12/30/18 1928 Order Status:  Completed Specimen:  Blood Updated:  01/02/19 6561 Special Requests: --     
  RIGHT 
HAND Culture result: NO GROWTH 3 DAYS C. DIFFICILE AG & TOXIN A/B [[id number]] Collected:  12/31/18 1845 Order Status:  Completed Specimen:  Stool Updated:  01/01/19 1231 [id number] Tena Buckner ANTIGEN    
  C. DIFFICILE GDH ANTIGEN-NEGATIVE  
     
  C. difficile toxin C. DIFFICILE TOXIN-NEGATIVE  
     
  PCR Reflex NOT APPLICABLE INTERPRETATION    
  NEGATIVE FOR TOXIGENIC C. DIFFICILE Clinical Consideration NEGATIVE FOR TOXIGENIC C. DIFFICILE Assessment/Plan:  
 
Principal Problem: 
  Acute [facility] (6/17/2016) - Due to bilateral pneumonia 
- Continue Vanc + Zosyn 
- Continue albuterol - No PE per CT Chest 
- Wean Opti-Yevgeniy as appropriate - Consult Pulmonary for assistance Active Problems: 
  Bilateral pneumonia (12/30/2018) - Continue Vanc + Zosyn since immunosuppressed 
- Continue albuteriol - Add Mucinex - Add ICS + Flutter Valve - Consult pulmonary for further assistance since requiring so much oxygen Pleural effusion, bilateral (1/2/2019) - Likely nieves-pneumonic + HF 
- Very small so will monitor - Consult pulmonary Acute hypokalemia (1/2/2019) - Likely due to acute illness - Replace PO 
- Recheck in AM 
 
  Episodic atrial fibrillation ([facility] (8/16/2016) - Continue Amiodarone - Continue Corgard - No [address] Chronic diastolic heart failure ([facility] (11/16/2016) - Stable - Continue Nadolol Macrocytic anemia (12/22/2016) - Stable Essential hypertension with goal blood pressure less than 130/85 (12/22/2016) - Well controlled - Continue Corgard - Continue Amlodipine Stage 3 chronic kidney disease ([facility] (11/27/2017) - Stable Acquired hypothyroidism (7/9/2015) - Continue Levothyroxine Dementia without behavioral disturbance (8/16/2016) - No acute issues - Continue Remeron Anxiety (12/22/2016) - Continue Remeron Multiple myeloma in remission [facility]) (1/25/2017) 
- Oncology saw and will follow peripherally Presence of cardiac pacemaker (3/21/2016) Dispo - Continue Vanc/Zosyn. Consult Pulm for assistance. DIET CARDIAC Regular DVT Prophylaxis: Heparin Signed By: Pam Wilhelm DO   
 January 2, 2019

## 2019-01-02 NOTE — PROGRESS NOTES
[facility] OUTREACH NURSE PROGRESS REPORT SUBJECTIVE: Called to assess patient secondary to nursing concern. MEWS Score: 1 (01/02/19 1555) Vitals:  
 01/02/19 1636 01/02/19 1712 01/02/19 1726 01/02/19 1751 BP:  117/56 Pulse:  62 Resp:      
Temp:      
SpO2: 90% (!) 86% 90% (!) 89% Weight:      
Height:      
  
LAB DATA: 
 
Recent Labs 01/02/19 
7902 01/01/19 
7396 12/31/18 
5763  141 139  
K 3.4* 3.8 3.2*  
* 111* 108* CO2 19* 19* 25 AGAP 8 11 6*  
* 119* 95 BUN 12 11 11 CREA 1.24* 1.21* 1.18* GFRAA 53* 54* 56* GFRNA 43* 45* 46*  
CA 6.1* 6.0* 6.0* ALB 2.1* 2.6* 2.5* TP 5.4* 5.4* 5.7*  
GLOB 3.3 2.8 3.2 AGRAT 0.6* 0.9* 0.8* ALT 25 22 20 Recent Labs 01/02/19 
6169 01/01/19 
9016 12/31/18 
3796 WBC 5.0 3.8* 3.3* HGB 9.3* 10.0* 9.8* HCT 29.1* 31.7* 31.2*  
 168 143* OBJECTIVE: On arrival to room, I found patient to be resting in bed, family at bedside. General appearance: alert, cooperative, mild distress, appears stated age Lungs: coarse bilateral.   
Extremities: extremities normal, atraumatic, no cyanosis or edema Neurologic: Grossly normal 
 
 
 Pain Assessment Pain Intensity 1: 0 (01/02/19 1751) Pain Location 1: Head 
Pain Intervention(s) 1: Medication (see MAR) Patient Stated Pain Goal: 0 
 
  
  
  
  
 
  
  
  
   
 
ASSESSMENT:  Pt resting in bed NAD. Optiflow well tolerated. Respirations even and unlabored. Pt reports not feeling well, but no specific complaint. Pt O2 needs increasing, but pt denies pain/SOB. PLAN:  Add to outreach program.

## 2019-01-02 NOTE — PROGRESS NOTES
[facility] Hematology & Oncology Inpatient Hematology / Oncology Progress NoteAdmission Date: 2018  5:11 PM 
Reason for Admission/Hospital Course: pneumonia Pneumonia 24 Hour Events: 
Fevers again yesterday Increased O2 needs, now on optiflow Sitting up in chair Son, grandson at bedside ROS: 
Constitutional: Positive for fatigue/malaise, fever CV:  negative for chest pain, palpitations, edema. Respiratory: Positive for dyspnea, worsening hypoxia GI:  negative for nausea, abdominal pain, diarrhea. 10 point review of systems is otherwise negative with the exception of the elements mentioned above in the HPI. No Known Allergies OBJECTIVE: 
Patient Vitals for the past 8 hrs: 
 BP Temp Pulse Resp SpO2  
19 1123     91 % 19 1100     90 % 19 1055     91 % 19 1053 112/81 98.3 °F (36.8 °C) 64 17 (!) 87 % 19 0807     99 % 19 0750 114/60 97.6 °F (36.4 °C) 61 17 97 % Temp (24hrs), Av.5 °F (37.5 °C), Min:97.2 °F (36.2 °C), Max:102.1 °F (38.9 °C) 
 
 0701 -  1900 In: 6166 [P.O.:480; I.V.:1319] Out: 400 [Urine:400] Physical Exam: 
Constitutional: Ill appearing female in no acute distress, sitting up in recliner in room HEENT: Normocephalic and atraumatic. Oropharynx is clear, mucous membranes are moist.  Neck supple Skin Warm and dry. No bruising and no rash noted. No erythema. No pallor. Respiratory Lungs with scattered rhonchi, on optiflow CVS Normal rate, regular rhythm and normal S1 and S2. No murmurs, gallops, or rubs. Abdomen Soft, nontender and nondistended, normoactive bowel sounds. No palpable mass. No hepatosplenomegaly. Neuro Grossly nonfocal with no obvious sensory or motor deficits. MSK No edema and no tenderness. Psych Appropriate mood and affect. Appears fatigued Labs: 
   
Recent Labs 19 
6524 19 
0126 18 
7366 WBC 5.0 3.8* 3.3*  
 RBC 2.85* 3.11* 3.04* HGB 9.3* 10.0* 9.8* HCT 29.1* 31.7* 31.2*  
.1* 101.9* 102.6*  
MCH 32.6 32.2 32.2 MCHC 32.0 31.5 31.4  
RDW 13.6 13.6 13.4  168 143* GRANS 55 51 41* LYMPH 17 23 34 MONOS 27* 24* 23* EOS 0* 1 1  
BASOS 1 1 1 IG 1 0 0  
DF AUTOMATED AUTOMATED AUTOMATED ANEU 2.7 2.0 1.4* ABL 0.9 0.9 1.1 ABM 1.4* 0.9 0.8 ERIC 0.0 0.0 0.0 ABB 0.0 0.0 0.0 AIG 0.0 0.0 0.0 Recent Labs 01/02/19 
3012 01/01/19 
1419 12/31/18 
7817  141 139  
K 3.4* 3.8 3.2*  
* 111* 108* CO2 19* 19* 25 AGAP 8 11 6*  
* 119* 95 BUN 12 11 11 CREA 1.24* 1.21* 1.18* GFRAA 53* 54* 56* GFRNA 43* 45* 46*  
CA 6.1* 6.0* 6.0*  
SGOT 31 22 25 AP 67 64 57  
TP 5.4* 5.4* 5.7* ALB 2.1* 2.6* 2.5*  
GLOB 3.3 2.8 3.2 AGRAT 0.6* 0.9* 0.8* Imaging: 
CT CHEST W CONT [[id number]] Collected: 01/01/19 2205 Order Status: Completed Updated: 01/01/19 2214 Narrative:    
CHEST CT ANGIOGRAPHY WITH ADDITIONAL REFORMATS:   
 
CLINICAL HISTORY:  Acute hypoxemia and cough for several days in a patient with 
multiple myeloma. TECHNIQUE:  During bolus injection of nonionic intravenous contrast, the chest 
was scanned with spiral technique, and coronal reformats were produced. Radiation dose reduction was achieved using one or all of the following 
techniques: automated exposure control, weight-based dosing, iterative 
reconstruction. COMPARISON:  Portable chest today and chest CTA from [facility] dated March 25, 2016.  
 
FINDINGS: Darral Medal is expected opacification of the pulmonary arterial tree with 
no intraluminal soft tissue density to suggest acute pulmonary embolism. Darral Medal 
is no definite pneumothorax.  There are small bilateral pleural effusions with 
extensive inhomogeneous infiltrates bilaterally, most marked in the right upper 
lobe.  Bibasilar atelectasis is also noted.  A 5.7 cm well-circumscribed cyst 
 posteriorly in the right upper quadrant of the abdomen likely arises from the 
right kidney, although it is incompletely evaluated on this examination of the 
chest.  The epigastrium otherwise appears unremarkable as imaged. Impression:    
IMPRESSION:   
  
1.  NO DEFINITE ACUTE PULMONARY EMBOLISM. 2.  SMALL BILATERAL PLEURAL EFFUSIONS WITH EXTENSIVE BILATERAL INFILTRATES 
SUSPICIOUS FOR PNEUMONIA.  PULMONARY EDEMA WOULD BE LESS LIKELY. XR CHEST SNGL V [[id number]] Collected: 01/01/19 1948 Order Status: Completed Updated: 01/01/19 1955 Narrative:    
PORTABLE CHEST, January 1, 2019 at 1906 hours CLINICAL HISTORY:  Pneumonia with hypoxemia. COMPARISON:  February 1, 2018. FINDINGS:  AP erect image demonstrates extensive inhomogeneous bilateral 
infiltrates with right upper lobe predominance.  The heart size at upper limits 
of normal.  No definite pneumothorax.  The bony thorax appears intact on this 
view. Impression:    
IMPRESSION:  EXTENSIVE INHOMOGENEOUS BILATERAL INFILTRATES WITH RIGHT UPPER LOBE PREDOMINANCE. Medications: 
Current Facility-Administered Medications Medication Dose Route Frequency  guaiFENesin ER (MUCINEX) tablet 1,200 mg  1,200 mg Oral Q12H  
 vancomycin (VANCOCIN) 1250 mg in  ml infusion  1,250 mg IntraVENous Q24H  
 albuterol (PROVENTIL VENTOLIN) nebulizer solution 2.5 mg  2.5 mg Nebulization Q6H RT  
 famotidine (PEPCID) tablet 20 mg  20 mg Oral DAILY  nystatin (MYCOSTATIN) 100,000 unit/mL oral suspension 500,000 Units  500,000 Units Oral QID  amiodarone (CORDARONE) tablet 200 mg  200 mg Oral DAILY  amLODIPine (NORVASC) tablet 2.5 mg  2.5 mg Oral DAILY  aspirin delayed-release tablet 81 mg  81 mg Oral DAILY  atorvastatin (LIPITOR) tablet 80 mg  80 mg Oral QHS  ferrous sulfate tablet 325 mg  1 Tab Oral DAILY WITH BREAKFAST  levothyroxine (SYNTHROID) tablet 50 mcg  50 mcg Oral ACB  mirtazapine (REMERON) tablet 15 mg  15 mg Oral QHS  nadolol (CORGARD) tablet 80 mg  80 mg Oral DAILY  piperacillin-tazobactam (ZOSYN) 4.5 g in 0.9% sodium chloride (MBP/ADV) 100 mL  4.5 g IntraVENous Q8H  
 0.9% sodium chloride infusion  100 mL/hr IntraVENous CONTINUOUS  
 sodium chloride (NS) flush 5-10 mL  5-10 mL IntraVENous Q8H  
 sodium chloride (NS) flush 5-10 mL  5-10 mL IntraVENous PRN  
 acetaminophen (TYLENOL) tablet 650 mg  650 mg Oral Q6H PRN  
 HYDROcodone-acetaminophen (NORCO) 5-325 mg per tablet 1 Tab  1 Tab Oral Q4H PRN  
 naloxone (NARCAN) injection 0.4 mg  0.4 mg IntraVENous PRN  
 ondansetron (ZOFRAN) injection 4 mg  4 mg IntraVENous Q4H PRN  
 clorazepate (TRANXENE) tablet 7.5 mg  7.5 mg Oral BID ASSESSMENT: 
 
Problem List  Date Reviewed: 8/14/2018 Codes Class Noted Pleural effusion, bilateral ICD-10-CM: J90 ICD-9-CM: 511.9  1/2/2019 Acute hypokalemia ICD-10-CM: E87.6 ICD-9-CM: 276.8  1/2/2019 Bilateral pneumonia ICD-10-CM: J18.9 ICD-9-CM: 063  12/30/2018 Leg swelling ICD-10-CM: M79.89 ICD-9-CM: 729.81  9/20/2018 Multiple [facility] having [facility] ICD-10-CM: C90.00 ICD-9-CM: 203.00  6/13/2018 Anemia due to chronic renal failure treated with erythropoietin, unspecified stage ICD-10-CM: N18.9, D63.1 ICD-9-CM: 285.21, 585.9  3/16/2018 Stage 3 chronic kidney disease (HCC) ICD-10-CM: N18.3 ICD-9-CM: 585.3  11/27/2017 On amiodarone therapy ICD-10-CM: Z79.899 ICD-9-CM: V58.69  10/9/2017 Dyspnea ICD-10-CM: R06.00 
ICD-9-CM: 786.09  1/25/2017 Multiple myeloma in remission [facility]) ICD-10-CM: C90.01 
ICD-9-CM: 203.01  1/25/2017 Pacemaker ICD-10-CM: Z95.0 ICD-9-CM: V45.01  12/22/2016 Macrocytic anemia ICD-10-CM: D53.9 ICD-9-CM: 281.9  12/22/2016 Essential hypertension with goal blood pressure less than 130/85 ICD-10-CM: I10 
ICD-9-CM: 401.9  12/22/2016 Anxiety ICD-10-CM: F41.9 ICD-9-CM: 300.00  12/22/2016 Chronic diastolic heart failure ([facility] ICD-10-CM: I50.32 
ICD-9-CM: 428.32  11/16/2016 Mixed hyperlipidemia ICD-10-CM: E78.2 ICD-9-CM: 272.2  8/16/2016 Episodic atrial fibrillation (HCC) ICD-10-CM: I48.0 ICD-9-CM: 427.31  8/16/2016 Dementia without behavioral disturbance ICD-10-CM: F03.90 ICD-9-CM: 294.20  8/16/2016 Melena ICD-10-CM: K92.1 ICD-9-CM: 578.1  6/19/2016 Persistent atrial fibrillation (HCC) (Chronic) ICD-10-CM: I48.1 ICD-9-CM: 427.31  6/18/2016 Iron deficiency anemia (Chronic) ICD-10-CM: D50.9 ICD-9-CM: 280.9  6/18/2016 Debility (Chronic) ICD-10-CM: R53.81 ICD-9-CM: 799.3  6/18/2016 * (Principal) Acute respiratory failure with hypoxemia [facility]) ICD-10-CM: J96.01 
ICD-9-CM: 518.81  6/17/2016 Hypoxemia ICD-10-CM: R09.02 
ICD-9-CM: 799.02  6/17/2016 Pleural effusion on left ICD-10-CM: J90 ICD-9-CM: 511.9  6/17/2016 Overview Addendum 6/25/2016 12:32 PM by Lucía Paredes NP  
  6/17/216 L thoracentesis - 800 ml 
6/20/2016 - L thoracentesis - 700 ml 
6/23/2016 L thoracentesis - 850 ml Pleural effusion on right ICD-10-CM: J90 ICD-9-CM: 511.9  6/17/2016 Overview Addendum 6/28/2016 11:46 AM by CADEN Gamez  
  6/17/2016 R thoracentesis - 700 ml 
6/20/2016 R thoracentesis 700 ml 
6/25/2016: R thoracentesis 1100mL Presence of cardiac pacemaker (Chronic) ICD-10-CM: Z95.0 ICD-9-CM: V45.01  3/21/2016 Chronic anxiety (Chronic) ICD-10-CM: F41.9 ICD-9-CM: 300.00  3/21/2016 Fatigue ICD-10-CM: R53.83 ICD-9-CM: 780.79  9/23/2015 Essential hypertension, benign (Chronic) ICD-10-CM: I10 
ICD-9-CM: 401.1  7/9/2015 Other and unspecified hyperlipidemia (Chronic) ICD-10-CM: A10.8 ICD-9-CM: 272.4  7/9/2015 Acquired hypothyroidism (Chronic) ICD-10-CM: E03.9 ICD-9-CM: 244.9  7/9/2015 Postmenopausal atrophic vaginitis ICD-10-CM: N95.2 ICD-9-CM: 627.3  7/9/2015 Ms. Lakeisha Kwok is a 80 y.o. female admitted on 12/30/2018. PMH PAFib (no AC) with pacer, HTN, CKD, dCHF. Additionally, she is a patient of Dr. Theodore Castorena with multiple myeloma. Oncologic history: In 4/2016 she was diagnosed with Multiple Myeloma, IgG Kappa, monosomy 13, del.16q, ISS Stage II, at diagnosis her IgG level was 2951 ( with an M-spike of 1.78 ). She received 2 cycles of Decadron and achieved a [place]; her IgG level decreased to 1902 ( with an M-spike of 1.11 ). In 8/2016 she was started on Revlimid and her IgG level decreased to 1370 ( with an M-spike of 0.55 ), in 2/2017 her M-spike increased to 0.94 hence in 3/2017 Ixazomib 2.3 mg PO weekly was added to her treatment regimen and in 8/2017 her M-spike decreased to 0.25 but in 10/2017 Ixazomib was stopped due to cytopenias, in 1/2018 Panabinostat was added to her treatment regimen but she developed severe asthenia and dyspnea and did not want to take it anymore. In 3/2018 she was started on Pomalidomide. Her last myeloma last in late December showed stable disease. IgG levels adequate (589).   
She presented to the ER on 12/30/18 from urgent care for RUL PNA (imaging not in system). She reported cough, malaise, headache, throat pain, constipation and decreased urination with dysuria. She has been started on zosyn/vancomycin. Tmax on admission was 100.5. PLAN: 
Pneumonia/acute respiratory failure - On zosyn/vancomycin day 2. BC pending. On RA 
1/2 Continue antibiotics. Pulmonary consulted by primary team. On optiflow. CT yesterday with no PE but consistent with pneumonia +/- drug toxicity  
  
Multiple myeloma - On pomalyst (14 days on/14 days off, due to resume 1/8). Counts adequate, not neutropenic. Last myeloma labs with stable mspike. IgG adequate 1/2 Hold further treatment until after discharge. F/u with Douglean Bumps 1 week post discharge 
  
Weakness - PT/OT consulted. Rehab vs home health 
  
Goals and plan of care reviewed with the patient. All questions answered to the best of our ability. Discussed with Dr. Cholo Todd. They will maintain on their service. Nothing further to add from heme-onc. Will plan for f/u 1 week post discharge Maylin Espino NP [facility] Hematology & Oncology [id number] [place] [address] Office : [phone] Fax : [phone] Attending Addendum: 
Patient seen with NP Denisse Rausch. Ms Ulices De La Rosa is a pt of Dr Yarelis Ricketts with known MM. She was admitted on 12/30 form urgent care with RUL PNA. She has a cough, malaise, HA, sore throat, constipation and dysuria. On vanco/Zosyn. T max 100.5. As for her MM, she was diagnosed in 4/2016, IgG kappa, -13, del.16q, ISS Stage II. S/p multiple treatments per details above. Most recently on pomalidomide with stability of disease per most recent labs in late 12/2018. Today, she is OOB in chair. She is on optiflow and feels better compared to yesterday. Still with a cough. No pain. I personally performed a face to face diagnostic evaluation on this patient. My findings are as follows: Alert, lungs with scattered rhonchi, heart regular, abdomen benign and no LE edema. C/w treatment for PNA per primary team.  ? Drug toxicity per pulm - streoids being added. Due to her current condition, will hold off resuming any tx until d/c and follow up with Dr Yarelis Ricketts (due 1/8).   
Thank you for the opportunity to participate in Ms Kennedy's care.   
  
I have reviewed and agree with the care plan.     
  
  
 
  
Gunjan Darden MD 
[facility] Hematology and Oncology [address] [place] [address] Office : [phone] Fax : [phone]

## 2019-01-02 NOTE — PROGRESS NOTES
Problem: Mobility Impaired (Adult and Pediatric) Goal: *Acute Goals and Plan of Care (Insert Text) 1. Ms. Loreto Capps will perform supine to sit and sit to supine independently in 7 days. 2.  Ms. Loreto Capps will perform sit to stand and bed to chair independently in 7 days. 3.  Ms. Loreto Capps will perform gait with least restrictive device 250 ft independently in 7 days. 4.  Ms. Loreto Capps will go up and down 3 steps with rail independently in 7 days. PHYSICAL THERAPY: Daily Note, Treatment Day: 1st, AM 1/2/2019INPATIENT: Hospital Day: 4 [place] / Product Type: Medicare /  
  
NAME/AGE/GENDER: Koby Ellis is a 80 y.o. female PRIMARY DIAGNOSIS: pneumonia Pneumonia Acute respiratory failure with hypoxemia (HCC) Acute respiratory failure with hypoxemia (HCC) ICD-10: Treatment Diagnosis:  
 · Generalized Muscle Weakness (M62.81) · Difficulty in walking, Not elsewhere classified (R26.2) Precaution/Allergies: 
Patient has no known allergies. ASSESSMENT:  
Ms. Loreto Capps was received in bed now on optiflow. She has had quite a decrease in respiratory status since evaluation. She is having no pain and with a little encouragement was agreeable to get to the chair with clearance from RN. From a functional stand point she is still doing well. In fact provided contact guard more for my own comfort than for anything else. Once to the chair O2 sat was 88% however after a minute or so it was 91%. Encouraged her to stay up for lunch at least.  Her family was present. Will leave goals set at evaluation for now. This section established at most recent assessment PROBLEM LIST (Impairments causing functional limitations): 1. Decreased Strength 2. Decreased Transfer Abilities 3. Decreased Ambulation Ability/Technique 4. Decreased Activity Tolerance 5. Decreased Pacing Skills  INTERVENTIONS PLANNED: (Benefits and precautions of physical therapy have been discussed with the patient.) 1. Bed Mobility 2. Gait Training 3. Therapeutic Activites 4. Transfer Training TREATMENT PLAN: Frequency/Duration: 4 times a week for duration of hospital stay Rehabilitation Potential For Stated Goals: Good RECOMMENDED REHABILITATION/EQUIPMENT: (at time of discharge pending progress): Due to the probability of continued deficits (see above) this patient will likely need continued skilled physical therapy after discharge. Equipment:  
? None at this time HISTORY:  
History of Present Injury/Illness (Reason for Referral): Ms. Celestine Saunders is a 79 yo female with PMH of PAFIB with pacer, multiple myeloma followed by Dr. Richa Us on current chemo, HTN, CKD, dCHF, who is sent as a direct admit from urgent care due to RUL pneumonia. She admits to several days of cough/ malaise and headache. CXR at urgent care showed RUL pneumonia. She denies fever, has anorexia and no ulices dyspnea.  
  
10 systems reviewed and negative except as noted in HPI. - has throat pain, needs glasses, has constipation, has decreased urination with dysuria, has myalgias, has memory loss, has cold intolerance and weight gain Past Medical History/Comorbidities:  
Ms. Celestine Saunders  has a past medical history of Anemia, unspecified, Chest pain, unspecified, Chronic anxiety, Diastolic heart failure ([address] Essential hypertension, benign, Flatulence, eructation, and gas pain, Lump or mass in breast, Other and unspecified hyperlipidemia, Paroxysmal atrial fibrillation ([address] Paroxysmal tachycardia ([address] Pernicious anemia, Postmenopausal atrophic vaginitis, Unspecified deficiency anemia, and Unspecified hypothyroidism.   Ms. Celestine Saunders  has a past surgical history that includes hx hysterectomy; hx orthopaedic; hx vein stripping; THORACENTESIS (Left, 6/23/2016); ULTRASOUND (Bilateral, 6/23/2016); ESOPHAGOGASTRODUODENOSCOPY (EGD)  BMI 30  ROOM 309 (N/A, 6/21/2016); ULTRASOUND (Bilateral, 2016); THORACENTESIS (Bilateral, 2016); THORACENTESIS (Bilateral, 2016); and ULTRASOUND (Bilateral, 2016). Social History/Living Environment:  
Home Environment: Private residence # Steps to Enter: 3 Rails to Enter: Yes Hand Rails : Right One/Two Story Residence: One story Living Alone: Yes Support Systems: Child(miriam) Patient Expects to be Discharged to[de-identified] Private residence Current DME Used/Available at Home: None Prior Level of Function/Work/Activity: 
Independent in her own home. A \"few\" falls. age Number of Personal Factors/Comorbidities that affect the Plan of Care: 1-2: MODERATE COMPLEXITY EXAMINATION:  
Most Recent Physical Functioning:  
Gross Assessment: 
AROM: Within functional limits PROM: Within functional limits Strength: Generally decreased, functional 
         
  
Posture: 
Posture (WDL): Exceptions to [facility] Posture Assessment: Forward head, Rounded shoulders Balance: 
Sitting: Intact Standing: Impaired Standing - Static: Fair Standing - Dynamic : Fair Bed Mobility: 
Supine to Sit: Supervision Wheelchair Mobility: 
  
Transfers: 
Sit to Stand: Contact guard assistance Stand to Sit: Contact guard assistance Gait: 
  
Step Length: Right shortened;Left shortened Distance (ft): 3 Feet (ft) Ambulation - Level of Assistance: Contact guard assistance Interventions: Verbal cues; Safety awareness training Body Structures Involved: 1. Muscles Body Functions Affected: 1. Movement Related Activities and Participation Affected: 1. Mobility Number of elements that affect the Plan of Care: 3: MODERATE COMPLEXITY CLINICAL PRESENTATION:  
Presentation: Evolving clinical presentation with changing clinical characteristics: MODERATE COMPLEXITY CLINICAL DECISION MAKIN [facility] [address] [id number] AM-PAC 6 Clicks Basic Mobility Inpatient Short Form How much difficulty does the patient currently have. .. Unable A Lot A Little None 1.  Turning over in bed (including adjusting bedclothes, sheets and blankets)? [] 1   [] 2   [] 3   [x] 4  
2. Sitting down on and standing up from a chair with arms ( e.g., wheelchair, bedside commode, etc.)   [] 1   [] 2   [x] 3   [] 4  
3. Moving from lying on back to sitting on the side of the bed? [] 1   [] 2   [] 3   [x] 4 How much help from another person does the patient currently need. .. Total A Lot A Little None 4. Moving to and from a bed to a chair (including a wheelchair)? [] 1   [] 2   [x] 3   [] 4  
5. Need to walk in hospital room? [] 1   [] 2   [x] 3   [] 4  
6. Climbing 3-5 steps with a railing? [] 1   [] 2   [x] 3   [] 4  
© 2007, Trustees of [address] [place] [address], under license to [employer]. All rights reserved Score:  Initial: 20 Most Recent: X (Date: -- ) Interpretation of Tool:  Represents activities that are increasingly more difficult (i.e. Bed mobility, Transfers, Gait). Score 24 23 22-20 19-15 14-10 9-7 6 Modifier CH CI CJ CK CL CM CN   
 
? Mobility - Walking and Moving Around:  
  - CURRENT STATUS: CJ - 20%-39% impaired, limited or restricted  - GOAL STATUS: CJ - 20%-39% impaired, limited or restricted  - D/C STATUS:  ---------------To be determined--------------- [place] / [place] / Product Type: Medicare /   
 
Medical Necessity:    
· Patient is expected to demonstrate progress in functional technique to increase independence with mobility and gait. . 
Reason for Services/Other Comments: 
· Patient continues to require present interventions due to patient's inability to function at baseline. .  
Use of outcome tool(s) and clinical judgement create a POC that gives a: Questionable prediction of patient's progress: MODERATE COMPLEXITY  
  
 
 
 
TREATMENT:  
(In addition to Assessment/Re-Assessment sessions the following treatments were rendered) Pre-treatment Symptoms/Complaints:  Now on Optiflow. Pain: Initial:  
Pain Intensity 1: 0  Post Session:  Slightly SOB Therapeutic Activity: (    13): Therapeutic activities including Bed transfers, Chair transfers, Toilet transfers and Ambulation on level ground to improve mobility. Required minimal Verbal cues; Safety awareness training to promote motor control of upper extremity(s), lower extremity(s). Braces/Orthotics/Lines/Etc:  
· IV Treatment/Session Assessment:   
· Response to Treatment:  good · Interdisciplinary Collaboration:  
o Registered Nurse · After treatment position/precautions:  
o Up in chair 
o Call light within reach 
o RN notified · Compliance with Program/Exercises: Will assess as treatment progresses · Recommendations/Intent for next treatment session: \"Next visit will focus on advancements to more challenging activities and reduction in assistance provided\". Total Treatment Duration: PT Patient Time In/Time Out Time In: 1110 Time Out: 1123 Yudith Rios, PT

## 2019-01-02 NOTE — INTERDISCIPLINARY ROUNDS
Interdisciplinary rounds with charge nurse, provider, and . Presenting Problem: PNA Daily Goal Onc S/O- MM Tx on hold, increased O2 needs Expected Discharge Date: TBD Expected Discharge Needs: none at this time Patient/Family Concerns addressed

## 2019-01-02 NOTE — PROGRESS NOTES
Pt O2 sat 90% after deep breathing. Respiratory therapist called to evaluate and adjust airvo accordingly.

## 2019-01-02 NOTE — PROGRESS NOTES
Called lab regarding overdue Vanc trough. Spoke with Asmita Salas. Lab personnel will be up to draw soon. Primary RN updated.

## 2019-01-02 NOTE — PROGRESS NOTES
END OF SHIFT NOTE: 
 
Intake/Output 01/02 0701 - 01/02 1900 In: 8778 [P.O.:480; I.V.:1795] Out: 600 [Urine:600] Voiding: YES Catheter: NO 
Drain:   
 
 
 
 
Stool:  0 occurrences. Stool Assessment Stool Color: Brown (01/02/19 1053) Stool Appearance: Loose (01/02/19 1053) Stool Amount: Large (01/02/19 1053) Stool Source/Status: Rectum (01/02/19 1053) Emesis:  0 occurrences. VITAL SIGNS Patient Vitals for the past 12 hrs: 
 Temp Pulse Resp BP SpO2  
01/02/19 1751     (!) 89 % 01/02/19 1726     90 % 01/02/19 1712  62  117/56 (!) 86 % 01/02/19 1636     90 % 01/02/19 1555 98.1 °F (36.7 °C) 65 17 121/53 (!) 85 % 01/02/19 1440     92 % 01/02/19 1123     91 % 01/02/19 1100     90 % 01/02/19 1055     91 % 01/02/19 1053 98.3 °F (36.8 °C) 64 17 112/81 (!) 87 % 01/02/19 0807     99 % 01/02/19 0750 97.6 °F (36.4 °C) 61 17 114/60 97 % Pain Assessment Pain 1 Pain Scale 1: Visual (01/02/19 1751) Pain Intensity 1: 0 (01/02/19 1751) Patient Stated Pain Goal: 0 (01/02/19 1716) Pain Reassessment 1: Patient sleeping (01/02/19 1751) Pain Onset 1: today (01/02/19 1716) Pain Location 1: Head (01/02/19 1716) Pain Orientation 1: Anterior (01/02/19 1716) Pain Description 1: Aching (01/02/19 1716) Pain Intervention(s) 1: Medication (see MAR) (01/02/19 1716) Ambulating Yes Additional Information: Pt with increased O2 needs today. 40 IV lasix given. Provided with incentive spirometer. Drsg to L calf changed. ICU rover assessed pt. Per rover, no need for more aggressive care at this time. PRN morphine added if labored breathing. Shift report given to oncoming nurse at the bedside. Sly Pat

## 2019-01-02 NOTE — CDMP QUERY
80 yr old female patient with a hx of dementia being admitted for Pneumonia. Requiring hi-flow oxygen to keep sats 91%. Pulm now saying \" acute resp failure. '' Family stating that patient is now less confused. Please clarify if this patient is being treated/managed for: 
-----encephalopathy ( type)  superimposed on her dementia- 
-----dementia only as stated  
=>Other Explanation of clinical findings =>Unable to Determine (no explanation of clinical findings) The medical record reflects the following: 
 
Risk Factors: hx of dementia away from baseline. resp failure, PNA Clinical Indicators: back to baseline mentation. More confusion before tx. Tx--- iv abx, hi-flow oxygen, pulm consult. Please clarify and document your clinical opinion in the progress notes and discharge summary including the definitive and/or presumptive diagnosis, (suspected or probable), related to the above clinical findings. Please include clinical findings supporting your diagnosis. Thanks, Micheline Ayala RN, CDS Compliant Documentation Management Program 
[phone]

## 2019-01-02 NOTE — PROGRESS NOTES
CM met with patient in room to discuss discharge planning needs. Patient was alert and oriented SOCIAL: 
Patient lives in an home alone She has family that lives within a [address] from pt's residence. Patient also has neighbors that check on her daily per pt and family at bedside. Patient is not a . Her PCP is confirmed Insurance confirmed as  [employer] MOBILITY / HISTORY: At baseline, patient ambulates  Occasionally uses a walker in needed. manages ADLs with independence. Patient reports she is still able to toilet independently ARUN - Yonas Blank[place] as needed No home oxygen. No dialysis history. No STR history. No sitters, aids, caregivers, or CLTC hours. PLAN FOR DISCHARGE: 
PT/OT consults  Have been ordered Specific plan for discharge cannot yet be determined. Pt is receptive to [place] if needed at time of discharge but has refused any STRH at this time. CM will continue to follow patient's case to assist with any social or discharge planning needs. Care Management Interventions PCP Verified by CM: Yes 
Palliative Care Criteria Met (RRAT>21 & CHF Dx)?: No 
Mode of Transport at Discharge: [facility] Transition of Care Consult (CM Consult): Discharge Planning Physical Therapy Consult: Yes Occupational Therapy Consult: Yes Current Support Network: Lives Alone, Family Lives [place] Confirm Follow Up Transport: Family Plan discussed with Pt/Family/Caregiver: Yes Freedom of Choice Offered: Yes [employer] Information Provided?: No

## 2019-01-02 NOTE — PROGRESS NOTES
Pharmacokinetic Consult to Pharmacist 
 
Susan Niki is a 80 y.o. female being treated for PNA with vancomycin and zosyn. Height: 5' 4\" (162.6 cm)  Weight: 73.9 kg (163 lb) Lab Results Component Value Date/Time BUN 12 01/02/2019 03:56 AM  
 Creatinine 1.24 (H) 01/02/2019 03:56 AM  
 WBC 5.0 01/02/2019 03:56 AM  
 Procalcitonin <0.1 06/13/2016 08:51 AM  
 Lactic acid 1.7 06/13/2016 02:24 PM  
 Lactic acid 2.3 (H) 06/13/2016 08:51 AM  
  
Estimated Creatinine Clearance: 30.9 mL/min (A) (based on SCr of 1.24 mg/dL (H)). Lab Results Component Value Date/Time Vancomycin,trough 10.0 01/01/2019 10:45 PM  
 
 
Day 3 of vancomycin. Goal trough is 15-20. Change to 1.25g q24h, next dose at 1600 today Will continue to follow patient. Thank you, Rylie Del Rio, Pharm. D. Clinical Pharmacist 
[phone]

## 2019-01-02 NOTE — PROGRESS NOTES
END OF SHIFT NOTE: 
 
Intake/Output No intake/output data recorded. Voiding: YES Catheter: NO 
Drain:   
 
 
 
 
Stool:  1 occurrences. Stool Assessment Stool Color: Zay Starch (01/02/19 0251) Stool Appearance: Loose; Watery (01/02/19 0251) Stool Amount: Small (01/02/19 0251) Stool Source/Status: Rectum (01/02/19 0251) Emesis:  0 occurrences. VITAL SIGNS Patient Vitals for the past 12 hrs: 
 Temp Pulse Resp BP SpO2  
01/02/19 0750 97.6 °F (36.4 °C) 61 17 114/60 97 % 01/02/19 0244 97.2 °F (36.2 °C) 65 18 127/60 95 % 01/02/19 0127     95 % 01/01/19 2356 (!) 102.1 °F (38.9 °C)      
01/01/19 2324 (!) 100.6 °F (38.1 °C)      
01/01/19 2230 100.2 °F (37.9 °C) 66 18 135/55 95 % Pain Assessment Pain 1 Pain Scale 1: Numeric (0 - 10) (01/02/19 0244) Pain Intensity 1: 0 (01/02/19 0244) Patient Stated Pain Goal: 0 (01/02/19 0244) Pain Reassessment 1: Patient sleeping (01/02/19 0244) Pain Onset 1: frontal HA (01/01/19 2129) Pain Location 1: Head (01/01/19 2129) Pain Orientation 1: Anterior (12/31/18 1933) Pain Description 1: Aching (01/01/19 2129) Pain Intervention(s) 1: Medication (see MAR) (01/01/19 2129) Ambulating Yes Additional Information: pt now on opti flow , O2 sats staying around 96%. . Continue to monitor for confusion Shift report given to oncoming nurse at the bedside.  
 
Clearance IRMA Sawant

## 2019-01-02 NOTE — PROGRESS NOTES
Enedelia, NP notified of low Calcium and Potassium level with today's lab. Per NP, replace per Alex orders.

## 2019-01-02 NOTE — CONSULTS
CONSULT NOTE Martin Perdue 1/2/2019 Date of Admission:  12/30/2018 The patient's chart is reviewed and the patient is discussed with the staff. Subjective:  
 
Patient is a 80 y.o.  female seen and evaluated at the request of Dr. Roseann Mclain for acute hypoxic respiratory failure with bilateral pnerumonia. Was admitted 12/30 as direct admit from Urgent Care with CXR showing RUL pneumonia. Has had cough and headache fr a few days. Was placed on antibiotics and oncology consulted. Yesterday was improving but is O2 sat dropped, requiring Opt-flow and chest CT revealed extensive bilateral pulmonary infiltrates, fevers 100.3. States she went to urgent care because she had a \"bad sore throat and though I was getting the flu\". Currently resting in bed, denies shortness of breath on Opti-flow 90%, 55L, still with sore throat and has a rare productive cough. States she lives alone and able to do \"most of my house work\". is on NC only with sleep and not on any inhalers. Chronic medical:  Multiple myeloma followed by Dr. Liliana Martinez and receiving chemotherapy, pAFib with pacemaker, dCHF, HTN, CKD, anemia, HLD, hypothyroidism. Has no history of tobacco use. Review of Systems A comprehensive review of systems was negative except for: Constitutional: positive for fevers, chills, sweats, fatigue, malaise and anorexia Ears, nose, mouth, throat, and face: positive for sore throat Respiratory: positive for cough, sputum, pneumonia or dyspnea on exertion Cardiovascular: positive for dyspnea, syncope, fatigue, dyspnea on exertion Gastrointestinal: positive for reflux symptoms Endocrine: positive for hypothyroidism Patient Active Problem List  
Diagnosis Code  Essential hypertension, benign I10  
 Other and unspecified hyperlipidemia E78.5  Acquired hypothyroidism E03.9  Postmenopausal atrophic vaginitis N95.2  Fatigue R53.83  
  Presence of cardiac pacemaker Z95.0  Persistent atrial fibrillation (HCC) I48.1  Chronic anxiety F41.9  Acute respiratory failure with hypoxemia (HCC) J96.01  
 Hypoxemia R09.02  
 Pleural effusion on left J90  
 Pleural effusion on right J90  
 Iron deficiency anemia D50.9  Debility R53.81  
 Melena K92.1  Mixed hyperlipidemia E78.2  Episodic atrial fibrillation (HCC) I48.0  Dementia without behavioral disturbance F03.90  Chronic diastolic heart failure ([address] I50.32  
 Pacemaker Z95.0  Macrocytic anemia D53.9  Essential hypertension with goal blood pressure less than 130/85 I10  
 Anxiety F41.9  Dyspnea R06.00  
 Multiple myeloma in remission ([facility]) C90.01  
 On amiodarone therapy Z79.899  Stage 3 chronic kidney disease (HCC) N18.3  Anemia due to chronic renal failure treated with erythropoietin, unspecified stage N18.9, D63.1  Multiple myeloma not having achieved remission ([facility]) C90.00  Leg swelling M79.89  Bilateral pneumonia J18.9  Pleural effusion, bilateral J90  
 Acute hypokalemia E87.6 Home O2 NC 2L with sleep only. Prior to Admission Medications Prescriptions Last Dose Informant Patient Reported? Taking? Aspirin, Buffered 81 mg tab 12/30/2018 at Unknown time  Yes Yes Sig: Take  by mouth daily. DISABLED PLACARD (DISABLED PLACARD) DMV 12/30/2018 at Unknown time  No Yes Sig: Apply to car. HYDROcodone-acetaminophen (NORCO) 5-325 mg per tablet Unknown at Unknown time  No No  
Sig: Take 1 Tab by mouth every six (6) hours as needed for Pain. Max Daily Amount: 4 Tabs. OXYGEN-AIR DELIVERY SYSTEMS 12/29/2018 at Unknown time  Yes Yes Sig: 3 L by Does Not Apply route nightly. STOOL SOFTENER 100 mg capsule 12/30/2018 at Unknown time  No Yes Sig: Take 1 Cap by mouth two (2) times a day. Patient taking differently: Take 1 Cap by mouth one times a day. amLODIPine (NORVASC) 2.5 mg tablet 12/30/2018 at Unknown time  No Yes Sig: Take 1 Tab by mouth daily. amiodarone (CORDARONE) 200 mg tablet 12/30/2018 at Unknown time  No Yes Sig: Take 1 Tab by mouth daily. atorvastatin (LIPITOR) 80 mg tablet 12/30/2018 at Unknown time  No Yes Sig: TAKE 1 TABLET BY MOUTH DAILY  
cholecalciferol (VITAMIN D3) 1,000 unit tablet 12/30/2018 at Unknown time  No Yes Sig: Take 1 Tab by mouth daily. clorazepate (TRANXENE) 7.5 mg tablet 12/29/2018 at Unknown time  No Yes Sig: TAKE 1 TABLET BY MOUTH 2 TIMES DAILY  
estradiol (ESTRACE) 1 mg tablet 12/29/2018 at Unknown time  No Yes Sig: TAKE 1 TABLET BY MOUTH DAILY ferrous sulfate 325 mg (65 mg iron) tablet 12/29/2018 at Unknown time  No Yes Sig: TAKE 1 TABLET BY MOUTH DAILY. furosemide (LASIX) 40 mg tablet 12/30/2018 at Unknown time  No Yes Sig: Take 1 Tab by mouth daily. Taking 2 po qd  
lactulose (KRISTALOSE) 20 gram packet Unknown at Unknown time  No No  
Sig: Take 1 Packet by mouth three (3) times daily. levothyroxine (SYNTHROID) 50 mcg tablet 12/30/2018 at Unknown time  No Yes Sig: Take 1 Tab by mouth Daily (before breakfast). mirtazapine (REMERON) 15 mg tablet 12/29/2018 at Unknown time  Yes Yes  
mupirocin (BACTROBAN) 2 % ointment   No No  
Sig: Apply  to affected area daily. nadolol (CORGARD) 80 mg tablet Not Taking at Unknown time  No No  
Sig: Take 1 Tab by mouth daily. ondansetron hcl (ZOFRAN, AS HYDROCHLORIDE,) 8 mg tablet Not Taking at Unknown time  No No  
Sig: Take 1 Tab by mouth every eight (8) hours as needed for Nausea. pomalidomide (POMALYST) 2 mg cap Not Taking at Unknown time  No No  
Sig: Take 1 Cap by mouth daily. Take 1 capsule daily on days 1-14. Then off for 14 days  
potassium chloride SR (KLOR-CON 10) 10 mEq tablet 12/29/2018 at Unknown time  No Yes Sig: Take 2 Tabs by mouth daily. prochlorperazine (COMPAZINE) 10 mg tablet Not Taking at Unknown time  No No  
Sig: Take 1 Tab by mouth every six (6) hours as needed. raNITIdine (ZANTAC) 75 mg tablet Not Taking at Unknown time  Yes No  
Sig: Take 75 mg by mouth two (2) times a day. traZODone (DESYREL) 100 mg tablet 12/29/2018 at Unknown time  No Yes Sig: TAKE 1 TABLET BY MOUTH NIGHTLY.  
trimethoprim-sulfamethoxazole (BACTRIM DS, SEPTRA DS) 160-800 mg per tablet Unknown at Unknown time  No No  
Sig: Take 1 Tab by mouth two (2) times a day. Facility-Administered Medications: None Past Medical History:  
Diagnosis Date  Anemia, unspecified  Chest pain, unspecified 3/21/2016  Chronic anxiety 3/21/2016  Diastolic heart failure ([facility] 3/21/2016  Essential hypertension, benign  Flatulence, eructation, and gas pain  Lump or mass in breast   
 Other and unspecified hyperlipidemia  Paroxysmal atrial fibrillation ([facility] 3/21/2016  Paroxysmal tachycardia ([facility] 3/21/2016  Pernicious anemia  Postmenopausal atrophic vaginitis  Unspecified deficiency anemia  Unspecified hypothyroidism Past Surgical History:  
Procedure Laterality Date  HX HYSTERECTOMY  HX ORTHOPAEDIC    
 heel spur  HX VEIN STRIPPING Social History Socioeconomic History  Marital status:  Spouse name: Not on file  Number of children: Not on file  Years of education: Not on file  Highest education level: Not on file Social Needs  Financial resource strain: Not on file  Food insecurity - worry: Not on file  Food insecurity - inability: Not on file  Transportation needs - medical: Not on file  Transportation needs - non-medical: Not on file Occupational History  Not on file Tobacco Use  Smoking status: Never Smoker  Smokeless tobacco: Never Used Substance and Sexual Activity  Alcohol use: No  
  Alcohol/week: 0.0 oz  Drug use: No  
 Sexual activity: Not on file Other Topics Concern  Not on file Social History Narrative , lives alone. Worked in [employer] x 30 years as a [employer]. Family History Problem Relation Age of Onset  No Known Problems Mother  Heart Disease Father No Known Allergies Current Facility-Administered Medications Medication Dose Route Frequency  guaiFENesin ER (MUCINEX) tablet 1,200 mg  1,200 mg Oral Q12H  
 vancomycin (VANCOCIN) 1250 mg in  ml infusion  1,250 mg IntraVENous Q24H  
 albuterol (PROVENTIL VENTOLIN) nebulizer solution 2.5 mg  2.5 mg Nebulization Q6H RT  
 famotidine (PEPCID) tablet 20 mg  20 mg Oral DAILY  nystatin (MYCOSTATIN) 100,000 unit/mL oral suspension 500,000 Units  500,000 Units Oral QID  amiodarone (CORDARONE) tablet 200 mg  200 mg Oral DAILY  amLODIPine (NORVASC) tablet 2.5 mg  2.5 mg Oral DAILY  aspirin delayed-release tablet 81 mg  81 mg Oral DAILY  atorvastatin (LIPITOR) tablet 80 mg  80 mg Oral QHS  ferrous sulfate tablet 325 mg  1 Tab Oral DAILY WITH BREAKFAST  levothyroxine (SYNTHROID) tablet 50 mcg  50 mcg Oral ACB  mirtazapine (REMERON) tablet 15 mg  15 mg Oral QHS  nadolol (CORGARD) tablet 80 mg  80 mg Oral DAILY  piperacillin-tazobactam (ZOSYN) 4.5 g in 0.9% sodium chloride (MBP/ADV) 100 mL  4.5 g IntraVENous Q8H  
 0.9% sodium chloride infusion  100 mL/hr IntraVENous CONTINUOUS  
 sodium chloride (NS) flush 5-10 mL  5-10 mL IntraVENous Q8H  
 sodium chloride (NS) flush 5-10 mL  5-10 mL IntraVENous PRN  
 acetaminophen (TYLENOL) tablet 650 mg  650 mg Oral Q6H PRN  
 HYDROcodone-acetaminophen (NORCO) 5-325 mg per tablet 1 Tab  1 Tab Oral Q4H PRN  
 naloxone (NARCAN) injection 0.4 mg  0.4 mg IntraVENous PRN  
 ondansetron (ZOFRAN) injection 4 mg  4 mg IntraVENous Q4H PRN  
 clorazepate (TRANXENE) tablet 7.5 mg  7.5 mg Oral BID Objective:  
 
Vitals:  
 01/02/19 1053 01/02/19 1055 01/02/19 1100 01/02/19 1123 BP: 112/81 Pulse: 64 Resp: 17 Temp: 98.3 °F (36.8 °C) SpO2: (!) 87% 91% 90% 91% Weight:      
Height: PHYSICAL EXAM  
 
Constitutional:  the patient is well developed and in no acute distress EENMT:  Sclera clear, pupils equal, oral mucosa moist 
Respiratory: bilateral crackles Cardiovascular:  RRR without M,G,R 
Gastrointestinal: soft and non-tender; with positive bowel sounds. Musculoskeletal: warm without cyanosis. There is no lower leg edema. Skin:  no jaundice or rashes, no wounds Neurologic: no gross neuro deficits Psychiatric:  alert and oriented x 3 CXR:  None today Chest CT 1/1/19:   
1. NO DEFINITE ACUTE PULMONARY EMBOLISM. 
 2.  SMALL BILATERAL PLEURAL EFFUSIONS WITH EXTENSIVE BILATERAL INFILTRATES SUSPICIOUS FOR PNEUMONIA. PULMONARY EDEMA WOULD BE LESS LIKELY. CXR 1/1/19:  EXTENSIVE INHOMOGENEOUS BILATERAL INFILTRATES WITH RIGHT UPPER LOBE PREDOMINANCE. Recent Labs 01/02/19 
3125 01/01/19 
6668 12/31/18 
5464 WBC 5.0 3.8* 3.3* HGB 9.3* 10.0* 9.8* HCT 29.1* 31.7* 31.2*  
 168 143* Recent Labs 01/02/19 
4349 01/01/19 
7804 12/31/18 
1589  141 139  
K 3.4* 3.8 3.2*  
* 111* 108* * 119* 95  
CO2 19* 19* 25 BUN 12 11 11 CREA 1.24* 1.21* 1.18* CA 6.1* 6.0* 6.0* ALB 2.1* 2.6* 2.5* TBILI 1.1 0.9  0.9 1.4* ALT 25 22 20 SGOT 31 22 25 No results for input(s): PH, PCO2, PO2, HCO3 in the last 72 hours. No results for input(s): LCAD, LAC in the last 72 hours. Assessment:  (Medical Decision Making) Hospital Problems  Date Reviewed: 8/14/2018 Codes Class Noted POA Pleural effusion, bilateral ICD-10-CM: J90 ICD-9-CM: 511.9  1/2/2019 Yes Acute hypokalemia ICD-10-CM: E87.6 ICD-9-CM: 276.8  1/2/2019 Yes Bilateral pneumonia ICD-10-CM: J18.9 ICD-9-CM: 598  12/30/2018 Yes Stage 3 chronic kidney disease (HCC) ICD-10-CM: N18.3 ICD-9-CM: 585.3  11/27/2017 Yes  Multiple myeloma in remission [facility]) ICD-10-CM: C90.01 
 ICD-9-CM: 203.01  1/25/2017 Yes Macrocytic anemia ICD-10-CM: D53.9 ICD-9-CM: 281.9  12/22/2016 Yes Essential hypertension with goal blood pressure less than 130/85 ICD-10-CM: I10 
ICD-9-CM: 401.9  12/22/2016 Yes Anxiety ICD-10-CM: F41.9 ICD-9-CM: 300.00  12/22/2016 Yes Chronic diastolic heart failure ([facility] ICD-10-CM: I50.32 
ICD-9-CM: 428.32  11/16/2016 Yes Episodic atrial fibrillation (HCC) ICD-10-CM: I48.0 ICD-9-CM: 427.31  8/16/2016 Yes Dementia without behavioral disturbance ICD-10-CM: F03.90 ICD-9-CM: 294.20  8/16/2016 Yes * (Principal) Acute respiratory failure with hypoxemia ([facility] ICD-10-CM: J96.01 
ICD-9-CM: 518.81  6/17/2016 Yes Presence of cardiac pacemaker (Chronic) ICD-10-CM: Z95.0 ICD-9-CM: V45.01  3/21/2016 Yes Acquired hypothyroidism (Chronic) ICD-10-CM: E03.9 ICD-9-CM: 244.9  7/9/2015 Yes Plan:  (Medical Decision Making) --Oncology following for multiple myeloma--planning 1 week follow up after discharge. --NS 100ml/hr 
--Albuterol, Mucinex 
--Zosyn, Vancomycin day 4 
--Blood cultures 12/30:  No growth 3 days-pending --Hgb 9.3 
--C. Diff:  Negative 
--PT/OT following  
--Add IV steroids 
--Concern for Amiodarone toxicity--will stop for now. Will likely need cardiology input. --Agree with Opti-flow--may required BIPAP due to high O2 demands. More than 50% of the time documented was spent in face-to-face contact with the patient and in the care of the patient on the floor/unit where the patient is located. Thank you very much for this referral.  We appreciate the opportunity to participate in this patient's care. Will follow along with above stated plan. Raven Og NP Lungs:  Bilateral crackles Heart:  RRR with no Murmur/Rubs/Gallops Additional Comments:  Bilateral pneumonia/infiltrates-  ? Cap but amiodarone toxicity, thalidomide could contribute Add iv steroids, add coverage for atypical pneuomia-zithromax I have spoken with and examined the patient. I agree with the above assessment and plan as documented.  
 
Goldy Martines MD

## 2019-01-02 NOTE — PROGRESS NOTES
1530- Pt temp fluctuating (tmax 100.3). O2 demands increasing from yesterday (RA - 5L NC). Pt apneic when sleeping. Pt more disoriented. RN spoke with NP Jeremías Ayala, oncology team provider. NP stated that oncology was not primary and needed to consult with hospitalist. Pt was not assigned to a hospitalist. Charge RN Linda Lai contacted Dr. Iwona Tirado. From there, hospitalist assessed pt and ordered ABG's, albuterol, and chest X-Ray. 1810- ABG for O2 was 75. RT placed pt on 15L high flow NC and notified Dr. Iwona Tirado. From there, Dr. Iwona Tirado ordered stat CT and requested that pt be placed on opti-flow. END OF SHIFT NOTE: 
 
VITAL SIGNS Patient Vitals for the past 12 hrs: 
 Temp Pulse Resp BP SpO2  
01/01/19 1937  74   96 % 01/01/19 1920     (!) 88 % 01/01/19 1917 99.2 °F (37.3 °C) (!) 59 18 125/54 (!) 88 % 01/01/19 1506 99.8 °F (37.7 °C)      
01/01/19 1500 100.3 °F (37.9 °C) 69 18 142/53 91 % 01/01/19 1138     93 % 01/01/19 1112 98.4 °F (36.9 °C) 80 16 125/63 92 % No c/o of pain during shift. No c/o N/V. BLE non-pitting edema. Bilateral Expiratory wheezing and diminished lung sounds. SOB upon exertion. Loose stools. C diff negative. Fair intake noted. Ambulates with an assist x1. Continue with POC.  
 
Report given to Elliot Rick

## 2019-01-02 NOTE — PROGRESS NOTES
Dr. Pauline Amaro (pulmonary) notified of O2 sat 88% on airvo at the highest setting. Order given to administer 40mg IV lasix. If pt continues to sat low, consider moving to ICU and using BIPAP.

## 2019-01-03 ENCOUNTER — APPOINTMENT (OUTPATIENT)
Dept: GENERAL RADIOLOGY | Age: 84
DRG: 193 | End: 2019-01-03
Attending: INTERNAL MEDICINE
Payer: MEDICARE

## 2019-01-03 LAB
ALBUMIN SERPL-MCNC: 2 G/DL (ref 3.2–4.6)
ALBUMIN/GLOB SERPL: 0.6 {RATIO} (ref 1.2–3.5)
ALP SERPL-CCNC: 72 U/L (ref 50–136)
ALT SERPL-CCNC: 35 U/L (ref 12–65)
ANION GAP SERPL CALC-SCNC: 11 MMOL/L (ref 7–16)
AST SERPL-CCNC: 44 U/L (ref 15–37)
BASOPHILS # BLD: 0 K/UL (ref 0–0.2)
BASOPHILS NFR BLD: 0 % (ref 0–2)
BILIRUB SERPL-MCNC: 0.9 MG/DL (ref 0.2–1.1)
BUN SERPL-MCNC: 12 MG/DL (ref 8–23)
CALCIUM SERPL-MCNC: 6.2 MG/DL (ref 8.3–10.4)
CHLORIDE SERPL-SCNC: 114 MMOL/L (ref 98–107)
CO2 SERPL-SCNC: 19 MMOL/L (ref 21–32)
CREAT SERPL-MCNC: 1.21 MG/DL (ref 0.6–1)
DIFFERENTIAL METHOD BLD: ABNORMAL
EOSINOPHIL # BLD: 0 K/UL (ref 0–0.8)
EOSINOPHIL NFR BLD: 0 % (ref 0.5–7.8)
ERYTHROCYTE [DISTWIDTH] IN BLOOD BY AUTOMATED COUNT: 13.9 % (ref 11.9–14.6)
FLUAV AG NPH QL IA: NEGATIVE
FLUBV AG NPH QL IA: NEGATIVE
GLOBULIN SER CALC-MCNC: 3.4 G/DL (ref 2.3–3.5)
GLUCOSE SERPL-MCNC: 153 MG/DL (ref 65–100)
HCT VFR BLD AUTO: 28.3 % (ref 35.8–46.3)
HGB BLD-MCNC: 8.9 G/DL (ref 11.7–15.4)
IMM GRANULOCYTES # BLD: 0 K/UL (ref 0–0.5)
IMM GRANULOCYTES NFR BLD AUTO: 1 % (ref 0–5)
LYMPHOCYTES # BLD: 0.3 K/UL (ref 0.5–4.6)
LYMPHOCYTES NFR BLD: 9 % (ref 13–44)
MCH RBC QN AUTO: 32.2 PG (ref 26.1–32.9)
MCHC RBC AUTO-ENTMCNC: 31.4 G/DL (ref 31.4–35)
MCV RBC AUTO: 102.5 FL (ref 79.6–97.8)
MONOCYTES # BLD: 0.6 K/UL (ref 0.1–1.3)
MONOCYTES NFR BLD: 15 % (ref 4–12)
NEUTS SEG # BLD: 2.9 K/UL (ref 1.7–8.2)
NEUTS SEG NFR BLD: 76 % (ref 43–78)
NRBC # BLD: 0 K/UL (ref 0–0.2)
PLATELET # BLD AUTO: 174 K/UL (ref 150–450)
PMV BLD AUTO: 10.4 FL (ref 9.4–12.3)
POTASSIUM SERPL-SCNC: 2.8 MMOL/L (ref 3.5–5.1)
PROCALCITONIN SERPL-MCNC: 0.5 NG/ML
PROT SERPL-MCNC: 5.4 G/DL (ref 6.3–8.2)
RBC # BLD AUTO: 2.76 M/UL (ref 4.05–5.2)
SODIUM SERPL-SCNC: 144 MMOL/L (ref 136–145)
SPECIMEN SOURCE: NORMAL
WBC # BLD AUTO: 3.9 K/UL (ref 4.3–11.1)

## 2019-01-03 PROCEDURE — 84145 PROCALCITONIN (PCT): CPT

## 2019-01-03 PROCEDURE — 77030034849

## 2019-01-03 PROCEDURE — 74011250637 HC RX REV CODE- 250/637: Performed by: INTERNAL MEDICINE

## 2019-01-03 PROCEDURE — 80053 COMPREHEN METABOLIC PANEL: CPT

## 2019-01-03 PROCEDURE — 85025 COMPLETE CBC W/AUTO DIFF WBC: CPT

## 2019-01-03 PROCEDURE — 94640 AIRWAY INHALATION TREATMENT: CPT

## 2019-01-03 PROCEDURE — 36415 COLL VENOUS BLD VENIPUNCTURE: CPT

## 2019-01-03 PROCEDURE — 87633 RESP VIRUS 12-25 TARGETS: CPT

## 2019-01-03 PROCEDURE — 87804 INFLUENZA ASSAY W/OPTIC: CPT

## 2019-01-03 PROCEDURE — 71045 X-RAY EXAM CHEST 1 VIEW: CPT

## 2019-01-03 PROCEDURE — 74011250636 HC RX REV CODE- 250/636: Performed by: INTERNAL MEDICINE

## 2019-01-03 PROCEDURE — 74011000258 HC RX REV CODE- 258: Performed by: INTERNAL MEDICINE

## 2019-01-03 PROCEDURE — 77010033711 HC HIGH FLOW OXYGEN

## 2019-01-03 PROCEDURE — 87449 NOS EACH ORGANISM AG IA: CPT

## 2019-01-03 PROCEDURE — 99232 SBSQ HOSP IP/OBS MODERATE 35: CPT | Performed by: INTERNAL MEDICINE

## 2019-01-03 PROCEDURE — 87899 AGENT NOS ASSAY W/OPTIC: CPT

## 2019-01-03 PROCEDURE — 65270000029 HC RM PRIVATE

## 2019-01-03 PROCEDURE — 94760 N-INVAS EAR/PLS OXIMETRY 1: CPT

## 2019-01-03 PROCEDURE — 74011250637 HC RX REV CODE- 250/637: Performed by: NURSE PRACTITIONER

## 2019-01-03 PROCEDURE — 74011000250 HC RX REV CODE- 250: Performed by: INTERNAL MEDICINE

## 2019-01-03 PROCEDURE — 97530 THERAPEUTIC ACTIVITIES: CPT

## 2019-01-03 RX ORDER — POTASSIUM CHLORIDE 20 MEQ/1
40 TABLET, EXTENDED RELEASE ORAL 3 TIMES DAILY
Status: COMPLETED | OUTPATIENT
Start: 2019-01-03 | End: 2019-01-03

## 2019-01-03 RX ORDER — HYDROCORTISONE 25 MG/G
CREAM TOPICAL 4 TIMES DAILY
Status: DISCONTINUED | OUTPATIENT
Start: 2019-01-03 | End: 2019-01-09 | Stop reason: HOSPADM

## 2019-01-03 RX ORDER — CALCIUM CARBONATE 200(500)MG
400 TABLET,CHEWABLE ORAL
Status: DISCONTINUED | OUTPATIENT
Start: 2019-01-03 | End: 2019-01-09 | Stop reason: HOSPADM

## 2019-01-03 RX ORDER — POTASSIUM CHLORIDE 20 MEQ/1
60 TABLET, EXTENDED RELEASE ORAL
Status: DISCONTINUED | OUTPATIENT
Start: 2019-01-03 | End: 2019-01-03

## 2019-01-03 RX ORDER — POTASSIUM CHLORIDE 20 MEQ/1
40 TABLET, EXTENDED RELEASE ORAL
Status: COMPLETED | OUTPATIENT
Start: 2019-01-03 | End: 2019-01-03

## 2019-01-03 RX ORDER — FUROSEMIDE 10 MG/ML
40 INJECTION INTRAMUSCULAR; INTRAVENOUS 2 TIMES DAILY
Status: DISCONTINUED | OUTPATIENT
Start: 2019-01-03 | End: 2019-01-09

## 2019-01-03 RX ADMIN — HYDROCORTISONE 2.5%: 25 CREAM TOPICAL at 23:21

## 2019-01-03 RX ADMIN — POTASSIUM CHLORIDE 20 MEQ: 1.5 POWDER, FOR SOLUTION ORAL at 08:35

## 2019-01-03 RX ADMIN — AZITHROMYCIN MONOHYDRATE 500 MG: 500 INJECTION, POWDER, LYOPHILIZED, FOR SOLUTION INTRAVENOUS at 13:55

## 2019-01-03 RX ADMIN — PIPERACILLIN SODIUM,TAZOBACTAM SODIUM 4.5 G: 4; .5 INJECTION, POWDER, FOR SOLUTION INTRAVENOUS at 13:55

## 2019-01-03 RX ADMIN — FAMOTIDINE 20 MG: 20 TABLET ORAL at 08:35

## 2019-01-03 RX ADMIN — NADOLOL 80 MG: 40 TABLET ORAL at 08:35

## 2019-01-03 RX ADMIN — NYSTATIN 500000 UNITS: 500000 SUSPENSION ORAL at 17:12

## 2019-01-03 RX ADMIN — Medication 10 ML: at 13:56

## 2019-01-03 RX ADMIN — POTASSIUM CHLORIDE 40 MEQ: 20 TABLET, EXTENDED RELEASE ORAL at 12:31

## 2019-01-03 RX ADMIN — ALBUTEROL SULFATE 2.5 MG: 2.5 SOLUTION RESPIRATORY (INHALATION) at 14:57

## 2019-01-03 RX ADMIN — Medication 10 ML: at 21:04

## 2019-01-03 RX ADMIN — ALBUTEROL SULFATE 2.5 MG: 2.5 SOLUTION RESPIRATORY (INHALATION) at 19:56

## 2019-01-03 RX ADMIN — HYDROCODONE BITARTRATE AND ACETAMINOPHEN 1 TABLET: 5; 325 TABLET ORAL at 21:00

## 2019-01-03 RX ADMIN — POTASSIUM CHLORIDE 40 MEQ: 20 TABLET, EXTENDED RELEASE ORAL at 09:47

## 2019-01-03 RX ADMIN — ALBUTEROL SULFATE 2.5 MG: 2.5 SOLUTION RESPIRATORY (INHALATION) at 07:26

## 2019-01-03 RX ADMIN — AMLODIPINE BESYLATE 2.5 MG: 5 TABLET ORAL at 08:36

## 2019-01-03 RX ADMIN — ATORVASTATIN CALCIUM 80 MG: 40 TABLET, FILM COATED ORAL at 20:55

## 2019-01-03 RX ADMIN — NYSTATIN 500000 UNITS: 500000 SUSPENSION ORAL at 08:35

## 2019-01-03 RX ADMIN — METHYLPREDNISOLONE SODIUM SUCCINATE 60 MG: 125 INJECTION, POWDER, FOR SOLUTION INTRAMUSCULAR; INTRAVENOUS at 20:56

## 2019-01-03 RX ADMIN — GUAIFENESIN 1200 MG: 600 TABLET, EXTENDED RELEASE ORAL at 08:35

## 2019-01-03 RX ADMIN — POTASSIUM CHLORIDE 40 MEQ: 20 TABLET, EXTENDED RELEASE ORAL at 20:55

## 2019-01-03 RX ADMIN — ALBUTEROL SULFATE 2.5 MG: 2.5 SOLUTION RESPIRATORY (INHALATION) at 02:10

## 2019-01-03 RX ADMIN — GUAIFENESIN 1200 MG: 600 TABLET, EXTENDED RELEASE ORAL at 20:56

## 2019-01-03 RX ADMIN — POTASSIUM CHLORIDE 40 MEQ: 20 TABLET, EXTENDED RELEASE ORAL at 17:11

## 2019-01-03 RX ADMIN — FUROSEMIDE 40 MG: 10 INJECTION, SOLUTION INTRAMUSCULAR; INTRAVENOUS at 09:47

## 2019-01-03 RX ADMIN — Medication 10 ML: at 05:48

## 2019-01-03 RX ADMIN — ACETAMINOPHEN 650 MG: 325 TABLET ORAL at 12:31

## 2019-01-03 RX ADMIN — CALCIUM CARBONATE 400 MG: 500 TABLET, CHEWABLE ORAL at 17:12

## 2019-01-03 RX ADMIN — NYSTATIN 500000 UNITS: 500000 SUSPENSION ORAL at 12:32

## 2019-01-03 RX ADMIN — CALCIUM CARBONATE 200 MG: 500 TABLET, CHEWABLE ORAL at 08:35

## 2019-01-03 RX ADMIN — LEVOTHYROXINE SODIUM 50 MCG: 50 TABLET ORAL at 08:35

## 2019-01-03 RX ADMIN — FERROUS SULFATE TAB 325 MG (65 MG ELEMENTAL FE) 325 MG: 325 (65 FE) TAB at 08:36

## 2019-01-03 RX ADMIN — ASPIRIN 81 MG: 81 TABLET, COATED ORAL at 08:36

## 2019-01-03 RX ADMIN — CALCIUM CARBONATE 400 MG: 500 TABLET, CHEWABLE ORAL at 12:33

## 2019-01-03 RX ADMIN — METHYLPREDNISOLONE SODIUM SUCCINATE 60 MG: 125 INJECTION, POWDER, FOR SOLUTION INTRAMUSCULAR; INTRAVENOUS at 08:36

## 2019-01-03 RX ADMIN — MIRTAZAPINE 15 MG: 15 TABLET, FILM COATED ORAL at 20:55

## 2019-01-03 RX ADMIN — VANCOMYCIN HYDROCHLORIDE 1250 MG: 10 INJECTION, POWDER, LYOPHILIZED, FOR SOLUTION INTRAVENOUS at 16:33

## 2019-01-03 RX ADMIN — FUROSEMIDE 40 MG: 10 INJECTION, SOLUTION INTRAMUSCULAR; INTRAVENOUS at 17:12

## 2019-01-03 RX ADMIN — CLORAZEPATE DIPOTASSIUM 7.5 MG: 7.5 TABLET ORAL at 08:35

## 2019-01-03 RX ADMIN — PIPERACILLIN SODIUM,TAZOBACTAM SODIUM 4.5 G: 4; .5 INJECTION, POWDER, FOR SOLUTION INTRAVENOUS at 03:18

## 2019-01-03 RX ADMIN — PIPERACILLIN SODIUM,TAZOBACTAM SODIUM 4.5 G: 4; .5 INJECTION, POWDER, FOR SOLUTION INTRAVENOUS at 20:54

## 2019-01-03 NOTE — PROGRESS NOTES
Shad Souza Admission Date: 12/30/2018 Daily Progress Note: 1/3/2019 The patient's chart is reviewed and the patient is discussed with the staff. 81yo WF with MM on chemotherapy, on O2 at night at baseline, admitted from  with shortness of breath, CXR with RUL infiltrate. Has had progressive worsening hypoxia and now with B infiltrates. Subjective:  
 
Patient currently sitting up in chair. On 60% optiflow. Continues to have significant dyspnea with any level of exertion. On lasix and very SOB when going to bathroom. Re-iterates that she thought she may have the flu. Current Facility-Administered Medications Medication Dose Route Frequency  potassium chloride (K-DUR, KLOR-CON) SR tablet 40 mEq  40 mEq Oral TID  calcium carbonate (TUMS) chewable tablet 400 mg [elemental]  400 mg Oral TID WITH MEALS  furosemide (LASIX) injection 40 mg  40 mg IntraVENous BID  
 azithromycin (ZITHROMAX) 500 mg in 0.9% sodium chloride (MBP/ADV) 250 mL  500 mg IntraVENous Q24H  
 guaiFENesin ER (MUCINEX) tablet 1,200 mg  1,200 mg Oral Q12H  
 vancomycin (VANCOCIN) 1250 mg in  ml infusion  1,250 mg IntraVENous Q24H  
 methylPREDNISolone (PF) (Solu-MEDROL) injection 60 mg  60 mg IntraVENous Q12H  
 morphine injection 1 mg  1 mg IntraVENous Q4H PRN  
 albuterol (PROVENTIL VENTOLIN) nebulizer solution 2.5 mg  2.5 mg Nebulization Q6H RT  
 famotidine (PEPCID) tablet 20 mg  20 mg Oral DAILY  nystatin (MYCOSTATIN) 100,000 unit/mL oral suspension 500,000 Units  500,000 Units Oral QID  amLODIPine (NORVASC) tablet 2.5 mg  2.5 mg Oral DAILY  aspirin delayed-release tablet 81 mg  81 mg Oral DAILY  atorvastatin (LIPITOR) tablet 80 mg  80 mg Oral QHS  ferrous sulfate tablet 325 mg  1 Tab Oral DAILY WITH BREAKFAST  levothyroxine (SYNTHROID) tablet 50 mcg  50 mcg Oral ACB  mirtazapine (REMERON) tablet 15 mg  15 mg Oral QHS  nadolol (CORGARD) tablet 80 mg  80 mg Oral DAILY  piperacillin-tazobactam (ZOSYN) 4.5 g in 0.9% sodium chloride (MBP/ADV) 100 mL  4.5 g IntraVENous Q8H  
 sodium chloride (NS) flush 5-10 mL  5-10 mL IntraVENous Q8H  
 sodium chloride (NS) flush 5-10 mL  5-10 mL IntraVENous PRN  
 acetaminophen (TYLENOL) tablet 650 mg  650 mg Oral Q6H PRN  
 HYDROcodone-acetaminophen (NORCO) 5-325 mg per tablet 1 Tab  1 Tab Oral Q4H PRN  
 naloxone (NARCAN) injection 0.4 mg  0.4 mg IntraVENous PRN  
 ondansetron (ZOFRAN) injection 4 mg  4 mg IntraVENous Q4H PRN  
 clorazepate (TRANXENE) tablet 7.5 mg  7.5 mg Oral BID Review of Systems Constitutional: negative for fever, chills, sweats Cardiovascular: negative for chest pain, palpitations, syncope, edema Gastrointestinal: negative for dysphagia, reflux, vomiting, diarrhea, abdominal pain, or melena Neurologic: negative for focal weakness, numbness, headache Objective:  
 
Vitals:  
 01/03/19 6468 01/03/19 0747 01/03/19 0947 01/03/19 1135 BP:  104/61 128/66 118/58 Pulse:  62 61 62 Resp:  18  18 Temp:  98.9 °F (37.2 °C)  98.4 °F (36.9 °C) SpO2: 94% 95% 98% 99% Weight:      
Height:      
 
Intake and Output:  
01/01 1901 - 01/03 0700 In: 2657 [P.O.:480; I.V.:2962] Out: 1525 [ALPPK:7631] 01/03 0701 - 01/03 1900 In: 310 [P.O.:118; I.V.:192] Out: 900 [Urine:900] Physical Exam:  
Constitution:  the patient is well developed and in no acute distress EENMT:  Sclera clear, pupils equal, oral mucosa moist 
Respiratory: Fine B crackles Cardiovascular:  RRR without M,G,R 
Gastrointestinal: soft and non-tender; with positive bowel sounds. Musculoskeletal: warm without cyanosis. There is no lower leg edema. Skin:  no jaundice or rashes, no wounds Neurologic: no gross neuro deficits Psychiatric:  alert and oriented x ppt CHEST XRAY:  
CXR 1/3/19 1. Stable findings of the chest as described above.  
 
 
LAB 
 No lab exists for component: [place] Recent Labs 01/03/19 
5947 01/02/19 
1585 01/01/19 
2156 WBC 3.9* 5.0 3.8* HGB 8.9* 9.3* 10.0* HCT 28.3* 29.1* 31.7*  171 168 Recent Labs 01/03/19 
8246 01/02/19 
2613 01/01/19 
6114  141 141  
K 2.8* 3.4* 3.8 * 114* 111* CO2 19* 19* 19* * 124* 119* BUN 12 12 11 CREA 1.21* 1.24* 1.21* CA 6.2* 6.1* 6.0* ALB 2.0* 2.1* 2.6* SGOT 44* 31 22 ABG:  No results found for: PH, PHI, PCO2, PCO2I, PO2, PO2I, HCO3, HCO3I, FIO2, FIO2I 
 
 
MICRO Blood - ngtd Assessment:  (Medical Decision Making) Hospital Problems  Date Reviewed: 8/14/2018 Codes Class Noted POA Pleural effusion, bilateral ICD-10-CM: J90 ICD-9-CM: 511.9  1/2/2019 Yes Acute hypokalemia ICD-10-CM: E87.6 ICD-9-CM: 276.8  1/2/2019 Yes Bilateral pneumonia ICD-10-CM: J18.9 ICD-9-CM: 899  12/30/2018 Yes Stage 3 chronic kidney disease (HCC) ICD-10-CM: N18.3 ICD-9-CM: 585.3  11/27/2017 Yes Multiple myeloma in remission [facility]) ICD-10-CM: C90.01 
ICD-9-CM: 203.01  1/25/2017 Yes Macrocytic anemia ICD-10-CM: D53.9 ICD-9-CM: 281.9  12/22/2016 Yes Essential hypertension with goal blood pressure less than 130/85 ICD-10-CM: I10 
ICD-9-CM: 401.9  12/22/2016 Yes Anxiety ICD-10-CM: F41.9 ICD-9-CM: 300.00  12/22/2016 Yes Chronic diastolic heart failure ([facility] ICD-10-CM: I50.32 
ICD-9-CM: 428.32  11/16/2016 Yes Episodic atrial fibrillation (HCC) ICD-10-CM: I48.0 ICD-9-CM: 427.31  8/16/2016 Yes Dementia without behavioral disturbance ICD-10-CM: F03.90 ICD-9-CM: 294.20  8/16/2016 Yes * (Principal) Acute respiratory failure with hypoxemia ([facility] ICD-10-CM: J96.01 
ICD-9-CM: 518.81  6/17/2016 Yes Presence of cardiac pacemaker (Chronic) ICD-10-CM: Z95.0 ICD-9-CM: V45.01  3/21/2016 Yes Acquired hypothyroidism (Chronic) ICD-10-CM: E03.9 ICD-9-CM: 244.9  7/9/2015 Yes Immunosuppressed patient presenting with myalgias and B PNA with severe hypoxic respiratory failure. Plan:  (Medical Decision Making)  
-need to check flu swab. -will check respiratory viral nasal swab as well as urine legionella ag and strep pna serum ab.  
-add azithro to vanc and zosyn for atypical coverage 
-would ideally like to perform bronchoscopy for direct sampling but too hypoxic at present.  
-agree with diuresis and trying to keep patient as dry as possible. May need geronimo catheter.  
-on 60% optiflow. Wean as sats allow.  
-amio held for possible toxicity. Seems less likely. On iv steroids. Will check pct now.   
 
 
Quyen Sampson MD

## 2019-01-03 NOTE — PROGRESS NOTES
END OF SHIFT NOTE: 
 
Intake/Output 01/03 0701 - 01/03 1900 In: 455 [P.O.:238; I.V.:237] Out: 900 [Urine:900] Voiding: YES Catheter: NO 
Drain:   
 
 
 
 
Stool:  2 occurrences. Stool Assessment Stool Color: Brown;Black (01/03/19 0944) Stool Appearance: Loose (01/03/19 0944) Stool Amount: Medium (01/03/19 0944) Stool Source/Status: Rectum (01/03/19 0944) Emesis:  0 occurrences. VITAL SIGNS Patient Vitals for the past 12 hrs: 
 Temp Pulse Resp BP SpO2  
01/03/19 1457     94 % 01/03/19 1219     93 % 01/03/19 1135 98.4 °F (36.9 °C) 62 18 118/58 99 % 01/03/19 0947  61  128/66 98 % 01/03/19 0747 98.9 °F (37.2 °C) 62 18 104/61 95 % 01/03/19 0726     94 % Pain Assessment Pain 1 Pain Scale 1: Numeric (0 - 10) (01/03/19 1350) Pain Intensity 1: 0 (01/03/19 1350) Patient Stated Pain Goal: 0 (01/03/19 1350) Pain Reassessment 1: Yes (01/03/19 1331) Pain Onset 1: today (01/03/19 1231) Pain Location 1: Head (01/03/19 1231) Pain Orientation 1: Anterior (01/03/19 1231) Pain Description 1: Aching (01/03/19 1231) Pain Intervention(s) 1: Medication (see MAR) (01/03/19 1231) Ambulating Yes Additional Information: Pt weaned to a lower O2 level per RT. Still on optiflow. Lasix added. Pt sat in the chair for most of the afternoon. Tylenol given for headache. Urine and sputum sample still needed. 200 - MD notified of continuing dark stools and protruding hemorrhoid. Unable to obtain urine sample d/t it being mixed with stool and SOB with ambulating to [facility]. Order given to insert geronimo catheter. MD to place order for anusol cream. Night shift RN to place geronimo and apply cream.  
 
Shift report given to oncoming nurse at the bedside. Ivy Calhoun

## 2019-01-03 NOTE — PROGRESS NOTES
Problem: Falls - Risk of 
Goal: *Absence of Falls Document Cornelious Booty Fall Risk and appropriate interventions in the flowsheet. Outcome: Progressing Towards Goal 
Fall Risk Interventions: 
Mobility Interventions: Communicate number of staff needed for ambulation/transfer Mentation Interventions: Adequate sleep, hydration, pain control Medication Interventions: Evaluate medications/consider consulting pharmacy Elimination Interventions: Call light in reach Problem: Pressure Injury - Risk of 
Goal: *Prevention of pressure injury Document José Manuel Scale and appropriate interventions in the flowsheet. Outcome: Progressing Towards Goal 
Pressure Injury Interventions: 
Sensory Interventions: Assess changes in LOC Moisture Interventions: Apply protective barrier, creams and emollients Activity Interventions: Increase time out of bed Mobility Interventions: Pressure redistribution bed/mattress (bed type) Nutrition Interventions: Document food/fluid/supplement intake Friction and Shear Interventions: Apply protective barrier, creams and emollients

## 2019-01-03 NOTE — PROGRESS NOTES
[facility] OUTREACH NURSE PROGRESS REPORT SUBJECTIVE: Called to assess patient secondary to nursing concern. MEWS Score: 1 (01/03/19 1135) Vitals:  
 01/03/19 0747 01/03/19 0947 01/03/19 1135 01/03/19 1219 BP: 104/61 128/66 118/58 Pulse: 62 61 62 Resp: 18  18 Temp: 98.9 °F (37.2 °C)  98.4 °F (36.9 °C) SpO2: 95% 98% 99% 93% Weight:      
Height:      
  
LAB DATA: 
 
Recent Labs 01/03/19 
7136 01/02/19 
4363 01/01/19 
6681  141 141  
K 2.8* 3.4* 3.8 * 114* 111* CO2 19* 19* 19* AGAP 11 8 11 * 124* 119* BUN 12 12 11 CREA 1.21* 1.24* 1.21* GFRAA 54* 53* 54* GFRNA 45* 43* 45*  
CA 6.2* 6.1* 6.0* ALB 2.0* 2.1* 2.6* TP 5.4* 5.4* 5.4*  
GLOB 3.4 3.3 2.8 AGRAT 0.6* 0.6* 0.9* ALT 35 25 22 Recent Labs 01/03/19 
7207 01/02/19 
2817 01/01/19 
1173 WBC 3.9* 5.0 3.8* HGB 8.9* 9.3* 10.0* HCT 28.3* 29.1* 31.7*  171 168 OBJECTIVE: On arrival to room, I found patient to be resting in bed. General appearance: alert, cooperative, no distress, appears stated age Lungs: Crackles Neurologic: Grossly normal 
 
 
 Pain Assessment Pain Intensity 1: 5 (01/03/19 1231) Pain Location 1: Head 
Pain Intervention(s) 1: Medication (see MAR) Patient Stated Pain Goal: 0 
 
  
  
  
  
 
  
  
  
   
 
ASSESSMENT:  Pt resting in bed NAD. Respirations even and unlabored. Pt with no complaints at this time. Reports feeling much better than yesterday. PLAN: Continue to follow per outreach protocol.

## 2019-01-03 NOTE — PROGRESS NOTES
Problem: Self Care Deficits Care Plan (Adult) Goal: *Acute Goals and Plan of Care (Insert Text) LTG 1: Pt will be mod I with toileting by 1/6/19 to prevent skin breakdown. LTG 2: Pt will be mod I with LB dressing by 1/6/19 to reduce risk of falls. LTG 3: Pt will be mod I with bathing by 1/6/19 to promote good skin integrity. LTG 4: Pt will be mod I with toilet transfers by 1/6/19 to promote quality of life. LTG 5: Pt will be mod I with HEP by 1/6/19 to prevent deconditioning. OCCUPATIONAL THERAPY: Initial Assessment, Daily Note, Treatment Day: 1st and PM 1/3/2019INPATIENT: Hospital Day: 5 [place] / Product Type: Medicare /  
  
NAME/AGE/GENDER: Bilile Ano is a 80 y.o. female PRIMARY DIAGNOSIS:  pneumonia Pneumonia Acute respiratory failure with hypoxemia (HCC) Acute respiratory failure with hypoxemia (HCC) ICD-10: Treatment Diagnosis:  
 · Generalized Muscle Weakness (M62.81) Precautions/Allergies: fall risk, Easily SOB Patient has no known allergies. ASSESSMENT:  
Ms. Nawaf Damian presents in recliner and agreeable to tx. Pt attempting to take meds when therapist arrived. Pt conversing with therapist and therapist noted SOB with conversation only. Pt able to brush her hair with set up. Pt stood with CGA and took several steps to the bed with supervision. Pt turned and sat with supervision and able to go sit to supine with supervision. Pt again SOB after the activity and needed a rest break. Pt progressing with transfer goal.  Pt much more alert and talkative today. This section established at most recent assessment PROBLEM LIST (Impairments causing functional limitations): 1. Decreased Strength 2. Decreased ADL/Functional Activities 3. Decreased Transfer Abilities 4. Decreased Activity Tolerance 5. Decreased Pacing Skills  INTERVENTIONS PLANNED: (Benefits and precautions of occupational therapy have been discussed with the patient.) 1. Activities of daily living training 2. Therapeutic activity 3. Therapeutic exercise TREATMENT PLAN: Frequency/Duration: Follow patient 3x a week to address above goals. Rehabilitation Potential For Stated Goals: Excellent RECOMMENDED REHABILITATION/EQUIPMENT: (at time of discharge pending progress): Due to the probability of continued deficits (see above) this patient will  likely need continued skilled occupational therapy after discharge. Equipment:  
? None at this time OCCUPATIONAL PROFILE AND HISTORY:  
History of Present Injury/Illness (Reason for Referral): Please see H&P Past Medical History/Comorbidities:  
Ms. Modesta Bajwa  has a past medical history of Anemia, unspecified, Chest pain, unspecified, Chronic anxiety, Diastolic heart failure ([address] Essential hypertension, benign, Flatulence, eructation, and gas pain, Lump or mass in breast, Other and unspecified hyperlipidemia, Paroxysmal atrial fibrillation ([address] Paroxysmal tachycardia ([address] Pernicious anemia, Postmenopausal atrophic vaginitis, Unspecified deficiency anemia, and Unspecified hypothyroidism. Ms. Modesta Bajwa  has a past surgical history that includes hx hysterectomy; hx orthopaedic; hx vein stripping; THORACENTESIS (Left, 6/23/2016); ULTRASOUND (Bilateral, 6/23/2016); ESOPHAGOGASTRODUODENOSCOPY (EGD)  BMI 30  ROOM 309 (N/A, 6/21/2016); ULTRASOUND (Bilateral, 6/20/2016); THORACENTESIS (Bilateral, 6/20/2016); THORACENTESIS (Bilateral, 6/17/2016); and ULTRASOUND (Bilateral, 6/17/2016). Social History/Living Environment:  
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 Number of Personal Factors/Comorbidities that affect the Plan of Care: Expanded review of therapy/medical records (1-2):  MODERATE COMPLEXITY ASSESSMENT OF OCCUPATIONAL PERFORMANCE[de-identified]  
Activities of Daily Living:  
Basic ADLs (From Assessment) Complex ADLs (From Assessment) Feeding: Independent Oral Facial Hygiene/Grooming: Setup Bathing: Minimum assistance Upper Body Dressing: Setup Lower Body Dressing: Minimum assistance Toileting: Minimum assistance Grooming/Bathing/Dressing Activities of Daily Living Cognitive Retraining Safety/Judgement: Awareness of environment Feeding Feeding Assistance: Supervision/set-up Container Management: Supervision/set-up Cutting Food: Supervision/set-up Bed/Mat Mobility Supine to Sit: Supervision Sit to Supine: Independent Sit to Stand: Contact guard assistance Bed to Chair: Minimum assistance Most Recent Physical Functioning:  
Gross Assessment: 
  
         
  
Posture: 
Posture (WDL): Exceptions to [facility] Posture Assessment: Forward head, Rounded shoulders Balance: 
Sitting: Intact Standing: Impaired Standing - Static: Fair Standing - Dynamic : Fair Bed Mobility: 
Supine to Sit: Supervision Sit to Supine: Independent Wheelchair Mobility: 
  
Transfers: 
Sit to Stand: Contact guard assistance Bed to Chair: Minimum assistance Patient Vitals for the past 6 hrs: 
 BP BP Patient Position SpO2 O2 Flow Rate (L/min) Pulse 01/03/19 0947 128/66  98 %  61  
01/03/19 1135 118/58 At rest 99 %  62  
01/03/19 1219   93 % 50 l/min  Mental Status Neurologic State: Alert Orientation Level: Oriented to person, Oriented to place Cognition: Follows commands, Poor safety awareness Perception: Appears intact Perseveration: No perseveration noted Safety/Judgement: Awareness of environment Physical Skills Involved: 
1. Balance 2. Activity Tolerance 3. [place] Cognitive Skills Affected (resulting in the inability to perform in a timely and safe manner): 
1. N/A Psychosocial Skills Affected: 
1. N/A Number of elements that affect the Plan of Care: 3-5:  MODERATE COMPLEXITY CLINICAL DECISION MAKING:  
[employer] AM-PAC 6 Clicks Daily Activity Inpatient Short Form How much help from another person does the patient currently need. .. Total A Lot A Little None 1. Putting on and taking off regular lower body clothing? [] 1   [] 2   [x] 3   [] 4  
2. Bathing (including washing, rinsing, drying)? [] 1   [] 2   [x] 3   [] 4  
3. Toileting, which includes using toilet, bedpan or urinal?   [] 1   [] 2   [x] 3   [] 4  
4. Putting on and taking off regular upper body clothing? [] 1   [] 2   [x] 3   [] 4  
5. Taking care of personal grooming such as brushing teeth? [] 1   [] 2   [x] 3   [] 4  
6. Eating meals? [] 1   [] 2   [] 3   [x] 4  
© 2007, Trustees of [facility] [employer], under license to [employer]. All rights reserved Score:  Initial: 19 Most Recent: X (Date: -- ) Interpretation of Tool:  Represents activities that are increasingly more difficult (i.e. Bed mobility, Transfers, Gait). Score 24 23 22-20 19-15 14-10 9-7 6 Modifier CH CI CJ CK CL CM CN   
 
? Self Care:  
  - CURRENT STATUS: CK - 40%-59% impaired, limited or restricted  - GOAL STATUS: CJ - 20%-39% impaired, limited or restricted  - D/C STATUS:  ---------------To be determined--------------- [place] / [place] / Product Type: Medicare /   
 
Medical Necessity:    
· Skilled intervention continues to be required due to decreased activity tolerance. Reason for Services/Other Comments: 
· Patient continues to require skilled intervention due to being worse than PLOF. Use of outcome tool(s) and clinical judgement create a POC that gives a: MODERATE COMPLEXITY  
 
 
 
TREATMENT:  
 (In addition to Assessment/Re-Assessment sessions the following treatments were rendered) Pre-treatment Symptoms/Complaints:   
Pain: Initial:  
Pain Intensity 1: 0 None Post Session:  None Therapeutic Activity: (19 min ):  Pt easily SOB during activities of conversation, combing her hair, transfers. Pt set up to comb her hair, transfers with min assist, sit to stand CGA and supervision supine to sit. Pt seems to feel much better and now on high flow 02. Braces/Orthotics/Lines/Etc:  
· Treatment/Session Assessment:   
· Response to Treatment:  Agreeable · Interdisciplinary Collaboration:  
o Occupational Therapist 
o Registered Nurse · After treatment position/precautions:  
o Bed/Chair-wheels locked 
o Call light within reach 
o Nurse at bedside · Compliance with Program/Exercises: Will assess as treatment progresses. · Recommendations/Intent for next treatment session: \"Next visit will focus on advancements to more challenging activities and reduction in assistance provided\". Total Treatment Duration: OT Patient Time In/Time Out Time In: 3103 Time Out: 1974 Jeannette Mcdermott, OT

## 2019-01-03 NOTE — PROGRESS NOTES
Hospitalist Progress Note Patient: Martin Perdue MRN: [id number]  SSN: [id number] YOB: 1930  Age: 80 y.o. Sex: female Admit Date: 12/30/2018 LOS: 4 days Subjective:  
 
From previous notes: \"81 yo female with PMH of paroxysmal A Fib with pacer, multiple myeloma followed by Dr. Christy Bethea on current chemo, HTN, CKD, dCHF, who is sent as a direct admit from urgent care due to RUL pneumonia. She admits to several days of cough/ malaise and headache. CXR at urgent care showed RUL pneumonia.  
Pt on 5 L NC with significant desatting during conversation. Pt reports feeling some better but is obviously SOB while we talk. \" 
 
1/2 - She feels better this AM. Still SOB. Denies cough. Less confused per family. 1/3 - She desatted overnight again. Feels good better this morning. Still with SOB. Odalys CP/cough. Review of systems negative except stated above. Objective:  
 
Visit Vitals /61 (BP 1 Location: Left arm, BP Patient Position: At rest) Pulse 62 Temp 98.9 °F (37.2 °C) Resp 18 Ht 5' 4\" (1.626 m) Wt 75.3 kg (165 lb 14.4 oz) SpO2 95% Breastfeeding? No  
BMI 28.48 kg/m² Oxygen Therapy O2 Sat (%): 95 % (01/03/19 0747) Pulse via Oximetry: 84 beats per minute (01/03/19 0726) O2 Device: Heated; Hi flow nasal cannula;Humidifier (01/03/19 0726) O2 Flow Rate (L/min): 55 l/min (01/03/19 0726) O2 Temperature: 87.8 °F (31 °C) (01/03/19 0726) FIO2 (%): 95 % (01/03/19 0726) Intake and Output:  
 
Intake/Output Summary (Last 24 hours) at 1/3/2019 2995 Last data filed at 1/3/2019 3528 Gross per 24 hour Intake 1695 ml Output 1525 ml Net 170 ml Physical Exam:  
GENERAL: alert, cooperative, no distress, appears stated age EYE: conjunctivae/corneas clear. PERRL. THROAT & NECK: normal and no erythema or exudates noted. LUNG: scattered rhonchi, L>R HEART: regular rate and rhythm, S1S2, no murmur, no JVD ABDOMEN: soft, non-tender, non-distended. Bowel sounds normal.  
EXTREMITIES:  No edema, 2+ pedal/radial pulses bilaterally SKIN: no rash or abnormalities NEUROLOGIC: Alert. Cranial nerves 2-12 grossly intact. Lab/Data Review: 
Recent Results (from the past 24 hour(s)) POC G3 Collection Time: 01/02/19  9:37 AM  
Result Value Ref Range Device: High Flow Nasal Cannula FIO2 (POC) 80 % pH (POC) 7.315 (L) 7.35 - 7.45    
 pCO2 (POC) 27.1 (L) 35 - 45 MMHG  
 pO2 (POC) 60 (L) 75 - 100 MMHG  
 HCO3 (POC) 13.8 (L) 22 - 26 MMOL/L  
 sO2 (POC) 89 (L) 95 - 98 % Base deficit (POC) 11 mmol/L Allens test (POC) YES Site RIGHT RADIAL Patient temp. 98.6 Specimen type (POC) ARTERIAL Performed by Tessie   
 CO2, POC 15 MMOL/L Flow rate (POC) 55.000 L/min COLLECT TIME 934 CBC WITH AUTOMATED DIFF Collection Time: 01/03/19  5:38 AM  
Result Value Ref Range WBC 3.9 (L) 4.3 - 11.1 K/uL  
 RBC 2.76 (L) 4.05 - 5.2 M/uL HGB 8.9 (L) 11.7 - 15.4 g/dL HCT 28.3 (L) 35.8 - 46.3 % .5 (H) 79.6 - 97.8 FL  
 MCH 32.2 26.1 - 32.9 PG  
 MCHC 31.4 31.4 - 35.0 g/dL  
 RDW 13.9 11.9 - 14.6 % PLATELET 434 836 - 900 K/uL MPV 10.4 9.4 - 12.3 FL ABSOLUTE NRBC 0.00 0.0 - 0.2 K/uL  
 DF AUTOMATED NEUTROPHILS 76 43 - 78 % LYMPHOCYTES 9 (L) 13 - 44 % MONOCYTES 15 (H) 4.0 - 12.0 % EOSINOPHILS 0 (L) 0.5 - 7.8 % BASOPHILS 0 0.0 - 2.0 % IMMATURE GRANULOCYTES 1 0.0 - 5.0 %  
 ABS. NEUTROPHILS 2.9 1.7 - 8.2 K/UL  
 ABS. LYMPHOCYTES 0.3 (L) 0.5 - 4.6 K/UL  
 ABS. MONOCYTES 0.6 0.1 - 1.3 K/UL  
 ABS. EOSINOPHILS 0.0 0.0 - 0.8 K/UL  
 ABS. BASOPHILS 0.0 0.0 - 0.2 K/UL  
 ABS. IMM. GRANS. 0.0 0.0 - 0.5 K/UL METABOLIC PANEL, COMPREHENSIVE Collection Time: 01/03/19  5:38 AM  
Result Value Ref Range Sodium 144 136 - 145 mmol/L Potassium 2.8 (LL) 3.5 - 5.1 mmol/L  Chloride 114 (H) 98 - 107 mmol/L  
 CO2 19 (L) 21 - 32 mmol/L  
 Anion gap 11 7 - 16 mmol/L Glucose 153 (H) 65 - 100 mg/dL BUN 12 8 - 23 MG/DL Creatinine 1.21 (H) 0.6 - 1.0 MG/DL  
 GFR est AA 54 (L) >60 ml/min/1.73m2 GFR est non-AA 45 (L) >60 ml/min/1.73m2 Calcium 6.2 (L) 8.3 - 10.4 MG/DL Bilirubin, total 0.9 0.2 - 1.1 MG/DL  
 ALT (SGPT) 35 12 - 65 U/L  
 AST (SGOT) 44 (H) 15 - 37 U/L Alk. phosphatase 72 50 - 136 U/L Protein, total 5.4 (L) 6.3 - 8.2 g/dL Albumin 2.0 (L) 3.2 - 4.6 g/dL Globulin 3.4 2.3 - 3.5 g/dL A-G Ratio 0.6 (L) 1.2 - 3.5 Imaging: Xr Chest Sngl V Result Date: 1/1/2019 IMPRESSION:  EXTENSIVE INHOMOGENEOUS BILATERAL INFILTRATES WITH RIGHT UPPER LOBE PREDOMINANCE. Ct Chest W Cont Result Date: 1/1/2019 IMPRESSION:   1. NO DEFINITE ACUTE PULMONARY EMBOLISM. 2.  SMALL BILATERAL PLEURAL EFFUSIONS WITH EXTENSIVE BILATERAL INFILTRATES SUSPICIOUS FOR PNEUMONIA. PULMONARY EDEMA WOULD BE LESS LIKELY. No results found for this visit on 12/30/18. Cultures: All Micro Results Procedure Component Value Units Date/Time CULTURE, BLOOD [[id number]] Collected:  12/30/18 1928 Order Status:  Completed Specimen:  Blood Updated:  01/03/19 0061 Special Requests: --     
  RIGHT 
HAND Culture result: NO GROWTH 4 DAYS     
 CULTURE, BLOOD [[id number]] Collected:  12/30/18 1921 Order Status:  Completed Specimen:  Blood Updated:  01/03/19 4819 Special Requests: --     
  RIGHT Antecubital 
  
  Culture result: NO GROWTH 4 DAYS C. DIFFICILE AG & TOXIN A/B [[id number]] Collected:  12/31/18 1844 Order Status:  Completed Specimen:  Stool Updated:  01/01/19 1231 [id number] ANTIGEN    
  C. DIFFICILE GDH ANTIGEN-NEGATIVE  
     
  C. difficile toxin C. DIFFICILE TOXIN-NEGATIVE  
     
  PCR Reflex NOT APPLICABLE INTERPRETATION    
  NEGATIVE FOR TOXIGENIC C. DIFFICILE Clinical Consideration NEGATIVE FOR TOXIGENIC C. DIFFICILE Assessment/Plan: Principal Problem: 
  Acute respiratory failure with hypoxemia ([facility] [address] (6/17/2016) - Due to bilateral pneumonia 
- Continue Vanc + Zosyn 
- Continue albuterol - No PE per CT Chest 
- Wean Opti-Yevgeniy as appropriate - Appreciate Pulmonary's input and assitance Active Problems: 
  Bilateral pneumonia (12/30/2018) - Continue Vanc + Zosyn since immunosuppressed 
- Continue albuteriol - Add Mucinex - Add ICS + Flutter Valve - Appreciate Pulmonary's input and assitance Pleural effusion, bilateral (1/2/2019) - Likely nieves-pneumonic + HF 
- Recheck CXR this AM 
- Appreciate Pulmonary's input and assitance Acute hypokalemia (1/2/2019) - Likely due to acute illness - Replace PO 
- Recheck in AM 
 
  Metabolic Encephalopathy (4/8/4713) - Resolved per family - Was likely due to hypoxemia Episodic atrial fibrillation ([facility] [address] (8/16/2016) - Continue Amiodarone - Continue Corgard - No [facility] Chronic diastolic heart failure ([facility] [address] (11/16/2016) - Stable - Start Lasix IV BID --> repeat BMP in AM 
- Continue Nadolol Macrocytic anemia (12/22/2016) - Stable Essential hypertension with goal blood pressure less than 130/85 (12/22/2016) - Well controlled - Continue Corgard - Continue Amlodipine Stage 3 chronic kidney disease ([facility] [address] (11/27/2017) - Stable Acquired hypothyroidism (7/9/2015) - Continue Levothyroxine Dementia without behavioral disturbance (8/16/2016) - No acute issues - Continue Remeron Anxiety (12/22/2016) - Continue Remeron Multiple myeloma in remission [facility]) (1/25/2017) 
- Oncology saw and will follow peripherally Presence of cardiac pacemaker (3/21/2016) Dispo - Continue Vanc/Zosyn. Start IV Lasix. Wean oxygen as appropriate. DIET CARDIAC Regular DVT Prophylaxis: Heparin Signed By: Kasey Casas DO   
 January 3, 2019

## 2019-01-03 NOTE — PROGRESS NOTES
Paged manohar MALIK regarding need for clarification of orders and skin tear to left calf. Spoke with Dr. Sveta Bahena. Orders received to decrease IVF to 50 ml/hr and consult wound care for left calf skin tear.

## 2019-01-04 PROBLEM — N18.30 STAGE 3 CHRONIC KIDNEY DISEASE (HCC): Chronic | Status: ACTIVE | Noted: 2017-11-27

## 2019-01-04 LAB
ALBUMIN SERPL-MCNC: 2.1 G/DL (ref 3.2–4.6)
ALBUMIN/GLOB SERPL: 0.6 {RATIO} (ref 1.2–3.5)
ALP SERPL-CCNC: 72 U/L (ref 50–136)
ALT SERPL-CCNC: 56 U/L (ref 12–65)
ANION GAP SERPL CALC-SCNC: 10 MMOL/L (ref 7–16)
AST SERPL-CCNC: 76 U/L (ref 15–37)
BACTERIA SPEC CULT: NORMAL
BACTERIA SPEC CULT: NORMAL
BASOPHILS # BLD: 0 K/UL (ref 0–0.2)
BASOPHILS NFR BLD: 0 % (ref 0–2)
BILIRUB SERPL-MCNC: 0.8 MG/DL (ref 0.2–1.1)
BUN SERPL-MCNC: 18 MG/DL (ref 8–23)
CALCIUM SERPL-MCNC: 6 MG/DL (ref 8.3–10.4)
CHLORIDE SERPL-SCNC: 112 MMOL/L (ref 98–107)
CO2 SERPL-SCNC: 20 MMOL/L (ref 21–32)
CREAT SERPL-MCNC: 1.23 MG/DL (ref 0.6–1)
DIFFERENTIAL METHOD BLD: ABNORMAL
EOSINOPHIL # BLD: 0 K/UL (ref 0–0.8)
EOSINOPHIL NFR BLD: 0 % (ref 0.5–7.8)
ERYTHROCYTE [DISTWIDTH] IN BLOOD BY AUTOMATED COUNT: 14.1 % (ref 11.9–14.6)
GLOBULIN SER CALC-MCNC: 3.6 G/DL (ref 2.3–3.5)
GLUCOSE SERPL-MCNC: 134 MG/DL (ref 65–100)
HCT VFR BLD AUTO: 27.6 % (ref 35.8–46.3)
HGB BLD-MCNC: 8.7 G/DL (ref 11.7–15.4)
IMM GRANULOCYTES # BLD: 0.1 K/UL (ref 0–0.5)
IMM GRANULOCYTES NFR BLD AUTO: 1 % (ref 0–5)
LYMPHOCYTES # BLD: 0.3 K/UL (ref 0.5–4.6)
LYMPHOCYTES NFR BLD: 7 % (ref 13–44)
MCH RBC QN AUTO: 31.6 PG (ref 26.1–32.9)
MCHC RBC AUTO-ENTMCNC: 31.5 G/DL (ref 31.4–35)
MCV RBC AUTO: 100.4 FL (ref 79.6–97.8)
MONOCYTES # BLD: 0.4 K/UL (ref 0.1–1.3)
MONOCYTES NFR BLD: 11 % (ref 4–12)
NEUTS SEG # BLD: 3 K/UL (ref 1.7–8.2)
NEUTS SEG NFR BLD: 81 % (ref 43–78)
NRBC # BLD: 0.03 K/UL (ref 0–0.2)
PLATELET # BLD AUTO: 197 K/UL (ref 150–450)
PMV BLD AUTO: 11.3 FL (ref 9.4–12.3)
POTASSIUM SERPL-SCNC: 3.9 MMOL/L (ref 3.5–5.1)
PROT SERPL-MCNC: 5.7 G/DL (ref 6.3–8.2)
RBC # BLD AUTO: 2.75 M/UL (ref 4.05–5.2)
SERVICE CMNT-IMP: NORMAL
SERVICE CMNT-IMP: NORMAL
SODIUM SERPL-SCNC: 142 MMOL/L (ref 136–145)
WBC # BLD AUTO: 3.7 K/UL (ref 4.3–11.1)

## 2019-01-04 PROCEDURE — 74011250637 HC RX REV CODE- 250/637: Performed by: INTERNAL MEDICINE

## 2019-01-04 PROCEDURE — 87070 CULTURE OTHR SPECIMN AEROBIC: CPT

## 2019-01-04 PROCEDURE — 85025 COMPLETE CBC W/AUTO DIFF WBC: CPT

## 2019-01-04 PROCEDURE — 94640 AIRWAY INHALATION TREATMENT: CPT

## 2019-01-04 PROCEDURE — 65270000029 HC RM PRIVATE

## 2019-01-04 PROCEDURE — 94760 N-INVAS EAR/PLS OXIMETRY 1: CPT

## 2019-01-04 PROCEDURE — 74011000258 HC RX REV CODE- 258: Performed by: INTERNAL MEDICINE

## 2019-01-04 PROCEDURE — 74011250636 HC RX REV CODE- 250/636: Performed by: INTERNAL MEDICINE

## 2019-01-04 PROCEDURE — 36415 COLL VENOUS BLD VENIPUNCTURE: CPT

## 2019-01-04 PROCEDURE — 99232 SBSQ HOSP IP/OBS MODERATE 35: CPT | Performed by: INTERNAL MEDICINE

## 2019-01-04 PROCEDURE — 97530 THERAPEUTIC ACTIVITIES: CPT

## 2019-01-04 PROCEDURE — 80053 COMPREHEN METABOLIC PANEL: CPT

## 2019-01-04 PROCEDURE — 74011000250 HC RX REV CODE- 250: Performed by: INTERNAL MEDICINE

## 2019-01-04 PROCEDURE — 77010033711 HC HIGH FLOW OXYGEN

## 2019-01-04 RX ORDER — OXYMETAZOLINE HCL 0.05 %
2 SPRAY, NON-AEROSOL (ML) NASAL
Status: DISCONTINUED | OUTPATIENT
Start: 2019-01-04 | End: 2019-01-09 | Stop reason: HOSPADM

## 2019-01-04 RX ORDER — TRAZODONE HYDROCHLORIDE 50 MG/1
100 TABLET ORAL
Status: DISCONTINUED | OUTPATIENT
Start: 2019-01-04 | End: 2019-01-09 | Stop reason: HOSPADM

## 2019-01-04 RX ADMIN — CALCIUM CARBONATE 400 MG: 500 TABLET, CHEWABLE ORAL at 08:49

## 2019-01-04 RX ADMIN — NYSTATIN 500000 UNITS: 500000 SUSPENSION ORAL at 21:37

## 2019-01-04 RX ADMIN — VANCOMYCIN HYDROCHLORIDE 1250 MG: 10 INJECTION, POWDER, LYOPHILIZED, FOR SOLUTION INTRAVENOUS at 16:06

## 2019-01-04 RX ADMIN — FUROSEMIDE 40 MG: 10 INJECTION, SOLUTION INTRAMUSCULAR; INTRAVENOUS at 08:52

## 2019-01-04 RX ADMIN — ALBUTEROL SULFATE 2.5 MG: 2.5 SOLUTION RESPIRATORY (INHALATION) at 20:45

## 2019-01-04 RX ADMIN — Medication 10 ML: at 05:17

## 2019-01-04 RX ADMIN — Medication 10 ML: at 21:38

## 2019-01-04 RX ADMIN — FERROUS SULFATE TAB 325 MG (65 MG ELEMENTAL FE) 325 MG: 325 (65 FE) TAB at 08:52

## 2019-01-04 RX ADMIN — CLORAZEPATE DIPOTASSIUM 7.5 MG: 7.5 TABLET ORAL at 21:37

## 2019-01-04 RX ADMIN — METHYLPREDNISOLONE SODIUM SUCCINATE 60 MG: 125 INJECTION, POWDER, FOR SOLUTION INTRAMUSCULAR; INTRAVENOUS at 21:38

## 2019-01-04 RX ADMIN — NYSTATIN 500000 UNITS: 500000 SUSPENSION ORAL at 18:16

## 2019-01-04 RX ADMIN — FUROSEMIDE 40 MG: 10 INJECTION, SOLUTION INTRAMUSCULAR; INTRAVENOUS at 18:16

## 2019-01-04 RX ADMIN — PIPERACILLIN SODIUM,TAZOBACTAM SODIUM 4.5 G: 4; .5 INJECTION, POWDER, FOR SOLUTION INTRAVENOUS at 19:54

## 2019-01-04 RX ADMIN — GUAIFENESIN 1200 MG: 600 TABLET, EXTENDED RELEASE ORAL at 21:37

## 2019-01-04 RX ADMIN — PIPERACILLIN SODIUM,TAZOBACTAM SODIUM 4.5 G: 4; .5 INJECTION, POWDER, FOR SOLUTION INTRAVENOUS at 03:09

## 2019-01-04 RX ADMIN — HYDROCODONE BITARTRATE AND ACETAMINOPHEN 1 TABLET: 5; 325 TABLET ORAL at 01:10

## 2019-01-04 RX ADMIN — ALBUTEROL SULFATE 2.5 MG: 2.5 SOLUTION RESPIRATORY (INHALATION) at 02:45

## 2019-01-04 RX ADMIN — Medication 10 ML: at 13:22

## 2019-01-04 RX ADMIN — CLORAZEPATE DIPOTASSIUM 7.5 MG: 7.5 TABLET ORAL at 01:08

## 2019-01-04 RX ADMIN — METHYLPREDNISOLONE SODIUM SUCCINATE 60 MG: 125 INJECTION, POWDER, FOR SOLUTION INTRAMUSCULAR; INTRAVENOUS at 08:52

## 2019-01-04 RX ADMIN — LEVOTHYROXINE SODIUM 50 MCG: 50 TABLET ORAL at 08:47

## 2019-01-04 RX ADMIN — HYDROCORTISONE 2.5%: 25 CREAM TOPICAL at 18:24

## 2019-01-04 RX ADMIN — ATORVASTATIN CALCIUM 80 MG: 40 TABLET, FILM COATED ORAL at 21:37

## 2019-01-04 RX ADMIN — NADOLOL 80 MG: 40 TABLET ORAL at 08:51

## 2019-01-04 RX ADMIN — TRAZODONE HYDROCHLORIDE 100 MG: 50 TABLET ORAL at 21:37

## 2019-01-04 RX ADMIN — GUAIFENESIN 1200 MG: 600 TABLET, EXTENDED RELEASE ORAL at 08:49

## 2019-01-04 RX ADMIN — AZITHROMYCIN MONOHYDRATE 500 MG: 500 INJECTION, POWDER, LYOPHILIZED, FOR SOLUTION INTRAVENOUS at 13:21

## 2019-01-04 RX ADMIN — MIRTAZAPINE 15 MG: 15 TABLET, FILM COATED ORAL at 21:37

## 2019-01-04 RX ADMIN — CALCIUM CARBONATE 400 MG: 500 TABLET, CHEWABLE ORAL at 11:27

## 2019-01-04 RX ADMIN — HYDROCORTISONE 2.5%: 25 CREAM TOPICAL at 08:53

## 2019-01-04 RX ADMIN — CALCIUM CARBONATE 400 MG: 500 TABLET, CHEWABLE ORAL at 18:16

## 2019-01-04 RX ADMIN — ALBUTEROL SULFATE 2.5 MG: 2.5 SOLUTION RESPIRATORY (INHALATION) at 09:06

## 2019-01-04 RX ADMIN — ASPIRIN 81 MG: 81 TABLET, COATED ORAL at 08:51

## 2019-01-04 RX ADMIN — NYSTATIN 500000 UNITS: 500000 SUSPENSION ORAL at 13:21

## 2019-01-04 RX ADMIN — NYSTATIN 500000 UNITS: 500000 SUSPENSION ORAL at 08:52

## 2019-01-04 RX ADMIN — CLORAZEPATE DIPOTASSIUM 7.5 MG: 7.5 TABLET ORAL at 08:52

## 2019-01-04 RX ADMIN — FAMOTIDINE 20 MG: 20 TABLET ORAL at 08:52

## 2019-01-04 RX ADMIN — PIPERACILLIN SODIUM,TAZOBACTAM SODIUM 4.5 G: 4; .5 INJECTION, POWDER, FOR SOLUTION INTRAVENOUS at 11:27

## 2019-01-04 RX ADMIN — AMLODIPINE BESYLATE 2.5 MG: 5 TABLET ORAL at 08:51

## 2019-01-04 RX ADMIN — ALBUTEROL SULFATE 2.5 MG: 2.5 SOLUTION RESPIRATORY (INHALATION) at 14:28

## 2019-01-04 NOTE — PROGRESS NOTES
Problem: Falls - Risk of 
Goal: *Absence of Falls Document Blanca Fearing Fall Risk and appropriate interventions in the flowsheet. Outcome: Progressing Towards Goal 
Fall Risk Interventions: 
Mobility Interventions: Communicate number of staff needed for ambulation/transfer Mentation Interventions: Adequate sleep, hydration, pain control Medication Interventions: Evaluate medications/consider consulting pharmacy Elimination Interventions: Call light in reach Problem: Pressure Injury - Risk of 
Goal: *Prevention of pressure injury Document José Manuel Scale and appropriate interventions in the flowsheet. Outcome: Progressing Towards Goal 
Pressure Injury Interventions: 
Sensory Interventions: Assess changes in LOC Moisture Interventions: Apply protective barrier, creams and emollients Activity Interventions: Increase time out of bed Mobility Interventions: Pressure redistribution bed/mattress (bed type) Nutrition Interventions: Document food/fluid/supplement intake Friction and Shear Interventions: Apply protective barrier, creams and emollients

## 2019-01-04 NOTE — PROGRESS NOTES
Problem: Falls - Risk of 
Goal: *Absence of Falls Document [facility] Fall Risk and appropriate interventions in the flowsheet. Outcome: Progressing Towards Goal 
Fall Risk Interventions: 
Mobility Interventions: Bed/chair exit alarm, Communicate number of staff needed for ambulation/transfer, Patient to call before getting OOB Mentation Interventions: Bed/chair exit alarm, Door open when patient unattended, Evaluate medications/consider consulting pharmacy, More frequent rounding, Reorient patient, Room close to nurse's station Medication Interventions: Bed/chair exit alarm, Evaluate medications/consider consulting pharmacy, Patient to call before getting OOB, Teach patient to arise slowly Elimination Interventions: Bed/chair exit alarm, Call light in reach, Patient to call for help with toileting needs, Toilet paper/wipes in reach Problem: Pressure Injury - Risk of 
Goal: *Prevention of pressure injury Document José Manuel Scale and appropriate interventions in the flowsheet. Outcome: Progressing Towards Goal 
Pressure Injury Interventions: 
Sensory Interventions: Assess changes in LOC, Maintain/enhance activity level, Pressure redistribution bed/mattress (bed type) Moisture Interventions: Maintain skin hydration (lotion/cream) Activity Interventions: Increase time out of bed, Pressure redistribution bed/mattress(bed type) Mobility Interventions: HOB 30 degrees or less, Pressure redistribution bed/mattress (bed type) Nutrition Interventions: Offer support with meals,snacks and hydration, Document food/fluid/supplement intake Friction and Shear Interventions: HOB 30 degrees or less

## 2019-01-04 NOTE — PROGRESS NOTES
END OF SHIFT NOTE: 
 
Intake/Output 01/04 0701 - 01/04 1900 In: 2943 [P.O.:740; I.V.:311] [facility] 14[facility] Voiding: YES Catheter: YES Drain:   
 
 
 
 
Stool:  0 occurrences. Stool Assessment Stool Color: Black;Green (01/03/19 1810) Stool Appearance: Formed; Watery (01/03/19 1810) Stool Amount: Large (01/03/19 1810) Stool Source/Status: Rectum (01/03/19 1810) Emesis:  0 occurrences. VITAL SIGNS Patient Vitals for the past 12 hrs: 
 Temp Pulse Resp BP SpO2  
01/04/19 1516 98.3 °F (36.8 °C) 60 18 115/59 97 % 01/04/19 1428     95 % 01/04/19 1158     98 % 01/04/19 1136 98.5 °F (36.9 °C) 60 18 118/62 99 % 01/04/19 0908     98 % 01/04/19 0737 98.2 °F (36.8 °C) 60 18 119/58 95 % 01/04/19 0455 97.8 °F (36.6 °C) 61 20 112/43 93 % Pain Assessment Pain 1 Pain Scale 1: Visual (01/04/19 1320) Pain Intensity 1: 0 (01/04/19 1320) Patient Stated Pain Goal: 0 (01/04/19 0830) Pain Reassessment 1: Patient sleeping (01/04/19 1320) Pain Onset 1: today (01/03/19 1231) Pain Location 1: Head (01/04/19 0110) Pain Orientation 1: Anterior (01/04/19 0110) Pain Description 1: Aching (01/04/19 0110) Pain Intervention(s) 1: Medication (see MAR) (01/04/19 0110) Ambulating Yes Additional Information: O2 (via optiflow) weaned per respiratory therapist. QHS trazodone added. Bacitracin applied to dried area on nose (PRN afrin added). Anusol applied to perirectal area for hemorrhoids. Shift report given to oncoming nurse at the bedside. Darvin Hernandez

## 2019-01-04 NOTE — PROGRESS NOTES
Critical Care Outreach Nurse Progress Report: 
 
Subjective: In to assess pt secondary to nurse concern on 1/2/18 for optiflow at 100%. Pt is currently sitting sitting up in chair after physical therapy and states she is feeling better today. Family member at bedside. MEWS Score: 1 (01/04/19 1136) Vitals:  
 01/04/19 0737 01/04/19 0908 01/04/19 1136 01/04/19 1158 BP: 119/58  118/62 Pulse: 60  60 Resp: 18  18 Temp: 98.2 °F (36.8 °C)  98.5 °F (36.9 °C) SpO2: 95% 98% 99% 98% Weight:      
Height:      
  
 
Objective: Pt on optiflow at 40% and 40L, current O2 Saturations 98%. O2 weaning as tolerated. Pt without respiratory distress. Pt is eating and drinking well. Urinating. Pain Intensity 1: 0 (01/04/19 1136) Pain Location 1: Head 
Pain Intervention(s) 1: Medication (see MAR) Patient Stated Pain Goal: 0 Assessment: pt with significant decrease in FiO2 since RN concern on 1/2. Plan: Will D/C from outreach program.  RN to call if concerns arise.

## 2019-01-04 NOTE — PROGRESS NOTES
END OF SHIFT NOTE: 
 
Intake/Output 01/03 1901 - 01/04 0700 In: 8855 [P.O.:500; I.V.:528] Out: 2000 [[id number]] Voiding: YES Catheter: YES Drain:   
 
 
 
 
Stool:  0 occurrences. Stool Assessment Stool Color: Black;Green (01/03/19 1810) Stool Appearance: Formed; Watery (01/03/19 1810) Stool Amount: Large (01/03/19 1810) Stool Source/Status: Rectum (01/03/19 1810) Emesis:  0 occurrences. VITAL SIGNS Patient Vitals for the past 12 hrs: 
 Temp Pulse Resp BP SpO2  
01/04/19 0455 97.8 °F (36.6 °C) 61 20 112/43 93 % 01/04/19 0247     96 % 01/03/19 2328 98.4 °F (36.9 °C) 60 20 120/53 95 % 01/03/19 2012 97.9 °F (36.6 °C) 60 20 111/55 100 % 01/03/19 1957     94 % Pain Assessment Pain 1 Pain Scale 1: Visual (01/04/19 0156) Pain Intensity 1: 0 (01/04/19 0156) Patient Stated Pain Goal: 0 (01/04/19 0110) Pain Reassessment 1: Patient sleeping (01/04/19 0156) Pain Onset 1: today (01/03/19 1231) Pain Location 1: Head (01/04/19 0110) Pain Orientation 1: Anterior (01/04/19 0110) Pain Description 1: Aching (01/04/19 0110) Pain Intervention(s) 1: Medication (see MAR) (01/04/19 0110) Ambulating No 
 
Additional Information:  
Pt c/o headache throughout the night. Norco given twice. Pt anxious overnight requesting tranxene that was not given at 1700. tranzene given. Pt requesting trazodone which is not ordered. May want consider adding as pt didn't rest really until this morning. Calderon insertion last night. Pt tolerated will. 1000cc drained w/insertion. Pt resting quietly. No visible s/sx of distress. Shift report will be given to oncoming nurse at the bedside.  
 
Leon Cabrera RN

## 2019-01-04 NOTE — PROGRESS NOTES
Hospitalist Progress Note Patient: Rashida Calhoun MRN: [id number]  SSN: [id number] YOB: 1930  Age: 80 y.o. Sex: female Admit Date: 12/30/2018 LOS: 5 days Subjective:  
 
From previous notes: \"79 yo female with PMH of paroxysmal A Fib with pacer, multiple myeloma followed by Dr. Leola Elliott on current chemo, HTN, CKD, dCHF, who is sent as a direct admit from urgent care due to RUL pneumonia. She admits to several days of cough/ malaise and headache. CXR at urgent care showed RUL pneumonia.  
Pt on 5 L NC with significant desatting during conversation. Pt reports feeling some better but is obviously SOB while we talk. \" 
 
1/3 - She desatted again overnight. Feels more SOB. Denies CP. 
1/4 - She feels better today. SOB improved. Review of systems negative except stated above. Objective:  
 
Visit Vitals /58 (BP 1 Location: Left arm, BP Patient Position: At rest) Pulse 60 Temp 98.2 °F (36.8 °C) Resp 18 Ht 5' 4\" (1.626 m) Wt 77.8 kg (171 lb 9.6 oz) SpO2 98% Breastfeeding? No  
BMI 29.46 kg/m² Oxygen Therapy O2 Sat (%): 98 % (01/04/19 0908) Pulse via Oximetry: 78 beats per minute (01/04/19 0908) O2 Device: Heated; Hi flow nasal cannula (01/04/19 0908) O2 Flow Rate (L/min): 50 l/min (01/04/19 0908) O2 Temperature: 87.8 °F (31 °C) (01/04/19 0908) FIO2 (%): 60 % (01/04/19 0908) Intake and Output:  
 
Intake/Output Summary (Last 24 hours) at 1/4/2019 1115 Last data filed at 1/4/2019 9970 Gross per 24 hour Intake 2173 ml Output 2500 ml Net -327 ml Physical Exam:  
GENERAL: alert, cooperative, no distress, appears stated age EYE: conjunctivae/corneas clear. PERRL. THROAT & NECK: normal and no erythema or exudates noted. LUNG: scattered rhonchi, L>R HEART: regular rate and rhythm, S1S2, no murmur, no JVD ABDOMEN: soft, non-tender, non-distended. Bowel sounds normal.  
EXTREMITIES:  No edema, 2+ pedal/radial pulses bilaterally SKIN: no rash or abnormalities NEUROLOGIC: Alert. Cranial nerves 2-12 grossly intact. Lab/Data Review: 
Recent Results (from the past 24 hour(s)) PROCALCITONIN Collection Time: 01/03/19 12:53 PM  
Result Value Ref Range Procalcitonin 0.5 ng/mL INFLUENZA A & B AG (RAPID TEST) Collection Time: 01/03/19  1:47 PM  
Result Value Ref Range Influenza A Ag NEGATIVE  NEG Influenza B Ag NEGATIVE  NEG Source NASOPHARYNGEAL    
CBC WITH AUTOMATED DIFF Collection Time: 01/04/19  4:46 AM  
Result Value Ref Range WBC 3.7 (L) 4.3 - 11.1 K/uL  
 RBC 2.75 (L) 4.05 - 5.2 M/uL HGB 8.7 (L) 11.7 - 15.4 g/dL HCT 27.6 (L) 35.8 - 46.3 % .4 (H) 79.6 - 97.8 FL  
 MCH 31.6 26.1 - 32.9 PG  
 MCHC 31.5 31.4 - 35.0 g/dL  
 RDW 14.1 11.9 - 14.6 % PLATELET 052 461 - 553 K/uL MPV 11.3 9.4 - 12.3 FL ABSOLUTE NRBC 0.03 0.0 - 0.2 K/uL  
 DF AUTOMATED NEUTROPHILS 81 (H) 43 - 78 % LYMPHOCYTES 7 (L) 13 - 44 % MONOCYTES 11 4.0 - 12.0 % EOSINOPHILS 0 (L) 0.5 - 7.8 % BASOPHILS 0 0.0 - 2.0 % IMMATURE GRANULOCYTES 1 0.0 - 5.0 %  
 ABS. NEUTROPHILS 3.0 1.7 - 8.2 K/UL  
 ABS. LYMPHOCYTES 0.3 (L) 0.5 - 4.6 K/UL  
 ABS. MONOCYTES 0.4 0.1 - 1.3 K/UL  
 ABS. EOSINOPHILS 0.0 0.0 - 0.8 K/UL  
 ABS. BASOPHILS 0.0 0.0 - 0.2 K/UL  
 ABS. IMM. GRANS. 0.1 0.0 - 0.5 K/UL METABOLIC PANEL, COMPREHENSIVE Collection Time: 01/04/19  4:46 AM  
Result Value Ref Range Sodium 142 136 - 145 mmol/L Potassium 3.9 3.5 - 5.1 mmol/L Chloride 112 (H) 98 - 107 mmol/L  
 CO2 20 (L) 21 - 32 mmol/L Anion gap 10 7 - 16 mmol/L Glucose 134 (H) 65 - 100 mg/dL BUN 18 8 - 23 MG/DL Creatinine 1.23 (H) 0.6 - 1.0 MG/DL  
 GFR est AA 53 (L) >60 ml/min/1.73m2 GFR est non-AA 44 (L) >60 ml/min/1.73m2 Calcium 6.0 (L) 8.3 - 10.4 MG/DL Bilirubin, total 0.8 0.2 - 1.1 MG/DL  
 ALT (SGPT) 56 12 - 65 U/L  
 AST (SGOT) 76 (H) 15 - 37 U/L Alk.  phosphatase 72 50 - 136 U/L  
 Protein, total 5.7 (L) 6.3 - 8.2 g/dL Albumin 2.1 (L) 3.2 - 4.6 g/dL Globulin 3.6 (H) 2.3 - 3.5 g/dL A-G Ratio 0.6 (L) 1.2 - 3.5 Imaging: Xr Chest Sngl V Result Date: 1/3/2019 IMPRESSION: 1.  Stable findings of the chest as described above. Xr Chest Sngl V Result Date: 1/1/2019 IMPRESSION:  EXTENSIVE INHOMOGENEOUS BILATERAL INFILTRATES WITH RIGHT UPPER LOBE PREDOMINANCE. Ct Chest W Cont Result Date: 1/1/2019 IMPRESSION:   1. NO DEFINITE ACUTE PULMONARY EMBOLISM. 2.  SMALL BILATERAL PLEURAL EFFUSIONS WITH EXTENSIVE BILATERAL INFILTRATES SUSPICIOUS FOR PNEUMONIA. PULMONARY EDEMA WOULD BE LESS LIKELY. No results found for this visit on 12/30/18. Cultures: All Micro Results Procedure Component Value Units Date/Time LEGIONELLA PNEUMOPHILA AG, URINE [[id number]] Collected:  01/03/19 1958 Order Status:  Completed Specimen:  Urine Updated:  01/03/19 2008 INFLUENZA A & B AG (RAPID TEST) [[id number]] Collected:  01/03/19 1347 Order Status:  Completed Specimen:  Nasopharyngeal from Nasal washing Updated:  01/03/19 1410 Influenza A Ag NEGATIVE Comment: NEGATIVE FOR THE PRESENCE OF INFLUENZA A ANTIGEN 
INFECTION DUE TO INFLUENZA A CANNOT BE RULED OUT. BECAUSE THE ANTIGEN PRESENT IN THE SAMPLE MAY BE BELOW 
THE DETECTION LIMIT OF THE TEST. A NEGATIVE TEST IS PRESUMPTIVE AND IT IS RECOMMENDED THAT THESE RESULTS BE CONFIRMED BY VIRAL CULTURE OR AN FDA-CLEARED INFLUENZA A AND B MOLECULAR ASSAY. Influenza B Ag NEGATIVE Comment: NEGATIVE FOR THE PRESENCE OF INFLUENZA B ANTIGEN 
INFECTION DUE TO INFLUENZA B CANNOT BE RULED OUT. BECAUSE THE ANTIGEN PRESENT IN THE SAMPLE MAY BE BELOW 
THE DETECTION LIMIT OF THE TEST. A NEGATIVE TEST IS PRESUMPTIVE AND IT IS RECOMMENDED THAT THESE RESULTS BE CONFIRMED BY VIRAL CULTURE OR AN FDA-CLEARED INFLUENZA A AND B MOLECULAR ASSAY.  
  
  
  Source NASOPHARYNGEAL     
 RESPIRATORY VIRAL PANEL, PCR [[id number]] Collected:  01/03/19 1347 Order Status:  Completed Updated:  01/03/19 1357 RESPIRATORY VIRAL PANEL, PCR [[id number]] Collected:  01/03/19 1253 Order Status:  Canceled Specimen:  Sputum Updated:  01/03/19 1258 CULTURE, RESPIRATORY/SPUTUM/BRONCH Darlynn Mais STAIN [[id number]] Order Status:  Sent Specimen:  Sputum CULTURE, BLOOD [[id number]] Collected:  12/30/18 1928 Order Status:  Completed Specimen:  Blood Updated:  01/03/19 8881 Special Requests: --     
  RIGHT 
HAND Culture result: NO GROWTH 4 DAYS     
 CULTURE, BLOOD [[id number]] Collected:  12/30/18 1921 Order Status:  Completed Specimen:  Blood Updated:  01/03/19 5039 Special Requests: --     
  RIGHT Antecubital 
  
  Culture result: NO GROWTH 4 DAYS C. DIFFICILE AG & TOXIN A/B [[id number]] Collected:  12/31/18 1844 Order Status:  Completed Specimen:  Stool Updated:  01/01/19 1231 [id number] Tena Edward ANTIGEN    
  C. DIFFICILE GDH ANTIGEN-NEGATIVE  
     
  C. difficile toxin C. DIFFICILE TOXIN-NEGATIVE  
     
  PCR Reflex NOT APPLICABLE INTERPRETATION    
  NEGATIVE FOR TOXIGENIC C. DIFFICILE Clinical Consideration NEGATIVE FOR TOXIGENIC C. DIFFICILE Assessment/Plan:  
 
Principal Problem: 
  Acute respiratory failure with hypoxemia ([facility] [address] (6/17/2016) - Due to bilateral pneumonia 
- Continue Vanc + Zosyn 
- Continue albuterol - No PE per CT Chest 
- Now on HFNC from [facility] - Appreciate Pulmonary's input and assitance Active Problems: 
  Bilateral pneumonia (12/30/2018) - Continue Vanc + Zosyn since immunosuppressed 
- Continue albuteriol - Add Mucinex - Add ICS + Flutter Valve - Appreciate Pulmonary's input and assitance Pleural effusion, bilateral (1/2/2019) - Likely nieves-pneumonic + HF 
- Appreciate Pulmonary's input and assitance Acute hypokalemia (1/2/2019) - Likely due to acute illness - Replace PO 
- Recheck in AM 
 Metabolic Encephalopathy (0/0/9112) - Resolved per family - Was likely due to hypoxemia Episodic atrial fibrillation ([facility] [address] (8/16/2016) - Continue Amiodarone - Continue Corgard - No [address] Chronic diastolic heart failure ([facility] [address] (11/16/2016) - Stable - Start Lasix IV BID --> repeat BMP in AM 
- Continue Nadolol Macrocytic anemia (12/22/2016) - Stable Essential hypertension with goal blood pressure less than 130/85 (12/22/2016) - Well controlled - Continue Corgard - Continue Amlodipine Stage 3 chronic kidney disease ([facility] [address] (11/27/2017) - Stable Acquired hypothyroidism (7/9/2015) - Continue Levothyroxine Dementia without behavioral disturbance (8/16/2016) - No acute issues - Continue Remeron Anxiety (12/22/2016) - Continue Remeron Multiple myeloma in remission [facility]) (1/25/2017) 
- Oncology saw and will follow peripherally Presence of cardiac pacemaker (3/21/2016) Dispo - Continue Vanc/Zosyn. IV Lasix. Wean oxygen as appropriate. DIET CARDIAC Regular DVT Prophylaxis: Heparin Signed By: Malou Hassan DO   
 January 4, 2019

## 2019-01-04 NOTE — PROGRESS NOTES
Wally Wooten Admission Date: 12/30/2018 Daily Progress Note: 1/4/2019 The patient's chart is reviewed and the patient is discussed with the staff. 81yo WF with MM on chemotherapy, on O2 at night at baseline, admitted from  with shortness of breath, CXR with RUL infiltrate. Has had progressive worsening hypoxia and now with B infiltrates. Subjective:  
 
Patient currently sitting up in chair. On 50 L / 60% optiflow - O2 sat 95%. Continues to have significant dyspnea with any level of exertion. On lasix and very SOB when going to bathroom. Occasional non-productive cough. Current Facility-Administered Medications Medication Dose Route Frequency  calcium carbonate (TUMS) chewable tablet 400 mg [elemental]  400 mg Oral TID WITH MEALS  furosemide (LASIX) injection 40 mg  40 mg IntraVENous BID  
 azithromycin (ZITHROMAX) 500 mg in 0.9% sodium chloride (MBP/ADV) 250 mL  500 mg IntraVENous Q24H  hydrocortisone (ANUSOL-HC) 2.5 % rectal cream   PeriANAL QID  guaiFENesin ER (MUCINEX) tablet 1,200 mg  1,200 mg Oral Q12H  
 vancomycin (VANCOCIN) 1250 mg in  ml infusion  1,250 mg IntraVENous Q24H  
 methylPREDNISolone (PF) (Solu-MEDROL) injection 60 mg  60 mg IntraVENous Q12H  
 morphine injection 1 mg  1 mg IntraVENous Q4H PRN  
 albuterol (PROVENTIL VENTOLIN) nebulizer solution 2.5 mg  2.5 mg Nebulization Q6H RT  
 famotidine (PEPCID) tablet 20 mg  20 mg Oral DAILY  nystatin (MYCOSTATIN) 100,000 unit/mL oral suspension 500,000 Units  500,000 Units Oral QID  amLODIPine (NORVASC) tablet 2.5 mg  2.5 mg Oral DAILY  aspirin delayed-release tablet 81 mg  81 mg Oral DAILY  atorvastatin (LIPITOR) tablet 80 mg  80 mg Oral QHS  ferrous sulfate tablet 325 mg  1 Tab Oral DAILY WITH BREAKFAST  levothyroxine (SYNTHROID) tablet 50 mcg  50 mcg Oral ACB  mirtazapine (REMERON) tablet 15 mg  15 mg Oral QHS  nadolol (CORGARD) tablet 80 mg  80 mg Oral DAILY  piperacillin-tazobactam (ZOSYN) 4.5 g in 0.9% sodium chloride (MBP/ADV) 100 mL  4.5 g IntraVENous Q8H  
 sodium chloride (NS) flush 5-10 mL  5-10 mL IntraVENous Q8H  
 sodium chloride (NS) flush 5-10 mL  5-10 mL IntraVENous PRN  
 acetaminophen (TYLENOL) tablet 650 mg  650 mg Oral Q6H PRN  
 HYDROcodone-acetaminophen (NORCO) 5-325 mg per tablet 1 Tab  1 Tab Oral Q4H PRN  
 naloxone (NARCAN) injection 0.4 mg  0.4 mg IntraVENous PRN  
 ondansetron (ZOFRAN) injection 4 mg  4 mg IntraVENous Q4H PRN  
 clorazepate (TRANXENE) tablet 7.5 mg  7.5 mg Oral BID Review of Systems Constitutional: negative for fever, chills, sweats Cardiovascular: negative for chest pain, palpitations, syncope, edema Gastrointestinal: negative for dysphagia, reflux, vomiting, diarrhea, abdominal pain, or melena Neurologic: negative for focal weakness, numbness, headache Objective:  
 
Vitals:  
 01/03/19 2328 01/04/19 3806 01/04/19 0455 01/04/19 5152 BP: 120/53  112/43 119/58 Pulse: 60  61 60 Resp: 20  20 18 Temp: 98.4 °F (36.9 °C)  97.8 °F (36.6 °C) 98.2 °F (36.8 °C) SpO2: 95% 96% 93% 95% Weight:   171 lb 9.6 oz (77.8 kg) Height:      
 
Intake and Output:  
01/02 1901 - 01/04 0700 In: 2412 [P.O.:978; I.V.:1434] Out: 3600 [SBAYL:[id number]] 01/04 0701 - 01/04 1900 In: 740 [P.O.:500; I.V.:240] Out: - Physical Exam:  
Constitution:  the patient is well developed and in no acute distress EENMT:  Sclera clear, pupils equal, oral mucosa moist 
Respiratory: clear bilaterally, Opti-flow Cardiovascular:  RRR without M,G,R 
Gastrointestinal: soft and non-tender; with positive bowel sounds. Musculoskeletal: warm without cyanosis. There is 1+ lower leg edema. Skin:  no jaundice or rashes, no wounds Neurologic: no gross neuro deficits Psychiatric:  alert and oriented x ppt CHEST XRAY:  
1/1 CT chest 
 
 
CXR 1/3/19 1.  Stable findings of the chest as described above. LAB Recent Labs 01/04/19 
1535 01/03/19 
4436 01/02/19 
9922 WBC 3.7* 3.9* 5.0 HGB 8.7* 8.9* 9.3* HCT 27.6* 28.3* 29.1*  
 174 171 Recent Labs 01/04/19 
6348 01/03/19 
9259 01/02/19 
6657  144 141  
K 3.9 2.8* 3.4*  
* 114* 114* CO2 20* 19* 19* * 153* 124* BUN 18 12 12 CREA 1.23* 1.21* 1.24* CA 6.0* 6.2* 6.1* ALB 2.1* 2.0* 2.1*  
SGOT 76* 44* 31 ABG:  No results found for: PH, PHI, PCO2, PCO2I, PO2, PO2I, HCO3, HCO3I, FIO2, FIO2I Micro:  
1/3 urine legionella: pending 1/3 respiratory viral panel: pending 1/3 influenza: negative 1/3 sputum: pending 12/31 stool: negative for c.diff 
12/30 BC: NGTD x 2 Assessment:  (Medical Decision Making) Hospital Problems  Date Reviewed: 1/4/2019 Codes Class Noted POA Pleural effusion, bilateral ICD-10-CM: J90 ICD-9-CM: 511.9  1/2/2019 Yes On lasix with negative fluid balance Acute hypokalemia ICD-10-CM: E87.6 ICD-9-CM: 276.8  1/2/2019 Yes K+ 3.9 Bilateral pneumonia ICD-10-CM: J18.9 ICD-9-CM: 649  12/30/2018 Yes On abx Ideally would like to bronch but too hypoxic at this time Stage 3 chronic kidney disease (HCC) (Chronic) ICD-10-CM: N18.3 ICD-9-CM: 585.3  11/27/2017 Yes Creat 1.23 - stable Multiple myeloma in remission [facility]) ICD-10-CM: C90.01 
ICD-9-CM: 203.01  1/25/2017 Yes Macrocytic anemia ICD-10-CM: D53.9 ICD-9-CM: 281.9  12/22/2016 Yes Hgb 8.7 Essential hypertension with goal blood pressure less than 130/85 (Chronic) ICD-10-CM: I10 
ICD-9-CM: 401.9  12/22/2016 Yes Anxiety ICD-10-CM: F41.9 ICD-9-CM: 300.00  12/22/2016 Yes Chronic diastolic heart failure (HCC) (Chronic) ICD-10-CM: I50.32 
ICD-9-CM: 428.32  11/16/2016 Yes On lasix Episodic atrial fibrillation (HCC) ICD-10-CM: I48.0 ICD-9-CM: 427.31  8/16/2016 Yes Dementia without behavioral disturbance (Chronic) ICD-10-CM: F03.90 ICD-9-CM: 294.20  8/16/2016 Yes * (Principal) Acute respiratory failure with hypoxemia ([facility] [address] ICD-10-CM: J96.01 
ICD-9-CM: 518.81  6/17/2016 Yes On Optiflow Presence of cardiac pacemaker (Chronic) ICD-10-CM: Z95.0 ICD-9-CM: V45.01  3/21/2016 Yes Acquired hypothyroidism (Chronic) ICD-10-CM: E03.9 ICD-9-CM: 244.9  7/9/2015 Yes Immunosuppressed patient presenting with myalgias and B PNA with severe hypoxic respiratory failure. Plan:  (Medical Decision Making) --Albuterol 
--azithro / Dre Gonsales / Neela Grajeda WBC 3.7 PCT 0.5 
--mucinex 
--solumedrol 60 mg IV q 12 hours 
--would ideally like to perform bronchoscopy for direct sampling but too hypoxic at present. --Lasix 40 mg IV BID- trying to keep patient as dry as possible. Net 1.2 liters negative yesterday Calderon catheter --on 60% optiflow. Wean as sats allow. --amio held for possible toxicity. Seems less likely. Lazarus Potts NP The patient has been seen and examined by me personally, the chart,labs, and radiographic studies have been reviewed. Chest:Coarse bilaterally with no wheezing Extremities: 1-2+ edema I agree with the above assessment and plan.  
 
Charleen Jj MD.

## 2019-01-04 NOTE — PROGRESS NOTES
Problem: Mobility Impaired (Adult and Pediatric) Goal: *Acute Goals and Plan of Care (Insert Text) 1. Ms. Lakeisha Kwok will perform supine to sit and sit to supine independently in 7 days. 2.  Ms. Lakeisha Kwok will perform sit to stand and bed to chair independently in 7 days. 3.  Ms. Lakeisha Kwok will perform gait with least restrictive device 250 ft independently in 7 days. 4.  Ms. Lakeisha Kwok will go up and down 3 steps with rail independently in 7 days. PHYSICAL THERAPY: Daily Note, Treatment Day: 2nd, AM 1/4/2019INPATIENT: Hospital Day: 6 [place] / Product Type: Medicare /  
  
NAME/AGE/GENDER: Marlen Canada is a 80 y.o. female PRIMARY DIAGNOSIS: pneumonia Pneumonia Acute respiratory failure with hypoxemia (HCC) Acute respiratory failure with hypoxemia (HCC) ICD-10: Treatment Diagnosis:  
 · Generalized Muscle Weakness (M62.81) · Difficulty in walking, Not elsewhere classified (R26.2) Precaution/Allergies: 
Patient has no known allergies. ASSESSMENT:  
Ms. Lakeisha Kwok presents sitting up in the bedside chair with her family at the bedside. She remains on optiflow. Her resting O2 sat was 97%. She participated in BLE AROM exercises with her legs extended and sitting up with her legs bent. She practiced sit to stand 2 times with use of the r/walker and CGA. She stepped in place 10' x 2 with the r/walker with CGA. She rested in between each set of stepping and her O2 sats remained at 97%. She became slightly SOB on the 2nd round of stepping but overall she appeared to tolerate the increased activity well. She was pleased with her efforts today. This section established at most recent assessment PROBLEM LIST (Impairments causing functional limitations): 1. Decreased Strength 2. Decreased Transfer Abilities 3. Decreased Ambulation Ability/Technique 4. Decreased Activity Tolerance 5. Decreased Pacing Skills INTERVENTIONS PLANNED: (Benefits and precautions of physical therapy have been discussed with the patient.) 1. Bed Mobility 2. Gait Training 3. Therapeutic Activites 4. Transfer Training TREATMENT PLAN: Frequency/Duration: 4 times a week for duration of hospital stay Rehabilitation Potential For Stated Goals: Good RECOMMENDED REHABILITATION/EQUIPMENT: (at time of discharge pending progress): Due to the probability of continued deficits (see above) this patient will likely need continued skilled physical therapy after discharge. Equipment:  
? None at this time HISTORY:  
History of Present Injury/Illness (Reason for Referral): Ms. Loreto Capps is a 79 yo female with PMH of PAFIB with pacer, multiple myeloma followed by Dr. Sidney Arechiga on current chemo, HTN, CKD, dCHF, who is sent as a direct admit from urgent care due to RUL pneumonia. She admits to several days of cough/ malaise and headache. CXR at urgent care showed RUL pneumonia. She denies fever, has anorexia and no ulices dyspnea.  
  
10 systems reviewed and negative except as noted in HPI. - has throat pain, needs glasses, has constipation, has decreased urination with dysuria, has myalgias, has memory loss, has cold intolerance and weight gain Past Medical History/Comorbidities:  
Ms. Loreto Capps  has a past medical history of Anemia, unspecified, Chest pain, unspecified, Chronic anxiety, Diastolic heart failure ([address] Essential hypertension, benign, Flatulence, eructation, and gas pain, Lump or mass in breast, Other and unspecified hyperlipidemia, Paroxysmal atrial fibrillation ([address] Paroxysmal tachycardia ([address] Pernicious anemia, Postmenopausal atrophic vaginitis, Unspecified deficiency anemia, and Unspecified hypothyroidism.   Ms. Loreto Capps  has a past surgical history that includes hx hysterectomy; hx orthopaedic; hx vein stripping; THORACENTESIS (Left, 6/23/2016); ULTRASOUND (Bilateral, 6/23/2016); ESOPHAGOGASTRODUODENOSCOPY (EGD)  BMI 30  ROOM 309 (N/A, 6/21/2016); ULTRASOUND (Bilateral, 6/20/2016); THORACENTESIS (Bilateral, 6/20/2016); THORACENTESIS (Bilateral, 6/17/2016); and ULTRASOUND (Bilateral, 6/17/2016). Social History/Living Environment:  
Home Environment: Private residence # Steps to Enter: 3 Rails to Enter: Yes Hand Rails : Right One/Two Story Residence: One story Living Alone: Yes Support Systems: Child(miriam) Patient Expects to be Discharged to[de-identified] Private residence Current DME Used/Available at Home: None Prior Level of Function/Work/Activity: 
Independent in her own home. A \"few\" falls. age Number of Personal Factors/Comorbidities that affect the Plan of Care: 1-2: MODERATE COMPLEXITY EXAMINATION:  
Most Recent Physical Functioning:  
Gross Assessment: 
  
         
  
Posture: 
  
Balance: 
Sitting: Intact Standing: Impaired Standing - Static: Fair;Constant support Standing - Dynamic : Fair Bed Mobility: 
  
Wheelchair Mobility: 
  
Transfers: 
Sit to Stand: Contact guard assistance Stand to Sit: Contact guard assistance Gait: 
  
Base of Support: Widened Step Length: Left shortened;Right shortened Distance (ft): 10 Feet (ft)(10' x 2 at the bedside) Ambulation - Level of Assistance: Contact guard assistance Interventions: Safety awareness training Body Structures Involved: 1. Muscles Body Functions Affected: 1. Movement Related Activities and Participation Affected: 1. Mobility Number of elements that affect the Plan of Care: 3: MODERATE COMPLEXITY CLINICAL PRESENTATION:  
Presentation: Evolving clinical presentation with changing clinical characteristics: MODERATE COMPLEXITY CLINICAL DECISION MAKING:  
[employer] 6 Clicks Basic Mobility Inpatient Short Form How much difficulty does the patient currently have. .. Unable A Lot A Little None 1.   Turning over in bed (including adjusting bedclothes, sheets and blankets)? [] 1   [] 2   [] 3   [x] 4  
2. Sitting down on and standing up from a chair with arms ( e.g., wheelchair, bedside commode, etc.)   [] 1   [] 2   [x] 3   [] 4  
3. Moving from lying on back to sitting on the side of the bed? [] 1   [] 2   [] 3   [x] 4 How much help from another person does the patient currently need. .. Total A Lot A Little None 4. Moving to and from a bed to a chair (including a wheelchair)? [] 1   [] 2   [x] 3   [] 4  
5. Need to walk in hospital room? [] 1   [] 2   [x] 3   [] 4  
6. Climbing 3-5 steps with a railing? [] 1   [] 2   [x] 3   [] 4  
© 2007, Trustees of [facility] [employer], under license to [employer]. All rights reserved Score:  Initial: 20 Most Recent: X (Date: -- ) Interpretation of Tool:  Represents activities that are increasingly more difficult (i.e. Bed mobility, Transfers, Gait). Score 24 23 22-20 19-15 14-10 9-7 6 Modifier CH CI CJ CK CL CM CN   
 
? Mobility - Walking and Moving Around:  
  - CURRENT STATUS: CJ - 20%-39% impaired, limited or restricted  - GOAL STATUS: CJ - 20%-39% impaired, limited or restricted  - D/C STATUS:  ---------------To be determined--------------- [place] / Product Type: Medicare /   
 
Medical Necessity:    
· Patient is expected to demonstrate progress in functional technique to increase independence with mobility and gait. . 
Reason for Services/Other Comments: 
· Patient continues to require present interventions due to patient's inability to function at baseline. .  
Use of outcome tool(s) and clinical judgement create a POC that gives a: Questionable prediction of patient's progress: MODERATE COMPLEXITY  
  
 
 
 
TREATMENT:  
(In addition to Assessment/Re-Assessment sessions the following treatments were rendered) Pre-treatment Symptoms/Complaints:  Remains on Optiflow. Pain: Initial: Pain Intensity 1: 0  Post Session:  Slightly SOB Therapeutic Activity: (24 minutes): Therapeutic activities including Bed transfers, Chair transfers, Toilet transfers and Ambulation on level ground to improve mobility. Required minimal Safety awareness training to promote motor control of upper extremity(s), lower extremity(s). Performed seated BLE AROM strength exercises in sitting with legs extended and bent 2 x 10 reps each. Braces/Orthotics/Lines/Etc:  
· IV · Optiflow O2 Treatment/Session Assessment:   
· Response to Treatment:  Patient participated and tolerated therapy well today. · Interdisciplinary Collaboration:  
o Physical Therapist 
o Registered Nurse · After treatment position/precautions:  
o Up in chair 
o Call light within reach 
o RN notified 
o Family at bedside · Compliance with Program/Exercises: Will assess as treatment progresses · Recommendations/Intent for next treatment session: \"Next visit will focus on advancements to more challenging activities and reduction in assistance provided\". Total Treatment Duration: PT Patient Time In/Time Out Time In: 4839 Time Out: 84[address] [place], [place]

## 2019-01-05 LAB
ALBUMIN SERPL-MCNC: 2.1 G/DL (ref 3.2–4.6)
ALBUMIN/GLOB SERPL: 0.6 {RATIO} (ref 1.2–3.5)
ALP SERPL-CCNC: 74 U/L (ref 50–136)
ALT SERPL-CCNC: 75 U/L (ref 12–65)
ANION GAP SERPL CALC-SCNC: 12 MMOL/L (ref 7–16)
AST SERPL-CCNC: 87 U/L (ref 15–37)
BASOPHILS # BLD: 0 K/UL (ref 0–0.2)
BASOPHILS NFR BLD: 0 % (ref 0–2)
BILIRUB SERPL-MCNC: 0.7 MG/DL (ref 0.2–1.1)
BUN SERPL-MCNC: 22 MG/DL (ref 8–23)
CALCIUM SERPL-MCNC: 5.6 MG/DL (ref 8.3–10.4)
CHLORIDE SERPL-SCNC: 107 MMOL/L (ref 98–107)
CO2 SERPL-SCNC: 23 MMOL/L (ref 21–32)
CREAT SERPL-MCNC: 1.19 MG/DL (ref 0.6–1)
CRP SERPL HS-MCNC: 55.6 MG/L
DIFFERENTIAL METHOD BLD: ABNORMAL
EOSINOPHIL # BLD: 0 K/UL (ref 0–0.8)
EOSINOPHIL NFR BLD: 0 % (ref 0.5–7.8)
ERYTHROCYTE [DISTWIDTH] IN BLOOD BY AUTOMATED COUNT: 13.7 % (ref 11.9–14.6)
GLOBULIN SER CALC-MCNC: 3.7 G/DL (ref 2.3–3.5)
GLUCOSE SERPL-MCNC: 152 MG/DL (ref 65–100)
HCT VFR BLD AUTO: 28.4 % (ref 35.8–46.3)
HGB BLD-MCNC: 9.3 G/DL (ref 11.7–15.4)
IMM GRANULOCYTES # BLD: 0 K/UL (ref 0–0.5)
IMM GRANULOCYTES NFR BLD AUTO: 1 % (ref 0–5)
LYMPHOCYTES # BLD: 0.2 K/UL (ref 0.5–4.6)
LYMPHOCYTES NFR BLD: 6 % (ref 13–44)
MAGNESIUM SERPL-MCNC: 2.1 MG/DL (ref 1.8–2.4)
MCH RBC QN AUTO: 32 PG (ref 26.1–32.9)
MCHC RBC AUTO-ENTMCNC: 32.7 G/DL (ref 31.4–35)
MCV RBC AUTO: 97.6 FL (ref 79.6–97.8)
MONOCYTES # BLD: 0.2 K/UL (ref 0.1–1.3)
MONOCYTES NFR BLD: 6 % (ref 4–12)
NEUTS SEG # BLD: 3 K/UL (ref 1.7–8.2)
NEUTS SEG NFR BLD: 87 % (ref 43–78)
NRBC # BLD: 0.03 K/UL (ref 0–0.2)
PLATELET # BLD AUTO: 231 K/UL (ref 150–450)
PMV BLD AUTO: 11.4 FL (ref 9.4–12.3)
POTASSIUM SERPL-SCNC: 2.7 MMOL/L (ref 3.5–5.1)
PROT SERPL-MCNC: 5.8 G/DL (ref 6.3–8.2)
RBC # BLD AUTO: 2.91 M/UL (ref 4.05–5.2)
S PNEUM AG SPEC QL LA: NORMAL
SODIUM SERPL-SCNC: 142 MMOL/L (ref 136–145)
WBC # BLD AUTO: 3.4 K/UL (ref 4.3–11.1)

## 2019-01-05 PROCEDURE — 77010033711 HC HIGH FLOW OXYGEN

## 2019-01-05 PROCEDURE — 74011250636 HC RX REV CODE- 250/636: Performed by: INTERNAL MEDICINE

## 2019-01-05 PROCEDURE — 74011000258 HC RX REV CODE- 258: Performed by: INTERNAL MEDICINE

## 2019-01-05 PROCEDURE — 74011250636 HC RX REV CODE- 250/636: Performed by: FAMILY MEDICINE

## 2019-01-05 PROCEDURE — 74011000250 HC RX REV CODE- 250: Performed by: INTERNAL MEDICINE

## 2019-01-05 PROCEDURE — 74011250637 HC RX REV CODE- 250/637: Performed by: INTERNAL MEDICINE

## 2019-01-05 PROCEDURE — 36415 COLL VENOUS BLD VENIPUNCTURE: CPT

## 2019-01-05 PROCEDURE — 65270000029 HC RM PRIVATE

## 2019-01-05 PROCEDURE — 94760 N-INVAS EAR/PLS OXIMETRY 1: CPT

## 2019-01-05 PROCEDURE — 86141 C-REACTIVE PROTEIN HS: CPT

## 2019-01-05 PROCEDURE — 80053 COMPREHEN METABOLIC PANEL: CPT

## 2019-01-05 PROCEDURE — 99232 SBSQ HOSP IP/OBS MODERATE 35: CPT | Performed by: INTERNAL MEDICINE

## 2019-01-05 PROCEDURE — 85025 COMPLETE CBC W/AUTO DIFF WBC: CPT

## 2019-01-05 PROCEDURE — 83735 ASSAY OF MAGNESIUM: CPT

## 2019-01-05 PROCEDURE — 94640 AIRWAY INHALATION TREATMENT: CPT

## 2019-01-05 RX ORDER — POTASSIUM CHLORIDE 14.9 MG/ML
20 INJECTION INTRAVENOUS
Status: COMPLETED | OUTPATIENT
Start: 2019-01-05 | End: 2019-01-05

## 2019-01-05 RX ORDER — POTASSIUM CHLORIDE 20 MEQ/1
40 TABLET, EXTENDED RELEASE ORAL
Status: COMPLETED | OUTPATIENT
Start: 2019-01-05 | End: 2019-01-05

## 2019-01-05 RX ADMIN — GUAIFENESIN 1200 MG: 600 TABLET, EXTENDED RELEASE ORAL at 08:24

## 2019-01-05 RX ADMIN — FERROUS SULFATE TAB 325 MG (65 MG ELEMENTAL FE) 325 MG: 325 (65 FE) TAB at 08:24

## 2019-01-05 RX ADMIN — FUROSEMIDE 40 MG: 10 INJECTION, SOLUTION INTRAMUSCULAR; INTRAVENOUS at 17:10

## 2019-01-05 RX ADMIN — CALCIUM CARBONATE 400 MG: 500 TABLET, CHEWABLE ORAL at 08:24

## 2019-01-05 RX ADMIN — AMLODIPINE BESYLATE 2.5 MG: 5 TABLET ORAL at 08:24

## 2019-01-05 RX ADMIN — POTASSIUM CHLORIDE 40 MEQ: 20 TABLET, EXTENDED RELEASE ORAL at 11:14

## 2019-01-05 RX ADMIN — CALCIUM CARBONATE 400 MG: 500 TABLET, CHEWABLE ORAL at 17:10

## 2019-01-05 RX ADMIN — HYDROCORTISONE 2.5%: 25 CREAM TOPICAL at 17:19

## 2019-01-05 RX ADMIN — METHYLPREDNISOLONE SODIUM SUCCINATE 60 MG: 125 INJECTION, POWDER, FOR SOLUTION INTRAMUSCULAR; INTRAVENOUS at 21:51

## 2019-01-05 RX ADMIN — POTASSIUM CHLORIDE 20 MEQ: 200 INJECTION, SOLUTION INTRAVENOUS at 06:50

## 2019-01-05 RX ADMIN — AZITHROMYCIN MONOHYDRATE 500 MG: 500 INJECTION, POWDER, LYOPHILIZED, FOR SOLUTION INTRAVENOUS at 13:44

## 2019-01-05 RX ADMIN — PIPERACILLIN SODIUM,TAZOBACTAM SODIUM 4.5 G: 4; .5 INJECTION, POWDER, FOR SOLUTION INTRAVENOUS at 03:22

## 2019-01-05 RX ADMIN — Medication 10 ML: at 21:52

## 2019-01-05 RX ADMIN — PIPERACILLIN SODIUM,TAZOBACTAM SODIUM 4.5 G: 4; .5 INJECTION, POWDER, FOR SOLUTION INTRAVENOUS at 21:51

## 2019-01-05 RX ADMIN — HYDROCORTISONE 2.5%: 25 CREAM TOPICAL at 13:00

## 2019-01-05 RX ADMIN — VANCOMYCIN HYDROCHLORIDE 1250 MG: 10 INJECTION, POWDER, LYOPHILIZED, FOR SOLUTION INTRAVENOUS at 15:27

## 2019-01-05 RX ADMIN — PIPERACILLIN SODIUM,TAZOBACTAM SODIUM 4.5 G: 4; .5 INJECTION, POWDER, FOR SOLUTION INTRAVENOUS at 11:13

## 2019-01-05 RX ADMIN — Medication 10 ML: at 13:28

## 2019-01-05 RX ADMIN — FUROSEMIDE 40 MG: 10 INJECTION, SOLUTION INTRAMUSCULAR; INTRAVENOUS at 08:28

## 2019-01-05 RX ADMIN — NYSTATIN 500000 UNITS: 500000 SUSPENSION ORAL at 13:27

## 2019-01-05 RX ADMIN — TRAZODONE HYDROCHLORIDE 100 MG: 50 TABLET ORAL at 21:51

## 2019-01-05 RX ADMIN — CLORAZEPATE DIPOTASSIUM 7.5 MG: 7.5 TABLET ORAL at 08:24

## 2019-01-05 RX ADMIN — CALCIUM CARBONATE 400 MG: 500 TABLET, CHEWABLE ORAL at 11:21

## 2019-01-05 RX ADMIN — ACETAMINOPHEN 650 MG: 325 TABLET ORAL at 14:50

## 2019-01-05 RX ADMIN — LEVOTHYROXINE SODIUM 50 MCG: 50 TABLET ORAL at 06:50

## 2019-01-05 RX ADMIN — POTASSIUM CHLORIDE 20 MEQ: 200 INJECTION, SOLUTION INTRAVENOUS at 11:09

## 2019-01-05 RX ADMIN — FAMOTIDINE 20 MG: 20 TABLET ORAL at 08:24

## 2019-01-05 RX ADMIN — ALBUTEROL SULFATE 2.5 MG: 2.5 SOLUTION RESPIRATORY (INHALATION) at 02:25

## 2019-01-05 RX ADMIN — ALBUTEROL SULFATE 2.5 MG: 2.5 SOLUTION RESPIRATORY (INHALATION) at 20:45

## 2019-01-05 RX ADMIN — CLORAZEPATE DIPOTASSIUM 7.5 MG: 7.5 TABLET ORAL at 21:51

## 2019-01-05 RX ADMIN — ALBUTEROL SULFATE 2.5 MG: 2.5 SOLUTION RESPIRATORY (INHALATION) at 07:52

## 2019-01-05 RX ADMIN — ASPIRIN 81 MG: 81 TABLET, COATED ORAL at 08:24

## 2019-01-05 RX ADMIN — CALCIUM GLUCONATE 4 G: 98 INJECTION, SOLUTION INTRAVENOUS at 06:50

## 2019-01-05 RX ADMIN — HYDROCORTISONE 2.5%: 25 CREAM TOPICAL at 08:29

## 2019-01-05 RX ADMIN — GUAIFENESIN 1200 MG: 600 TABLET, EXTENDED RELEASE ORAL at 21:51

## 2019-01-05 RX ADMIN — NYSTATIN 500000 UNITS: 500000 SUSPENSION ORAL at 08:25

## 2019-01-05 RX ADMIN — ATORVASTATIN CALCIUM 80 MG: 40 TABLET, FILM COATED ORAL at 21:51

## 2019-01-05 RX ADMIN — ALBUTEROL SULFATE 2.5 MG: 2.5 SOLUTION RESPIRATORY (INHALATION) at 14:00

## 2019-01-05 RX ADMIN — MIRTAZAPINE 15 MG: 15 TABLET, FILM COATED ORAL at 21:51

## 2019-01-05 RX ADMIN — NADOLOL 80 MG: 40 TABLET ORAL at 08:24

## 2019-01-05 RX ADMIN — METHYLPREDNISOLONE SODIUM SUCCINATE 60 MG: 125 INJECTION, POWDER, FOR SOLUTION INTRAMUSCULAR; INTRAVENOUS at 08:25

## 2019-01-05 NOTE — PROGRESS NOTES
Problem: Falls - Risk of 
Goal: *Absence of Falls Document Yelitza Shah Fall Risk and appropriate interventions in the flowsheet. Outcome: Progressing Towards Goal 
Fall Risk Interventions: 
Mobility Interventions: Communicate number of staff needed for ambulation/transfer, Patient to call before getting OOB Mentation Interventions: Adequate sleep, hydration, pain control, Door open when patient unattended, Bed/chair exit alarm, Evaluate medications/consider consulting pharmacy, More frequent rounding, Reorient patient, Room close to nurse's station Medication Interventions: Evaluate medications/consider consulting pharmacy, Patient to call before getting OOB, Teach patient to arise slowly Elimination Interventions: Call light in reach, Patient to call for help with toileting needs, Bed/chair exit alarm, Toilet paper/wipes in reach Problem: Pressure Injury - Risk of 
Goal: *Prevention of pressure injury Document José Manuel Scale and appropriate interventions in the flowsheet. Outcome: Progressing Towards Goal 
Pressure Injury Interventions: 
Sensory Interventions: Maintain/enhance activity level, Pressure redistribution bed/mattress (bed type), Assess changes in LOC Moisture Interventions: Maintain skin hydration (lotion/cream), Internal/External urinary devices Activity Interventions: Increase time out of bed, Pressure redistribution bed/mattress(bed type) Mobility Interventions: HOB 30 degrees or less, Pressure redistribution bed/mattress (bed type) Nutrition Interventions: Document food/fluid/supplement intake Friction and Shear Interventions: HOB 30 degrees or less

## 2019-01-05 NOTE — PROGRESS NOTES
Hospitalist Progress Note Patient: Elnor Primrose MRN: [id number]  SSN: [id number] YOB: 1930  Age: 80 y.o. Sex: female Admit Date: 12/30/2018 LOS: 6 days Subjective:  
 
From previous notes: \"81 yo female with PMH of paroxysmal A Fib with pacer, multiple myeloma followed by Dr. Hodgson Husbands on current chemo, HTN, CKD, dCHF, who is sent as a direct admit from urgent care due to RUL pneumonia. She admits to several days of cough/ malaise and headache. CXR at urgent care showed RUL pneumonia.  
Pt on 5 L NC with significant desatting during conversation. Pt reports feeling some better but is obviously SOB while we talk. \" 
 
1/3 - She desatted again overnight. Feels more SOB. Denies CP. 
1/4 - She feels better today. SOB improved. 1/5 - She continue to improve slowly. Her SOB is better. Denies CP. Denies cough. Review of systems negative except stated above. Objective:  
 
Visit Vitals /59 Pulse 60 Temp 97.7 °F (36.5 °C) Resp 18 Ht 5' 4\" (1.626 m) Wt 76 kg (167 lb 8 oz) SpO2 94% Breastfeeding? No  
BMI 28.75 kg/m² Oxygen Therapy O2 Sat (%): 94 % (01/05/19 1123) Pulse via Oximetry: 70 beats per minute (01/05/19 0752) O2 Device: Hi flow nasal cannula(Optiflo switched to HFNC 7L) (01/05/19 0752) O2 Flow Rate (L/min): 7 l/min (01/05/19 0752) O2 Temperature: 87.8 °F (31 °C) (01/05/19 0225) FIO2 (%): 45 % (01/05/19 0225) Intake and Output:  
 
Intake/Output Summary (Last 24 hours) at 1/5/2019 1151 Last data filed at 1/5/2019 1123 Gross per 24 hour Intake 591 ml Output 5075 ml Net -4484 ml Physical Exam:  
GENERAL: alert, cooperative, no distress, appears stated age EYE: conjunctivae/corneas clear. PERRL. THROAT & NECK: normal and no erythema or exudates noted. LUNG: scattered rhonchi, L>R HEART: regular rate and rhythm, S1S2, no murmur, no JVD ABDOMEN: soft, non-tender, non-distended.  Bowel sounds normal.  
 EXTREMITIES:  No edema, 2+ pedal/radial pulses bilaterally SKIN: no rash or abnormalities NEUROLOGIC: Alert. Cranial nerves 2-12 grossly intact. Lab/Data Review: 
Recent Results (from the past 24 hour(s)) CULTURE, RESPIRATORY/SPUTUM/BRONCH W GRAM STAIN Collection Time: 01/04/19  7:58 PM  
Result Value Ref Range Special Requests: NO SPECIAL REQUESTS    
 GRAM STAIN PENDING Culture result:     
  NO GROWTH AFTER SHORT PERIOD OF INCUBATION. FURTHER RESULTS TO FOLLOW AFTER OVERNIGHT INCUBATION. CBC WITH AUTOMATED DIFF Collection Time: 01/05/19  4:35 AM  
Result Value Ref Range WBC 3.4 (L) 4.3 - 11.1 K/uL  
 RBC 2.91 (L) 4.05 - 5.2 M/uL HGB 9.3 (L) 11.7 - 15.4 g/dL HCT 28.4 (L) 35.8 - 46.3 % MCV 97.6 79.6 - 97.8 FL  
 MCH 32.0 26.1 - 32.9 PG  
 MCHC 32.7 31.4 - 35.0 g/dL  
 RDW 13.7 11.9 - 14.6 % PLATELET 231 167 - 944 K/uL MPV 11.4 9.4 - 12.3 FL ABSOLUTE NRBC 0.03 0.0 - 0.2 K/uL NEUTROPHILS 87 (H) 43 - 78 % LYMPHOCYTES 6 (L) 13 - 44 % MONOCYTES 6 4.0 - 12.0 % EOSINOPHILS 0 (L) 0.5 - 7.8 % BASOPHILS 0 0.0 - 2.0 % IMMATURE GRANULOCYTES 1 0.0 - 5.0 %  
 ABS. NEUTROPHILS 3.0 1.7 - 8.2 K/UL  
 ABS. LYMPHOCYTES 0.2 (L) 0.5 - 4.6 K/UL  
 ABS. MONOCYTES 0.2 0.1 - 1.3 K/UL  
 ABS. EOSINOPHILS 0.0 0.0 - 0.8 K/UL  
 ABS. BASOPHILS 0.0 0.0 - 0.2 K/UL  
 ABS. IMM. GRANS. 0.0 0.0 - 0.5 K/UL  
 DF AUTOMATED METABOLIC PANEL, COMPREHENSIVE Collection Time: 01/05/19  4:35 AM  
Result Value Ref Range Sodium 142 136 - 145 mmol/L Potassium 2.7 (LL) 3.5 - 5.1 mmol/L Chloride 107 98 - 107 mmol/L  
 CO2 23 21 - 32 mmol/L Anion gap 12 7 - 16 mmol/L Glucose 152 (H) 65 - 100 mg/dL BUN 22 8 - 23 MG/DL Creatinine 1.19 (H) 0.6 - 1.0 MG/DL  
 GFR est AA 55 (L) >60 ml/min/1.73m2 GFR est non-AA 46 (L) >60 ml/min/1.73m2 Calcium 5.6 (LL) 8.3 - 10.4 MG/DL  Bilirubin, total 0.7 0.2 - 1.1 MG/DL  
 ALT (SGPT) 75 (H) 12 - 65 U/L  
 AST (SGOT) 87 (H) 15 - 37 U/L  
 Alk. phosphatase 74 50 - 136 U/L Protein, total 5.8 (L) 6.3 - 8.2 g/dL Albumin 2.1 (L) 3.2 - 4.6 g/dL Globulin 3.7 (H) 2.3 - 3.5 g/dL A-G Ratio 0.6 (L) 1.2 - 3.5    
CRP, HIGH SENSITIVITY Collection Time: 01/05/19  4:35 AM  
Result Value Ref Range CRP, High sensitivity 55.6 mg/L MAGNESIUM Collection Time: 01/05/19  4:35 AM  
Result Value Ref Range Magnesium 2.1 1.8 - 2.4 mg/dL Imaging: Xr Chest Sngl V Result Date: 1/3/2019 IMPRESSION: 1.  Stable findings of the chest as described above. Xr Chest Sngl V Result Date: 1/1/2019 IMPRESSION:  EXTENSIVE INHOMOGENEOUS BILATERAL INFILTRATES WITH RIGHT UPPER LOBE PREDOMINANCE. Ct Chest W Cont Result Date: 1/1/2019 IMPRESSION:   1. NO DEFINITE ACUTE PULMONARY EMBOLISM. 2.  SMALL BILATERAL PLEURAL EFFUSIONS WITH EXTENSIVE BILATERAL INFILTRATES SUSPICIOUS FOR PNEUMONIA. PULMONARY EDEMA WOULD BE LESS LIKELY. No results found for this visit on 12/30/18. Cultures: All Micro Results Procedure Component Value Units Date/Time CULTURE, RESPIRATORY/SPUTUM/BRONCH [facility] [[id number]] Collected:  01/04/19 1958 Order Status:  Completed Specimen:  Sputum Updated:  01/05/19 7965 Special Requests: NO SPECIAL REQUESTS     
  GRAM STAIN PENDING Culture result:    
  NO GROWTH AFTER SHORT PERIOD OF INCUBATION. FURTHER RESULTS TO FOLLOW AFTER OVERNIGHT INCUBATION. CULTURE, BLOOD [[id number]] Collected:  12/30/18 1921 Order Status:  Completed Specimen:  Blood Updated:  01/04/19 1224 Special Requests: --     
  RIGHT Antecubital 
  
  Culture result: NO GROWTH 5 DAYS     
 CULTURE, BLOOD [[id number]] Collected:  12/30/18 1928 Order Status:  Completed Specimen:  Blood Updated:  01/04/19 1224 Special Requests: --     
  RIGHT 
HAND Culture result: NO GROWTH 5 DAYS     
 LEGIONELLA PNEUMOPHILA AG, URINE [[id number]] Collected:  01/03/19 1958 Order Status:  Completed Specimen:  Urine Updated:  01/03/19 2008 INFLUENZA A & B AG (RAPID TEST) [[id number]] Collected:  01/03/19 1347 Order Status:  Completed Specimen:  Nasopharyngeal from Nasal washing Updated:  01/03/19 1410 Influenza A Ag NEGATIVE Comment: NEGATIVE FOR THE PRESENCE OF INFLUENZA A ANTIGEN 
INFECTION DUE TO INFLUENZA A CANNOT BE RULED OUT. BECAUSE THE ANTIGEN PRESENT IN THE SAMPLE MAY BE BELOW 
THE DETECTION LIMIT OF THE TEST. A NEGATIVE TEST IS PRESUMPTIVE AND IT IS RECOMMENDED THAT THESE RESULTS BE CONFIRMED BY VIRAL CULTURE OR AN FDA-CLEARED INFLUENZA A AND B MOLECULAR ASSAY. Influenza B Ag NEGATIVE Comment: NEGATIVE FOR THE PRESENCE OF INFLUENZA B ANTIGEN 
INFECTION DUE TO INFLUENZA B CANNOT BE RULED OUT. BECAUSE THE ANTIGEN PRESENT IN THE SAMPLE MAY BE BELOW 
THE DETECTION LIMIT OF THE TEST. A NEGATIVE TEST IS PRESUMPTIVE AND IT IS RECOMMENDED THAT THESE RESULTS BE CONFIRMED BY VIRAL CULTURE OR AN FDA-CLEARED INFLUENZA A AND B MOLECULAR ASSAY. Source NASOPHARYNGEAL     
 RESPIRATORY VIRAL PANEL, PCR [[id number]] Collected:  01/03/19 1347 Order Status:  Completed Updated:  01/03/19 1357 RESPIRATORY VIRAL PANEL, PCR [[id number]] Collected:  01/03/19 1253 Order Status:  Canceled Specimen:  Sputum Updated:  01/03/19 1258 C. DIFFICILE AG & TOXIN A/B [[id number]] Collected:  12/31/18 1844 Order Status:  Completed Specimen:  Stool Updated:  01/01/19 1231 [id number] ANTIGEN    
  C. DIFFICILE GDH ANTIGEN-NEGATIVE  
     
  C. difficile toxin C. DIFFICILE TOXIN-NEGATIVE  
     
  PCR Reflex NOT APPLICABLE INTERPRETATION    
  NEGATIVE FOR TOXIGENIC C. DIFFICILE Clinical Consideration NEGATIVE FOR TOXIGENIC C. DIFFICILE Assessment/Plan:  
 
Principal Problem: 
  Acute respiratory failure with hypoxemia ([facility] [address] (6/17/2016) - Due to bilateral pneumonia 
- Continue Vanc + Zosyn 
- Continue albuterol - No PE per CT Chest 
- Now on HFNC from [facility] - Appreciate Pulmonary's input and assitance Active Problems: 
  Bilateral pneumonia (12/30/2018) - Continue Vanc + Zosyn since immunosuppressed 
- Continue albuteriol - Add Mucinex - Add ICS + Flutter Valve - Appreciate Pulmonary's input and assitance Pleural effusion, bilateral (1/2/2019) - Likely nieves-pneumonic + HF 
- Appreciate Pulmonary's input and assitance Acute hypokalemia (1/2/2019) - Likely due to acute illness - Replace PO 
- Recheck in AM 
 
  Metabolic Encephalopathy (4/4/4657) - Resolved per family - Was likely due to hypoxemia Episodic atrial fibrillation ([facility] [address] (8/16/2016) - Continue Amiodarone - [facility] Chronic diastolic heart failure ([facility] [address] (11/16/2016) - Stable - Start Lasix IV BID --> repeat BMP in AM 
- Continue Nadolol Macrocytic anemia (12/22/2016) - Stable Essential hypertension with goal blood pressure less than 130/85 (12/22/2016) - Well controlled - Continue Corgard - Continue Amlodipine Stage 3 chronic kidney disease ([facility] [address] (11/27/2017) - Stable Acquired hypothyroidism (7/9/2015) - Continue Levothyroxine Dementia without behavioral disturbance (8/16/2016) - No acute issues - Continue Remeron Anxiety (12/22/2016) - Continue Remeron Multiple myeloma in remission [facility]) (1/25/2017) 
- Oncology saw and will follow peripherally Presence of cardiac pacemaker (3/21/2016) Dispo - Continue Vanc/Zosyn. IV Lasix. Wean oxygen as appropriate. DIET CARDIAC Regular DVT Prophylaxis: Heparin Signed By: Adelina Heller DO   
 January 5, 2019

## 2019-01-05 NOTE — PROGRESS NOTES
Wally Wooten Admission Date: 12/30/2018 Daily Progress Note: 1/5/2019 The patient's chart is reviewed and the patient is discussed with the staff. 81yo WF with MM on chemotherapy, on O2 at night at baseline, admitted from  with shortness of breath, CXR with RUL infiltrate. Has had progressive worsening hypoxia and now with B infiltrates. Subjective: Weaned to 7L HFNC from 60% yesterday. Coughing up some thick white sputum. Pt had 4.2L output yesterday. Current Facility-Administered Medications Medication Dose Route Frequency  calcium gluconate 4 g in 0.9% sodium chloride 250 mL IVPB  4 g IntraVENous ONCE  potassium chloride 20 mEq in 100 ml IVPB  20 mEq IntraVENous Q2H  
 oxymetazoline (AFRIN) 0.05 % nasal spray 2 Spray  2 Spray Both Nostrils BID PRN  
 traZODone (DESYREL) tablet 100 mg  100 mg Oral QHS  calcium carbonate (TUMS) chewable tablet 400 mg [elemental]  400 mg Oral TID WITH MEALS  furosemide (LASIX) injection 40 mg  40 mg IntraVENous BID  
 azithromycin (ZITHROMAX) 500 mg in 0.9% sodium chloride (MBP/ADV) 250 mL  500 mg IntraVENous Q24H  hydrocortisone (ANUSOL-HC) 2.5 % rectal cream   PeriANAL QID  guaiFENesin ER (MUCINEX) tablet 1,200 mg  1,200 mg Oral Q12H  
 vancomycin (VANCOCIN) 1250 mg in  ml infusion  1,250 mg IntraVENous Q24H  
 methylPREDNISolone (PF) (Solu-MEDROL) injection 60 mg  60 mg IntraVENous Q12H  
 morphine injection 1 mg  1 mg IntraVENous Q4H PRN  
 albuterol (PROVENTIL VENTOLIN) nebulizer solution 2.5 mg  2.5 mg Nebulization Q6H RT  
 famotidine (PEPCID) tablet 20 mg  20 mg Oral DAILY  nystatin (MYCOSTATIN) 100,000 unit/mL oral suspension 500,000 Units  500,000 Units Oral QID  amLODIPine (NORVASC) tablet 2.5 mg  2.5 mg Oral DAILY  aspirin delayed-release tablet 81 mg  81 mg Oral DAILY  atorvastatin (LIPITOR) tablet 80 mg  80 mg Oral QHS  ferrous sulfate tablet 325 mg  1 Tab Oral DAILY WITH BREAKFAST  levothyroxine (SYNTHROID) tablet 50 mcg  50 mcg Oral ACB  mirtazapine (REMERON) tablet 15 mg  15 mg Oral QHS  nadolol (CORGARD) tablet 80 mg  80 mg Oral DAILY  piperacillin-tazobactam (ZOSYN) 4.5 g in 0.9% sodium chloride (MBP/ADV) 100 mL  4.5 g IntraVENous Q8H  
 sodium chloride (NS) flush 5-10 mL  5-10 mL IntraVENous Q8H  
 sodium chloride (NS) flush 5-10 mL  5-10 mL IntraVENous PRN  
 acetaminophen (TYLENOL) tablet 650 mg  650 mg Oral Q6H PRN  
 HYDROcodone-acetaminophen (NORCO) 5-325 mg per tablet 1 Tab  1 Tab Oral Q4H PRN  
 naloxone (NARCAN) injection 0.4 mg  0.4 mg IntraVENous PRN  
 ondansetron (ZOFRAN) injection 4 mg  4 mg IntraVENous Q4H PRN  
 clorazepate (TRANXENE) tablet 7.5 mg  7.5 mg Oral BID Review of Systems Constitutional: negative for fever, chills, sweats Cardiovascular: negative for chest pain, palpitations, syncope, edema Gastrointestinal: negative for dysphagia, reflux, vomiting, diarrhea, abdominal pain, or melena Neurologic: negative for focal weakness, numbness, headache Objective:  
 
Vitals:  
 01/05/19 0225 01/05/19 2444 01/05/19 0730 01/05/19 9411 BP:  127/56 120/55 Pulse:  60 66 Resp:  18 20 Temp:  97.5 °F (36.4 °C) 98 °F (36.7 °C) SpO2: 96% 96% 96% 94% Weight:      
Height:      
 
Intake and Output:  
01/03 1901 - 01/05 0700 In: 2209 [P.O.:1300; I.V.:909] Out: Juan Carlos Bender [[id number]] 01/05 0701 - 01/05 1900 In: 150 [P.O.:150] Out: - Physical Exam:  
Constitution:  the patient is well developed and in no acute distress EENMT:  Sclera clear, pupils equal, oral mucosa moist; very [place] Respiratory: few scattered rhonchi 
Cardiovascular:  RRR without M,G,R 
Gastrointestinal: soft and non-tender; with positive bowel sounds. Musculoskeletal: warm without cyanosis. There is 1+ lower leg edema. Skin:  no jaundice or rashes, no wounds Neurologic: no gross neuro deficits Psychiatric:  alert and FC 
 
CHEST XRAY:  
1/1 CT chest 
 
 
CXR 1/3/19 1. Stable findings of the chest as described above. LAB Recent Labs 01/05/19 
1255 01/04/19 
8378 01/03/19 
1092 WBC 3.4* 3.7* 3.9* HGB 9.3* 8.7* 8.9* HCT 28.4* 27.6* 28.3*  
 197 174 Recent Labs 01/05/19 
0255 01/04/19 
0860 01/03/19 
4468  142 144  
K 2.7* 3.9 2.8*  
 112* 114* CO2 23 20* 19* * 134* 153* BUN 22 18 12 CREA 1.19* 1.23* 1.21* CA 5.6* 6.0* 6.2* ALB 2.1* 2.1* 2.0*  
SGOT 87* 76* 44* ABG:  No results found for: PH, PHI, PCO2, PCO2I, PO2, PO2I, HCO3, HCO3I, FIO2, FIO2I Micro:  
1/3 urine legionella: pending 1/3 respiratory viral panel: pending 1/3 influenza: negative 1/3 sputum: pending 12/31 stool: negative for c.diff 
12/30 BC: NGTD x 2 Assessment:  (Medical Decision Making) Hospital Problems  Date Reviewed: 1/4/2019 Codes Class Noted POA Pleural effusion, bilateral ICD-10-CM: J90 ICD-9-CM: 511.9  1/2/2019 Yes On lasix with negative fluid balance Acute hypokalemia ICD-10-CM: E87.6 ICD-9-CM: 276.8  1/2/2019 Yes K+ 3.9 Bilateral pneumonia ICD-10-CM: J18.9 ICD-9-CM: 063  12/30/2018 Yes On abx Marked improvement in oxygenation this AM.  
  
 Stage 3 chronic kidney disease (HCC) (Chronic) ICD-10-CM: N18.3 ICD-9-CM: 585.3  11/27/2017 Yes Creat 1.19 - stable Multiple myeloma in remission [facility]) ICD-10-CM: C90.01 
ICD-9-CM: 203.01  1/25/2017 Yes Macrocytic anemia ICD-10-CM: D53.9 ICD-9-CM: 281.9  12/22/2016 Yes Hgb 8.7 Essential hypertension with goal blood pressure less than 130/85 (Chronic) ICD-10-CM: I10 
ICD-9-CM: 401.9  12/22/2016 Yes Anxiety ICD-10-CM: F41.9 ICD-9-CM: 300.00  12/22/2016 Yes Chronic diastolic heart failure (HCC) (Chronic) ICD-10-CM: I50.32 
ICD-9-CM: 428.32  11/16/2016 Yes On lasix Episodic atrial fibrillation (HCC) ICD-10-CM: I48.0 ICD-9-CM: 427.31  8/16/2016 Yes Need to correct hypokalemia Dementia without behavioral disturbance (Chronic) ICD-10-CM: F03.90 ICD-9-CM: 294.20  8/16/2016 Yes * (Principal) Acute respiratory failure with hypoxemia ([facility] [address] ICD-10-CM: J96.01 
ICD-9-CM: 518.81  6/17/2016 Yes Now weaned to 7L Presence of cardiac pacemaker (Chronic) ICD-10-CM: Z95.0 ICD-9-CM: V45.01  3/21/2016 Yes Acquired hypothyroidism (Chronic) ICD-10-CM: E03.9 ICD-9-CM: 244.9  7/9/2015 Yes Plan:  (Medical Decision Making) Wean oxygen as tolerated. CXR tomorrow. Add on CRP and Mg as calcium is low. Continue current Abx and lasix./ 
Replete K 
OOB with assistance. Kerry Patel MD  
 
The patient has been seen and examined by me personally, the chart,labs, and radiographic studies have been reviewed.

## 2019-01-05 NOTE — PROGRESS NOTES
Problem: Gas Exchange - Impaired Goal: *Absence of hypoxia Outcome: Progressing Towards Goal 
Switched to HFNC 7L, off Optiflo

## 2019-01-05 NOTE — PROGRESS NOTES
Visit with patient to build rapport with . Patient is calm. Receptive to  presence. Encouraged and assured patient of our continued care. Maia Alvarez,  Staff  C: [phone]  /  [email or web address]

## 2019-01-06 ENCOUNTER — APPOINTMENT (OUTPATIENT)
Dept: GENERAL RADIOLOGY | Age: 84
DRG: 193 | End: 2019-01-06
Attending: NURSE PRACTITIONER
Payer: MEDICARE

## 2019-01-06 LAB
ALBUMIN SERPL-MCNC: 2.1 G/DL (ref 3.2–4.6)
ALBUMIN/GLOB SERPL: 0.6 {RATIO} (ref 1.2–3.5)
ALP SERPL-CCNC: 72 U/L (ref 50–136)
ALT SERPL-CCNC: 93 U/L (ref 12–65)
ANION GAP SERPL CALC-SCNC: 10 MMOL/L (ref 7–16)
AST SERPL-CCNC: 74 U/L (ref 15–37)
BASOPHILS # BLD: 0 K/UL (ref 0–0.2)
BASOPHILS NFR BLD: 0 % (ref 0–2)
BILIRUB SERPL-MCNC: 0.7 MG/DL (ref 0.2–1.1)
BUN SERPL-MCNC: 23 MG/DL (ref 8–23)
CALCIUM SERPL-MCNC: 5.8 MG/DL (ref 8.3–10.4)
CHLORIDE SERPL-SCNC: 105 MMOL/L (ref 98–107)
CO2 SERPL-SCNC: 29 MMOL/L (ref 21–32)
CREAT SERPL-MCNC: 1.24 MG/DL (ref 0.6–1)
DIFFERENTIAL METHOD BLD: ABNORMAL
EOSINOPHIL # BLD: 0 K/UL (ref 0–0.8)
EOSINOPHIL NFR BLD: 0 % (ref 0.5–7.8)
ERYTHROCYTE [DISTWIDTH] IN BLOOD BY AUTOMATED COUNT: 13.4 % (ref 11.9–14.6)
GLOBULIN SER CALC-MCNC: 3.6 G/DL (ref 2.3–3.5)
GLUCOSE SERPL-MCNC: 144 MG/DL (ref 65–100)
HCT VFR BLD AUTO: 30.6 % (ref 35.8–46.3)
HGB BLD-MCNC: 10.3 G/DL (ref 11.7–15.4)
IMM GRANULOCYTES # BLD: 0.1 K/UL (ref 0–0.5)
IMM GRANULOCYTES NFR BLD AUTO: 1 % (ref 0–5)
L PNEUMO1 AG UR QL IA: NEGATIVE
LYMPHOCYTES # BLD: 0.2 K/UL (ref 0.5–4.6)
LYMPHOCYTES NFR BLD: 4 % (ref 13–44)
MAGNESIUM SERPL-MCNC: 2.1 MG/DL (ref 1.8–2.4)
MCH RBC QN AUTO: 32.4 PG (ref 26.1–32.9)
MCHC RBC AUTO-ENTMCNC: 33.7 G/DL (ref 31.4–35)
MCV RBC AUTO: 96.2 FL (ref 79.6–97.8)
MONOCYTES # BLD: 0.2 K/UL (ref 0.1–1.3)
MONOCYTES NFR BLD: 5 % (ref 4–12)
NEUTS SEG # BLD: 4.3 K/UL (ref 1.7–8.2)
NEUTS SEG NFR BLD: 90 % (ref 43–78)
NRBC # BLD: 0.02 K/UL (ref 0–0.2)
PLATELET # BLD AUTO: 277 K/UL (ref 150–450)
PMV BLD AUTO: 11.1 FL (ref 9.4–12.3)
POTASSIUM SERPL-SCNC: 2.7 MMOL/L (ref 3.5–5.1)
PROT SERPL-MCNC: 5.7 G/DL (ref 6.3–8.2)
RBC # BLD AUTO: 3.18 M/UL (ref 4.05–5.2)
SODIUM SERPL-SCNC: 144 MMOL/L (ref 136–145)
SPECIMEN SOURCE: NORMAL
WBC # BLD AUTO: 4.8 K/UL (ref 4.3–11.1)

## 2019-01-06 PROCEDURE — 74011250637 HC RX REV CODE- 250/637: Performed by: INTERNAL MEDICINE

## 2019-01-06 PROCEDURE — 74011000258 HC RX REV CODE- 258: Performed by: INTERNAL MEDICINE

## 2019-01-06 PROCEDURE — 74011250636 HC RX REV CODE- 250/636: Performed by: INTERNAL MEDICINE

## 2019-01-06 PROCEDURE — 99232 SBSQ HOSP IP/OBS MODERATE 35: CPT | Performed by: INTERNAL MEDICINE

## 2019-01-06 PROCEDURE — 65270000029 HC RM PRIVATE

## 2019-01-06 PROCEDURE — 71046 X-RAY EXAM CHEST 2 VIEWS: CPT

## 2019-01-06 PROCEDURE — 83735 ASSAY OF MAGNESIUM: CPT

## 2019-01-06 PROCEDURE — 36415 COLL VENOUS BLD VENIPUNCTURE: CPT

## 2019-01-06 PROCEDURE — 94640 AIRWAY INHALATION TREATMENT: CPT

## 2019-01-06 PROCEDURE — 80053 COMPREHEN METABOLIC PANEL: CPT

## 2019-01-06 PROCEDURE — 74011000250 HC RX REV CODE- 250: Performed by: INTERNAL MEDICINE

## 2019-01-06 PROCEDURE — 85025 COMPLETE CBC W/AUTO DIFF WBC: CPT

## 2019-01-06 PROCEDURE — 94760 N-INVAS EAR/PLS OXIMETRY 1: CPT

## 2019-01-06 PROCEDURE — 77010033678 HC OXYGEN DAILY

## 2019-01-06 RX ORDER — POTASSIUM CHLORIDE 20 MEQ/1
60 TABLET, EXTENDED RELEASE ORAL
Status: COMPLETED | OUTPATIENT
Start: 2019-01-06 | End: 2019-01-06

## 2019-01-06 RX ORDER — POTASSIUM CHLORIDE 20 MEQ/1
40 TABLET, EXTENDED RELEASE ORAL 3 TIMES DAILY
Status: DISPENSED | OUTPATIENT
Start: 2019-01-06 | End: 2019-01-08

## 2019-01-06 RX ADMIN — AMLODIPINE BESYLATE 2.5 MG: 5 TABLET ORAL at 08:35

## 2019-01-06 RX ADMIN — FUROSEMIDE 40 MG: 10 INJECTION, SOLUTION INTRAMUSCULAR; INTRAVENOUS at 08:46

## 2019-01-06 RX ADMIN — CALCIUM CARBONATE 400 MG: 500 TABLET, CHEWABLE ORAL at 08:33

## 2019-01-06 RX ADMIN — NADOLOL 80 MG: 40 TABLET ORAL at 08:34

## 2019-01-06 RX ADMIN — GUAIFENESIN 1200 MG: 600 TABLET, EXTENDED RELEASE ORAL at 08:34

## 2019-01-06 RX ADMIN — NYSTATIN 500000 UNITS: 500000 SUSPENSION ORAL at 17:12

## 2019-01-06 RX ADMIN — HYDROCORTISONE 2.5%: 25 CREAM TOPICAL at 10:26

## 2019-01-06 RX ADMIN — Medication 10 ML: at 20:59

## 2019-01-06 RX ADMIN — ALBUTEROL SULFATE 2.5 MG: 2.5 SOLUTION RESPIRATORY (INHALATION) at 07:29

## 2019-01-06 RX ADMIN — POTASSIUM CHLORIDE 60 MEQ: 20 TABLET, EXTENDED RELEASE ORAL at 10:24

## 2019-01-06 RX ADMIN — CALCIUM GLUCONATE 2 G: 98 INJECTION, SOLUTION INTRAVENOUS at 11:33

## 2019-01-06 RX ADMIN — ALBUTEROL SULFATE 2.5 MG: 2.5 SOLUTION RESPIRATORY (INHALATION) at 21:37

## 2019-01-06 RX ADMIN — PIPERACILLIN SODIUM,TAZOBACTAM SODIUM 4.5 G: 4; .5 INJECTION, POWDER, FOR SOLUTION INTRAVENOUS at 19:50

## 2019-01-06 RX ADMIN — AZITHROMYCIN MONOHYDRATE 500 MG: 500 INJECTION, POWDER, LYOPHILIZED, FOR SOLUTION INTRAVENOUS at 17:01

## 2019-01-06 RX ADMIN — Medication 10 ML: at 14:00

## 2019-01-06 RX ADMIN — METHYLPREDNISOLONE SODIUM SUCCINATE 60 MG: 125 INJECTION, POWDER, FOR SOLUTION INTRAMUSCULAR; INTRAVENOUS at 20:55

## 2019-01-06 RX ADMIN — POTASSIUM CHLORIDE 40 MEQ: 20 TABLET, EXTENDED RELEASE ORAL at 20:54

## 2019-01-06 RX ADMIN — ASPIRIN 81 MG: 81 TABLET, COATED ORAL at 08:34

## 2019-01-06 RX ADMIN — CALCIUM CARBONATE 400 MG: 500 TABLET, CHEWABLE ORAL at 14:37

## 2019-01-06 RX ADMIN — PIPERACILLIN SODIUM,TAZOBACTAM SODIUM 4.5 G: 4; .5 INJECTION, POWDER, FOR SOLUTION INTRAVENOUS at 03:09

## 2019-01-06 RX ADMIN — TRAZODONE HYDROCHLORIDE 100 MG: 50 TABLET ORAL at 20:54

## 2019-01-06 RX ADMIN — GUAIFENESIN 1200 MG: 600 TABLET, EXTENDED RELEASE ORAL at 20:53

## 2019-01-06 RX ADMIN — LEVOTHYROXINE SODIUM 50 MCG: 50 TABLET ORAL at 08:28

## 2019-01-06 RX ADMIN — METHYLPREDNISOLONE SODIUM SUCCINATE 60 MG: 125 INJECTION, POWDER, FOR SOLUTION INTRAMUSCULAR; INTRAVENOUS at 08:38

## 2019-01-06 RX ADMIN — HYDROCORTISONE 2.5%: 25 CREAM TOPICAL at 17:18

## 2019-01-06 RX ADMIN — FAMOTIDINE 20 MG: 20 TABLET ORAL at 08:34

## 2019-01-06 RX ADMIN — NYSTATIN 500000 UNITS: 500000 SUSPENSION ORAL at 08:46

## 2019-01-06 RX ADMIN — ATORVASTATIN CALCIUM 80 MG: 40 TABLET, FILM COATED ORAL at 20:53

## 2019-01-06 RX ADMIN — Medication 10 ML: at 06:09

## 2019-01-06 RX ADMIN — POTASSIUM CHLORIDE 40 MEQ: 20 TABLET, EXTENDED RELEASE ORAL at 17:09

## 2019-01-06 RX ADMIN — CLORAZEPATE DIPOTASSIUM 7.5 MG: 7.5 TABLET ORAL at 08:34

## 2019-01-06 RX ADMIN — CLORAZEPATE DIPOTASSIUM 7.5 MG: 7.5 TABLET ORAL at 20:54

## 2019-01-06 RX ADMIN — ACETAMINOPHEN 650 MG: 325 TABLET ORAL at 14:55

## 2019-01-06 RX ADMIN — FERROUS SULFATE TAB 325 MG (65 MG ELEMENTAL FE) 325 MG: 325 (65 FE) TAB at 08:28

## 2019-01-06 RX ADMIN — ALBUTEROL SULFATE 2.5 MG: 2.5 SOLUTION RESPIRATORY (INHALATION) at 14:18

## 2019-01-06 RX ADMIN — MIRTAZAPINE 15 MG: 15 TABLET, FILM COATED ORAL at 20:54

## 2019-01-06 RX ADMIN — NYSTATIN 500000 UNITS: 500000 SUSPENSION ORAL at 14:37

## 2019-01-06 RX ADMIN — CALCIUM CARBONATE 400 MG: 500 TABLET, CHEWABLE ORAL at 17:22

## 2019-01-06 RX ADMIN — FUROSEMIDE 40 MG: 10 INJECTION, SOLUTION INTRAMUSCULAR; INTRAVENOUS at 17:10

## 2019-01-06 NOTE — PROGRESS NOTES
Pt resting quietly. Eyes closed. No visible s/sx of distress. Uneventful night.  
Bedside report given to Eri Green RN

## 2019-01-06 NOTE — PROGRESS NOTES
Problem: Falls - Risk of 
Goal: *Absence of Falls Document Shayna End Fall Risk and appropriate interventions in the flowsheet. Outcome: Progressing Towards Goal 
Fall Risk Interventions: 
Mobility Interventions: Communicate number of staff needed for ambulation/transfer Mentation Interventions: Adequate sleep, hydration, pain control Medication Interventions: Evaluate medications/consider consulting pharmacy Elimination Interventions: Call light in reach Problem: Pressure Injury - Risk of 
Goal: *Prevention of pressure injury Document José Manuel Scale and appropriate interventions in the flowsheet. Outcome: Progressing Towards Goal 
Pressure Injury Interventions: 
Sensory Interventions: Assess changes in LOC Moisture Interventions: Absorbent underpads Activity Interventions: Increase time out of bed Mobility Interventions: HOB 30 degrees or less Nutrition Interventions: Document food/fluid/supplement intake Friction and Shear Interventions: HOB 30 degrees or less

## 2019-01-06 NOTE — PROGRESS NOTES
END OF SHIFT NOTE: 
 
Intake/Output 01/05 1901 - 01/06 0700 In: -  
Out: 2900 [Urine:2900] Voiding: YES Catheter: YES Drain:   
 
 
 
 
Stool:  0 occurrences. Stool Assessment Stool Color: Black;Green (01/03/19 1810) Stool Appearance: Formed; Watery (01/03/19 1810) Stool Amount: Large (01/03/19 1810) Stool Source/Status: Rectum (01/03/19 1810) Emesis:  0 occurrences. VITAL SIGNS Patient Vitals for the past 12 hrs: 
 Temp Pulse Resp BP SpO2  
01/06/19 0316 97.9 °F (36.6 °C) 60 18 127/51 98 % 01/05/19 2338 98.2 °F (36.8 °C) 67 18 139/62 98 % 01/05/19 2045     99 % 01/05/19 1942 97.3 °F (36.3 °C) 62 16 112/64 98 % Pain Assessment Pain 1 Pain Scale 1: Numeric (0 - 10) (01/05/19 1954) Pain Intensity 1: 0 (01/05/19 1954) Patient Stated Pain Goal: 0 (01/05/19 1954) Pain Reassessment 1: Patient sleeping (01/04/19 1320) Pain Onset 1: today (01/03/19 1231) Pain Location 1: Head (01/04/19 0110) Pain Orientation 1: Anterior (01/04/19 0110) Pain Description 1: Aching (01/04/19 0110) Pain Intervention(s) 1: Medication (see MAR) (01/04/19 0110) Ambulating No 
 
Additional Information:  
Pt in recliner sleeping at start of shift. Pt more alert at bedtime. C/o soreness of nostrils R>L. Bacitracin administered in both nostrils. Pt resting quietly. No visible s/sx of distress. Shift report will be given to oncoming nurse at the bedside.  
 
Hollis Ang RN

## 2019-01-06 NOTE — PROGRESS NOTES
Blake Cortez Admission Date: 12/30/2018 Daily Progress Note: 1/6/2019 The patient's chart is reviewed and the patient is discussed with the staff. 79yo WF with MM on chemotherapy, on O2 at night at baseline, admitted from  with shortness of breath, CXR with RUL infiltrate. Has had progressive worsening hypoxia and now with B infiltrates. Subjective:  
 
Sitting up in chair at bedside. Weaned to 4L HFNC from 99% yesterday. Coughing up some thick white sputum. Current Facility-Administered Medications Medication Dose Route Frequency  calcium gluconate 2 g in 0.9% sodium chloride 250 mL  2 g IntraVENous ONCE  potassium chloride (K-DUR, KLOR-CON) SR tablet 40 mEq  40 mEq Oral TID  piperacillin-tazobactam (ZOSYN) 4.5 g in 0.9% sodium chloride (MBP/ADV) 100 mL  4.5 g IntraVENous Q8H  
 oxymetazoline (AFRIN) 0.05 % nasal spray 2 Spray  2 Spray Both Nostrils BID PRN  
 traZODone (DESYREL) tablet 100 mg  100 mg Oral QHS  calcium carbonate (TUMS) chewable tablet 400 mg [elemental]  400 mg Oral TID WITH MEALS  furosemide (LASIX) injection 40 mg  40 mg IntraVENous BID  
 azithromycin (ZITHROMAX) 500 mg in 0.9% sodium chloride (MBP/ADV) 250 mL  500 mg IntraVENous Q24H  hydrocortisone (ANUSOL-HC) 2.5 % rectal cream   PeriANAL QID  guaiFENesin ER (MUCINEX) tablet 1,200 mg  1,200 mg Oral Q12H  
 methylPREDNISolone (PF) (Solu-MEDROL) injection 60 mg  60 mg IntraVENous Q12H  
 morphine injection 1 mg  1 mg IntraVENous Q4H PRN  
 albuterol (PROVENTIL VENTOLIN) nebulizer solution 2.5 mg  2.5 mg Nebulization Q6H RT  
 famotidine (PEPCID) tablet 20 mg  20 mg Oral DAILY  nystatin (MYCOSTATIN) 100,000 unit/mL oral suspension 500,000 Units  500,000 Units Oral QID  amLODIPine (NORVASC) tablet 2.5 mg  2.5 mg Oral DAILY  aspirin delayed-release tablet 81 mg  81 mg Oral DAILY  atorvastatin (LIPITOR) tablet 80 mg  80 mg Oral QHS  ferrous sulfate tablet 325 mg  1 Tab Oral DAILY WITH BREAKFAST  levothyroxine (SYNTHROID) tablet 50 mcg  50 mcg Oral ACB  mirtazapine (REMERON) tablet 15 mg  15 mg Oral QHS  nadolol (CORGARD) tablet 80 mg  80 mg Oral DAILY  sodium chloride (NS) flush 5-10 mL  5-10 mL IntraVENous Q8H  
 sodium chloride (NS) flush 5-10 mL  5-10 mL IntraVENous PRN  
 acetaminophen (TYLENOL) tablet 650 mg  650 mg Oral Q6H PRN  
 HYDROcodone-acetaminophen (NORCO) 5-325 mg per tablet 1 Tab  1 Tab Oral Q4H PRN  
 naloxone (NARCAN) injection 0.4 mg  0.4 mg IntraVENous PRN  
 ondansetron (ZOFRAN) injection 4 mg  4 mg IntraVENous Q4H PRN  
 clorazepate (TRANXENE) tablet 7.5 mg  7.5 mg Oral BID Review of Systems Constitutional: negative for fever, chills, sweats Cardiovascular: negative for chest pain, palpitations, syncope, edema Gastrointestinal: negative for dysphagia, reflux, vomiting, diarrhea, abdominal pain, or melena Neurologic: negative for focal weakness, numbness, headache Objective:  
 
Vitals:  
 01/05/19 2045 01/05/19 2338 01/06/19 0316 01/06/19 1946 BP:  139/62 127/51 127/64 Pulse:  67 60 (!) 59 Resp:  18 18 16 Temp:  98.2 °F (36.8 °C) 97.9 °F (36.6 °C) 98.3 °F (36.8 °C) SpO2: 99% 98% 98% 99% Weight:      
Height:      
 
Intake and Output:  
01/04 1901 - 01/06 0700 In: 150 [P.O.:150] Out: 9300 [PLMAR:2046] 01/06 0701 - 01/06 1900 In: 240 [P.O.:240] Out: 300 [Urine:300] Physical Exam:  
Constitution:  the patient is well developed and in no acute distress EENMT:  Sclera clear, pupils equal, oral mucosa moist; very [place] Respiratory: few scattered fine crackles Cardiovascular:  RRR without M,G,R 
Gastrointestinal: soft and non-tender; with positive bowel sounds. Musculoskeletal: warm without cyanosis. There is 1+ lower leg edema. Skin:  no jaundice or rashes, no wounds Neurologic: no gross neuro deficits Psychiatric:  alert and FC 
 
CHEST XRAY:  
1/1 CT chest 
 
 
 CXR 1/3/19 1. Stable findings of the chest as described above. LAB Recent Labs 01/06/19 
7734 01/05/19 
0435 01/04/19 
8976 WBC 4.8 3.4* 3.7* HGB 10.3* 9.3* 8.7* HCT 30.6* 28.4* 27.6*  
 231 197 Recent Labs 01/06/19 
8083 01/05/19 
0435 01/04/19 
1797  142 142  
K 2.7* 2.7* 3.9  107 112* CO2 29 23 20* * 152* 134* BUN 23 22 18 CREA 1.24* 1.19* 1.23* MG 2.1 2.1  --   
CA 5.8* 5.6* 6.0* ALB 2.1* 2.1* 2.1*  
SGOT 74* 87* 76* ABG:  No results found for: PH, PHI, PCO2, PCO2I, PO2, PO2I, HCO3, HCO3I, FIO2, FIO2I Micro:  
1/3 urine legionella: pending 1/3 respiratory viral panel: pending 1/3 influenza: negative 1/3 sputum: pending 12/31 stool: negative for c.diff 
12/30 BC: NGTD x 2 Assessment:  (Medical Decision Making) Hospital Problems  Date Reviewed: 1/4/2019 Codes Class Noted POA Pleural effusion, bilateral ICD-10-CM: J90 ICD-9-CM: 511.9  1/2/2019 Yes On lasix with negative fluid balance Acute hypokalemia ICD-10-CM: E87.6 ICD-9-CM: 276.8  1/2/2019 Yes K+ 2.7 Being supplemented Bilateral pneumonia ICD-10-CM: J18.9 ICD-9-CM: 403  12/30/2018 Yes On abx Marked improvement in oxygenation this AM.  
  
 Stage 3 chronic kidney disease (HCC) (Chronic) ICD-10-CM: N18.3 ICD-9-CM: 585.3  11/27/2017 Yes Creat 1.24 - stable Multiple myeloma in remission [facility]) ICD-10-CM: C90.01 
ICD-9-CM: 203.01  1/25/2017 Yes Macrocytic anemia ICD-10-CM: D53.9 ICD-9-CM: 281.9  12/22/2016 Yes Hgb 10.3 Essential hypertension with goal blood pressure less than 130/85 (Chronic) ICD-10-CM: I10 
ICD-9-CM: 401.9  12/22/2016 Yes Anxiety ICD-10-CM: F41.9 ICD-9-CM: 300.00  12/22/2016 Yes Chronic diastolic heart failure (HCC) (Chronic) ICD-10-CM: I50.32 
ICD-9-CM: 428.32  11/16/2016 Yes On lasix Episodic atrial fibrillation (HCC) ICD-10-CM: I48.0 ICD-9-CM: 427.31  8/16/2016 Yes Need to correct hypokalemia Dementia without behavioral disturbance (Chronic) ICD-10-CM: F03.90 ICD-9-CM: 294.20  8/16/2016 Yes * (Principal) Acute respiratory failure with hypoxemia ([facility] [address] ICD-10-CM: J96.01 
ICD-9-CM: 518.81  6/17/2016 Yes Now weaned to 4L Presence of cardiac pacemaker (Chronic) ICD-10-CM: Z95.0 ICD-9-CM: V45.01  3/21/2016 Yes Acquired hypothyroidism (Chronic) ICD-10-CM: E03.9 ICD-9-CM: 244.9  7/9/2015 Yes Micro: 
1/4 sputum: NRF 
1/3 urine legionella - pending Plan:  (Medical Decision Making) --Albuterol 
--Zithromax / Zosyn 
--mucinex 
--Wean oxygen as tolerated. --K+ / Ca++ being supplemented Ca++ 5.8 / Mg++ 2.1 CRP 55.6 
--lasix 40mg IV BID 
 6.5 liter urine output yesterday 
--CXR pending 
--OOB with assistance. Wing Cathleen NP The patient has been seen and examined by me personally, the chart,labs, and radiographic studies have been reviewed. Lungs:  Rhonchi bilaterally Heart:  RRR with no Murmur/Rubs/Gallops Additional Comments:  CXR today much improved as below. Continue current Rx. K and Mg being repleted due to diuretics. I have spoken with and examined the patient. I agree with the above assessment and plan as documented.  
 
Geetha Chavarria MD

## 2019-01-06 NOTE — PROGRESS NOTES
Hospitalist Progress Note Patient: Cuong Judge MRN: [id number]  SSN: [id number] YOB: 1930  Age: 80 y.o. Sex: female Admit Date: 12/30/2018 LOS: 7 days Subjective:  
 
From previous notes: \"79 yo female with PMH of paroxysmal A Fib with pacer, multiple myeloma followed by Dr. Prosper Mckenzie on current chemo, HTN, CKD, dCHF, who is sent as a direct admit from urgent care due to RUL pneumonia. She admits to several days of cough/ malaise and headache. CXR at urgent care showed RUL pneumonia.  
Pt on 5 L NC with significant desatting during conversation. Pt reports feeling some better but is obviously SOB while we talk. \" 
 
1/5 - She continue to improve slowly. Her SOB is better. Denies CP. Denies cough. 1/6 - She is sitting in the chair eating breakfast this morning. States that she feels better. SOB improved. Still with dry cough. Denies CP. Review of systems negative except stated above. Objective:  
 
Visit Vitals /51 (BP 1 Location: Left arm, BP Patient Position: At rest) Pulse 60 Temp 97.9 °F (36.6 °C) Resp 18 Ht 5' 4\" (1.626 m) Wt 75.7 kg (166 lb 12.8 oz) SpO2 98% Breastfeeding? No  
BMI 28.63 kg/m² Oxygen Therapy O2 Sat (%): 98 % (01/06/19 0316) Pulse via Oximetry: 58 beats per minute (01/05/19 2045) O2 Device: Hi flow nasal cannula;Humidifier (01/05/19 2045) O2 Flow Rate (L/min): 4 l/min (01/05/19 2045) O2 Temperature: 87.8 °F (31 °C) (01/05/19 0225) FIO2 (%): 45 % (01/05/19 0225) Intake and Output:  
 
Intake/Output Summary (Last 24 hours) at 1/6/2019 0614 Last data filed at 1/6/2019 2596 Gross per 24 hour Intake  Output 6800 ml Net -6800 ml Physical Exam:  
GENERAL: alert, cooperative, no distress, appears stated age EYE: conjunctivae/corneas clear. PERRL. THROAT & NECK: normal and no erythema or exudates noted. LUNG: scattered rhonchi, L>R HEART: regular rate and rhythm, S1S2, no murmur, no JVD ABDOMEN: soft, non-tender, non-distended. Bowel sounds normal.  
EXTREMITIES:  No edema, 2+ pedal/radial pulses bilaterally SKIN: no rash or abnormalities NEUROLOGIC: Alert. Cranial nerves 2-12 grossly intact. Lab/Data Review: 
Recent Results (from the past 24 hour(s)) CBC WITH AUTOMATED DIFF Collection Time: 01/06/19  8:14 AM  
Result Value Ref Range WBC 4.8 4.3 - 11.1 K/uL  
 RBC 3.18 (L) 4.05 - 5.2 M/uL  
 HGB 10.3 (L) 11.7 - 15.4 g/dL HCT 30.6 (L) 35.8 - 46.3 % MCV 96.2 79.6 - 97.8 FL  
 MCH 32.4 26.1 - 32.9 PG  
 MCHC 33.7 31.4 - 35.0 g/dL  
 RDW 13.4 11.9 - 14.6 % PLATELET 203 418 - 297 K/uL MPV 11.1 9.4 - 12.3 FL ABSOLUTE NRBC 0.02 0.0 - 0.2 K/uL  
 DF AUTOMATED NEUTROPHILS 90 (H) 43 - 78 % LYMPHOCYTES 4 (L) 13 - 44 % MONOCYTES 5 4.0 - 12.0 % EOSINOPHILS 0 (L) 0.5 - 7.8 % BASOPHILS 0 0.0 - 2.0 % IMMATURE GRANULOCYTES 1 0.0 - 5.0 %  
 ABS. NEUTROPHILS 4.3 1.7 - 8.2 K/UL  
 ABS. LYMPHOCYTES 0.2 (L) 0.5 - 4.6 K/UL  
 ABS. MONOCYTES 0.2 0.1 - 1.3 K/UL  
 ABS. EOSINOPHILS 0.0 0.0 - 0.8 K/UL  
 ABS. BASOPHILS 0.0 0.0 - 0.2 K/UL  
 ABS. IMM. GRANS. 0.1 0.0 - 0.5 K/UL METABOLIC PANEL, COMPREHENSIVE Collection Time: 01/06/19  8:14 AM  
Result Value Ref Range Sodium 144 136 - 145 mmol/L Potassium 2.7 (LL) 3.5 - 5.1 mmol/L Chloride 105 98 - 107 mmol/L  
 CO2 29 21 - 32 mmol/L Anion gap 10 7 - 16 mmol/L Glucose 144 (H) 65 - 100 mg/dL BUN 23 8 - 23 MG/DL Creatinine 1.24 (H) 0.6 - 1.0 MG/DL  
 GFR est AA 53 (L) >60 ml/min/1.73m2 GFR est non-AA 43 (L) >60 ml/min/1.73m2 Calcium 5.8 (LL) 8.3 - 10.4 MG/DL Bilirubin, total 0.7 0.2 - 1.1 MG/DL  
 ALT (SGPT) 93 (H) 12 - 65 U/L  
 AST (SGOT) 74 (H) 15 - 37 U/L Alk. phosphatase 72 50 - 136 U/L Protein, total 5.7 (L) 6.3 - 8.2 g/dL Albumin 2.1 (L) 3.2 - 4.6 g/dL Globulin 3.6 (H) 2.3 - 3.5 g/dL A-G Ratio 0.6 (L) 1.2 - 3.5 MAGNESIUM  Collection Time: 01/06/19  8:14 AM  
 Result Value Ref Range Magnesium 2.1 1.8 - 2.4 mg/dL Imaging: Xr Chest Sngl V Result Date: 1/3/2019 IMPRESSION: 1.  Stable findings of the chest as described above. Xr Chest Sngl V Result Date: 1/1/2019 IMPRESSION:  EXTENSIVE INHOMOGENEOUS BILATERAL INFILTRATES WITH RIGHT UPPER LOBE PREDOMINANCE. Ct Chest W Cont Result Date: 1/1/2019 IMPRESSION:   1. NO DEFINITE ACUTE PULMONARY EMBOLISM. 2.  SMALL BILATERAL PLEURAL EFFUSIONS WITH EXTENSIVE BILATERAL INFILTRATES SUSPICIOUS FOR PNEUMONIA. PULMONARY EDEMA WOULD BE LESS LIKELY. No results found for this visit on 12/30/18. Cultures: All Micro Results Procedure Component Value Units Date/Time CULTURE, RESPIRATORY/SPUTUM/BRONCH [place] [[id number]] Collected:  01/04/19 1958 Order Status:  Completed Specimen:  Sputum Updated:  01/06/19 4689 Special Requests: NO SPECIAL REQUESTS     
  GRAM STAIN 0 to 10 WBC'S/OIF  
   NO EPITHELIAL CELLS SEEN     
   RARE GRAM POSITIVE COCCI 3+ MUCUS PRESENT Culture result:    
  LIGHT NORMAL RESPIRATORY YAZAN  
     
 CULTURE, BLOOD [[id number]] Collected:  12/30/18 1921 Order Status:  Completed Specimen:  Blood Updated:  01/04/19 1224 Special Requests: --     
  RIGHT Antecubital 
  
  Culture result: NO GROWTH 5 DAYS     
 CULTURE, BLOOD [[id number]] Collected:  12/30/18 1928 Order Status:  Completed Specimen:  Blood Updated:  01/04/19 1224 Special Requests: --     
  RIGHT 
HAND Culture result: NO GROWTH 5 DAYS     
 LEGIONELLA PNEUMOPHILA AG, URINE [[id number]] Collected:  01/03/19 1958 Order Status:  Completed Specimen:  Urine Updated:  01/03/19 2008 INFLUENZA A & B AG (RAPID TEST) [[id number]] Collected:  01/03/19 1347 Order Status:  Completed Specimen:  Nasopharyngeal from Nasal washing Updated:  01/03/19 1410 Influenza A Ag NEGATIVE   Comment: NEGATIVE FOR THE PRESENCE OF INFLUENZA A ANTIGEN 
 INFECTION DUE TO INFLUENZA A CANNOT BE RULED OUT. BECAUSE THE ANTIGEN PRESENT IN THE SAMPLE MAY BE BELOW 
THE DETECTION LIMIT OF THE TEST. A NEGATIVE TEST IS PRESUMPTIVE AND IT IS RECOMMENDED THAT THESE RESULTS BE CONFIRMED BY VIRAL CULTURE OR AN FDA-CLEARED INFLUENZA A AND B MOLECULAR ASSAY. Influenza B Ag NEGATIVE Comment: NEGATIVE FOR THE PRESENCE OF INFLUENZA B ANTIGEN 
INFECTION DUE TO INFLUENZA B CANNOT BE RULED OUT. BECAUSE THE ANTIGEN PRESENT IN THE SAMPLE MAY BE BELOW 
THE DETECTION LIMIT OF THE TEST. A NEGATIVE TEST IS PRESUMPTIVE AND IT IS RECOMMENDED THAT THESE RESULTS BE CONFIRMED BY VIRAL CULTURE OR AN FDA-CLEARED INFLUENZA A AND B MOLECULAR ASSAY. Source NASOPHARYNGEAL     
 RESPIRATORY VIRAL PANEL, PCR [[id number]] Collected:  01/03/19 1347 Order Status:  Completed Updated:  01/03/19 1357 RESPIRATORY VIRAL PANEL, PCR [[id number]] Collected:  01/03/19 1253 Order Status:  Canceled Specimen:  Sputum Updated:  01/03/19 1258 C. DIFFICILE AG & TOXIN A/B [[id number]] Collected:  12/31/18 1844 Order Status:  Completed Specimen:  Stool Updated:  01/01/19 1231 [id number] ANTIGEN    
  C. DIFFICILE GDH ANTIGEN-NEGATIVE  
     
  C. difficile toxin C. DIFFICILE TOXIN-NEGATIVE  
     
  PCR Reflex NOT APPLICABLE INTERPRETATION    
  NEGATIVE FOR TOXIGENIC C. DIFFICILE Clinical Consideration NEGATIVE FOR TOXIGENIC C. DIFFICILE Assessment/Plan:  
 
Principal Problem: 
  Acute respiratory failure with hypoxemia ([facility] [address] (6/17/2016) - Due to bilateral pneumonia 
- Continue Vanc + Zosyn 
- Continue albuterol 
- Continue Lasix IV --> 6.3L UOP x 24 hours - No PE per CT Chest 
- Now on HFNC from [facility] - Appreciate Pulmonary's input and assitance Active Problems: 
  Bilateral pneumonia (12/30/2018) - Continue Vanc + Zosyn since immunosuppressed 
- Continue albuteriol - Add Mucinex - Add ICS + Flutter Valve - Appreciate Pulmonary's input and assitance Pleural effusion, bilateral (1/2/2019) - Likely nieves-pneumonic + HF 
- Appreciate Pulmonary's input and assitance Acute hypokalemia (1/2/2019) - Likely due to acute illness + Lasix - Potassium 2.7 --> 2.7 
- Replace PO 
- Recheck in AM 
 
  Metabolic Encephalopathy (6/2/3031) - Resolved per family - Was likely due to hypoxemia Episodic atrial fibrillation ([address] (8/16/2016) - Continue Amiodarone - Continue Corgard - No [address] Chronic diastolic heart failure ([address] (11/16/2016) - Stable - Start Lasix IV BID --> repeat BMP in AM 
- Continue Nadolol Macrocytic anemia (12/22/2016) - Stable Essential hypertension with goal blood pressure less than 130/85 (12/22/2016) - Well controlled - Continue Corgard - Continue Amlodipine Stage 3 chronic kidney disease ([address] (11/27/2017) - Stable Acquired hypothyroidism (7/9/2015) - Continue Levothyroxine Dementia without behavioral disturbance (8/16/2016) - No acute issues - Continue Remeron Anxiety (12/22/2016) - Continue Remeron Multiple myeloma in remission [facility]) (1/25/2017) 
- Oncology saw and will follow peripherally Presence of cardiac pacemaker (3/21/2016) Dispo - Continue Vanc/Zosyn. IV Lasix. Wean oxygen as appropriate. Discharge to rehab in next 2-3 days. DIET CARDIAC Regular DVT Prophylaxis: Heparin Signed By: En Carnes DO   
 January 6, 2019

## 2019-01-06 NOTE — PROGRESS NOTES
Problem: Falls - Risk of 
Goal: *Absence of Falls Document Shayna End Fall Risk and appropriate interventions in the flowsheet. Fall Risk Interventions: 
Mobility Interventions: Communicate number of staff needed for ambulation/transfer, Patient to call before getting OOB Mentation Interventions: Adequate sleep, hydration, pain control, Door open when patient unattended, Evaluate medications/consider consulting pharmacy, More frequent rounding, Reorient patient, Room close to nurse's station Medication Interventions: Evaluate medications/consider consulting pharmacy, Patient to call before getting OOB, Teach patient to arise slowly Elimination Interventions: Call light in reach, Patient to call for help with toileting needs, Toilet paper/wipes in reach Problem: Pressure Injury - Risk of 
Goal: *Prevention of pressure injury Document José Manuel Scale and appropriate interventions in the flowsheet. Outcome: Progressing Towards Goal 
Pressure Injury Interventions: 
Sensory Interventions: Assess changes in LOC, Maintain/enhance activity level, Pressure redistribution bed/mattress (bed type) Moisture Interventions: Absorbent underpads, Internal/External urinary devices, Maintain skin hydration (lotion/cream) Activity Interventions: Increase time out of bed, Pressure redistribution bed/mattress(bed type) Mobility Interventions: HOB 30 degrees or less, Pressure redistribution bed/mattress (bed type) Nutrition Interventions: Document food/fluid/supplement intake Friction and Shear Interventions: HOB 30 degrees or less

## 2019-01-07 LAB
ALBUMIN SERPL-MCNC: 2 G/DL (ref 3.2–4.6)
ALBUMIN/GLOB SERPL: 0.5 {RATIO} (ref 1.2–3.5)
ALP SERPL-CCNC: 73 U/L (ref 50–136)
ALT SERPL-CCNC: 102 U/L (ref 12–65)
ANION GAP SERPL CALC-SCNC: 8 MMOL/L (ref 7–16)
AST SERPL-CCNC: 81 U/L (ref 15–37)
BACTERIA SPEC CULT: NORMAL
BASOPHILS # BLD: 0 K/UL (ref 0–0.2)
BASOPHILS NFR BLD: 0 % (ref 0–2)
BILIRUB SERPL-MCNC: 0.6 MG/DL (ref 0.2–1.1)
BUN SERPL-MCNC: 25 MG/DL (ref 8–23)
CALCIUM SERPL-MCNC: 6.1 MG/DL (ref 8.3–10.4)
CHLORIDE SERPL-SCNC: 104 MMOL/L (ref 98–107)
CO2 SERPL-SCNC: 27 MMOL/L (ref 21–32)
CREAT SERPL-MCNC: 1.21 MG/DL (ref 0.6–1)
DIFFERENTIAL METHOD BLD: ABNORMAL
EOSINOPHIL # BLD: 0 K/UL (ref 0–0.8)
EOSINOPHIL NFR BLD: 0 % (ref 0.5–7.8)
ERYTHROCYTE [DISTWIDTH] IN BLOOD BY AUTOMATED COUNT: 13.6 % (ref 11.9–14.6)
GLOBULIN SER CALC-MCNC: 3.7 G/DL (ref 2.3–3.5)
GLUCOSE SERPL-MCNC: 158 MG/DL (ref 65–100)
GRAM STN SPEC: NORMAL
HCT VFR BLD AUTO: 34.1 % (ref 35.8–46.3)
HGB BLD-MCNC: 10.1 G/DL (ref 11.7–15.4)
IMM GRANULOCYTES # BLD: 0 K/UL (ref 0–0.5)
IMM GRANULOCYTES NFR BLD AUTO: 1 % (ref 0–5)
LYMPHOCYTES # BLD: 0.2 K/UL (ref 0.5–4.6)
LYMPHOCYTES NFR BLD: 3 % (ref 13–44)
MCH RBC QN AUTO: 31.7 PG (ref 26.1–32.9)
MCHC RBC AUTO-ENTMCNC: 29.6 G/DL (ref 31.4–35)
MCV RBC AUTO: 106.9 FL (ref 79.6–97.8)
MONOCYTES # BLD: 0.2 K/UL (ref 0.1–1.3)
MONOCYTES NFR BLD: 3 % (ref 4–12)
NEUTS SEG # BLD: 5.5 K/UL (ref 1.7–8.2)
NEUTS SEG NFR BLD: 93 % (ref 43–78)
NRBC # BLD: 0 K/UL (ref 0–0.2)
PLATELET # BLD AUTO: 219 K/UL (ref 150–450)
PMV BLD AUTO: 11.8 FL (ref 9.4–12.3)
POTASSIUM SERPL-SCNC: 4.2 MMOL/L (ref 3.5–5.1)
PROT SERPL-MCNC: 5.7 G/DL (ref 6.3–8.2)
RBC # BLD AUTO: 3.19 M/UL (ref 4.05–5.2)
SERVICE CMNT-IMP: NORMAL
SODIUM SERPL-SCNC: 139 MMOL/L (ref 136–145)
WBC # BLD AUTO: 5.9 K/UL (ref 4.3–11.1)

## 2019-01-07 PROCEDURE — 97110 THERAPEUTIC EXERCISES: CPT

## 2019-01-07 PROCEDURE — 74011250636 HC RX REV CODE- 250/636: Performed by: INTERNAL MEDICINE

## 2019-01-07 PROCEDURE — 74011250637 HC RX REV CODE- 250/637: Performed by: INTERNAL MEDICINE

## 2019-01-07 PROCEDURE — 77010033678 HC OXYGEN DAILY

## 2019-01-07 PROCEDURE — 74011000258 HC RX REV CODE- 258: Performed by: INTERNAL MEDICINE

## 2019-01-07 PROCEDURE — 97535 SELF CARE MNGMENT TRAINING: CPT

## 2019-01-07 PROCEDURE — 80053 COMPREHEN METABOLIC PANEL: CPT

## 2019-01-07 PROCEDURE — 94640 AIRWAY INHALATION TREATMENT: CPT

## 2019-01-07 PROCEDURE — 99232 SBSQ HOSP IP/OBS MODERATE 35: CPT | Performed by: INTERNAL MEDICINE

## 2019-01-07 PROCEDURE — 36415 COLL VENOUS BLD VENIPUNCTURE: CPT

## 2019-01-07 PROCEDURE — 74011000250 HC RX REV CODE- 250: Performed by: INTERNAL MEDICINE

## 2019-01-07 PROCEDURE — 94760 N-INVAS EAR/PLS OXIMETRY 1: CPT

## 2019-01-07 PROCEDURE — 65270000029 HC RM PRIVATE

## 2019-01-07 PROCEDURE — 97530 THERAPEUTIC ACTIVITIES: CPT

## 2019-01-07 PROCEDURE — 85025 COMPLETE CBC W/AUTO DIFF WBC: CPT

## 2019-01-07 RX ORDER — AZITHROMYCIN 250 MG/1
500 TABLET, FILM COATED ORAL EVERY 24 HOURS
Status: COMPLETED | OUTPATIENT
Start: 2019-01-08 | End: 2019-01-09

## 2019-01-07 RX ADMIN — AMLODIPINE BESYLATE 2.5 MG: 5 TABLET ORAL at 08:51

## 2019-01-07 RX ADMIN — ASPIRIN 81 MG: 81 TABLET, COATED ORAL at 08:52

## 2019-01-07 RX ADMIN — NYSTATIN 500000 UNITS: 500000 SUSPENSION ORAL at 09:14

## 2019-01-07 RX ADMIN — GUAIFENESIN 1200 MG: 600 TABLET, EXTENDED RELEASE ORAL at 08:50

## 2019-01-07 RX ADMIN — TRAZODONE HYDROCHLORIDE 100 MG: 50 TABLET ORAL at 21:00

## 2019-01-07 RX ADMIN — POTASSIUM CHLORIDE 40 MEQ: 20 TABLET, EXTENDED RELEASE ORAL at 08:50

## 2019-01-07 RX ADMIN — FUROSEMIDE 40 MG: 10 INJECTION, SOLUTION INTRAMUSCULAR; INTRAVENOUS at 17:32

## 2019-01-07 RX ADMIN — PIPERACILLIN SODIUM,TAZOBACTAM SODIUM 4.5 G: 4; .5 INJECTION, POWDER, FOR SOLUTION INTRAVENOUS at 14:17

## 2019-01-07 RX ADMIN — LEVOTHYROXINE SODIUM 50 MCG: 50 TABLET ORAL at 07:24

## 2019-01-07 RX ADMIN — CALCIUM GLUCONATE 1 G: 98 INJECTION, SOLUTION INTRAVENOUS at 09:26

## 2019-01-07 RX ADMIN — Medication 10 ML: at 14:24

## 2019-01-07 RX ADMIN — CLORAZEPATE DIPOTASSIUM 7.5 MG: 7.5 TABLET ORAL at 21:00

## 2019-01-07 RX ADMIN — CLORAZEPATE DIPOTASSIUM 7.5 MG: 7.5 TABLET ORAL at 08:50

## 2019-01-07 RX ADMIN — NYSTATIN 500000 UNITS: 500000 SUSPENSION ORAL at 12:40

## 2019-01-07 RX ADMIN — ALBUTEROL SULFATE 2.5 MG: 2.5 SOLUTION RESPIRATORY (INHALATION) at 07:08

## 2019-01-07 RX ADMIN — CALCIUM CARBONATE 400 MG: 500 TABLET, CHEWABLE ORAL at 17:28

## 2019-01-07 RX ADMIN — Medication 10 ML: at 21:04

## 2019-01-07 RX ADMIN — HYDROCORTISONE 2.5%: 25 CREAM TOPICAL at 09:00

## 2019-01-07 RX ADMIN — PIPERACILLIN SODIUM,TAZOBACTAM SODIUM 4.5 G: 4; .5 INJECTION, POWDER, FOR SOLUTION INTRAVENOUS at 03:42

## 2019-01-07 RX ADMIN — HYDROCORTISONE 2.5%: 25 CREAM TOPICAL at 14:21

## 2019-01-07 RX ADMIN — CALCIUM CARBONATE 400 MG: 500 TABLET, CHEWABLE ORAL at 12:46

## 2019-01-07 RX ADMIN — FAMOTIDINE 20 MG: 20 TABLET ORAL at 08:50

## 2019-01-07 RX ADMIN — MIRTAZAPINE 15 MG: 15 TABLET, FILM COATED ORAL at 21:00

## 2019-01-07 RX ADMIN — NYSTATIN 500000 UNITS: 500000 SUSPENSION ORAL at 17:28

## 2019-01-07 RX ADMIN — Medication 10 ML: at 05:02

## 2019-01-07 RX ADMIN — NADOLOL 80 MG: 40 TABLET ORAL at 08:50

## 2019-01-07 RX ADMIN — GUAIFENESIN 1200 MG: 600 TABLET, EXTENDED RELEASE ORAL at 21:00

## 2019-01-07 RX ADMIN — ALBUTEROL SULFATE 2.5 MG: 2.5 SOLUTION RESPIRATORY (INHALATION) at 20:44

## 2019-01-07 RX ADMIN — POTASSIUM CHLORIDE 40 MEQ: 20 TABLET, EXTENDED RELEASE ORAL at 17:28

## 2019-01-07 RX ADMIN — ATORVASTATIN CALCIUM 80 MG: 40 TABLET, FILM COATED ORAL at 21:00

## 2019-01-07 RX ADMIN — HYDROCORTISONE 2.5%: 25 CREAM TOPICAL at 17:33

## 2019-01-07 RX ADMIN — FERROUS SULFATE TAB 325 MG (65 MG ELEMENTAL FE) 325 MG: 325 (65 FE) TAB at 07:24

## 2019-01-07 RX ADMIN — CALCIUM CARBONATE 400 MG: 500 TABLET, CHEWABLE ORAL at 07:23

## 2019-01-07 RX ADMIN — ALBUTEROL SULFATE 2.5 MG: 2.5 SOLUTION RESPIRATORY (INHALATION) at 14:10

## 2019-01-07 RX ADMIN — AZITHROMYCIN MONOHYDRATE 500 MG: 500 INJECTION, POWDER, LYOPHILIZED, FOR SOLUTION INTRAVENOUS at 12:40

## 2019-01-07 RX ADMIN — FUROSEMIDE 40 MG: 10 INJECTION, SOLUTION INTRAMUSCULAR; INTRAVENOUS at 09:14

## 2019-01-07 RX ADMIN — METHYLPREDNISOLONE SODIUM SUCCINATE 60 MG: 125 INJECTION, POWDER, FOR SOLUTION INTRAMUSCULAR; INTRAVENOUS at 09:14

## 2019-01-07 RX ADMIN — ACETAMINOPHEN 650 MG: 325 TABLET ORAL at 11:11

## 2019-01-07 NOTE — PROGRESS NOTES
Problem: Pressure Injury - Risk of 
Goal: *Prevention of pressure injury Document José Manuel Scale and appropriate interventions in the flowsheet. Outcome: Progressing Towards Goal 
Pressure Injury Interventions: 
Sensory Interventions: Assess changes in LOC Moisture Interventions: Internal/External urinary devices Activity Interventions: Increase time out of bed Mobility Interventions: HOB 30 degrees or less Nutrition Interventions: Document food/fluid/supplement intake Friction and Shear Interventions: HOB 30 degrees or less

## 2019-01-07 NOTE — PROGRESS NOTES
END OF SHIFT NOTE: 
 
Intake/Output 01/06 1901 - 01/07 0700 In: 500 [P.O.:500] Out: 901 [Urine:900] Voiding: YES Catheter: YES Drain:   
 
 
 
 
Stool:  1 occurrences. Stool Assessment Stool Color: Green;Black (01/06/19 2350) Stool Appearance: Loose; Other (comment)(pudding consistency) (01/06/19 2350) Stool Amount: Large (01/06/19 2350) Stool Source/Status: Rectum (01/06/19 2350) Emesis:  0 occurrences. VITAL SIGNS Patient Vitals for the past 12 hrs: 
 Temp Pulse Resp BP SpO2  
01/07/19 0229 98.5 °F (36.9 °C) 60 16 126/52 100 % 01/06/19 2334 97.7 °F (36.5 °C) 65 16 134/48 96 % 01/06/19 2138     97 % Pain Assessment Pain 1 Pain Scale 1: Numeric (0 - 10) (01/06/19 2040) Pain Intensity 1: 0 (01/06/19 2040) Patient Stated Pain Goal: 0 (01/06/19 2040) Pain Reassessment 1: Patient sleeping (01/04/19 1320) Pain Onset 1: today (01/03/19 1231) Pain Location 1: Head (01/06/19 1456) Pain Orientation 1: Anterior (01/04/19 0110) Pain Description 1: Aching (01/04/19 0110) Pain Intervention(s) 1: Medication (see MAR) (01/06/19 1456) Ambulating No 
 
Additional Information:  
K+ 4.2 Ca+ corrected 7.7- 1gr maciej gluc ordered Pt resting quietly. No visible s/sx of distress. Shift report will be given to oncoming nurse at the bedside.  
 
John Zhang, RN

## 2019-01-07 NOTE — PROGRESS NOTES
Problem: Mobility Impaired (Adult and Pediatric) Goal: *Acute Goals and Plan of Care (Insert Text) 1. Ms. Odessa Riojas will perform supine to sit and sit to supine independently in 7 days. 2.  Ms. Odessa Riojas will perform sit to stand and bed to chair independently in 7 days. 3.  Ms. Odessa Riojas will perform gait with least restrictive device 250 ft independently in 7 days. 4.  Ms. Odessa Riojas will go up and down 3 steps with rail independently in 7 days. PHYSICAL THERAPY: Daily Note, Treatment Day: 3rd, AM 1/7/2019INPATIENT: Hospital Day: 9 [place] / Product Type: Medicare /  
  
NAME/AGE/GENDER: Sheng Torre is a 80 y.o. female PRIMARY DIAGNOSIS: pneumonia Pneumonia Acute respiratory failure with hypoxemia (HCC) Acute respiratory failure with hypoxemia (HCC) ICD-10: Treatment Diagnosis:  
 · Generalized Muscle Weakness (M62.81) · Difficulty in walking, Not elsewhere classified (R26.2) Precaution/Allergies: 
Patient has no known allergies. ASSESSMENT:  
Ms. Odessa Riojas is doing great today. O2 sats on 2 liters 99%. O2 sats after gait on RA 98%. No SOB. She was able to complete therex in chair and then perform gait with little difficulty. She admits she feels safer with the walker right now. She significantly increased her gait distance today and is really at a SBA level of care right now. This section established at most recent assessment PROBLEM LIST (Impairments causing functional limitations): 1. Decreased Strength 2. Decreased Transfer Abilities 3. Decreased Ambulation Ability/Technique 4. Decreased Activity Tolerance 5. Decreased Pacing Skills INTERVENTIONS PLANNED: (Benefits and precautions of physical therapy have been discussed with the patient.) 1. Bed Mobility 2. Gait Training 3. Therapeutic Activites 4. Transfer Training TREATMENT PLAN: Frequency/Duration: 4 times a week for duration of hospital stay Rehabilitation Potential For Stated Goals: Good RECOMMENDED REHABILITATION/EQUIPMENT: (at time of discharge pending progress): Due to the probability of continued deficits (see above) this patient will likely need continued skilled physical therapy after discharge. Equipment:  
? None at this time HISTORY:  
History of Present Injury/Illness (Reason for Referral): Ms. Ann Kaur is a 81 yo female with PMH of PAFIB with pacer, multiple myeloma followed by Dr. Natalie Garza on current chemo, HTN, CKD, dCHF, who is sent as a direct admit from urgent care due to RUL pneumonia. She admits to several days of cough/ malaise and headache. CXR at urgent care showed RUL pneumonia. She denies fever, has anorexia and no ulices dyspnea.  
  
10 systems reviewed and negative except as noted in HPI. - has throat pain, needs glasses, has constipation, has decreased urination with dysuria, has myalgias, has memory loss, has cold intolerance and weight gain Past Medical History/Comorbidities:  
Ms. Ann Kaur  has a past medical history of Anemia, unspecified, Chest pain, unspecified, Chronic anxiety, Diastolic heart failure ([address] Essential hypertension, benign, Flatulence, eructation, and gas pain, Lump or mass in breast, Other and unspecified hyperlipidemia, Paroxysmal atrial fibrillation ([address] Paroxysmal tachycardia ([address] Pernicious anemia, Postmenopausal atrophic vaginitis, Unspecified deficiency anemia, and Unspecified hypothyroidism. Ms. Ann Kaur  has a past surgical history that includes hx hysterectomy; hx orthopaedic; hx vein stripping; THORACENTESIS (Left, 6/23/2016); ULTRASOUND (Bilateral, 6/23/2016); ESOPHAGOGASTRODUODENOSCOPY (EGD)  BMI 30  ROOM 309 (N/A, 6/21/2016); ULTRASOUND (Bilateral, 6/20/2016); THORACENTESIS (Bilateral, 6/20/2016); THORACENTESIS (Bilateral, 6/17/2016); and ULTRASOUND (Bilateral, 6/17/2016). Social History/Living Environment:  
Home Environment: Private residence # Steps to Enter: 3 Rails to Enter: Yes Hand Rails : Right One/Two Story Residence: One story Living Alone: Yes Support Systems: Child(miriam) Patient Expects to be Discharged to[de-identified] Private residence Current DME Used/Available at Home: None Prior Level of Function/Work/Activity: 
Independent in her own home. A \"few\" falls. age Number of Personal Factors/Comorbidities that affect the Plan of Care: 1-2: MODERATE COMPLEXITY EXAMINATION:  
Most Recent Physical Functioning:  
Gross Assessment: 
AROM: Within functional limits PROM: Within functional limits Strength: Generally decreased, functional 
         
  
Posture: 
Posture (WDL): Exceptions to [facility] Posture Assessment: Forward head, Rounded shoulders Balance: 
Sitting: Intact Standing: Impaired; With support Standing - Static: Good Standing - Dynamic : Fair Bed Mobility: 
  
Wheelchair Mobility: 
  
Transfers: 
Sit to Stand: Supervision Stand to Sit: Stand-by assistance Gait: 
  
Step Length: Right shortened;Left shortened Distance (ft): 120 Feet (ft) Ambulation - Level of Assistance: Contact guard assistance Interventions: Safety awareness training; Tactile cues Body Structures Involved: 1. Muscles Body Functions Affected: 1. Movement Related Activities and Participation Affected: 1. Mobility Number of elements that affect the Plan of Care: 3: MODERATE COMPLEXITY CLINICAL PRESENTATION:  
Presentation: Evolving clinical presentation with changing clinical characteristics: MODERATE COMPLEXITY CLINICAL DECISION MAKIN [facility] [address] [id number] AM-PAC 6 Clicks Basic Mobility Inpatient Short Form How much difficulty does the patient currently have. .. Unable A Lot A Little None 1. Turning over in bed (including adjusting bedclothes, sheets and blankets)? [] 1   [] 2   [] 3   [x] 4  
2.   Sitting down on and standing up from a chair with arms ( e.g., wheelchair, bedside commode, etc.)   [] 1   [] 2   [x] 3   [] 4  
 3. Moving from lying on back to sitting on the side of the bed? [] 1   [] 2   [] 3   [x] 4 How much help from another person does the patient currently need. .. Total A Lot A Little None 4. Moving to and from a bed to a chair (including a wheelchair)? [] 1   [] 2   [x] 3   [] 4  
5. Need to walk in hospital room? [] 1   [] 2   [x] 3   [] 4  
6. Climbing 3-5 steps with a railing? [] 1   [] 2   [x] 3   [] 4  
© 2007, Trustees of [address] [place] [address] [address] [address], under license to [employer]. All rights reserved Score:  Initial: 20 Most Recent: X (Date: -- ) Interpretation of Tool:  Represents activities that are increasingly more difficult (i.e. Bed mobility, Transfers, Gait). Score 24 23 22-20 19-15 14-10 9-7 6 Modifier CH CI CJ CK CL CM CN   
 
? Mobility - Walking and Moving Around:  
  - CURRENT STATUS: CJ - 20%-39% impaired, limited or restricted  - GOAL STATUS: CJ - 20%-39% impaired, limited or restricted  - D/C STATUS:  ---------------To be determined--------------- [place] / [place] / Product Type: Medicare /   
 
Medical Necessity:    
· Patient is expected to demonstrate progress in functional technique to increase independence with mobility and gait. . 
Reason for Services/Other Comments: 
· Patient continues to require present interventions due to patient's inability to function at baseline. .  
Use of outcome tool(s) and clinical judgement create a POC that gives a: Questionable prediction of patient's progress: MODERATE COMPLEXITY  
  
 
 
 
TREATMENT:  
(In addition to Assessment/Re-Assessment sessions the following treatments were rendered) Pre-treatment Symptoms/Complaints:  Feeling good. Pain: Initial:  
Pain Intensity 1: 0  Post Session:  Felt good after much increase in activity. Therapeutic Activity: (11 minutes):   Therapeutic activities including Bed transfers, Chair transfers, Toilet transfers and Ambulation on level ground to improve mobility. Required minimal Safety awareness training; Tactile cues to promote motor control of upper extremity(s), lower extremity(s). Therapeutic Exercise: (  15):  Exercises per grid below to improve strength. Required minimal verbal and tactile cues to promote proper body mechanics. Progressed repetitions as indicated. Date: 
1/7/19 Date: 
 Date: Activity/Exercise Parameters Parameters Parameters AP 15    
heelslides 15    
abd/add 15 LAQ 15    
Marching 15 Braces/Orthotics/Lines/Etc:  
· IV · Optiflow O2 Treatment/Session Assessment:   
· Response to Treatment:  Patient participated and tolerated therapy well today. · Interdisciplinary Collaboration:  
o Physical Therapist 
o Registered Nurse · After treatment position/precautions:  
o Up in chair 
o Call light within reach 
o RN notified 
o Family at bedside · Compliance with Program/Exercises: Will assess as treatment progresses · Recommendations/Intent for next treatment session: \"Next visit will focus on advancements to more challenging activities and reduction in assistance provided\". Total Treatment Duration: PT Patient Time In/Time Out Time In: 0930 Time Out: [id number] Miguel A Rios, PT

## 2019-01-07 NOTE — PROGRESS NOTES
END OF SHIFT NOTE: 
 
Intake/Output No intake/output data recorded. Voiding: YES Catheter: YES Drain:   
 
 
 
 
Stool:  1 occurrences. Stool Assessment Stool Color: Black (01/06/19 1830) Stool Appearance: Loose (01/06/19 1830) Stool Amount: Large (01/06/19 1830) Stool Source/Status: Rectum (01/06/19 1830) Emesis:  0 occurrences. VITAL SIGNS Patient Vitals for the past 12 hrs: 
 Temp Pulse Resp BP SpO2  
01/06/19 1808 97.8 °F (36.6 °C) 60 16 126/63 98 % 01/06/19 1519 97.8 °F (36.6 °C) 60 18 113/57 100 % 01/06/19 1421     93 % 01/06/19 1130 98.1 °F (36.7 °C) 65 18 119/58 98 % 01/06/19 0939 98.3 °F (36.8 °C) (!) 59 16 127/64 99 % 01/06/19 0729     97 % Pain Assessment Pain 1 Pain Scale 1: Numeric (0 - 10) (01/06/19 1456) Pain Intensity 1: 4 (01/06/19 1456) Patient Stated Pain Goal: 0 (01/06/19 0815) Pain Reassessment 1: Patient sleeping (01/04/19 1320) Pain Onset 1: today (01/03/19 1231) Pain Location 1: Head (01/06/19 1456) Pain Orientation 1: Anterior (01/04/19 0110) Pain Description 1: Aching (01/04/19 0110) Pain Intervention(s) 1: Medication (see MAR) (01/06/19 1456) Ambulating Yes Additional Information: Patient sat in chair most of the day. Shift report given to oncoming nurse at the bedside.  
 
Earnestine Sandra RN

## 2019-01-07 NOTE — PROGRESS NOTES
Problem: Nutrition Deficit Goal: *Optimize nutritional status Nutrition LOS Note: day 7 Assessment Diet order(s): cardiacFood,Nutrition, and Pertinent History: Pt reports eating fair since admission, ~50% of meals. She states that she used to drink boost years ago when she needed to gain weight. Per pt, she used to weight ~116 pounds. Pt receives meals on wheels at home and has assistance with other meals from her daughter. She denies any barriers to intake. H/O MM, CKD, HTN, CHF. Receiving tx for PNA. Anthropometrics: Height: 5' 4\" (162.6 cm), Weight Source: Bed, Weight: 73.3 kg (161 lb 8 oz), Body mass index is 27.72 kg/m². BMI class of normal weight. Edema: Trace to BLEs Macronutrient Needs: 
· EER:  6203-9340 kcal /day (20-25 kcal/kg litsed BW) · EPR:  44-65 grams protein/day (0.8-1.2 grams/kg IBW)(GFR 45)-CKD Intake/Comparative Standards:  Average intake for past 7 day(s)/14 recorded meal(s): 47%. This potentially meets ~64% of kcal and ~96% of protein needs. Nutrition Diagnosis: Inadequate energy intake r/t decreased ability to consume adequate energy intake, as evidenced by pt meeting <75% of EEN based off of recorded meal intakes. Intervention:  
Meals and snacks: Continue current diet. Supplementation: Add boost plus BID Discharge nutrition plan: No discharge needs identified Ryan Montanez [address], [address] [place] [address], [address] [place] [address], [phone]

## 2019-01-07 NOTE — PROGRESS NOTES
Problem: Self Care Deficits Care Plan (Adult) Goal: *Acute Goals and Plan of Care (Insert Text) LTG 1: Pt will be mod I with toileting by 1/6/19 to prevent skin breakdown. LTG 2: Pt will be mod I with LB dressing by 1/6/19 to reduce risk of falls. LTG 3: Pt will be mod I with bathing by 1/6/19 to promote good skin integrity. LTG 4: Pt will be mod I with toilet transfers by 1/6/19 to promote quality of life. LTG 5: Pt will be mod I with HEP by 1/6/19 to prevent deconditioning. OCCUPATIONAL THERAPY: Daily Note and Treatment Day: 2nd 1/7/2019INPATIENT: Hospital Day: 9 [place] / Product Type: Medicare /  
  
NAME/AGE/GENDER: Rahel Marcano is a 80 y.o. female PRIMARY DIAGNOSIS:  pneumonia Pneumonia Acute respiratory failure with hypoxemia (HCC) Acute respiratory failure with hypoxemia (HCC) ICD-10: Treatment Diagnosis:  
 · Generalized Muscle Weakness (M62.81) Precautions/Allergies: fall risk, Easily SOB Patient has no known allergies. ASSESSMENT:  
Ms. Ulices De La Rosa presents in recliner and agreeable to tx. Pt stood with SBA, completed mobility around room and bathroom with SBA-CGA using RW. Pt required min multi modal cues for safe use of RW, hand placement, and functional approaches for fall prevention during ADLs. Pt stood at sink ~15 minutes and completed grooming and hygiene tasks with SBA, then returned to chair. Pt issued yellow theraband and HEP, required min cues to complete exercises correctly. Pt tolerated session well with c/o minimal fatigue. Pt is progressing towards goals, continue POC. This section established at most recent assessment PROBLEM LIST (Impairments causing functional limitations): 1. Decreased Strength 2. Decreased ADL/Functional Activities 3. Decreased Transfer Abilities 4. Decreased Activity Tolerance 5. Decreased Pacing Skills  INTERVENTIONS PLANNED: (Benefits and precautions of occupational therapy have been discussed with the patient.) 1. Activities of daily living training 2. Therapeutic activity 3. Therapeutic exercise TREATMENT PLAN: Frequency/Duration: Follow patient 3x a week to address above goals. Rehabilitation Potential For Stated Goals: Excellent RECOMMENDED REHABILITATION/EQUIPMENT: (at time of discharge pending progress): Due to the probability of continued deficits (see above) this patient will  likely need continued skilled occupational therapy after discharge. Equipment:  
? None at this time OCCUPATIONAL PROFILE AND HISTORY:  
History of Present Injury/Illness (Reason for Referral): Please see H&P Past Medical History/Comorbidities:  
Ms. Chad Gross  has a past medical history of Anemia, unspecified, Chest pain, unspecified, Chronic anxiety, Diastolic heart failure ([address] Essential hypertension, benign, Flatulence, eructation, and gas pain, Lump or mass in breast, Other and unspecified hyperlipidemia, Paroxysmal atrial fibrillation ([address] Paroxysmal tachycardia ([address] Pernicious anemia, Postmenopausal atrophic vaginitis, Unspecified deficiency anemia, and Unspecified hypothyroidism. Ms. Chad Gross  has a past surgical history that includes hx hysterectomy; hx orthopaedic; hx vein stripping; THORACENTESIS (Left, 6/23/2016); ULTRASOUND (Bilateral, 6/23/2016); ESOPHAGOGASTRODUODENOSCOPY (EGD)  BMI 30  ROOM 309 (N/A, 6/21/2016); ULTRASOUND (Bilateral, 6/20/2016); THORACENTESIS (Bilateral, 6/20/2016); THORACENTESIS (Bilateral, 6/17/2016); and ULTRASOUND (Bilateral, 6/17/2016). Social History/Living Environment:  
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 Number of Personal Factors/Comorbidities that affect the Plan of Care: Expanded review of therapy/medical records (1-2):  MODERATE COMPLEXITY ASSESSMENT OF OCCUPATIONAL PERFORMANCE[de-identified]  
Activities of Daily Living:  
Basic ADLs (From Assessment) Complex ADLs (From Assessment) Feeding: Independent Oral Facial Hygiene/Grooming: Setup Bathing: Minimum assistance Upper Body Dressing: Setup Lower Body Dressing: Minimum assistance Toileting: Minimum assistance Grooming/Bathing/Dressing Activities of Daily Living Grooming Grooming Assistance: Stand-by assistance Cognitive Retraining Safety/Judgement: Awareness of environment Functional Transfers Bathroom Mobility: Stand-by assistance;Contact guard assistance Bed/Mat Mobility Sit to Stand: Stand-by assistance Most Recent Physical Functioning:  
Gross Assessment: 
  
         
  
Posture: 
Posture (WDL): Exceptions to [facility] Posture Assessment: Forward head, Rounded shoulders Balance: 
Sitting: Intact Standing: Impaired Standing - Static: Good Standing - Dynamic : Fair Bed Mobility: 
  
Wheelchair Mobility: 
  
Transfers: 
Sit to Stand: Stand-by assistance Stand to Sit: Stand-by assistance Patient Vitals for the past 6 hrs: 
 BP BP Patient Position SpO2 Pulse 01/07/19 0737 130/62 At rest 96 % 62  
01/07/19 0955   98 %   
01/07/19 1116 121/66 At rest 97 % 60 Mental Status Neurologic State: Alert Orientation Level: Oriented X4 Cognition: Follows commands Perception: Appears intact Perseveration: No perseveration noted Safety/Judgement: Awareness of environment Physical Skills Involved: 
1. Balance 2. Activity Tolerance 3. [place] Cognitive Skills Affected (resulting in the inability to perform in a timely and safe manner): 
1. N/A Psychosocial Skills Affected: 
1. N/A Number of elements that affect the Plan of Care: 3-5:  MODERATE COMPLEXITY CLINICAL DECISION MAKING:  
 [place] Daily Activity Inpatient Short Form How much help from another person does the patient currently need. .. Total A Lot A Little None 1. Putting on and taking off regular lower body clothing? [] 1   [] 2   [x] 3   [] 4  
2. Bathing (including washing, rinsing, drying)? [] 1   [] 2   [x] 3   [] 4  
3. Toileting, which includes using toilet, bedpan or urinal?   [] 1   [] 2   [x] 3   [] 4  
4. Putting on and taking off regular upper body clothing? [] 1   [] 2   [x] 3   [] 4  
5. Taking care of personal grooming such as brushing teeth? [] 1   [] 2   [x] 3   [] 4  
6. Eating meals? [] 1   [] 2   [] 3   [x] 4  
© 2007, Trustees of [address] [place] [id number], under license to [employer]. All rights reserved Score:  Initial: 19 Most Recent: X (Date: -- ) Interpretation of Tool:  Represents activities that are increasingly more difficult (i.e. Bed mobility, Transfers, Gait). Score 24 23 22-20 19-15 14-10 9-7 6 Modifier CH CI CJ CK CL CM CN   
 
? Self Care:  
  - CURRENT STATUS: CK - 40%-59% impaired, limited or restricted  - GOAL STATUS: CJ - 20%-39% impaired, limited or restricted  - D/C STATUS:  ---------------To be determined--------------- [place] / [place] / Product Type: Medicare /   
 
Medical Necessity:    
· Skilled intervention continues to be required due to decreased activity tolerance. Reason for Services/Other Comments: 
· Patient continues to require skilled intervention due to being worse than PLOF. Use of outcome tool(s) and clinical judgement create a POC that gives a: MODERATE COMPLEXITY  
 
 
 
TREATMENT:  
(In addition to Assessment/Re-Assessment sessions the following treatments were rendered) Pre-treatment Symptoms/Complaints:   
Pain: Initial:  
Pain Intensity 1: 0 None Post Session:  None Self Care: (15 min): Procedure(s) (per grid) utilized to improve and/or restore self-care/home management as related to grooming. Required minimal verbal cueing to facilitate activities of daily living skills and compensatory activities. Therapeutic Exercise: (  8 min):  Exercises per grid below to improve strength. Required minimal visual and verbal cues to promote proper body alignment, promote proper body posture, promote proper body mechanics and promote proper body breathing techniques. Progressed resistance, range, repetitions and complexity of movement as indicated. Date: 
1/7/2019 Date: 
 Date: Activity/Exercise Parameters Parameters Parameters Shoulder ab/adduction 10/1 Shoulder flexion  10/1 Elbow flexion  10/1 Elbow extension 10/1 Braces/Orthotics/Lines/Etc:  
· Treatment/Session Assessment:   
· Response to Treatment:  Agreeable · Interdisciplinary Collaboration:  
o Occupational Therapist 
o Registered Nurse · After treatment position/precautions:  
o Bed/Chair-wheels locked 
o Call light within reach 
o Nurse at bedside · Compliance with Program/Exercises: Will assess as treatment progresses. · Recommendations/Intent for next treatment session: \"Next visit will focus on advancements to more challenging activities and reduction in assistance provided\". Total Treatment Duration: OT Patient Time In/Time Out Time In: 2744 Time Out: 9334 Walter Holden OT

## 2019-01-07 NOTE — PROGRESS NOTES
Hospitalist Progress Note Patient: Nina Sam MRN: [id number]  SSN: [id number] YOB: 1930  Age: 80 y.o. Sex: female Admit Date: 12/30/2018 LOS: 8 days Subjective:  
 
From previous notes: \"79 yo female with PMH of paroxysmal A Fib with pacer, multiple myeloma followed by Dr. Lawanda Neumann on current chemo, HTN, CKD, dCHF, who is sent as a direct admit from urgent care due to RUL pneumonia. She admits to several days of cough/ malaise and headache. CXR at urgent care showed RUL pneumonia.  
Pt on 5 L NC with significant desatting during conversation. Pt reports feeling some better but is obviously SOB while we talk. \" 
 
1/6 - She is sitting in the chair eating breakfast this morning. States that she feels better. SOB improved. Still with dry cough. Denies CP. 
1/7 - She is on room air and feels much improved today. Complains of mild SOB. No cough. Denies CP. Review of systems negative except stated above. Objective:  
 
Visit Vitals /66 (BP 1 Location: Left arm, BP Patient Position: At rest) Pulse 60 Temp 98.3 °F (36.8 °C) Resp 17 Ht 5' 4\" (1.626 m) Wt 73.3 kg (161 lb 8 oz) SpO2 97% Breastfeeding? No  
BMI 27.72 kg/m² Oxygen Therapy O2 Sat (%): 97 % (01/07/19 1116) Pulse via Oximetry: 60 beats per minute (01/07/19 0708) O2 Device: Room air (01/07/19 0955) O2 Flow Rate (L/min): 2 l/min(decreased from 4L) (01/07/19 0708) O2 Temperature: 87.8 °F (31 °C) (01/05/19 0225) FIO2 (%): 45 % (01/05/19 0225) Intake and Output:  
 
Intake/Output Summary (Last 24 hours) at 1/7/2019 1405 Last data filed at 1/7/2019 1343 Gross per 24 hour Intake 980 ml Output 2701 ml Net -1721 ml Physical Exam:  
GENERAL: alert, cooperative, no distress, appears stated age EYE: conjunctivae/corneas clear. PERRL. THROAT & NECK: normal and no erythema or exudates noted. LUNG: Bibasilar crackles HEART: regular rate and rhythm, S1S2, no murmur, no JVD ABDOMEN: soft, non-tender, non-distended. Bowel sounds normal.  
EXTREMITIES:  No edema, 2+ pedal/radial pulses bilaterally SKIN: no rash or abnormalities NEUROLOGIC: Alert. Cranial nerves 2-12 grossly intact. Lab/Data Review: 
Recent Results (from the past 24 hour(s)) CBC WITH AUTOMATED DIFF Collection Time: 01/07/19  4:07 AM  
Result Value Ref Range WBC 5.9 4.3 - 11.1 K/uL  
 RBC 3.19 (L) 4.05 - 5.2 M/uL  
 HGB 10.1 (L) 11.7 - 15.4 g/dL HCT 34.1 (L) 35.8 - 46.3 % .9 (H) 79.6 - 97.8 FL  
 MCH 31.7 26.1 - 32.9 PG  
 MCHC 29.6 (L) 31.4 - 35.0 g/dL  
 RDW 13.6 11.9 - 14.6 % PLATELET 868 384 - 769 K/uL MPV 11.8 9.4 - 12.3 FL ABSOLUTE NRBC 0.00 0.0 - 0.2 K/uL  
 DF AUTOMATED NEUTROPHILS 93 (H) 43 - 78 % LYMPHOCYTES 3 (L) 13 - 44 % MONOCYTES 3 (L) 4.0 - 12.0 % EOSINOPHILS 0 (L) 0.5 - 7.8 % BASOPHILS 0 0.0 - 2.0 % IMMATURE GRANULOCYTES 1 0.0 - 5.0 %  
 ABS. NEUTROPHILS 5.5 1.7 - 8.2 K/UL  
 ABS. LYMPHOCYTES 0.2 (L) 0.5 - 4.6 K/UL  
 ABS. MONOCYTES 0.2 0.1 - 1.3 K/UL  
 ABS. EOSINOPHILS 0.0 0.0 - 0.8 K/UL  
 ABS. BASOPHILS 0.0 0.0 - 0.2 K/UL  
 ABS. IMM. GRANS. 0.0 0.0 - 0.5 K/UL METABOLIC PANEL, COMPREHENSIVE Collection Time: 01/07/19  4:07 AM  
Result Value Ref Range Sodium 139 136 - 145 mmol/L Potassium 4.2 3.5 - 5.1 mmol/L Chloride 104 98 - 107 mmol/L  
 CO2 27 21 - 32 mmol/L Anion gap 8 7 - 16 mmol/L Glucose 158 (H) 65 - 100 mg/dL BUN 25 (H) 8 - 23 MG/DL Creatinine 1.21 (H) 0.6 - 1.0 MG/DL  
 GFR est AA 54 (L) >60 ml/min/1.73m2 GFR est non-AA 45 (L) >60 ml/min/1.73m2 Calcium 6.1 (L) 8.3 - 10.4 MG/DL Bilirubin, total 0.6 0.2 - 1.1 MG/DL  
 ALT (SGPT) 102 (H) 12 - 65 U/L  
 AST (SGOT) 81 (H) 15 - 37 U/L Alk. phosphatase 73 50 - 136 U/L Protein, total 5.7 (L) 6.3 - 8.2 g/dL Albumin 2.0 (L) 3.2 - 4.6 g/dL Globulin 3.7 (H) 2.3 - 3.5 g/dL A-G Ratio 0.5 (L) 1.2 - 3.5 Imaging: Xr Chest Sngl V 
 
 Result Date: 1/3/2019 IMPRESSION: 1.  Stable findings of the chest as described above. Xr Chest Sngl V Result Date: 1/1/2019 IMPRESSION:  EXTENSIVE INHOMOGENEOUS BILATERAL INFILTRATES WITH RIGHT UPPER LOBE PREDOMINANCE. Xr Chest Pa Lat Result Date: 1/6/2019 IMPRESSION:  INTERVAL IMPROVEMENT OF BILATERAL PNEUMONIA WITH PERSISTENT LEFT BASAL CONSOLIDATION AND SMALL PLEURAL EFFUSIONS. Ct Chest W Cont Result Date: 1/1/2019 IMPRESSION:   1. NO DEFINITE ACUTE PULMONARY EMBOLISM. 2.  SMALL BILATERAL PLEURAL EFFUSIONS WITH EXTENSIVE BILATERAL INFILTRATES SUSPICIOUS FOR PNEUMONIA. PULMONARY EDEMA WOULD BE LESS LIKELY. No results found for this visit on 12/30/18. Cultures: All Micro Results Procedure Component Value Units Date/Time CULTURE, RESPIRATORY/SPUTUM/BRONCH Riddhi Oliveira [[id number]] Collected:  01/04/19 1958 Order Status:  Completed Specimen:  Sputum Updated:  01/07/19 1007 Special Requests: NO SPECIAL REQUESTS     
  GRAM STAIN 0 to 10 WBC'S/OIF  
   NO EPITHELIAL CELLS SEEN     
   RARE GRAM POSITIVE COCCI 3+ MUCUS PRESENT Culture result: MODERATE NORMAL RESPIRATORY YAZAN  
     
 LEGIONELLA PNEUMOPHILA AG, URINE [[id number]] Collected:  01/03/19 1958 Order Status:  Completed Specimen:  Urine, random Updated:  01/06/19 1436 Source URINE     
  L pneumophila S1 Ag, urine NEGATIVE Comment: (NOTE) Presumptive negative for L. pneumophila serogroup 1 antigen in urine, 
suggesting no recent or current infection. Legionnaires' disease 
cannot be ruled out since other serogroups and species may also 
cause disease. Performed At: [address] [id number] Abdoul Wong MD IK:[id number] CULTURE, BLOOD [[id number]] Collected:  12/30/18 1921 Order Status:  Completed Specimen:  Blood Updated:  01/04/19 1224 Special Requests: --     
  RIGHT Antecubital 
  
 Culture result: NO GROWTH 5 DAYS     
 CULTURE, BLOOD [[id number]] Collected:  12/30/18 1928 Order Status:  Completed Specimen:  Blood Updated:  01/04/19 1224 Special Requests: --     
  RIGHT 
HAND Culture result: NO GROWTH 5 DAYS INFLUENZA A & B AG (RAPID TEST) [[id number]] Collected:  01/03/19 1347 Order Status:  Completed Specimen:  Nasopharyngeal from Nasal washing Updated:  01/03/19 1410 Influenza A Ag NEGATIVE Comment: NEGATIVE FOR THE PRESENCE OF INFLUENZA A ANTIGEN 
INFECTION DUE TO INFLUENZA A CANNOT BE RULED OUT. BECAUSE THE ANTIGEN PRESENT IN THE SAMPLE MAY BE BELOW 
THE DETECTION LIMIT OF THE TEST. A NEGATIVE TEST IS PRESUMPTIVE AND IT IS RECOMMENDED THAT THESE RESULTS BE CONFIRMED BY VIRAL CULTURE OR AN FDA-CLEARED INFLUENZA A AND B MOLECULAR ASSAY. Influenza B Ag NEGATIVE Comment: NEGATIVE FOR THE PRESENCE OF INFLUENZA B ANTIGEN 
INFECTION DUE TO INFLUENZA B CANNOT BE RULED OUT. BECAUSE THE ANTIGEN PRESENT IN THE SAMPLE MAY BE BELOW 
THE DETECTION LIMIT OF THE TEST. A NEGATIVE TEST IS PRESUMPTIVE AND IT IS RECOMMENDED THAT THESE RESULTS BE CONFIRMED BY VIRAL CULTURE OR AN FDA-CLEARED INFLUENZA A AND B MOLECULAR ASSAY. Source NASOPHARYNGEAL     
 RESPIRATORY VIRAL PANEL, PCR [[id number]] Collected:  01/03/19 1347 Order Status:  Completed Updated:  01/03/19 1357 RESPIRATORY VIRAL PANEL, PCR [[id number]] Collected:  01/03/19 1253 Order Status:  Canceled Specimen:  Sputum Updated:  01/03/19 1258 C. DIFFICILE AG & TOXIN A/B [[id number]] Collected:  12/31/18 1844 Order Status:  Completed Specimen:  Stool Updated:  01/01/19 1231 [id number] ANTIGEN    
  C. DIFFICILE GDH ANTIGEN-NEGATIVE  
     
  C. difficile toxin C. DIFFICILE TOXIN-NEGATIVE  
     
  PCR Reflex NOT APPLICABLE INTERPRETATION    
  NEGATIVE FOR TOXIGENIC C. DIFFICILE Clinical Consideration   NEGATIVE FOR TOXIGENIC C. DIFFICILE  
     
  
 
 
 Assessment/Plan:  
 
Principal Problem: 
  Acute respiratory failure with hypoxemia ([address] (6/17/2016) - Now on room air - Due to bilateral pneumonia - Stop Vanc + Zosyn (Day 7/8) - Continue albuterol 
- Continue Lasix IV --> 2.6L UOP x 24 hours - No PE per CT Chest 
- Appreciate Pulmonary's input and assitance Active Problems: 
  Bilateral pneumonia (12/30/2018) - Stop Vanc + Zosyn since day 8 
- Continue albuteriol 
- Continue Mucinex 
- Continue ICS + Flutter Valve - Appreciate Pulmonary's input and assitance Pleural effusion, bilateral (1/2/2019) - Likely nieves-pneumonic + HF 
- Appreciate Pulmonary's input and assitance Acute hypokalemia (1/2/2019) - Likely due to acute illness + Lasix - Potassium 2.7 --> 4.2 
- Recheck in AM 
 
  Metabolic Encephalopathy (0/1/1523) - Resolved per family - Was likely due to hypoxemia Episodic atrial fibrillation ([facility] (8/16/2016) - Continue Amiodarone - Continue Corgard - [address] [facility] Chronic diastolic heart failure ([address] (11/16/2016) - Stable - Start Lasix IV BID --> repeat BMP in AM 
- Continue Nadolol Macrocytic anemia (12/22/2016) - Stable Essential hypertension with goal blood pressure less than 130/85 (12/22/2016) - Well controlled - Continue Corgard - Continue Amlodipine Stage 3 chronic kidney disease ([address] (11/27/2017) - Stable Acquired hypothyroidism (7/9/2015) - Continue Levothyroxine Dementia without behavioral disturbance (8/16/2016) - No acute issues - Continue Remeron Anxiety (12/22/2016) - Continue Remeron Multiple myeloma in remission [facility]) (1/25/2017) 
- Oncology saw and will follow peripherally Presence of cardiac pacemaker (3/21/2016) Dispo - Stop Vanc/Zosyn. IV Lasix. Discharge to rehab in next 1-2 days. DIET CARDIAC Regular DIET NUTRITIONAL SUPPLEMENTS Breakfast, Lunch; Other (boost plus) DVT Prophylaxis: Heparin Signed By: Melo Alonzo, DO   
 January 7, 2019

## 2019-01-07 NOTE — PROGRESS NOTES
Rashida Calhoun Admission Date: 12/30/2018 Daily Progress Note: 1/7/2019 The patient's chart is reviewed and the patient is discussed with the staff. 81yo WF with MM on chemotherapy, on O2 at night at baseline, admitted from Urgent Care with shortness of breath, CXR with RUL infiltrate. Has had progressive worsening hypoxia and now with B infiltrates. Subjective:  
 
Sitting up in chair at bedside. Weaned to room air. Productive cough at times--thick white sputum. Current Facility-Administered Medications Medication Dose Route Frequency  potassium chloride (K-DUR, KLOR-CON) SR tablet 40 mEq  40 mEq Oral TID  piperacillin-tazobactam (ZOSYN) 4.5 g in 0.9% sodium chloride (MBP/ADV) 100 mL  4.5 g IntraVENous Q8H  
 oxymetazoline (AFRIN) 0.05 % nasal spray 2 Spray  2 Spray Both Nostrils BID PRN  
 traZODone (DESYREL) tablet 100 mg  100 mg Oral QHS  calcium carbonate (TUMS) chewable tablet 400 mg [elemental]  400 mg Oral TID WITH MEALS  furosemide (LASIX) injection 40 mg  40 mg IntraVENous BID  
 azithromycin (ZITHROMAX) 500 mg in 0.9% sodium chloride (MBP/ADV) 250 mL  500 mg IntraVENous Q24H  hydrocortisone (ANUSOL-HC) 2.5 % rectal cream   PeriANAL QID  guaiFENesin ER (MUCINEX) tablet 1,200 mg  1,200 mg Oral Q12H  
 methylPREDNISolone (PF) (Solu-MEDROL) injection 60 mg  60 mg IntraVENous Q12H  
 morphine injection 1 mg  1 mg IntraVENous Q4H PRN  
 albuterol (PROVENTIL VENTOLIN) nebulizer solution 2.5 mg  2.5 mg Nebulization Q6H RT  
 famotidine (PEPCID) tablet 20 mg  20 mg Oral DAILY  nystatin (MYCOSTATIN) 100,000 unit/mL oral suspension 500,000 Units  500,000 Units Oral QID  amLODIPine (NORVASC) tablet 2.5 mg  2.5 mg Oral DAILY  aspirin delayed-release tablet 81 mg  81 mg Oral DAILY  atorvastatin (LIPITOR) tablet 80 mg  80 mg Oral QHS  ferrous sulfate tablet 325 mg  1 Tab Oral DAILY WITH BREAKFAST  levothyroxine (SYNTHROID) tablet 50 mcg  50 mcg Oral ACB  mirtazapine (REMERON) tablet 15 mg  15 mg Oral QHS  nadolol (CORGARD) tablet 80 mg  80 mg Oral DAILY  sodium chloride (NS) flush 5-10 mL  5-10 mL IntraVENous Q8H  
 sodium chloride (NS) flush 5-10 mL  5-10 mL IntraVENous PRN  
 acetaminophen (TYLENOL) tablet 650 mg  650 mg Oral Q6H PRN  
 HYDROcodone-acetaminophen (NORCO) 5-325 mg per tablet 1 Tab  1 Tab Oral Q4H PRN  
 naloxone (NARCAN) injection 0.4 mg  0.4 mg IntraVENous PRN  
 ondansetron (ZOFRAN) injection 4 mg  4 mg IntraVENous Q4H PRN  
 clorazepate (TRANXENE) tablet 7.5 mg  7.5 mg Oral BID Review of Systems Constitutional: negative for fever, chills, sweats Cardiovascular: negative for chest pain, palpitations, syncope, edema Gastrointestinal: negative for dysphagia, reflux, vomiting, diarrhea, abdominal pain, or melena Neurologic: negative for focal weakness, numbness, headache Objective:  
 
Vitals:  
 01/07/19 0708 01/07/19 0737 01/07/19 0955 01/07/19 1116 BP:  130/62  121/66 Pulse:  62  60 Resp:  17  17 Temp:  98.2 °F (36.8 °C)  98.3 °F (36.8 °C) SpO2: 99% 96% 98% 97% Weight:      
Height:      
 
Intake and Output:  
01/05 1901 - 01/07 0700 In: 740 [P.O.:740] Out: 9560 [Urine:5500] 01/07 0701 - 01/07 1900 In: 240 [P.O.:240] Out: 500 [Urine:500] Physical Exam:  
Constitution:  the patient is well developed and in no acute distress EENMT:  Sclera clear, pupils equal, oral mucosa moist; very [place] Respiratory: few scattered fine crackles Cardiovascular:  RRR without M,G,R 
Gastrointestinal: soft and non-tender; with positive bowel sounds. Musculoskeletal: warm without cyanosis. There is no lower leg edema. Skin:  no jaundice or rashes,left lower leg wound--dry dressing Neurologic: no gross neuro deficits Psychiatric:  alert and oriented x2 CXR: none today CXR 1/6/19:   
 
 
CHEST XRAY:  
1/1 CT chest 
 
 
CXR 1/3/19 1.  Stable findings of the chest as described above. LAB Recent Labs 01/07/19 
0407 01/06/19 
8844 01/05/19 
0757 WBC 5.9 4.8 3.4* HGB 10.1* 10.3* 9.3* HCT 34.1* 30.6* 28.4*  
 277 231 Recent Labs 01/07/19 
0407 01/06/19 
0415 01/05/19 
3883  144 142  
K 4.2 2.7* 2.7*  
 105 107 CO2 27 29 23 * 144* 152* BUN 25* 23 22 CREA 1.21* 1.24* 1.19* MG  --  2.1 2.1 CA 6.1* 5.8* 5.6* ALB 2.0* 2.1* 2.1*  
SGOT 81* 74* 87* ABG:  No results found for: PH, PHI, PCO2, PCO2I, PO2, PO2I, HCO3, HCO3I, FIO2, FIO2I Assessment:  (Medical Decision Making) Hospital Problems  Date Reviewed: 1/4/2019 Codes Class Noted POA Pleural effusion, bilateral ICD-10-CM: J90 ICD-9-CM: 511.9  1/2/2019 Yes On lasix with negative fluid balance Diuresing Acute hypokalemia ICD-10-CM: E87.6 ICD-9-CM: 276.8  1/2/2019 Yes Resolved--K+ 4.2 Bilateral pneumonia ICD-10-CM: J18.9 ICD-9-CM: 832  12/30/2018 Yes Remains on antibiotics Stage 3 chronic kidney disease (HCC) (Chronic) ICD-10-CM: N18.3 ICD-9-CM: 585.3  11/27/2017 Yes Creat 1.21 - stable Multiple myeloma in remission [facility]) ICD-10-CM: C90.01 
ICD-9-CM: 203.01  1/25/2017 Yes  
 chronic Macrocytic anemia ICD-10-CM: D53.9 ICD-9-CM: 281.9  12/22/2016 Yes Hgb 10.1 Essential hypertension with goal blood pressure less than 130/85 (Chronic) ICD-10-CM: I10 
ICD-9-CM: 401.9  12/22/2016 Yes Chronic--controlled Anxiety ICD-10-CM: F41.9 ICD-9-CM: 300.00  12/22/2016 Yes Chronic-calm Chronic diastolic heart failure (HCC) (Chronic) ICD-10-CM: I50.32 
ICD-9-CM: 428.32  11/16/2016 Yes On lasix Episodic atrial fibrillation (HCC) ICD-10-CM: I48.0 ICD-9-CM: 427.31  8/16/2016 Yes Rate controlled Dementia without behavioral disturbance (Chronic) ICD-10-CM: F03.90 ICD-9-CM: 294.20  8/16/2016 Yes  Chronic--oriented x2  
 * (Principal) Acute respiratory failure with hypoxemia ([facility] [address] ICD-10-CM: J96.01 
ICD-9-CM: 518.81  6/17/2016 Yes Now weaned to room air Presence of cardiac pacemaker (Chronic) ICD-10-CM: Z95.0 ICD-9-CM: V45.01  3/21/2016 Yes  
 chronic Acquired hypothyroidism (Chronic) ICD-10-CM: E03.9 ICD-9-CM: 244.9  7/9/2015 Yes  
 chronic Plan:  (Medical Decision Making) --Albuterol, Mucinex 
--Zithromax day 5, Zosyn day 9 
--Respiratory culture:  Normal flavia --Respiratory viral panel: pending 
--Urine legionella - negative 
--Solu Medrol 60mg q12h 
--Weaned to room air 
--Lasix 40mg IV BID--Urine output 2600 ml last 24 hr 
--OOB with assistance. Mark Bartholomew, NP Lungs:  CTA B, no w/r/r Heart:  RRR with no Murmur/Rubs/Gallops Additional Comments:   
Patient clinically improving. On room air. This is day 9 of zosyn. Will d/c. Can likely d/c azithro tomorrow if still stable. Change steroids to daily and from there to PO. I have spoken with and examined the patient. I agree with the above assessment and plan as documented.  
 
Aida Christian MD

## 2019-01-07 NOTE — PROGRESS NOTES
Problem: Falls - Risk of 
Goal: *Absence of Falls Document [place] Fall Risk and appropriate interventions in the flowsheet. Outcome: Progressing Towards Goal 
Fall Risk Interventions: 
Mobility Interventions: Communicate number of staff needed for ambulation/transfer, Patient to call before getting OOB Mentation Interventions: Adequate sleep, hydration, pain control, Door open when patient unattended, Reorient patient, Room close to nurse's station, More frequent rounding Medication Interventions: Evaluate medications/consider consulting pharmacy, Patient to call before getting OOB, Teach patient to arise slowly Elimination Interventions: Call light in reach, Patient to call for help with toileting needs, Toilet paper/wipes in reach Problem: Pressure Injury - Risk of 
Goal: *Prevention of pressure injury Document José Manuel Scale and appropriate interventions in the flowsheet. Outcome: Progressing Towards Goal 
Pressure Injury Interventions: 
Sensory Interventions: Assess changes in LOC, Maintain/enhance activity level, Pressure redistribution bed/mattress (bed type) Moisture Interventions: Internal/External urinary devices, Maintain skin hydration (lotion/cream) Activity Interventions: Increase time out of bed, Pressure redistribution bed/mattress(bed type) Mobility Interventions: HOB 30 degrees or less, Pressure redistribution bed/mattress (bed type) Nutrition Interventions: Document food/fluid/supplement intake Friction and Shear Interventions: HOB 30 degrees or less

## 2019-01-08 ENCOUNTER — APPOINTMENT (OUTPATIENT)
Dept: GENERAL RADIOLOGY | Age: 84
DRG: 193 | End: 2019-01-08
Attending: INTERNAL MEDICINE
Payer: MEDICARE

## 2019-01-08 LAB
ALBUMIN SERPL-MCNC: 2.2 G/DL (ref 3.2–4.6)
ALBUMIN/GLOB SERPL: 0.6 {RATIO} (ref 1.2–3.5)
ALP SERPL-CCNC: 68 U/L (ref 50–136)
ALT SERPL-CCNC: 93 U/L (ref 12–65)
ANION GAP SERPL CALC-SCNC: 6 MMOL/L (ref 7–16)
AST SERPL-CCNC: 54 U/L (ref 15–37)
BASOPHILS # BLD: 0 K/UL (ref 0–0.2)
BASOPHILS NFR BLD: 0 % (ref 0–2)
BILIRUB SERPL-MCNC: 0.5 MG/DL (ref 0.2–1.1)
BUN SERPL-MCNC: 26 MG/DL (ref 8–23)
CALCIUM SERPL-MCNC: 6.1 MG/DL (ref 8.3–10.4)
CHLORIDE SERPL-SCNC: 103 MMOL/L (ref 98–107)
CO2 SERPL-SCNC: 30 MMOL/L (ref 21–32)
CREAT SERPL-MCNC: 1.13 MG/DL (ref 0.6–1)
DIFFERENTIAL METHOD BLD: ABNORMAL
EOSINOPHIL # BLD: 0 K/UL (ref 0–0.8)
EOSINOPHIL NFR BLD: 0 % (ref 0.5–7.8)
ERYTHROCYTE [DISTWIDTH] IN BLOOD BY AUTOMATED COUNT: 13.6 % (ref 11.9–14.6)
FLUAV RNA SPEC QL NAA+PROBE: NEGATIVE
FLUBV RNA SPEC QL NAA+PROBE: NEGATIVE
GLOBULIN SER CALC-MCNC: 3.4 G/DL (ref 2.3–3.5)
GLUCOSE SERPL-MCNC: 112 MG/DL (ref 65–100)
HADV DNA SPEC QL NAA+PROBE: NEGATIVE
HCT VFR BLD AUTO: 31.8 % (ref 35.8–46.3)
HGB BLD-MCNC: 10.5 G/DL (ref 11.7–15.4)
HMPV RNA SPEC QL NAA+PROBE: NEGATIVE
HPIV1 RNA SPEC QL NAA+PROBE: NEGATIVE
HPIV2 RNA SPEC QL NAA+PROBE: NEGATIVE
HPIV3 RNA SPEC QL NAA+PROBE: NEGATIVE
IMM GRANULOCYTES # BLD: 0.1 K/UL (ref 0–0.5)
IMM GRANULOCYTES NFR BLD AUTO: 1 % (ref 0–5)
LYMPHOCYTES # BLD: 0.6 K/UL (ref 0.5–4.6)
LYMPHOCYTES NFR BLD: 8 % (ref 13–44)
MCH RBC QN AUTO: 31.7 PG (ref 26.1–32.9)
MCHC RBC AUTO-ENTMCNC: 33 G/DL (ref 31.4–35)
MCV RBC AUTO: 96.1 FL (ref 79.6–97.8)
MONOCYTES # BLD: 0.5 K/UL (ref 0.1–1.3)
MONOCYTES NFR BLD: 6 % (ref 4–12)
NEUTS SEG # BLD: 6.4 K/UL (ref 1.7–8.2)
NEUTS SEG NFR BLD: 84 % (ref 43–78)
NRBC # BLD: 0 K/UL (ref 0–0.2)
PLATELET # BLD AUTO: 318 K/UL (ref 150–450)
PMV BLD AUTO: 11.4 FL (ref 9.4–12.3)
POTASSIUM SERPL-SCNC: 3.8 MMOL/L (ref 3.5–5.1)
PROT SERPL-MCNC: 5.6 G/DL (ref 6.3–8.2)
RBC # BLD AUTO: 3.31 M/UL (ref 4.05–5.2)
RHINOVIRUS RNA SPEC QL NAA+PROBE: NEGATIVE
RSV A RNA SPEC QL NAA+PROBE: NEGATIVE
RSV B RNA SPEC QL NAA+PROBE: NEGATIVE
SODIUM SERPL-SCNC: 139 MMOL/L (ref 136–145)
SPECIMEN SOURCE: NORMAL
WBC # BLD AUTO: 7.6 K/UL (ref 4.3–11.1)

## 2019-01-08 PROCEDURE — 80053 COMPREHEN METABOLIC PANEL: CPT

## 2019-01-08 PROCEDURE — 65270000029 HC RM PRIVATE

## 2019-01-08 PROCEDURE — 74011250637 HC RX REV CODE- 250/637: Performed by: INTERNAL MEDICINE

## 2019-01-08 PROCEDURE — 74011250636 HC RX REV CODE- 250/636: Performed by: INTERNAL MEDICINE

## 2019-01-08 PROCEDURE — 94760 N-INVAS EAR/PLS OXIMETRY 1: CPT

## 2019-01-08 PROCEDURE — 99232 SBSQ HOSP IP/OBS MODERATE 35: CPT | Performed by: INTERNAL MEDICINE

## 2019-01-08 PROCEDURE — 71045 X-RAY EXAM CHEST 1 VIEW: CPT

## 2019-01-08 PROCEDURE — 85025 COMPLETE CBC W/AUTO DIFF WBC: CPT

## 2019-01-08 PROCEDURE — 74011000250 HC RX REV CODE- 250: Performed by: INTERNAL MEDICINE

## 2019-01-08 PROCEDURE — 94640 AIRWAY INHALATION TREATMENT: CPT

## 2019-01-08 PROCEDURE — 36415 COLL VENOUS BLD VENIPUNCTURE: CPT

## 2019-01-08 RX ORDER — PREDNISONE 20 MG/1
40 TABLET ORAL
Status: DISCONTINUED | OUTPATIENT
Start: 2019-01-09 | End: 2019-01-09 | Stop reason: HOSPADM

## 2019-01-08 RX ORDER — ALBUTEROL SULFATE 0.83 MG/ML
2.5 SOLUTION RESPIRATORY (INHALATION)
Status: DISCONTINUED | OUTPATIENT
Start: 2019-01-08 | End: 2019-01-09 | Stop reason: HOSPADM

## 2019-01-08 RX ADMIN — CLORAZEPATE DIPOTASSIUM 7.5 MG: 7.5 TABLET ORAL at 21:23

## 2019-01-08 RX ADMIN — HYDROCORTISONE 2.5%: 25 CREAM TOPICAL at 17:05

## 2019-01-08 RX ADMIN — Medication 10 ML: at 06:10

## 2019-01-08 RX ADMIN — GUAIFENESIN 1200 MG: 600 TABLET, EXTENDED RELEASE ORAL at 09:58

## 2019-01-08 RX ADMIN — CALCIUM CARBONATE 400 MG: 500 TABLET, CHEWABLE ORAL at 16:51

## 2019-01-08 RX ADMIN — MIRTAZAPINE 15 MG: 15 TABLET, FILM COATED ORAL at 21:23

## 2019-01-08 RX ADMIN — GUAIFENESIN 1200 MG: 600 TABLET, EXTENDED RELEASE ORAL at 21:23

## 2019-01-08 RX ADMIN — ALBUTEROL SULFATE 2.5 MG: 2.5 SOLUTION RESPIRATORY (INHALATION) at 02:49

## 2019-01-08 RX ADMIN — FUROSEMIDE 40 MG: 10 INJECTION, SOLUTION INTRAMUSCULAR; INTRAVENOUS at 16:58

## 2019-01-08 RX ADMIN — FUROSEMIDE 40 MG: 10 INJECTION, SOLUTION INTRAMUSCULAR; INTRAVENOUS at 09:59

## 2019-01-08 RX ADMIN — AZITHROMYCIN 500 MG: 250 TABLET, FILM COATED ORAL at 11:32

## 2019-01-08 RX ADMIN — NYSTATIN 500000 UNITS: 500000 SUSPENSION ORAL at 09:00

## 2019-01-08 RX ADMIN — CALCIUM CARBONATE 400 MG: 500 TABLET, CHEWABLE ORAL at 11:32

## 2019-01-08 RX ADMIN — ATORVASTATIN CALCIUM 80 MG: 40 TABLET, FILM COATED ORAL at 21:23

## 2019-01-08 RX ADMIN — NADOLOL 80 MG: 40 TABLET ORAL at 09:57

## 2019-01-08 RX ADMIN — ASPIRIN 81 MG: 81 TABLET, COATED ORAL at 09:58

## 2019-01-08 RX ADMIN — HYDROCORTISONE 2.5%: 25 CREAM TOPICAL at 21:29

## 2019-01-08 RX ADMIN — LEVOTHYROXINE SODIUM 50 MCG: 50 TABLET ORAL at 06:10

## 2019-01-08 RX ADMIN — METHYLPREDNISOLONE SODIUM SUCCINATE 60 MG: 125 INJECTION, POWDER, FOR SOLUTION INTRAMUSCULAR; INTRAVENOUS at 09:59

## 2019-01-08 RX ADMIN — Medication 10 ML: at 15:00

## 2019-01-08 RX ADMIN — NYSTATIN 500000 UNITS: 500000 SUSPENSION ORAL at 21:57

## 2019-01-08 RX ADMIN — ACETAMINOPHEN 650 MG: 325 TABLET ORAL at 09:58

## 2019-01-08 RX ADMIN — ALBUTEROL SULFATE 2.5 MG: 2.5 SOLUTION RESPIRATORY (INHALATION) at 07:30

## 2019-01-08 RX ADMIN — NYSTATIN 500000 UNITS: 500000 SUSPENSION ORAL at 16:52

## 2019-01-08 RX ADMIN — TRAZODONE HYDROCHLORIDE 100 MG: 50 TABLET ORAL at 21:23

## 2019-01-08 RX ADMIN — Medication 10 ML: at 21:24

## 2019-01-08 RX ADMIN — HYDROCORTISONE 2.5%: 25 CREAM TOPICAL at 15:01

## 2019-01-08 RX ADMIN — HYDROCORTISONE 2.5%: 25 CREAM TOPICAL at 09:00

## 2019-01-08 RX ADMIN — CALCIUM CARBONATE 400 MG: 500 TABLET, CHEWABLE ORAL at 09:58

## 2019-01-08 RX ADMIN — CLORAZEPATE DIPOTASSIUM 7.5 MG: 7.5 TABLET ORAL at 09:57

## 2019-01-08 RX ADMIN — FAMOTIDINE 20 MG: 20 TABLET ORAL at 09:57

## 2019-01-08 RX ADMIN — AMLODIPINE BESYLATE 2.5 MG: 5 TABLET ORAL at 09:58

## 2019-01-08 RX ADMIN — FERROUS SULFATE TAB 325 MG (65 MG ELEMENTAL FE) 325 MG: 325 (65 FE) TAB at 09:58

## 2019-01-08 NOTE — PROGRESS NOTES
Hospitalist Progress Note 2019 Admit Date: 2018  5:11 PM  
NAME: Brissa Dunn :  1930 MRN:  [id number] Attending: Brian Gary DO 
PCP:  Rigo Stanley MD 
 
SUBJECTIVE:  
From previous notes: \"81 yo female with PMH of paroxysmal A Fib with pacer, multiple myeloma followed by Dr. Ese Subramanian on current chemo, HTN, CKD, dCHF, who is sent as a direct admit from urgent care due to RUL pneumonia. She admits to several days of cough/ malaise and headache. CXR at urgent care showed RUL pneumonia.  
Pt on 5 L NC with significant desatting during conversation.  Pt reports feeling some better but is obviously SOB while we talk. \" 
 
: Patient reports reports fatigue today and slight headache this am that has subsided with Tylenol. She denies SOB and is saturating well on room air at rest. 
 
 
Review of Systems negative with exception of pertinent positives noted above PHYSICAL EXAM  
 
Visit Vitals /52 Pulse 60 Temp 98.2 °F (36.8 °C) Resp 16 Ht 5' 4\" (1.626 m) Wt 73 kg (160 lb 14.4 oz) SpO2 97% Breastfeeding? No  
BMI 27.62 kg/m² Temp (24hrs), Av.2 °F (36.8 °C), Min:98 °F (36.7 °C), Max:98.6 °F (37 °C) Oxygen Therapy O2 Sat (%): 97 % (19 1653) Pulse via Oximetry: 60 beats per minute (19) O2 Device: Room air (19) O2 Flow Rate (L/min): 2 l/min(decreased from 4L) (19) O2 Temperature: 87.8 °F (31 °C) (19) FIO2 (%): 45 % (19) Intake/Output Summary (Last 24 hours) at 2019 6044 Last data filed at 2019 1404 Gross per 24 hour Intake 1961 ml Output 1725 ml Net 236 ml General: No acute distress   
Lungs:  CTA Bilaterally. Heart:  Regular rate and rhythm,  No murmur, rub, or gallop Abdomen: Soft, Non distended, Non tender, Positive bowel sounds Extremities: No cyanosis, clubbing or edema Neurologic:  No focal deficits ASSESSMENT Active Hospital Problems Diagnosis Date Noted  Pleural effusion, bilateral 01/02/2019  Acute hypokalemia 01/02/2019  Bilateral pneumonia 12/30/2018  [facility] 11/27/2017  Multiple myeloma in remission ([facility] 01/25/2017  Anxiety 12/22/2016  Essential hypertension with goal blood pressure less than 130/85 12/22/2016  Macrocytic anemia 12/22/2016  Chronic diastolic heart failure [facility] 11/16/2016  Dementia without behavioral disturbance 08/16/2016  Episodic atrial fibrillation ([facility] 08/16/2016  Acute respiratory failure with hypoxemia ([facility] 06/17/2016  Presence of cardiac pacemaker 03/21/2016  Acquired hypothyroidism 07/09/2015 Plan: 
 
- Pulm following and input appreciated - Continue nebulizers, flutter valve and ICS per pulm recs - Taper steroids - S/p Zosyn. Continue Zithromax 
- Continue Lasix BID; and plan to remove geronimo tomorrow after Lasix tapered 
- PT/OT daily - Patient declined rehab. Plan for D/c home w/ HH in 1-2 days Signed By: Nikita Beckham MD   
 January 8, 2019

## 2019-01-08 NOTE — ROUTINE PROCESS
Respiratory Care Services Policy Number: -[id number] Title: Aerosolized Medication Protocol Effective Date: 10/1998 Revised Date: 06/2013, 03/2016 Reviewed Date: 05/2014/ 03/2015 , 06/2017 I. Policy: The Aerosolized Medication Protocol shall by implemented by Respiratory Care              Practitioners (RCP) for patients with orders to receive aerosol therapy with medication. II. Purpose: To open and maintain obstructed airways, the RCP, will utilize the following  
protocol to select the indicated aerosolized medication(s) and determine the most effective method of delivery to the patient. III. Patient Type: All patients who are determined to meet aerosolized medication criteria as  
       outlined in this protocol. IV. Responsibility: Director, [address], registered Respiratory Care                                                     Practitioners (RCPs) with documented competency in the performance of                                     respiratory therapeutic techniques. V. Equipment needed: A. Stethoscope B. Pulse oximeter C. IPPB machine and circuit D. Aerosol nebulizer E. MDI Inhaler VI. Protocol: A. The following conditions are accepted indications for aerosolized medication therapy. 1. Bronchospasm/wheezing 2. Impaired mucociliary clearance 3. Tracheobronchial mucosal congestion/and laryngeal stridor 4. Diseases which commonly require aerosolized medication therapy include, but are not limited to: 
a. Asthma/reactive airway disease 
b. Bronchitis/emphysema (COPD) c. Cystic fibrosis 
d. Severe laryngitis/tracheitis 
e. Bronchiectasis 
f. Smoke inhalation or chemical trauma to the lung or upper airway 
g. Physical trauma to the upper airway 
h. Laryngotracheobronchitis 
i. Bronchiolitis 
j. Non-specific wheezing B. Indications for bronchodilator medications will include: 
a. Bronchospasm/ wheezing b. Asthma/reactive airway disease 
c. Chronic obstructive pulmonary disease 
d. Obstructive defect on pulmonary function testing C. Administration of medications 1. If a bronchodilator or any other type of respiratory medication is needed, a physician order must be indicated in the medication section in the patients EMR. 2. When the physician specifies the medication and dosage at the time of request, the ordered medication will be used as part of the care plan. D. The following guidelines will be utilized in the evaluation and selection of the         appropriate delivery device for indicated medication(s): 
1. IPPB 
a. Ventilation is inadequate (rapid shallow breathing) b. Refractory atelectasis has developed, other forms of therapy are unsuccessful 
c. Inability to clear secretions 
d. Need to improve lung expansion 2. Unassisted aerosol (UA) is the preferred method of aerosol delivery and indicated if 
a. Ventilation is inadequate 
b. Patient demonstrates wheezing  
c. patient is unable to perform MDI effectively  
d. Patient preference 3. Metered Dose Inhaler (MDI)  
a. Patient is alert/cooperative 
b. Medication(s) available in this delivery method. c. Able to perform 3 second breath hold. d. Patient has demonstrated ability to use MDI effectively 
e. Patient has used MDI therapy previously, either at home or in the hospital. 
f. Note: The only approved inhalers on formulary are albuterol and Spiriva. VII. Guidelines:  
Monitor patients vital signs and evaluate patients clinical status. The need to change medication and/or modality may be indicated by: 1. A pulse greater than 120 bpm, or if a pulse increase of 20 bpm occurs with bronchodilator medications. 2. Significant worsening of dyspnea or wheezing occurring during or within 30 minutes of discontinuing therapy. 3. Worsening of patients sensorium (e.g. patient becomes confused or obtunded, and unable to follow directions). 4. Worsening of patients chest x-ray. 5. Change in sputum (e.g. increased pulmonary infiltrate, which might indicate need for volume expansion therapy). 6. Patient has difficulty coughing up secretions, which might indicate need for acetylcysteine and/or bronchial hygiene therapy. 7. Call physician immediately if dyspnea worsens and is not responsive to modifications allowed by protocol. VIII. Clinical Responsibility: A. The therapy assessment guidelines will be used to evaluate all patients receiving aerosolized medications with the exception of critical care areas. 1. RCPs will perform changes in therapy per protocol. 2. It will be the responsibility of RCP to provide instruction regarding respiratory medications, aerosol therapy and proper MDI technique, as well as, spacer usage to patients ordered MDI therapy. 3. Current therapy that is part of a patients home regimen will not be discontinued. IX. Documentation A. Document assessment findings in the respiratory assessment section of the patients EMR. B. Document changes in therapy per protocol in the respiratory orders section and in the care plan section of the patients EMR. C. Document patient education in the patient education section of the patients EMR. X. Outcome Criteria: A. Relief of wheezes and obstruction B. Improved cough and sputum color and consistency C. Improved chest x-ray D. Improved arterial oxygen tension and or SaO2 
E. Improved Peak Flow on asthmatic patients XI. Related Protocols: A. Respiratory Patient Care Protocols B. Bronchial Hygiene Therapy C. Oxygen Protocol Reference: 
 
 
 
 
 
 
 
 
 
 
 
Respiratory Care Services Policy Number: -[id number] Title: Patient Care Assessment Program 
 
Effective Date: 01/1999 Revised Date: 05/2014 Reviewed Date: 06/2013/ 03/2015, 03/2016, 06/2017 Overview In an effort to improve quality and reduce costs of respiratory care at [facility], the Respiratory Department has developed a number of Patient Care Protocols. These protocols have been developed according to Juan C 3 and are utilized for those patients who are ordered respiratory therapy using therapeutic indications and standardized approaches for accomplishing objectives. Patient Care Protocols are intended to improve care by: ? Defining the indications and standards of care agreed upon by the Pulmonary Medicine and [facility] Fabian Arreola of [facility]. ? Training respiratory care practitioners to apply those criteria to individual patients and modify therapy as indicated by the protocols. ? Documenting the indication and care plan as part of the initial ordering process. ? Tapering or discontinuing treatments once the indication for therapy changes. The Patient Care Protocols shall be universally applied throughout the hospital as determined by  
the Pulmonary Medicine and [address] [place] [address]. Rationale for Patient Care Assessment Protocols: 
? Continuous Quality Improvement ? Cost containment ? Standardization of care 
? Enhanced continuity of care ? Utilization review ? Timely intervention The following patient care assessment protocols have been developed: ? Aerosolized Medication ? Bronchial Hygiene ? Oxygen Weaning Protocol ? Volume Expansion/Secretion Clearance Non-Vented ? Ventilator Weaning Protocol ? CVRU Fast Track Weaning Protocol ? Asthma Treatment Protocol ER ? Pediatric Asthma Treatment Protocol ER ? Alpha-1 Antitrypsin Deficiency Protocol ? Prone Positioning Protocol The Director of Radha Arreola oversees the Patient Care Assessment Program. The Clinical/ is responsible for protocol development and training. The Supervisor is responsible for implementation and  activities. Each patient with an order for respiratory treatments will receive an evaluation. All Registered Respiratory Care Practitioners (RCPs) will perform the evaluations. The same evaluation tool will be utilized for all initial and follow-up assessments. If the patient does not meet criteria for ordered therapy, the therapy will be discontinued. If the patient demonstrates an adverse response to initially ordered therapy, the therapy will be discontinued and the physician will be contacted. Specific physician's orders that deviate from protocols and are deemed \"inappropriate\" or \"unsafe\" will be addressed with ordering physician and/or medical director as required. Patients of [facility] and [address] will not be assessed for the first 24 hours as the Medical Director of [address] [place] [address] has requested that changes in therapy should not be made during that time frame. Respiratory Patient Care Assessment Protocols I.  Policy: In an effort to provide quality patient care and effective utilization of services, physicians who order respiratory therapy will have their patients treated via the protocols established (see attached). All Registered [address] [place] [address] Street (RCPs) will complete the initial assessment. This assessment will indicate patient needs, and the care plan developed for the patient and will performed within 24 hours of admission. Frequency of the therapy will be set according to the results of the respiratory therapy evaluation and frequency guidelines policy. Reassessment will be continued done every 48 hours and more frequently as needed for the individual patient. II. Purpose: A. To provide a process that will allow for ongoing assessment and care plan modification for patients receiving respiratory services based on both objective and or subjective patient responses to interventions.  This process of protocol utilization will assist in patient care progression while eliminating the need for the physician to continually update respiratory therapy orders. B. To assure continuity of respiratory care that meets [facility] Clinical Practice Guidelines. III. Initiation:  Implement Respiratory Care Protocols for patients who are ordered by physician  
       to receive respiratory therapy procedures or for ventilator management. IV. Protocol: A. Upon receiving an order for therapy the RCP will review the patients EMR (electronic medical record) for all pertinent information includin. Physicians order for therapy 2. Patient history and physical examination 3. Physician progress notes 4. Diagnostic. X-rays, PFTs, arterial blood gases etc. 
 
 
B. The RCP will perform a respiratory assessment in the following manner: 1. Perform hand hygiene per hospital policy utilizing Standard Precautions for all patients and following transmission-based isolation as indicated per policy. 2. Identify patient via ID bracelet verifying patient name and birth date. 3. General observations: color, pattern and effort of breathing, chest expansion, (symmetrical and bilateral), level of consciousness and the ability to ambulate. 4. The RCP will assess patients cough ability and determine if Nasotracheal suctioning is needed. If patient is unable to produce sputum, at that time, the RCP should question the patient with regard to their sputum: production, color consistency, frequency and amount. 5. Auscultation: Using a stethoscope, the RCP will listen and note quality of breath sounds and presence or absence of adventitious breath sounds in all lung fields, both anteriorly and posteriorly. 6. Upon completing the EMR review and physical assessment, the RCP will document findings in the RT Assessment section of the EMR. The score level will be provided and will be used to determine the frequency of therapy. V. Indications: A. Indications for Bronchial Hygiene Protocol will include: 1. Potential for or presence of atelectasis. 2. Need for hydration and removal of retained secretions. 3. Need for improvement of cough effectiveness. 4. Presence of conditions associated with disorder of pulmonary clearance: 
a. Cystic fibrosis b. Bronchiectasis B. Indications for Aerosolized Medication(s) Protocol should include: 1. Treatment of bronchospasm/wheezing 2. Improvement of mucociliary clearance 3. Treatment of stridor 4. History of Asthma or COPD 
           C.  Indications for Oxygen Therapy Protocol should include: 1. Documented hypoxemia 2. Severe trauma 3. Acute myocardial infarction 4. Short-term therapy (e.g. post anesthesia recovery) VI. Maintenance: A. Timely patient assessment is an integral part of this protocol therefore the following will be applied: 1. All non- critical care patients will be evaluated upon receiving initial respiratory care orders within 24 hours and re-evaluated within 48 hours (or more as needed). 2. Orders requesting a Respiratory Consult will be responded to in the following manner: 
a. In patient emergency situations, the RCP assigned to the floor will respond immediately to the patient, provide an initial respiratory assessment, and contact the patients physician as necessary for appropriate orders. b. In non-emergent situations, the RCP assigned to the floor will respond to the patient within 90 minutes and provide an initial respiratory assessment and contact patients physician as necessary for appropriate orders. c. An RCP will provide a comprehensive assessment as soon as possible. 3. Upon completion of an evaluation, the RCP will complete documentation in the patients EMR in the RT Assessment section. 4. The RCP who completes the assessment will document orders for therapy in the orders section of the patients EMR selecting new order.  Next, per protocol should be selected indicating it is a protocol order and sign orders should be selected to complete the process. 5. The Pharmacy and Therapeutics (P&T) Committee has mandated that the medication Xopenex may be changed to unit dose albuterol without an order, except for those patients receiving Xopenex due to cardiac arrhythmias. The dosage for these patients should be 0.63 mg. and may be changed from 1.25 mg. to 0.63 mg per P & T Committee by the RCP completing the assessment. 6. Patients who are not experiencing cardiac arrhythmias, and are ordered Xopenex and Atrovent may be changed to Duoneb. VII. Safety: A. The following safety issues shall be monitored: 1. The RCP will perform hand hygiene per hospital policy utilizing Standard Precautions for all patients and following transmission-based isolation as indicated per hospital policy. 2. The RCP must exercise professional judgment in classifying the patient for frequency of therapy. 3. Appropriate classification of the patient will require an evaluation utilizing the Therapy Assessment Protocol Guidelines. 4. The RCP will confer with the physician concerning the care of the patient at any time questions or problems arise. 5. If during therapy, the patient exhibits no improvement or deterioration in clinical status the RCP will notify the physician and the patients nurse. VIII. Interventions: A. The patients nurse is responsible concerning all items related to his/her care. Ongoing communication with nursing is essential to successful protocol management. B. The RCP recognizes the value of the team approach in meeting the patients needs. Nursing input regarding the patients pulmonary condition will be sought as needed. IX. Reportable conditions: The RCP will inform the physician if: 1. There are acute changes in patients respiratory status.  
2. The therapist is unable to determine appropriate care plan upon assessment. 3. The patient fails to reach therapeutic objective. 4. A change or additional medication is needed. X.  Patient Education: A. Patient will receive instruction on the followin. The treatment modality, including objectives and proper technique of therapy 2. Respiratory medications B. Documentation shall occur in the patient education section of the patients EMR. XI. Documentation: Record all findings as described above in the patients EMR. Related Protocols: A. Aerosolized Medication Protocol B. Bronchial Hygiene C. Oxygen Protocol D. Volume Expansion/Secretion Clearance E. Ventilator Weaning Protocols References: 
[address] Standard L    Respiratory Care Department Policy, Procedure and Protocol Guideline Manual, , FREEDOM Pedroza. L  Therapist Driven Respiratory Care Protocols  A Practitioners Guide for Criteria-Based Respiratory Care by Kevin Haskins M.D., and FREEDOM Norwood, KARIN. L  The rationale for therapist-driven protocols: an update. Respiratory Care 1998; [id number] N   [facility] Clinical Practice Guidelines.

## 2019-01-08 NOTE — PROGRESS NOTES
Shad Souza Admission Date: 12/30/2018 Daily Progress Note: 1/8/2019 The patient's chart is reviewed and the patient is discussed with the staff. 
 
  
89yo WF with MM on chemotherapy, on O2 at night at baseline, admitted from Urgent Care with shortness of breath, CXR with RUL infiltrate. Has had progressive worsening hypoxia and now with B infiltrates. Subjective:  
 
Feels better On RA Less cough Current Facility-Administered Medications Medication Dose Route Frequency  [START ON 1/9/2019] predniSONE (DELTASONE) tablet 40 mg  40 mg Oral DAILY WITH BREAKFAST  azithromycin (ZITHROMAX) tablet 500 mg  500 mg Oral Q24H  
 oxymetazoline (AFRIN) 0.05 % nasal spray 2 Spray  2 Spray Both Nostrils BID PRN  
 traZODone (DESYREL) tablet 100 mg  100 mg Oral QHS  calcium carbonate (TUMS) chewable tablet 400 mg [elemental]  400 mg Oral TID WITH MEALS  furosemide (LASIX) injection 40 mg  40 mg IntraVENous BID  hydrocortisone (ANUSOL-HC) 2.5 % rectal cream   PeriANAL QID  guaiFENesin ER (MUCINEX) tablet 1,200 mg  1,200 mg Oral Q12H  
 morphine injection 1 mg  1 mg IntraVENous Q4H PRN  
 albuterol (PROVENTIL VENTOLIN) nebulizer solution 2.5 mg  2.5 mg Nebulization Q6H RT  
 famotidine (PEPCID) tablet 20 mg  20 mg Oral DAILY  nystatin (MYCOSTATIN) 100,000 unit/mL oral suspension 500,000 Units  500,000 Units Oral QID  amLODIPine (NORVASC) tablet 2.5 mg  2.5 mg Oral DAILY  aspirin delayed-release tablet 81 mg  81 mg Oral DAILY  atorvastatin (LIPITOR) tablet 80 mg  80 mg Oral QHS  ferrous sulfate tablet 325 mg  1 Tab Oral DAILY WITH BREAKFAST  levothyroxine (SYNTHROID) tablet 50 mcg  50 mcg Oral ACB  mirtazapine (REMERON) tablet 15 mg  15 mg Oral QHS  nadolol (CORGARD) tablet 80 mg  80 mg Oral DAILY  sodium chloride (NS) flush 5-10 mL  5-10 mL IntraVENous Q8H  
 sodium chloride (NS) flush 5-10 mL  5-10 mL IntraVENous PRN  
  acetaminophen (TYLENOL) tablet 650 mg  650 mg Oral Q6H PRN  
 HYDROcodone-acetaminophen (NORCO) 5-325 mg per tablet 1 Tab  1 Tab Oral Q4H PRN  
 naloxone (NARCAN) injection 0.4 mg  0.4 mg IntraVENous PRN  
 ondansetron (ZOFRAN) injection 4 mg  4 mg IntraVENous Q4H PRN  
 clorazepate (TRANXENE) tablet 7.5 mg  7.5 mg Oral BID Review of Systems Constitutional: negative for fever, chills, sweats Cardiovascular: negative for chest pain, palpitations, syncope, edema Gastrointestinal:  negative for dysphagia, reflux, vomiting, diarrhea, abdominal pain, or melena Neurologic:  negative for focal weakness, numbness, headache Objective:  
 
Vitals:  
 01/08/19 0249 01/08/19 0730 01/08/19 0754 01/08/19 1140 BP:   122/55 120/58 Pulse:   60 60 Resp:   17 17 Temp:   98.6 °F (37 °C) 98.2 °F (36.8 °C) SpO2: 98% 95% 96% 94% Weight:      
Height:      
 
Intake and Output:  
01/06 1901 - 01/08 0700 In: 2346 [P.O.:1220; I.V.:1126] Out: 2951 [Urine:2950] 01/08 0701 - 01/08 1900 In: 358 [P.O.:358] Out: - Physical Exam:  
Constitution:  the patient is well developed and in no acute distress EENMT:  Sclera clear, pupils equal, oral mucosa moist 
Respiratory: clear Cardiovascular:  RRR without M,G,R 
Gastrointestinal: soft and non-tender; with positive bowel sounds. Musculoskeletal: warm without cyanosis. There is no lower leg edema. Skin:  no jaundice or rashes, no wounds Neurologic: no gross neuro deficits Psychiatric:  alert and oriented x 3 CXR:  
 
 
LAB No results for input(s): GLUCPOC in the last 72 hours. No lab exists for component: [place] Recent Labs 01/08/19 
0410 01/07/19 
0407 01/06/19 
9208 WBC 7.6 5.9 4.8 HGB 10.5* 10.1* 10.3* HCT 31.8* 34.1* 30.6*  219 277 Recent Labs 01/08/19 
0410 01/07/19 
0407 01/06/19 
5476  139 144  
K 3.8 4.2 2.7*  
 104 105 CO2 30 27 29 * 158* 144* BUN 26* 25* 23  
 CREA 1.13* 1.21* 1.24* MG  --   --  2.1 CA 6.1* 6.1* 5.8* ALB 2.2* 2.0* 2.1* TBILI 0.5 0.6 0.7 ALT 93* 102* 93* SGOT 54* 81* 74* Assessment:  (Medical Decision Making) Hospital Problems  Date Reviewed: 1/7/2019 Codes Class Noted POA Pleural effusion, bilateral 
 continue diuresis ICD-10-CM: J90 ICD-9-CM: 511.9  1/2/2019 Yes Bilateral pneumonia  ICD-10-CM: J18.9 ICD-9-CM: 403  12/30/2018 Yes Stage 3 chronic kidney disease (HCC) (Chronic) ICD-10-CM: N18.3 ICD-9-CM: 585.3  11/27/2017 Yes Multiple myeloma in remission [facility]) ICD-10-CM: C90.01 
ICD-9-CM: 203.01  1/25/2017 Yes Essential hypertension with goal blood pressure less than 130/85 (Chronic) ICD-10-CM: I10 
ICD-9-CM: 401.9  12/22/2016 Yes Anxiety ICD-10-CM: F41.9 ICD-9-CM: 300.00  12/22/2016 Yes Chronic diastolic heart failure (HCC) (Chronic) ICD-10-CM: I50.32 
ICD-9-CM: 428.32  11/16/2016 Yes Episodic atrial fibrillation (HCC) ICD-10-CM: I48.0 ICD-9-CM: 427.31  8/16/2016 Yes Dementia without behavioral disturbance (Chronic) ICD-10-CM: F03.90 ICD-9-CM: 294.20  8/16/2016 Yes * (Principal) Acute respiratory failure with hypoxemia ([facility] [address] ICD-10-CM: J96.01 
ICD-9-CM: 518.81  6/17/2016 Yes Presence of cardiac pacemaker (Chronic) ICD-10-CM: Z95.0 ICD-9-CM: V45.01  3/21/2016 Yes Plan:  (Medical Decision Making)  
 
- wean steroids to PO 
- mobilize 
- finished Zosyn ,one more day of Zithromax More than 50% of the time documented was spent in face-to-face contact with the patient and in the care of the patient on the floor/unit where the patient is located.  
 
Lindsey Fernando MD

## 2019-01-08 NOTE — PROGRESS NOTES
Pt resting quietly. No visible s/sx of distress. On room air. A&O x3 w/confusion to time. Bedside report given to Lawyer Jocy RN.

## 2019-01-08 NOTE — PROGRESS NOTES
END OF SHIFT NOTE: 
 
Intake/Output No intake/output data recorded. Voiding: YES Catheter: YES Drain:   
 
 
 
 
Stool:  1 occurrences. Stool Assessment Stool Color: Black;Green (01/07/19 1854) Stool Appearance: Loose (01/07/19 1854) Stool Amount: Medium (01/07/19 1854) Stool Source/Status: Rectum (01/07/19 1854) Emesis:  0 occurrences. VITAL SIGNS Patient Vitals for the past 12 hrs: 
 Temp Pulse Resp BP SpO2  
01/07/19 1457 97.9 °F (36.6 °C) 60 17 121/60 97 % 01/07/19 1410     99 % 01/07/19 1116 98.3 °F (36.8 °C) 60 17 121/66 97 % 01/07/19 0955     98 % 01/07/19 0737 98.2 °F (36.8 °C) 62 17 130/62 96 % Pain Assessment Pain 1 Pain Scale 1: Numeric (0 - 10) (01/07/19 1921) Pain Intensity 1: 0 (01/07/19 1300) Patient Stated Pain Goal: 0 (01/07/19 0735) Pain Reassessment 1: Patient sleeping (01/04/19 1320) Pain Onset 1: today (01/03/19 1231) Pain Location 1: Head (01/07/19 1111) Pain Orientation 1: Anterior (01/04/19 0110) Pain Description 1: Aching (01/04/19 0110) Pain Intervention(s) 1: Medication (see MAR) (01/07/19 1111) Ambulating Yes Additional Information: Patient did well working with PT today Shift report given to oncoming nurse at the bedside.  
 
Emile Serna, RN

## 2019-01-08 NOTE — PROGRESS NOTES
Problem: Falls - Risk of 
Goal: *Absence of Falls Document Eugenio Ortiz Fall Risk and appropriate interventions in the flowsheet. Outcome: Progressing Towards Goal 
Fall Risk Interventions: 
Mobility Interventions: Communicate number of staff needed for ambulation/transfer, Patient to call before getting OOB Mentation Interventions: Adequate sleep, hydration, pain control, Door open when patient unattended, Evaluate medications/consider consulting pharmacy, Reorient patient, Room close to nurse's station Medication Interventions: Evaluate medications/consider consulting pharmacy, Patient to call before getting OOB, Teach patient to arise slowly Elimination Interventions: Call light in reach, Patient to call for help with toileting needs, Toilet paper/wipes in reach Problem: Pressure Injury - Risk of 
Goal: *Prevention of pressure injury Document José Manuel Scale and appropriate interventions in the flowsheet. Outcome: Progressing Towards Goal 
Pressure Injury Interventions: 
Sensory Interventions: Assess changes in LOC, Maintain/enhance activity level, Pressure redistribution bed/mattress (bed type) Moisture Interventions: Maintain skin hydration (lotion/cream), Internal/External urinary devices Activity Interventions: Increase time out of bed, Pressure redistribution bed/mattress(bed type) Mobility Interventions: HOB 30 degrees or less, Pressure redistribution bed/mattress (bed type) Nutrition Interventions: Document food/fluid/supplement intake Friction and Shear Interventions: HOB 30 degrees or less

## 2019-01-08 NOTE — PROGRESS NOTES
END OF SHIFT NOTE:   Pt slept for the remainder of the night. No needs voiced. Intake/Output 01/07 1901 - 01/08 0700 In: 3818 [I.V.:1126] Out: -   
Voiding: yes Catheter: geronimo Drain:   
 
 
 
 
Stool:  0 occurrences. Stool Assessment Stool Color: Black;Green (01/07/19 1854) Stool Appearance: Loose (01/07/19 1854) Stool Amount: Medium (01/07/19 1854) Stool Source/Status: Rectum (01/07/19 1854) Emesis:  0 occurrences. VITAL SIGNS Patient Vitals for the past 12 hrs: 
 Temp Pulse Resp BP SpO2  
01/08/19 0249     98 % 01/08/19 0234 98 °F (36.7 °C) 65 17 129/76 96 % 01/07/19 2341 98.1 °F (36.7 °C) 62 17 131/73 95 % 01/07/19 2044     95 % 01/07/19 2007 98 °F (36.7 °C) 60 17 129/68 97 % Pain Assessment Pain 1 Pain Scale 1: Visual (01/08/19 0120) Pain Intensity 1: 0 (01/08/19 0120) Patient Stated Pain Goal: 0 (01/08/19 0120) Pain Reassessment 1: Patient sleeping (01/08/19 0120) Pain Onset 1: today (01/03/19 1231) Pain Location 1: Head (01/07/19 1111) Pain Orientation 1: Anterior (01/04/19 0110) Pain Description 1: Aching (01/04/19 0110) Pain Intervention(s) 1: Medication (see MAR) (01/07/19 1111) Ambulating 
yes Additional Information: 
 
Shift report given to be given to oncoming nurse at the bedside.  
 
Delon Cormier RN

## 2019-01-09 VITALS
OXYGEN SATURATION: 96 % | WEIGHT: 152.6 LBS | HEART RATE: 60 BPM | SYSTOLIC BLOOD PRESSURE: 106 MMHG | TEMPERATURE: 98.3 F | BODY MASS INDEX: 26.05 KG/M2 | RESPIRATION RATE: 16 BRPM | HEIGHT: 64 IN | DIASTOLIC BLOOD PRESSURE: 56 MMHG

## 2019-01-09 LAB
ANION GAP SERPL CALC-SCNC: 7 MMOL/L (ref 7–16)
BASOPHILS # BLD: 0 K/UL (ref 0–0.2)
BASOPHILS NFR BLD: 0 % (ref 0–2)
BUN SERPL-MCNC: 30 MG/DL (ref 8–23)
CALCIUM SERPL-MCNC: 6.7 MG/DL (ref 8.3–10.4)
CHLORIDE SERPL-SCNC: 103 MMOL/L (ref 98–107)
CO2 SERPL-SCNC: 28 MMOL/L (ref 21–32)
CREAT SERPL-MCNC: 1.21 MG/DL (ref 0.6–1)
DIFFERENTIAL METHOD BLD: ABNORMAL
EOSINOPHIL # BLD: 0 K/UL (ref 0–0.8)
EOSINOPHIL NFR BLD: 0 % (ref 0.5–7.8)
ERYTHROCYTE [DISTWIDTH] IN BLOOD BY AUTOMATED COUNT: 13.5 % (ref 11.9–14.6)
GLUCOSE SERPL-MCNC: 122 MG/DL (ref 65–100)
HCT VFR BLD AUTO: 31.9 % (ref 35.8–46.3)
HGB BLD-MCNC: 10.4 G/DL (ref 11.7–15.4)
IMM GRANULOCYTES # BLD: 0.1 K/UL (ref 0–0.5)
IMM GRANULOCYTES NFR BLD AUTO: 1 % (ref 0–5)
LYMPHOCYTES # BLD: 0.5 K/UL (ref 0.5–4.6)
LYMPHOCYTES NFR BLD: 7 % (ref 13–44)
MCH RBC QN AUTO: 31.2 PG (ref 26.1–32.9)
MCHC RBC AUTO-ENTMCNC: 32.6 G/DL (ref 31.4–35)
MCV RBC AUTO: 95.8 FL (ref 79.6–97.8)
MONOCYTES # BLD: 0.4 K/UL (ref 0.1–1.3)
MONOCYTES NFR BLD: 6 % (ref 4–12)
NEUTS SEG # BLD: 6.3 K/UL (ref 1.7–8.2)
NEUTS SEG NFR BLD: 86 % (ref 43–78)
NRBC # BLD: 0 K/UL (ref 0–0.2)
PLATELET # BLD AUTO: 314 K/UL (ref 150–450)
PMV BLD AUTO: 11.5 FL (ref 9.4–12.3)
POTASSIUM SERPL-SCNC: 3.6 MMOL/L (ref 3.5–5.1)
RBC # BLD AUTO: 3.33 M/UL (ref 4.05–5.2)
SODIUM SERPL-SCNC: 138 MMOL/L (ref 136–145)
WBC # BLD AUTO: 7.3 K/UL (ref 4.3–11.1)

## 2019-01-09 PROCEDURE — 74011250637 HC RX REV CODE- 250/637: Performed by: FAMILY MEDICINE

## 2019-01-09 PROCEDURE — 74011000250 HC RX REV CODE- 250: Performed by: INTERNAL MEDICINE

## 2019-01-09 PROCEDURE — 74011250637 HC RX REV CODE- 250/637: Performed by: INTERNAL MEDICINE

## 2019-01-09 PROCEDURE — 80048 BASIC METABOLIC PNL TOTAL CA: CPT

## 2019-01-09 PROCEDURE — 94760 N-INVAS EAR/PLS OXIMETRY 1: CPT

## 2019-01-09 PROCEDURE — 36415 COLL VENOUS BLD VENIPUNCTURE: CPT

## 2019-01-09 PROCEDURE — 97110 THERAPEUTIC EXERCISES: CPT

## 2019-01-09 PROCEDURE — 99232 SBSQ HOSP IP/OBS MODERATE 35: CPT | Performed by: INTERNAL MEDICINE

## 2019-01-09 PROCEDURE — 97530 THERAPEUTIC ACTIVITIES: CPT

## 2019-01-09 PROCEDURE — 94640 AIRWAY INHALATION TREATMENT: CPT

## 2019-01-09 PROCEDURE — 85025 COMPLETE CBC W/AUTO DIFF WBC: CPT

## 2019-01-09 PROCEDURE — 74011636637 HC RX REV CODE- 636/637: Performed by: INTERNAL MEDICINE

## 2019-01-09 PROCEDURE — 97535 SELF CARE MNGMENT TRAINING: CPT

## 2019-01-09 RX ORDER — FUROSEMIDE 40 MG/1
40 TABLET ORAL DAILY
Status: DISCONTINUED | OUTPATIENT
Start: 2019-01-09 | End: 2019-01-09 | Stop reason: HOSPADM

## 2019-01-09 RX ORDER — NYSTATIN 100000 [USP'U]/ML
500000 SUSPENSION ORAL 4 TIMES DAILY
Qty: 100 ML | Refills: 0 | Status: SHIPPED | OUTPATIENT
Start: 2019-01-09 | End: 2019-01-14

## 2019-01-09 RX ORDER — CALCIUM CARBONATE 200(500)MG
400 TABLET,CHEWABLE ORAL
Qty: 180 TAB | Refills: 0 | Status: SHIPPED | OUTPATIENT
Start: 2019-01-09 | End: 2019-02-08

## 2019-01-09 RX ADMIN — AZITHROMYCIN 500 MG: 250 TABLET, FILM COATED ORAL at 10:30

## 2019-01-09 RX ADMIN — HYDROCORTISONE 2.5%: 25 CREAM TOPICAL at 09:00

## 2019-01-09 RX ADMIN — NYSTATIN 500000 UNITS: 500000 SUSPENSION ORAL at 10:30

## 2019-01-09 RX ADMIN — NADOLOL 80 MG: 40 TABLET ORAL at 10:21

## 2019-01-09 RX ADMIN — LEVOTHYROXINE SODIUM 50 MCG: 50 TABLET ORAL at 08:28

## 2019-01-09 RX ADMIN — FUROSEMIDE 40 MG: 40 TABLET ORAL at 10:21

## 2019-01-09 RX ADMIN — CALCIUM CARBONATE 400 MG: 500 TABLET, CHEWABLE ORAL at 10:23

## 2019-01-09 RX ADMIN — FAMOTIDINE 20 MG: 20 TABLET ORAL at 10:20

## 2019-01-09 RX ADMIN — GUAIFENESIN 1200 MG: 600 TABLET, EXTENDED RELEASE ORAL at 10:21

## 2019-01-09 RX ADMIN — CALCIUM CARBONATE 400 MG: 500 TABLET, CHEWABLE ORAL at 12:23

## 2019-01-09 RX ADMIN — CLORAZEPATE DIPOTASSIUM 7.5 MG: 7.5 TABLET ORAL at 10:21

## 2019-01-09 RX ADMIN — NYSTATIN 500000 UNITS: 500000 SUSPENSION ORAL at 17:46

## 2019-01-09 RX ADMIN — ALBUTEROL SULFATE 2.5 MG: 2.5 SOLUTION RESPIRATORY (INHALATION) at 07:49

## 2019-01-09 RX ADMIN — PREDNISONE 40 MG: 20 TABLET ORAL at 10:21

## 2019-01-09 RX ADMIN — NYSTATIN 500000 UNITS: 500000 SUSPENSION ORAL at 13:30

## 2019-01-09 RX ADMIN — CALCIUM CARBONATE 400 MG: 500 TABLET, CHEWABLE ORAL at 17:46

## 2019-01-09 RX ADMIN — ASPIRIN 81 MG: 81 TABLET, COATED ORAL at 10:21

## 2019-01-09 RX ADMIN — Medication 10 ML: at 13:30

## 2019-01-09 RX ADMIN — FERROUS SULFATE TAB 325 MG (65 MG ELEMENTAL FE) 325 MG: 325 (65 FE) TAB at 10:21

## 2019-01-09 RX ADMIN — AMLODIPINE BESYLATE 2.5 MG: 5 TABLET ORAL at 10:20

## 2019-01-09 RX ADMIN — HYDROCORTISONE 2.5%: 25 CREAM TOPICAL at 16:18

## 2019-01-09 NOTE — PROGRESS NOTES
Juda Fothergill Admission Date: 12/30/2018 Daily Progress Note: 1/9/2019 The patient's chart is reviewed and the patient is discussed with the staff. 
 
  
89yo WF with MM on chemotherapy, on O2 at night at baseline, admitted from Urgent Care with shortness of breath, CXR with RUL infiltrate. Has had progressive worsening hypoxia and now with B infiltrates. Subjective:  
 
Walked yesterday on RA. Feels good. Wants to go home. Current Facility-Administered Medications Medication Dose Route Frequency  furosemide (LASIX) tablet 40 mg  40 mg Oral DAILY  predniSONE (DELTASONE) tablet 40 mg  40 mg Oral DAILY WITH BREAKFAST  albuterol (PROVENTIL VENTOLIN) nebulizer solution 2.5 mg  2.5 mg Nebulization BID RT  
 oxymetazoline (AFRIN) 0.05 % nasal spray 2 Spray  2 Spray Both Nostrils BID PRN  
 traZODone (DESYREL) tablet 100 mg  100 mg Oral QHS  calcium carbonate (TUMS) chewable tablet 400 mg [elemental]  400 mg Oral TID WITH MEALS  
 hydrocortisone (ANUSOL-HC) 2.5 % rectal cream   PeriANAL QID  guaiFENesin ER (MUCINEX) tablet 1,200 mg  1,200 mg Oral Q12H  
 morphine injection 1 mg  1 mg IntraVENous Q4H PRN  
 famotidine (PEPCID) tablet 20 mg  20 mg Oral DAILY  nystatin (MYCOSTATIN) 100,000 unit/mL oral suspension 500,000 Units  500,000 Units Oral QID  amLODIPine (NORVASC) tablet 2.5 mg  2.5 mg Oral DAILY  aspirin delayed-release tablet 81 mg  81 mg Oral DAILY  atorvastatin (LIPITOR) tablet 80 mg  80 mg Oral QHS  ferrous sulfate tablet 325 mg  1 Tab Oral DAILY WITH BREAKFAST  levothyroxine (SYNTHROID) tablet 50 mcg  50 mcg Oral ACB  mirtazapine (REMERON) tablet 15 mg  15 mg Oral QHS  nadolol (CORGARD) tablet 80 mg  80 mg Oral DAILY  sodium chloride (NS) flush 5-10 mL  5-10 mL IntraVENous Q8H  
 sodium chloride (NS) flush 5-10 mL  5-10 mL IntraVENous PRN  
 acetaminophen (TYLENOL) tablet 650 mg  650 mg Oral Q6H PRN  
  HYDROcodone-acetaminophen (NORCO) 5-325 mg per tablet 1 Tab  1 Tab Oral Q4H PRN  
 naloxone (NARCAN) injection 0.4 mg  0.4 mg IntraVENous PRN  
 ondansetron (ZOFRAN) injection 4 mg  4 mg IntraVENous Q4H PRN  
 clorazepate (TRANXENE) tablet 7.5 mg  7.5 mg Oral BID Review of Systems Constitutional: negative for fever, chills, sweats Cardiovascular: negative for chest pain, palpitations, syncope, edema Gastrointestinal:  negative for dysphagia, reflux, vomiting, diarrhea, abdominal pain, or melena Neurologic:  negative for focal weakness, numbness, headache Objective:  
 
Vitals:  
 19 8530 19 0745 19 0749 19 1109 BP: 128/63 131/64  129/59 Pulse: 63 60  68 Resp: 16 17  17 Temp: 98.6 °F (37 °C) 98.7 °F (37.1 °C)  97.6 °F (36.4 °C) SpO2: 95% 96% 95% 100% Weight:      
Height:      
 
Intake and Output:  
 1901 -  07 In: 1931 [P.O.:805; I.V.:1126] Out: 1525 [ZAZW] 701 -  190 In: 358 [P.O.:358] Out: 800 [Urine:800] Physical Exam:  
Constitution:  the patient is well developed and in no acute distress EENMT:  Sclera clear, pupils equal, oral mucosa moist 
Respiratory: clear Cardiovascular:  RRR without M,G,R 
Gastrointestinal: soft and non-tender; with positive bowel sounds. Musculoskeletal: warm without cyanosis. There is no lower leg edema. Skin:  no jaundice or rashes, no wounds Neurologic: no gross neuro deficits Psychiatric:  alert and oriented x 3 CXR: 2019: Essentially resolved upper lobe infiltrates LAB No results for input(s): GLUCPOC in the last 72 hours. No lab exists for component: [place] Recent Labs 19 
9565 19 
0410 19 
0407 WBC 7.3 7.6 5.9 HGB 10.4* 10.5* 10.1* HCT 31.9* 31.8* 34.1*  
 318 219 Recent Labs 19 
8980 19 
0410 19 
0407  139 139  
K 3.6 3.8 4.2  103 104 CO2 28 30 27 * 112* 158* BUN 30* 26* 25* CREA 1.21* 1.13* 1.21* CA 6.7* 6.1* 6.1* ALB  --  2.2* 2.0*  
TBILI  --  0.5 0.6 ALT  --  93* 102* SGOT  --  54* 81* Assessment:  (Medical Decision Making) Hospital Problems  Date Reviewed: 1/7/2019 Codes Class Noted POA Pleural effusion, bilateral 
 continue diuresis ICD-10-CM: J90 ICD-9-CM: 511.9  1/2/2019 Yes Bilateral pneumonia  ICD-10-CM: J18.9 ICD-9-CM: 515  12/30/2018 Yes Stage 3 chronic kidney disease (HCC) (Chronic) ICD-10-CM: N18.3 ICD-9-CM: 585.3  11/27/2017 Yes Multiple myeloma in remission [facility]) ICD-10-CM: C90.01 
ICD-9-CM: 203.01  1/25/2017 Yes Essential hypertension with goal blood pressure less than 130/85 (Chronic) ICD-10-CM: I10 
ICD-9-CM: 401.9  12/22/2016 Yes Anxiety ICD-10-CM: F41.9 ICD-9-CM: 300.00  12/22/2016 Yes Chronic diastolic heart failure (HCC) (Chronic) ICD-10-CM: I50.32 
ICD-9-CM: 428.32  11/16/2016 Yes Episodic atrial fibrillation (HCC) ICD-10-CM: I48.0 ICD-9-CM: 427.31  8/16/2016 Yes Dementia without behavioral disturbance (Chronic) ICD-10-CM: F03.90 ICD-9-CM: 294.20  8/16/2016 Yes * (Principal) Acute respiratory failure with hypoxemia ([facility] [address] ICD-10-CM: J96.01 
ICD-9-CM: 518.81  6/17/2016 Yes Presence of cardiac pacemaker (Chronic) ICD-10-CM: Z95.0 ICD-9-CM: V45.01  3/21/2016 Yes Initial CT was concerning for pneumonia, but overall I suspect this was mostly volume overload with diastolic heart failure. Plan:  (Medical Decision Making)  
 
- stop steroids 
- mobilize 
- stop antibiotics - continue gentle diuresis, could do orally with close outpatient follow up - pulmonary will sign off, call with questions More than 50% of the time documented was spent in face-to-face contact with the patient and in the care of the patient on the floor/unit where the patient is located.  
 
Autumn De Paz MD

## 2019-01-09 NOTE — PROGRESS NOTES
Problem: Self Care Deficits Care Plan (Adult) Goal: *Acute Goals and Plan of Care (Insert Text) LTG 1: Pt will be mod I with toileting by 1/6/19 to prevent skin breakdown. LTG 2: Pt will be mod I with LB dressing by 1/6/19 to reduce risk of falls. LTG 3: Pt will be mod I with bathing by 1/6/19 to promote good skin integrity. LTG 4: Pt will be mod I with toilet transfers by 1/6/19 to promote quality of life. LTG 5: Pt will be mod I with HEP by 1/6/19 to prevent deconditioning. OCCUPATIONAL THERAPY: Daily Note and Treatment Day: 3rd 1/9/2019INPATIENT: Hospital Day: 11 [place] / Product Type: Medicare /  
  
NAME/AGE/GENDER: Elnor Primrose is a 80 y.o. female PRIMARY DIAGNOSIS:  pneumonia Pneumonia Acute respiratory failure with hypoxemia (HCC) Acute respiratory failure with hypoxemia (HCC) ICD-10: Treatment Diagnosis:  
 · Generalized Muscle Weakness (M62.81) Precautions/Allergies: fall risk, Easily SOB Patient has no known allergies. ASSESSMENT:  
Ms. Tali Kapadia presents in recliner eating curtis crackers, pleasant and agreeable to tx. Pt stood with SBA, completed mobility around room and bathroom with SBA using RW, CGA for toilet transfer. Pt required min multi modal cues for safe use of RW to avoid pushing too far ahead and for hand placement and functional approaches for fall prevention during ADLs. Pt toileted with SBA, stood at sink and completed grooming and hygiene tasks with SBA, then returned to chair. Pt completed HEP with min cues to complete exercises correctly, demonstrated fair carryover from previous session. Pt tolerated session well with c/o minimal fatigue. Pt is progressing towards goals, continue POC. This section established at most recent assessment PROBLEM LIST (Impairments causing functional limitations): 1. Decreased Strength 2. Decreased ADL/Functional Activities 3. Decreased Transfer Abilities 4. Decreased Activity Tolerance 5. Decreased Pacing Skills INTERVENTIONS PLANNED: (Benefits and precautions of occupational therapy have been discussed with the patient.) 1. Activities of daily living training 2. Therapeutic activity 3. Therapeutic exercise TREATMENT PLAN: Frequency/Duration: Follow patient 3x a week to address above goals. Rehabilitation Potential For Stated Goals: Excellent RECOMMENDED REHABILITATION/EQUIPMENT: (at time of discharge pending progress): Due to the probability of continued deficits (see above) this patient will  likely need continued skilled occupational therapy after discharge. Equipment:  
? None at this time OCCUPATIONAL PROFILE AND HISTORY:  
History of Present Injury/Illness (Reason for Referral): Please see H&P Past Medical History/Comorbidities:  
Ms. Tali Kapadia  has a past medical history of Anemia, unspecified, Chest pain, unspecified, Chronic anxiety, Diastolic heart failure ([address] Essential hypertension, benign, Flatulence, eructation, and gas pain, Lump or mass in breast, Other and unspecified hyperlipidemia, Paroxysmal atrial fibrillation ([address] Paroxysmal tachycardia ([address] Pernicious anemia, Postmenopausal atrophic vaginitis, Unspecified deficiency anemia, and Unspecified hypothyroidism. Ms. Tali Kapadia  has a past surgical history that includes hx hysterectomy; hx orthopaedic; hx vein stripping; THORACENTESIS (Left, 6/23/2016); ULTRASOUND (Bilateral, 6/23/2016); ESOPHAGOGASTRODUODENOSCOPY (EGD)  BMI 30  ROOM 309 (N/A, 6/21/2016); ULTRASOUND (Bilateral, 6/20/2016); THORACENTESIS (Bilateral, 6/20/2016); THORACENTESIS (Bilateral, 6/17/2016); and ULTRASOUND (Bilateral, 6/17/2016). Social History/Living Environment:  
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 Number of Personal Factors/Comorbidities that affect the Plan of Care: Expanded review of therapy/medical records (1-2):  MODERATE COMPLEXITY ASSESSMENT OF OCCUPATIONAL PERFORMANCE[de-identified]  
Activities of Daily Living:  
Basic ADLs (From Assessment) Complex ADLs (From Assessment) Feeding: Independent Oral Facial Hygiene/Grooming: Setup Bathing: Minimum assistance Upper Body Dressing: Setup Lower Body Dressing: Minimum assistance Toileting: Minimum assistance Grooming/Bathing/Dressing Activities of Daily Living Grooming Grooming Assistance: Stand-by assistance Cognitive Retraining Safety/Judgement: Awareness of environment; Fall prevention Toileting Toileting Assistance: Stand-by assistance Bed/Mat Mobility Sit to Stand: Stand-by assistance Most Recent Physical Functioning:  
Gross Assessment: 
  
         
  
Posture: 
Posture (WDL): Exceptions to [facility] Posture Assessment: Forward head, Rounded shoulders Balance: 
Sitting: Intact Standing: Impaired Standing - Static: Good Standing - Dynamic : Fair Bed Mobility: 
  
Wheelchair Mobility: 
  
Transfers: 
Sit to Stand: Stand-by assistance Stand to Sit: Stand-by assistance Patient Vitals for the past 6 hrs: 
 BP BP Patient Position SpO2 Pulse 01/09/19 1109 129/59 At rest 100 % 68  
01/09/19 1513 106/56 At rest;Sitting 96 % 60 Mental Status Neurologic State: Alert Orientation Level: Oriented X4 Cognition: Appropriate decision making, Follows commands Perception: Appears intact Perseveration: No perseveration noted Safety/Judgement: Awareness of environment, Fall prevention Physical Skills Involved: 
1. Balance 2. Activity Tolerance 3.  Dypsena Cognitive Skills Affected (resulting in the inability to perform in a timely and safe manner): 
 1. N/A Psychosocial Skills Affected: 
1. N/A Number of elements that affect the Plan of Care: 3-5:  MODERATE COMPLEXITY CLINICAL DECISION MAKING:  
[employer] AM-PAC 6 Clicks Daily Activity Inpatient Short Form How much help from another person does the patient currently need. .. Total A Lot A Little None 1. Putting on and taking off regular lower body clothing? [] 1   [] 2   [x] 3   [] 4  
2. Bathing (including washing, rinsing, drying)? [] 1   [] 2   [x] 3   [] 4  
3. Toileting, which includes using toilet, bedpan or urinal?   [] 1   [] 2   [x] 3   [] 4  
4. Putting on and taking off regular upper body clothing? [] 1   [] 2   [x] 3   [] 4  
5. Taking care of personal grooming such as brushing teeth? [] 1   [] 2   [x] 3   [] 4  
6. Eating meals? [] 1   [] 2   [] 3   [x] 4  
© 2007, Trustees of [facility] [employer], under license to [employer]. All rights reserved Score:  Initial: 19 Most Recent: X (Date: -- ) Interpretation of Tool:  Represents activities that are increasingly more difficult (i.e. Bed mobility, Transfers, Gait). Score 24 23 22-20 19-15 14-10 9-7 6 Modifier CH CI CJ CK CL CM CN   
 
? Self Care:  
  - CURRENT STATUS: CK - 40%-59% impaired, limited or restricted  - GOAL STATUS: CJ - 20%-39% impaired, limited or restricted  - D/C STATUS:  ---------------To be determined--------------- [place] / [place] / Product Type: Medicare /   
 
Medical Necessity:    
· Skilled intervention continues to be required due to decreased activity tolerance. Reason for Services/Other Comments: 
· Patient continues to require skilled intervention due to being worse than PLOF. Use of outcome tool(s) and clinical judgement create a POC that gives a: MODERATE COMPLEXITY  
 
 
 
TREATMENT:  
(In addition to Assessment/Re-Assessment sessions the following treatments were rendered) Pre-treatment Symptoms/Complaints: Pain: Initial:  
Pain Intensity 1: 0 None Post Session:  None Self Care: (15 min): Procedure(s) (per grid) utilized to improve and/or restore self-care/home management as related to grooming. Required minimal verbal cueing to facilitate activities of daily living skills and compensatory activities. Therapeutic Exercise: (  9 min):  Exercises per grid below to improve strength. Required minimal visual and verbal cues to promote proper body alignment, promote proper body posture, promote proper body mechanics and promote proper body breathing techniques. Progressed resistance, range, repetitions and complexity of movement as indicated. Date: 
1/7/19 Date: 
1/9/2019 Date: Activity/Exercise Parameters Parameters Parameters Shoulder ab/adduction 10/1 12/1 Shoulder flexion  10/1 10/1 Elbow flexion  10/1 12/1 Elbow extension 10/1 12/1 Braces/Orthotics/Lines/Etc:  
· Treatment/Session Assessment:   
· Response to Treatment:  Agreeable · Interdisciplinary Collaboration:  
o Occupational Therapist 
o Registered Nurse · After treatment position/precautions:  
o Bed/Chair-wheels locked 
o Call light within reach 
o Nurse at bedside · Compliance with Program/Exercises: Will assess as treatment progresses. · Recommendations/Intent for next treatment session: \"Next visit will focus on advancements to more challenging activities and reduction in assistance provided\". Total Treatment Duration: OT Patient Time In/Time Out Time In: 4584 Time Out: 1503 Shlomo May OT

## 2019-01-09 NOTE — PROGRESS NOTES
Problem: Mobility Impaired (Adult and Pediatric) Goal: *Acute Goals and Plan of Care (Insert Text) 1. Ms. Sanaz Vasquez will perform supine to sit and sit to supine independently in 7 days. 2.  Ms. Sanaz Vasquez will perform sit to stand and bed to chair independently in 7 days. 3.  Ms. Sanaz Vasquez will perform gait with least restrictive device 250 ft independently in 7 days. 4.  Ms. Sanaz Vasquez will go up and down 3 steps with rail independently in 7 days. PHYSICAL THERAPY: Daily Note, Treatment Day: 4th, PM 1/9/2019INPATIENT: Hospital Day: 11 [place] / Product Type: Medicare /  
  
NAME/AGE/GENDER: Martin Perdue is a 80 y.o. female PRIMARY DIAGNOSIS: pneumonia Pneumonia Acute respiratory failure with hypoxemia (HCC) Acute respiratory failure with hypoxemia (HCC) ICD-10: Treatment Diagnosis:  
 · Generalized Muscle Weakness (M62.81) · Difficulty in walking, Not elsewhere classified (R26.2) Precaution/Allergies: 
Patient has no known allergies. ASSESSMENT:  
Ms. Sanaz Vasquez is sitting in the recliner with her son present. She is agreeable to therapy. Sit to stand with contact guard assist and dons her housecoat with assistance. Gait training with rolling walker x 200 feet with one sitting rest break. While sitting the patient participated in a few therapeutic exercises. Patient returned to the room to the recliner and positioned for comfort with needs within reach. Good session. Patient has increased her gait distance and her mobility is better. Making progress towards goals. Will continue PT efforts. This section established at most recent assessment PROBLEM LIST (Impairments causing functional limitations): 1. Decreased Strength 2. Decreased Transfer Abilities 3. Decreased Ambulation Ability/Technique 4. Decreased Activity Tolerance 5. Decreased Pacing Skills  INTERVENTIONS PLANNED: (Benefits and precautions of physical therapy have been discussed with the patient.) 1. Bed Mobility 2. Gait Training 3. Therapeutic Activites 4. Transfer Training TREATMENT PLAN: Frequency/Duration: 4 times a week for duration of hospital stay Rehabilitation Potential For Stated Goals: Good RECOMMENDED REHABILITATION/EQUIPMENT: (at time of discharge pending progress): Due to the probability of continued deficits (see above) this patient will likely need continued skilled physical therapy after discharge. Equipment:  
? None at this time HISTORY:  
History of Present Injury/Illness (Reason for Referral): Ms. Fredna Goldberg is a 79 yo female with PMH of PAFIB with pacer, multiple myeloma followed by Dr. Amaya Darden on current chemo, HTN, CKD, dCHF, who is sent as a direct admit from urgent care due to RUL pneumonia. She admits to several days of cough/ malaise and headache. CXR at urgent care showed RUL pneumonia. She denies fever, has anorexia and no ulices dyspnea.  
  
10 systems reviewed and negative except as noted in HPI. - has throat pain, needs glasses, has constipation, has decreased urination with dysuria, has myalgias, has memory loss, has cold intolerance and weight gain Past Medical History/Comorbidities:  
Ms. Fredna Goldberg  has a past medical history of Anemia, unspecified, Chest pain, unspecified, Chronic anxiety, Diastolic heart failure ([address] Essential hypertension, benign, Flatulence, eructation, and gas pain, Lump or mass in breast, Other and unspecified hyperlipidemia, Paroxysmal atrial fibrillation ([address] Paroxysmal tachycardia ([address] Pernicious anemia, Postmenopausal atrophic vaginitis, Unspecified deficiency anemia, and Unspecified hypothyroidism.   Ms. Fredna Goldberg  has a past surgical history that includes hx hysterectomy; hx orthopaedic; hx vein stripping; THORACENTESIS (Left, 6/23/2016); ULTRASOUND (Bilateral, 6/23/2016); ESOPHAGOGASTRODUODENOSCOPY (EGD)  BMI 30  ROOM 309 (N/A, 6/21/2016); ULTRASOUND (Bilateral, 6/20/2016); THORACENTESIS (Bilateral, 6/20/2016); THORACENTESIS (Bilateral, 6/17/2016); and ULTRASOUND (Bilateral, 6/17/2016). Social History/Living Environment:  
Home Environment: Private residence # Steps to Enter: 3 Rails to Enter: Yes Hand Rails : Right One/Two Story Residence: One story Living Alone: Yes Support Systems: Child(miriam) Patient Expects to be Discharged to[de-identified] Private residence Current DME Used/Available at Home: None Prior Level of Function/Work/Activity: 
Independent in her own home. A \"few\" falls. age Number of Personal Factors/Comorbidities that affect the Plan of Care: 1-2: MODERATE COMPLEXITY EXAMINATION:  
Most Recent Physical Functioning:  
Gross Assessment: 
  
         
  
Posture: 
  
Balance: 
Sitting: Intact Standing: Impaired Standing - Static: Good Standing - Dynamic : Fair Bed Mobility: 
  
Wheelchair Mobility: 
  
Transfers: 
Sit to Stand: Stand-by assistance Stand to Sit: Stand-by assistance Gait: 
  
Distance (ft): 200 Feet (ft) Ambulation - Level of Assistance: Contact guard assistance Interventions: Safety awareness training Body Structures Involved: 1. Muscles Body Functions Affected: 1. Movement Related Activities and Participation Affected: 1. Mobility Number of elements that affect the Plan of Care: 3: MODERATE COMPLEXITY CLINICAL PRESENTATION:  
Presentation: Evolving clinical presentation with changing clinical characteristics: MODERATE COMPLEXITY CLINICAL DECISION MAKING:  
[employer] 6 Clicks Basic Mobility Inpatient Short Form How much difficulty does the patient currently have. .. Unable A Lot A Little None 1. Turning over in bed (including adjusting bedclothes, sheets and blankets)? [] 1   [] 2   [] 3   [x] 4  
2.   Sitting down on and standing up from a chair with arms ( e.g., wheelchair, bedside commode, etc.)   [] 1   [] 2   [x] 3   [] 4  
 3. Moving from lying on back to sitting on the side of the bed? [] 1   [] 2   [] 3   [x] 4 How much help from another person does the patient currently need. .. Total A Lot A Little None 4. Moving to and from a bed to a chair (including a wheelchair)? [] 1   [] 2   [x] 3   [] 4  
5. Need to walk in hospital room? [] 1   [] 2   [x] 3   [] 4  
6. Climbing 3-5 steps with a railing? [] 1   [] 2   [x] 3   [] 4  
© 2007, Trustees of [facility] [employer], under license to [employer]. All rights reserved Score:  Initial: 20 Most Recent: X (Date: -- ) Interpretation of Tool:  Represents activities that are increasingly more difficult (i.e. Bed mobility, Transfers, Gait). Score 24 23 22-20 19-15 14-10 9-7 6 Modifier CH CI CJ CK CL CM CN   
 
? Mobility - Walking and Moving Around:  
  - CURRENT STATUS: CJ - 20%-39% impaired, limited or restricted  - GOAL STATUS: CJ - 20%-39% impaired, limited or restricted  - D/C STATUS:  ---------------To be determined--------------- [place] / [place] / Product Type: Medicare /   
 
Medical Necessity:    
· Patient is expected to demonstrate progress in functional technique to increase independence with mobility and gait. . 
Reason for Services/Other Comments: 
· Patient continues to require present interventions due to patient's inability to function at baseline. .  
Use of outcome tool(s) and clinical judgement create a POC that gives a: Questionable prediction of patient's progress: MODERATE COMPLEXITY  
  
 
 
 
TREATMENT:  
(In addition to Assessment/Re-Assessment sessions the following treatments were rendered) Pre-treatment Symptoms/Complaints:  \" I feel better when I walk\" Pain: Initial:  
Pain Intensity 1: 0  Post Session:  0/10 Therapeutic Activity: (23 minutes):   Therapeutic activities including  Chair transfers, balance activities  and Ambulation on level ground to improve mobility. Required contact guard assist to minimum assist with  Safety awareness training to promote motor control of upper extremity(s), lower extremity(s). Therapeutic Exercise: (  ):  Exercises per grid below to improve strength. Required minimal verbal and tactile cues to promote proper body mechanics. Progressed repetitions as indicated. Date: 
1/7/19 Date: 
 Date: Activity/Exercise Parameters Parameters Parameters AP 15    
heelslides 15    
abd/add 15 LAQ 15    
Marching 15 Braces/Orthotics/Lines/Etc:  
· Treatment/Session Assessment:   
· Response to Treatment:  Patient participated and tolerated therapy well · Interdisciplinary Collaboration:  
o Physical Therapy Assistant 
o Registered Nurse · After treatment position/precautions:  
o Up in chair 
o Bed/Chair-wheels locked 
o Call light within reach 
o RN notified 
o Family at bedside · Compliance with Program/Exercises: Compliant all of the time · Recommendations/Intent for next treatment session: \"Next visit will focus on advancements to more challenging activities and reduction in assistance provided\". Total Treatment Duration: PT Patient Time In/Time Out Time In: 1400 Time Out: 1423 Alexus Kaplan PTA

## 2019-01-09 NOTE — PROGRESS NOTES
EOS: Shift report given to oncoming RN. Pt had uneventful day. Ambulated in hallway. Tylenol given x 1 for HA.

## 2019-01-09 NOTE — DISCHARGE SUMMARY
Hospitalist Discharge Summary Patient ID: Rashida Calhoun [id number] 
89 y.o. 
2/1/1930 Admit date: 12/30/2018  5:11 PM 
Discharge date and time: 1/9/2019 Attending: Jona Grullon DO 
PCP:  Brayan Allen MD 
Treatment Team: Attending Provider: Jona Grullon DO; Utilization Review: Joshua Pacheco RN; Care Manager: Clementine Abreu RN 
 
Principal Diagnosis Acute respiratory failure with hypoxemia [facility]) Principal Problem: 
  Acute respiratory failure with hypoxemia ([address] (6/17/2016) Active Problems: 
  Acquired hypothyroidism (7/9/2015) Presence of cardiac pacemaker (3/21/2016) Episodic atrial fibrillation ([address] (8/16/2016) Dementia without behavioral disturbance (8/16/2016) Chronic diastolic heart failure ([address] (11/16/2016) Macrocytic anemia (12/22/2016) Essential hypertension with goal blood pressure less than 130/85 (12/22/2016) Anxiety (12/22/2016) Multiple myeloma in remission ([address] (1/25/2017) Stage 3 chronic kidney disease ([address] (11/27/2017) Bilateral pneumonia (12/30/2018) Pleural effusion, bilateral (1/2/2019) Acute hypokalemia (1/2/2019) Hospital Course: 
Please refer to the admission H&P for details of presentation. In summary, the patient is \"79 yo female with PMH of paroxysmal A Fib with pacer, multiple myeloma followed by Dr. Leola Elliott on current chemo, HTN, CKD, dCHF, who is sent as a direct admit from urgent care due to RUL pneumonia. She admits to several days of cough/ malaise and headache. CXR at urgent care showed RUL pneumonia.  
Pt on 5 L NC with significant desatting during conversation.  Pt reports feeling some better but is obviously SOB while we talk. \" 
 
Since admission, she was started on Zosyn and Zithromax and finished complete course. She was also seen by Pulmonary and started on nebulizers, flutter valve and ICS.  Steroids was also tapered throughout and she was stable for discharge home from Pulmonary and medical standpoint. She was evaluated by PT/OT and requested home with home health. Discharged into care of daughter and instructed to follow up within 1 week at PCP. Significant Diagnostic Studies: Xr Chest Sngl V Result Date: 1/3/2019 IMPRESSION: 1.  Stable findings of the chest as described above. Xr Chest Sngl V Result Date: 1/1/2019 IMPRESSION:  EXTENSIVE INHOMOGENEOUS BILATERAL INFILTRATES WITH RIGHT UPPER LOBE PREDOMINANCE. Xr Chest Pa Lat Result Date: 1/6/2019 IMPRESSION:  INTERVAL IMPROVEMENT OF BILATERAL PNEUMONIA WITH PERSISTENT LEFT BASAL CONSOLIDATION AND SMALL PLEURAL EFFUSIONS. Ct Chest W Cont Result Date: 1/1/2019 IMPRESSION:   1. NO DEFINITE ACUTE PULMONARY EMBOLISM. 2.  SMALL BILATERAL PLEURAL EFFUSIONS WITH EXTENSIVE BILATERAL INFILTRATES SUSPICIOUS FOR PNEUMONIA. PULMONARY EDEMA WOULD BE LESS LIKELY. Xr Chest Prema Mings Result Date: 1/8/2019 IMPRESSION: Bibasilar atelectasis or pneumonia improved. Labs: Results:  
   
Chemistry Recent Labs 01/09/19 
7311 01/08/19 
0410 01/07/19 
0407 * 112* 158*  139 139  
K 3.6 3.8 4.2  103 104 CO2 28 30 27 BUN 30* 26* 25* CREA 1.21* 1.13* 1.21* CA 6.7* 6.1* 6.1* AGAP 7 6* 8  
AP  --  68 73 TP  --  5.6* 5.7* ALB  --  2.2* 2.0*  
GLOB  --  3.4 3.7* AGRAT  --  0.6* 0.5* CBC w/Diff Recent Labs 01/09/19 
5741 01/08/19 
0410 01/07/19 
0407 WBC 7.3 7.6 5.9  
RBC 3.33* 3.31* 3.19* HGB 10.4* 10.5* 10.1* HCT 31.9* 31.8* 34.1*  
 318 219 GRANS 86* 84* 93* LYMPH 7* 8* 3* EOS 0* 0* 0* Cardiac Enzymes No results for input(s): CPK, CKND1, JASON in the last 72 hours. No lab exists for component: Elver Fer Coagulation No results for input(s): PTP, INR, APTT in the last 72 hours. No lab exists for component: INREXT Lipid Panel Lab Results Component Value Date/Time Cholesterol, total 144 01/15/2018 03:56 PM  
 HDL Cholesterol 62 01/15/2018 03:56 PM  
 LDL, calculated 60 01/15/2018 03:56 PM  
 VLDL, calculated 22 01/15/2018 03:56 PM  
 Triglyceride 110 01/15/2018 03:56 PM  
  
BNP No results for input(s): BNPP in the last 72 hours. Liver Enzymes Recent Labs 01/08/19 
0410 TP 5.6* ALB 2.2* AP 68 SGOT 54* Thyroid Studies Lab Results Component Value Date/Time T4, Total 12.0 (H) 08/04/2008 10:59 AM  
 T3 Uptake 24 08/04/2008 10:59 AM  
 TSH 2.420 01/15/2018 03:56 PM  
    
 
 
Discharge Exam: 
Visit Vitals /56 (BP 1 Location: Left arm, BP Patient Position: At rest;Sitting) Pulse 60 Temp 98.3 °F (36.8 °C) Resp 16 Ht 5' 4\" (1.626 m) Wt 69.2 kg (152 lb 9.6 oz) SpO2 96% Breastfeeding? No  
BMI 26.19 kg/m² General appearance: alert, cooperative, no distress, appears stated age Lungs: clear to auscultation bilaterally Heart: regular rate and rhythm, S1, S2 normal, no murmur, click, rub or gallop Abdomen: soft, non-tender. Bowel sounds normal. No masses,  no organomegaly Extremities: no cyanosis or edema Neurologic: Grossly normal 
 
Disposition: home Discharge Condition: stable Patient Instructions:  
Current Discharge Medication List  
  
START taking these medications Details  
calcium carbonate (TUMS) 200 mg calcium (500 mg) chew Take 2 Tabs by mouth three (3) times daily (with meals) for 30 days. Qty: 180 Tab, Refills: 0  
  
guaiFENesin ER (MUCINEX) 1,200 mg Ta12 ER tablet Take 1 Tab by mouth every twelve (12) hours for 5 days. Qty: 10 Tab, Refills: 0  
  
nystatin (MYCOSTATIN) 100,000 unit/mL suspension Take 5 mL by mouth four (4) times daily for 5 days. swish and spit Qty: 100 mL, Refills: 0 CONTINUE these medications which have NOT CHANGED Details  
potassium chloride SR (KLOR-CON 10) 10 mEq tablet Take 2 Tabs by mouth daily.  
Qty: 30 Tab, Refills: 11  
  
 furosemide (LASIX) 40 mg tablet Take 1 Tab by mouth daily. Taking 2 po qd Qty: 180 Tab, Refills: 1 Associated Diagnoses: Chronic diastolic heart failure ([address] traZODone (DESYREL) 100 mg tablet TAKE 1 TABLET BY MOUTH NIGHTLY. Qty: 30 Tab, Refills: 5  
  
atorvastatin (LIPITOR) 80 mg tablet TAKE 1 TABLET BY MOUTH DAILY Qty: 60 Tab, Refills: 5  
  
clorazepate (TRANXENE) 7.5 mg tablet TAKE 1 TABLET BY MOUTH 2 TIMES DAILY Qty: 60 Tab, Refills: 2 Associated Diagnoses: Anxiety  
  
ferrous sulfate 325 mg (65 mg iron) tablet TAKE 1 TABLET BY MOUTH DAILY. Qty: 30 Tab, Refills: 5 Associated Diagnoses: Anemia, unspecified type  
  
levothyroxine (SYNTHROID) 50 mcg tablet Take 1 Tab by mouth Daily (before breakfast). Qty: 30 Tab, Refills: 5  
  
amLODIPine (NORVASC) 2.5 mg tablet Take 1 Tab by mouth daily. Qty: 30 Tab, Refills: 11  
  
STOOL SOFTENER 100 mg capsule Take 1 Cap by mouth two (2) times a day. Qty: 180 Cap, Refills: 3 Associated Diagnoses: Hypothyroidism  
  
mirtazapine (REMERON) 15 mg tablet   
  
amiodarone (CORDARONE) 200 mg tablet Take 1 Tab by mouth daily. Qty: 180 Tab, Refills: 3  
  
estradiol (ESTRACE) 1 mg tablet TAKE 1 TABLET BY MOUTH DAILY Qty: 90 Tab, Refills: 3 Associated Diagnoses: Menopause DISABLED PLACARD (DISABLED PLACARD) DMV Apply to car. Qty: 1 Each, Refills: 0 Associated Diagnoses: Acute cystitis without hematuria OXYGEN-AIR DELIVERY SYSTEMS 3 L by Does Not Apply route nightly. Associated Diagnoses: Pleural effusion associated with pulmonary infection; Pleural effusion on left; Pleural effusion on right; Debility; Hypoxemia  
  
cholecalciferol (VITAMIN D3) 1,000 unit tablet Take 1 Tab by mouth daily. Qty: 90 Tab, Refills: 3 Aspirin, Buffered 81 mg tab Take  by mouth daily. Associated Diagnoses: Anemia, unspecified  
  
pomalidomide (POMALYST) 2 mg cap Take 1 Cap by mouth daily. Take 1 capsule daily on days 1-14. Then off for 14 days Qty: 14 Cap, Refills: 0 Associated Diagnoses: Multiple myeloma not having achieved remission ([address] mupirocin (BACTROBAN) 2 % ointment Apply  to affected area daily. Qty: 22 g, Refills: 0 Associated Diagnoses: Skin ulcer of right foot, limited to breakdown of skin ([address] HYDROcodone-acetaminophen (NORCO) 5-325 mg per tablet Take 1 Tab by mouth every six (6) hours as needed for Pain. Max Daily Amount: 4 Tabs. Qty: 40 Tab, Refills: 0 Associated Diagnoses: Acute midline low back pain without sciatica  
  
nadolol (CORGARD) 80 mg tablet Take 1 Tab by mouth daily. Qty: 30 Tab, Refills: 11  
  
raNITIdine (ZANTAC) 75 mg tablet Take 75 mg by mouth two (2) times a day. lactulose (KRISTALOSE) 20 gram packet Take 1 Packet by mouth three (3) times daily. Qty: 60 Packet, Refills: 3 Associated Diagnoses: Slow transit constipation  
  
ondansetron hcl (ZOFRAN, AS HYDROCHLORIDE,) 8 mg tablet Take 1 Tab by mouth every eight (8) hours as needed for Nausea. Qty: 90 Tab, Refills: 2 Associated Diagnoses: Multiple myeloma, remission status unspecified ([address] Other iron deficiency anemia; Essential hypertension, benign; Mixed hyperlipidemia STOP taking these medications  
  
 trimethoprim-sulfamethoxazole (BACTRIM DS, SEPTRA DS) 160-800 mg per tablet Comments:  
Reason for Stopping:   
   
 prochlorperazine (COMPAZINE) 10 mg tablet Comments:  
Reason for Stopping:   
   
  
 
 
Activity: Activity as tolerated Diet: Regular Diet Follow-up · PCP in 1 week Time spent to discharge patient 35 minutes Signed: 
Cipriano Santana MD 
1/9/2019 
3:37 PM

## 2019-01-09 NOTE — PROGRESS NOTES
Problem: Falls - Risk of 
Goal: *Absence of Falls Document Lashaun Ayala Fall Risk and appropriate interventions in the flowsheet. Outcome: Progressing Towards Goal 
Fall Risk Interventions: 
Mobility Interventions: Communicate number of staff needed for ambulation/transfer Mentation Interventions: Adequate sleep, hydration, pain control Medication Interventions: Evaluate medications/consider consulting pharmacy Elimination Interventions: Call light in reach Problem: Pressure Injury - Risk of 
Goal: *Prevention of pressure injury Document José Manuel Scale and appropriate interventions in the flowsheet. Outcome: Progressing Towards Goal 
Pressure Injury Interventions: 
Sensory Interventions: Assess changes in LOC, Maintain/enhance activity level, Pressure redistribution bed/mattress (bed type) Moisture Interventions: Apply protective barrier, creams and emollients Activity Interventions: Increase time out of bed Mobility Interventions: Pressure redistribution bed/mattress (bed type) Nutrition Interventions: Document food/fluid/supplement intake Friction and Shear Interventions: Apply protective barrier, creams and emollients

## 2019-01-09 NOTE — PROGRESS NOTES
IV removed. Pt refused flu vaccine. Discharge information provided to pt and pt's daughter. Pt's daughter to call and clarify oncology appointment (to see if they have any earlier appointments) and make appointment with PCP. Educated on where to  new medications. Pt's daughter educated on dressings for L calf skin tear and cream to apply to hemorrhoids. Pt escorted to the lobby via wheelchair with PCT and discharged home with daughter.

## 2019-01-09 NOTE — PROGRESS NOTES
END OF SHIFT NOTE. Kvng removed on 1/8/2019 at [address] [place] [address]. Patient voided x 1 in her brief. Shift report to on coming nurse at bedside.

## 2019-01-09 NOTE — DISCHARGE INSTRUCTIONS
DISCHARGE SUMMARY from Nurse    PATIENT INSTRUCTIONS:     While taking prescription Narcotics:  · Limit your activities  · Do not drive and operate hazardous machinery  · Do not make important personal or business decisions  · Do  not drink alcoholic beverages  · If you have not urinated within 8 hours after discharge, please contact your surgeon on call. Please call physician after your discharge if the following symptoms present:    1. Temperature greater than 100.5  2. Heart rate greater than 90 beats per minute  3. Increased respirations   4. Chills  5. Cough with any sputum (color: yellow, white, green, or bloody?)  6. Shortness of breath or change in breathing pattern  7. Bleeding:  Any prolonged bleeding presented from skin tears, cuts, bleeding from brushing teeth, nose bleed, bleeding noted in stool  8. Tenderness, swelling, or drainage from IV site or central line catheter    Diet:Regular    Neutropenic Precautions  Thrombocytopenic Precautions    MD office #:[phone]    What to do at Home:  Recommended activity: Activity as tolerated      *  Please give a list of your current medications to your Primary Care Provider. *  Please update this list whenever your medications are discontinued, doses are      changed, or new medications (including over-the-counter products) are added. *  Please carry medication information at all times in case of emergency situations. These are general instructions for a healthy lifestyle:    No smoking/ No tobacco products/ Avoid exposure to second hand smoke  Surgeon General's Warning:  Quitting smoking now greatly reduces serious risk to your health.     Obesity, smoking, and sedentary lifestyle greatly increases your risk for illness    A healthy diet, regular physical exercise & weight monitoring are important for maintaining a healthy lifestyle    You may be retaining fluid if you have a history of heart failure or if you experience any of the following symptoms:  Weight gain of 3 pounds or more overnight or 5 pounds in a week, increased swelling in our hands or feet or shortness of breath while lying flat in bed. Please call your doctor as soon as you notice any of these symptoms; do not wait until your next office visit. Recognize signs and symptoms of STROKE:    F-face looks uneven    A-arms unable to move or move unevenly    S-speech slurred or non-existent    T-time-call 911 as soon as signs and symptoms begin-DO NOT go       Back to bed or wait to see if you get better-TIME IS BRAIN. Warning Signs of HEART ATTACK     Call 911 if you have these symptoms:   Chest discomfort. Most heart attacks involve discomfort in the center of the chest that lasts more than a few minutes, or that goes away and comes back. It can feel like uncomfortable pressure, squeezing, fullness, or pain.  Discomfort in other areas of the upper body. Symptoms can include pain or discomfort in one or both arms, the back, neck, jaw, or stomach.  Shortness of breath with or without chest discomfort.  Other signs may include breaking out in a cold sweat, nausea, or lightheadedness. Don't wait more than five minutes to call 911 - MINUTES MATTER! Fast action can save your life. Calling 911 is almost always the fastest way to get lifesaving treatment. Emergency Medical Services staff can begin treatment when they arrive -- up to an hour sooner than if someone gets to the hospital by car. The discharge information has been reviewed with the patient and spouse. The patient and spouse verbalized understanding. Discharge medications reviewed with the patient and spouse and appropriate educational materials and side effects teaching were provided.   ___________________________________________________________________________________________________________________________________

## 2019-01-10 ENCOUNTER — PATIENT OUTREACH (OUTPATIENT)
Dept: CASE MANAGEMENT | Age: 84
End: 2019-01-10

## 2019-01-11 ENCOUNTER — HOME HEALTH ADMISSION (OUTPATIENT)
Dept: HOME HEALTH SERVICES | Facility: HOME HEALTH | Age: 84
End: 2019-01-11

## 2019-01-11 ENCOUNTER — PATIENT OUTREACH (OUTPATIENT)
Dept: CASE MANAGEMENT | Age: 84
End: 2019-01-11

## 2019-01-11 ENCOUNTER — APPOINTMENT (OUTPATIENT)
Dept: GENERAL RADIOLOGY | Age: 84
End: 2019-01-11
Payer: MEDICARE

## 2019-01-11 ENCOUNTER — HOSPITAL ENCOUNTER (EMERGENCY)
Age: 84
Discharge: HOME | End: 2019-01-11
Attending: EMERGENCY MEDICINE
Payer: MEDICARE

## 2019-01-11 VITALS
HEART RATE: 60 BPM | TEMPERATURE: 97.1 F | DIASTOLIC BLOOD PRESSURE: 100 MMHG | RESPIRATION RATE: 17 BRPM | SYSTOLIC BLOOD PRESSURE: 134 MMHG | OXYGEN SATURATION: 100 %

## 2019-01-11 DIAGNOSIS — R53.1 WEAKNESS: Primary | ICD-10-CM

## 2019-01-11 LAB
ANION GAP SERPL CALC-SCNC: 8 MMOL/L (ref 7–16)
BACTERIA URNS QL MICRO: 0 /HPF
BASOPHILS # BLD: 0 K/UL (ref 0–0.2)
BASOPHILS NFR BLD: 0 % (ref 0–2)
BNP SERPL-MCNC: 229 PG/ML
BUN SERPL-MCNC: 23 MG/DL (ref 8–23)
CALCIUM SERPL-MCNC: 6.8 MG/DL (ref 8.3–10.4)
CASTS URNS QL MICRO: 0 /LPF
CHLORIDE SERPL-SCNC: 104 MMOL/L (ref 98–107)
CO2 SERPL-SCNC: 27 MMOL/L (ref 21–32)
CREAT SERPL-MCNC: 1.08 MG/DL (ref 0.6–1)
DIFFERENTIAL METHOD BLD: ABNORMAL
EOSINOPHIL # BLD: 0 K/UL (ref 0–0.8)
EOSINOPHIL NFR BLD: 1 % (ref 0.5–7.8)
EPI CELLS #/AREA URNS HPF: NORMAL /HPF
ERYTHROCYTE [DISTWIDTH] IN BLOOD BY AUTOMATED COUNT: 14 % (ref 11.9–14.6)
GLUCOSE SERPL-MCNC: 90 MG/DL (ref 65–100)
HCT VFR BLD AUTO: 34 % (ref 35.8–46.3)
HGB BLD-MCNC: 11 G/DL (ref 11.7–15.4)
IMM GRANULOCYTES # BLD AUTO: 0.2 K/UL (ref 0–0.5)
IMM GRANULOCYTES NFR BLD AUTO: 2 % (ref 0–5)
LYMPHOCYTES # BLD: 1.3 K/UL (ref 0.5–4.6)
LYMPHOCYTES NFR BLD: 16 % (ref 13–44)
MCH RBC QN AUTO: 31.6 PG (ref 26.1–32.9)
MCHC RBC AUTO-ENTMCNC: 32.4 G/DL (ref 31.4–35)
MCV RBC AUTO: 97.7 FL (ref 79.6–97.8)
MONOCYTES # BLD: 0.8 K/UL (ref 0.1–1.3)
MONOCYTES NFR BLD: 10 % (ref 4–12)
NEUTS SEG # BLD: 5.7 K/UL (ref 1.7–8.2)
NEUTS SEG NFR BLD: 71 % (ref 43–78)
NRBC # BLD: 0 K/UL (ref 0–0.2)
PLATELET # BLD AUTO: 327 K/UL (ref 150–450)
PMV BLD AUTO: 11.5 FL (ref 9.4–12.3)
POTASSIUM SERPL-SCNC: 3.4 MMOL/L (ref 3.5–5.1)
RBC # BLD AUTO: 3.48 M/UL (ref 4.05–5.2)
RBC #/AREA URNS HPF: NORMAL /HPF
SODIUM SERPL-SCNC: 139 MMOL/L (ref 136–145)
WBC # BLD AUTO: 8 K/UL (ref 4.3–11.1)
WBC URNS QL MICRO: NORMAL /HPF

## 2019-01-11 PROCEDURE — 80048 BASIC METABOLIC PNL TOTAL CA: CPT

## 2019-01-11 PROCEDURE — 81003 URINALYSIS AUTO W/O SCOPE: CPT | Performed by: EMERGENCY MEDICINE

## 2019-01-11 PROCEDURE — 71046 X-RAY EXAM CHEST 2 VIEWS: CPT

## 2019-01-11 PROCEDURE — 83880 ASSAY OF NATRIURETIC PEPTIDE: CPT

## 2019-01-11 PROCEDURE — 85025 COMPLETE CBC W/AUTO DIFF WBC: CPT

## 2019-01-11 PROCEDURE — 81015 MICROSCOPIC EXAM OF URINE: CPT

## 2019-01-11 PROCEDURE — 99284 EMERGENCY DEPT VISIT MOD MDM: CPT | Performed by: EMERGENCY MEDICINE

## 2019-01-11 NOTE — ED PROVIDER NOTES
70-year-old white female recently admitted to hospital for bilateral pneumonia was in the hospital for approximately 10 days and discharged home 2 days ago. He has brought her in secondary to severe weakness and continued shortness of breath. Family cannot care for her at home. No fever or vomiting. No other complaints at this time. The history is provided by the patient. Shortness of Breath Pertinent negatives include no fever, no neck pain, no cough, no chest pain, no vomiting, no abdominal pain and no leg swelling. Past Medical History:  
Diagnosis Date  Anemia, unspecified  Chest pain, unspecified 3/21/2016  Chronic anxiety 3/21/2016  Diastolic heart failure ([facility] 3/21/2016  Essential hypertension, benign  Flatulence, eructation, and gas pain  Lump or mass in breast   
 Other and unspecified hyperlipidemia  Paroxysmal atrial fibrillation ([facility] [address] 3/21/2016  Paroxysmal tachycardia ([facility] 3/21/2016  Pernicious anemia  Postmenopausal atrophic vaginitis  Unspecified deficiency anemia  Unspecified hypothyroidism Past Surgical History:  
Procedure Laterality Date  HX HYSTERECTOMY  HX ORTHOPAEDIC    
 heel spur  HX VEIN STRIPPING Family History:  
Problem Relation Age of Onset  No Known Problems Mother  Heart Disease Father Social History Socioeconomic History  Marital status:  Spouse name: Not on file  Number of children: Not on file  Years of education: Not on file  Highest education level: Not on file Social Needs  Financial resource strain: Not on file  Food insecurity - worry: Not on file  Food insecurity - inability: Not on file  Transportation needs - medical: Not on file  Transportation needs - non-medical: Not on file Occupational History  Not on file Tobacco Use  Smoking status: Never Smoker  Smokeless tobacco: Never Used Substance and Sexual Activity  Alcohol use: No  
  Alcohol/week: 0.0 oz  Drug use: No  
 Sexual activity: Not on file Other Topics Concern  Not on file Social History Narrative , lives alone. 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 ALLERGIES: Patient has no known allergies. Review of Systems Constitutional: Positive for fatigue. Negative for fever. HENT: Negative for congestion. Respiratory: Positive for shortness of breath. Negative for cough. Cardiovascular: Negative for chest pain and leg swelling. Gastrointestinal: Negative for abdominal pain, nausea and vomiting. Genitourinary: Negative for dysuria. Musculoskeletal: Negative for back pain and neck pain. Neurological: Positive for weakness. Psychiatric/Behavioral: Negative for confusion. Vitals:  
 01/11/19 1324 01/11/19 1343 BP: 120/56 (!) 134/100 Pulse: 60 Resp: 17 Temp: 97.1 °F (36.2 °C) SpO2: 100% 100% Physical Exam  
Constitutional: She is oriented to person, place, and time. She appears well-developed and well-nourished. She does not appear ill. No distress. HENT:  
Head: Normocephalic and atraumatic. Mouth/Throat: Oropharynx is clear and moist.  
Eyes: Conjunctivae are normal. Pupils are equal, round, and reactive to light. Neck: Normal range of motion. Neck supple. Cardiovascular: Normal rate and regular rhythm. Pulmonary/Chest: Effort normal and breath sounds normal.  
Abdominal: Soft. She exhibits no distension. There is no tenderness. There is no guarding. Musculoskeletal: Normal range of motion. She exhibits no edema. Neurological: She is alert and oriented to person, place, and time. Skin: Skin is warm. Psychiatric: She has a normal mood and affect. Her behavior is normal.  
Nursing note and vitals reviewed. MDM Number of Diagnoses or Management Options Diagnosis management comments: Chest x-ray shows no acute abnormality. Lab work unremarkable except for mild anemia which is at baseline. Urinalysis normal.  Patient does not meet criteria for readmission to the hospital but has been accepted at [facility] rehabilitation facility. Patient will be transferred directly there. Amount and/or Complexity of Data Reviewed Clinical lab tests: ordered and reviewed Tests in the radiology section of CPT®: ordered and reviewed Independent visualization of images, tracings, or specimens: yes Risk of Complications, Morbidity, and/or Mortality Presenting problems: moderate Diagnostic procedures: low Management options: low Procedures

## 2019-01-11 NOTE — PROGRESS NOTES
Spoke with daughter Joaquín Mendieta. She states her mother wants to come back to the hospital. Offer was made to have [facility] come out and evaluate daughter declined. Daughter became emotional stated Trudy Diaz was upset\" familly will bring patient back to ED. Will notify ED, CM Of this current situation.

## 2019-01-11 NOTE — ED TRIAGE NOTES
Pt to ed via EMS from home 2 days post discharge from hospital for pneumonia. C/o weakness and shob relieved with 02. VSS.

## 2019-01-11 NOTE — DISCHARGE INSTRUCTIONS
Patient Education        Weakness: Care Instructions  Your Care Instructions    Weakness is a lack of physical or muscle strength. You may feel that you need to make extra effort to move your arms, legs, or other muscles. Generalized weakness means that you feel weak in most areas of your body. Another type of weakness may affect just one muscle or group of muscles. You may feel weak and tired after you have done too much activity, such as taking an extra-long hike. This is not a serious problem. It often goes away on its own. Feeling weak can also be caused by medical conditions like thyroid problems, depression, or a virus. Sometimes the cause can be serious. Your doctor may want to do more tests to try to find the cause of the weakness. The doctor has checked you carefully, but problems can develop later. If you notice any problems or new symptoms, get medical treatment right away. Follow-up care is a key part of your treatment and safety. Be sure to make and go to all appointments, and call your doctor if you are having problems. It's also a good idea to know your test results and keep a list of the medicines you take. How can you care for yourself at home? · Rest when you feel tired. · Be safe with medicines. If your doctor prescribed medicine, take it exactly as prescribed. Call your doctor if you think you are having a problem with your medicine. You will get more details on the specific medicines your doctor prescribes. · Do not skip meals. Eating a balanced diet may increase your energy level. · Get some physical activity every day, but do not get too tired. When should you call for help? Call your doctor now or seek immediate medical care if:    · You have new or worse weakness.     · You are dizzy or lightheaded, or you feel like you may faint.    Watch closely for changes in your health, and be sure to contact your doctor if:    · You do not get better as expected.    Where can you learn more?  Go to http://marianela-cameron.info/. Enter [id number] in the search box to learn more about \"Weakness: Care Instructions. \"  Current as of: November 20, 2017  Content Version: 11.8  © 1593-9222 [employer]. Care instructions adapted under license by [employer] (which disclaims liability or warranty for this information). If you have questions about a medical condition or this instruction, always ask your healthcare professional. [place] [address] any warranty or liability for your use of this information.

## 2019-01-11 NOTE — ED NOTES
I have reviewed discharge instructions with the patient. The patient verbalized understanding. Patient left ED via Discharge Method: stretcher to rehab facility Opportunity for questions and clarification provided. Patient given 0 scripts. To continue your aftercare when you leave the hospital, you may receive an automated call from our care team to check in on how you are doing. This is a free service and part of our promise to provide the best care and service to meet your aftercare needs.  If you have questions, or wish to unsubscribe from this service please call [phone]. Thank you for Choosing our [address] [place] [address] Emergency Department.

## 2019-01-12 NOTE — PROGRESS NOTES
Transition of Care Discharge Follow-up Questionnaire Date/Time of Call: 
 1/11/19 @  11:30am   
What was the patient hospitalized for? Bilateral PNA Does the patient understand his/her diagnosis and/or treatment and what happened during the hospitalization? Daughter voices understanding. Did the patient receive discharge instructions? Yes  
CM Assessed Risk for Readmission:  
 
 
Patient stated Risk for Readmission:  
 
 Yes High risk for readmission. Daughter feels that patient should not have been discharged from the hospital    
Review any discharge instructions (see discharge instructions/AVS in [facility]). Ask patient if they understand these. Do they have any questions? Daughter understands discharge instructions. Were home services ordered (nursing, PT, OT, ST, etc.)? [facility]. This nurse called patients PCP to request the order. If so, has the first visit occurred? If not, why? (Assist with coordination of services if necessary.) 
 N/A Was any DME ordered? N/A If so, has it been received? If not, why?  (Assist patient in obtaining DME orders &/or equipment if necessary.)  
N/A Complete a review of all medications (new, continued and discontinued meds per the D/C instructions and medication tab in [facility]). Reviewed medications, no medication changes. Were all new prescriptions filled? If not, why?  (Assist patient in obtaining medications if necessary  escalate for CCM &/or SW if ongoing issues are verbalized by pt or anticipated) No new orders Does the patient understand the purpose and dosing instructions for all medications? (If patient has questions, provide explanation and education.) Yes Does the patient have any problems in performing ADLs? (If patient is unable to perform ADLs  what is the limiting factor(s)?   Do they have a support system that can assist? If no support system is present, discuss possible assistance that they may be able to obtain. Escalate for CCM/SW if ongoing issues are verbalized by pt or anticipated) Patient is dependent on daughter for ADLs. Does the patient have all follow-up appointments scheduled? 7 day f/up with PCP?  
(f/up with PCP may be w/in 14 days if patient has a f/up with their specialist w/in 7 days) 7-14 day f/up with specialist?  
(or per discharge instructions) If f/up has not been made  what actions has the care coordinator made to accomplish this? Has transportation been arranged? None at this time N/A Any other questions or concerns expressed by the patient? None Schedule next appointment with LUISA ONEAL Coordinator or refer to RN Case Manager/ per the workflow guidelines. When is care coordinators next follow-up call scheduled? If referred for CCM  what RN care manager was the referral assigned? TBD  
RUPESH Call Completed By:  
Mario Salvador RN  
II spoke with patient's daughter this am. Daughter is adamant that patient needs to go to rehab to get stronger. Daughter refused this offer of H and R while patient was in the hospital.  She brought the patient home and realized that patient cannot care for herself. Plan: coordinate with SW, PCP and return phone call to daughter.

## 2019-01-14 ENCOUNTER — PATIENT OUTREACH (OUTPATIENT)
Dept: CASE MANAGEMENT | Age: 84
End: 2019-01-14

## 2019-01-14 NOTE — PROGRESS NOTES
Per notes in [address] [place] [address], the patient's daughter Dolores Cordova took the patient back to the ER. Patient was discharged to a SNF. RUPESH postponed for 21 days due to discharge to a non-preferred network SNF. This note will not be viewable in [address]

## 2019-02-04 ENCOUNTER — PATIENT OUTREACH (OUTPATIENT)
Dept: CASE MANAGEMENT | Age: 84
End: 2019-02-04

## 2019-02-04 NOTE — PROGRESS NOTES
Voicemail left for patient's daughter. Follow up re: SNF stay. This note will not be viewable in [address]

## 2019-02-09 ENCOUNTER — HOSPITAL ENCOUNTER (EMERGENCY)
Age: 84
Discharge: HOME | End: 2019-02-09
Attending: EMERGENCY MEDICINE
Payer: MEDICARE

## 2019-02-09 ENCOUNTER — APPOINTMENT (OUTPATIENT)
Dept: GENERAL RADIOLOGY | Age: 84
End: 2019-02-09
Attending: EMERGENCY MEDICINE
Payer: MEDICARE

## 2019-02-09 VITALS
BODY MASS INDEX: 25.95 KG/M2 | HEIGHT: 64 IN | WEIGHT: 152 LBS | OXYGEN SATURATION: 96 % | SYSTOLIC BLOOD PRESSURE: 165 MMHG | RESPIRATION RATE: 18 BRPM | HEART RATE: 65 BPM | TEMPERATURE: 98.5 F | DIASTOLIC BLOOD PRESSURE: 62 MMHG

## 2019-02-09 DIAGNOSIS — N39.0 URINARY TRACT INFECTION WITHOUT HEMATURIA, SITE UNSPECIFIED: Primary | ICD-10-CM

## 2019-02-09 DIAGNOSIS — H01.00A BLEPHARITIS OF UPPER AND LOWER EYELIDS OF BOTH EYES, UNSPECIFIED TYPE: ICD-10-CM

## 2019-02-09 DIAGNOSIS — H01.00B BLEPHARITIS OF UPPER AND LOWER EYELIDS OF BOTH EYES, UNSPECIFIED TYPE: ICD-10-CM

## 2019-02-09 LAB
ALBUMIN SERPL-MCNC: 2.9 G/DL (ref 3.2–4.6)
ALBUMIN/GLOB SERPL: 0.9 {RATIO} (ref 1.2–3.5)
ALP SERPL-CCNC: 77 U/L (ref 50–136)
ALT SERPL-CCNC: 25 U/L (ref 12–65)
ANION GAP SERPL CALC-SCNC: 5 MMOL/L (ref 7–16)
AST SERPL-CCNC: 33 U/L (ref 15–37)
ATRIAL RATE: 60 BPM
BACTERIA URNS QL MICRO: ABNORMAL /HPF
BASOPHILS # BLD: 0 K/UL (ref 0–0.2)
BASOPHILS NFR BLD: 1 % (ref 0–2)
BILIRUB SERPL-MCNC: 0.7 MG/DL (ref 0.2–1.1)
BUN SERPL-MCNC: 18 MG/DL (ref 8–23)
CALCIUM SERPL-MCNC: 7.9 MG/DL (ref 8.3–10.4)
CALCULATED P AXIS, ECG09: 83 DEGREES
CALCULATED R AXIS, ECG10: 10 DEGREES
CALCULATED T AXIS, ECG11: -86 DEGREES
CASTS URNS QL MICRO: ABNORMAL /LPF
CHLORIDE SERPL-SCNC: 112 MMOL/L (ref 98–107)
CO2 SERPL-SCNC: 27 MMOL/L (ref 21–32)
CREAT SERPL-MCNC: 1.08 MG/DL (ref 0.6–1)
CRYSTALS URNS QL MICRO: 0 /LPF
DIAGNOSIS, 93000: NORMAL
DIFFERENTIAL METHOD BLD: ABNORMAL
EOSINOPHIL # BLD: 0 K/UL (ref 0–0.8)
EOSINOPHIL NFR BLD: 0 % (ref 0.5–7.8)
EPI CELLS #/AREA URNS HPF: ABNORMAL /HPF
ERYTHROCYTE [DISTWIDTH] IN BLOOD BY AUTOMATED COUNT: 15.4 % (ref 11.9–14.6)
GLOBULIN SER CALC-MCNC: 3.1 G/DL (ref 2.3–3.5)
GLUCOSE SERPL-MCNC: 90 MG/DL (ref 65–100)
HCT VFR BLD AUTO: 37.2 % (ref 35.8–46.3)
HGB BLD-MCNC: 11.5 G/DL (ref 11.7–15.4)
IMM GRANULOCYTES # BLD AUTO: 0.2 K/UL (ref 0–0.5)
IMM GRANULOCYTES NFR BLD AUTO: 4 % (ref 0–5)
LYMPHOCYTES # BLD: 1.1 K/UL (ref 0.5–4.6)
LYMPHOCYTES NFR BLD: 23 % (ref 13–44)
MCH RBC QN AUTO: 31.6 PG (ref 26.1–32.9)
MCHC RBC AUTO-ENTMCNC: 30.9 G/DL (ref 31.4–35)
MCV RBC AUTO: 102.2 FL (ref 79.6–97.8)
MONOCYTES # BLD: 0.3 K/UL (ref 0.1–1.3)
MONOCYTES NFR BLD: 6 % (ref 4–12)
MUCOUS THREADS URNS QL MICRO: 0 /LPF
NEUTS SEG # BLD: 3.3 K/UL (ref 1.7–8.2)
NEUTS SEG NFR BLD: 66 % (ref 43–78)
NRBC # BLD: 0 K/UL (ref 0–0.2)
P-R INTERVAL, ECG05: 356 MS
PLATELET # BLD AUTO: 158 K/UL (ref 150–450)
PMV BLD AUTO: 11.6 FL (ref 9.4–12.3)
POTASSIUM SERPL-SCNC: 4.7 MMOL/L (ref 3.5–5.1)
PROCALCITONIN SERPL-MCNC: <0.1 NG/ML
PROT SERPL-MCNC: 6 G/DL (ref 6.3–8.2)
Q-T INTERVAL, ECG07: 466 MS
QRS DURATION, ECG06: 72 MS
QTC CALCULATION (BEZET), ECG08: 480 MS
RBC # BLD AUTO: 3.64 M/UL (ref 4.05–5.2)
RBC #/AREA URNS HPF: ABNORMAL /HPF
SODIUM SERPL-SCNC: 144 MMOL/L (ref 136–145)
TSH SERPL DL<=0.005 MIU/L-ACNC: 3.44 UIU/ML (ref 0.36–3.74)
VENTRICULAR RATE, ECG03: 64 BPM
WBC # BLD AUTO: 5 K/UL (ref 4.3–11.1)
WBC URNS QL MICRO: >100 /HPF

## 2019-02-09 PROCEDURE — 93005 ELECTROCARDIOGRAM TRACING: CPT | Performed by: EMERGENCY MEDICINE

## 2019-02-09 PROCEDURE — 87088 URINE BACTERIA CULTURE: CPT

## 2019-02-09 PROCEDURE — 74011000258 HC RX REV CODE- 258: Performed by: EMERGENCY MEDICINE

## 2019-02-09 PROCEDURE — 99285 EMERGENCY DEPT VISIT HI MDM: CPT | Performed by: EMERGENCY MEDICINE

## 2019-02-09 PROCEDURE — 84145 PROCALCITONIN (PCT): CPT

## 2019-02-09 PROCEDURE — 96361 HYDRATE IV INFUSION ADD-ON: CPT | Performed by: EMERGENCY MEDICINE

## 2019-02-09 PROCEDURE — 85025 COMPLETE CBC W/AUTO DIFF WBC: CPT

## 2019-02-09 PROCEDURE — 81015 MICROSCOPIC EXAM OF URINE: CPT

## 2019-02-09 PROCEDURE — 96374 THER/PROPH/DIAG INJ IV PUSH: CPT | Performed by: EMERGENCY MEDICINE

## 2019-02-09 PROCEDURE — 87086 URINE CULTURE/COLONY COUNT: CPT

## 2019-02-09 PROCEDURE — 87186 SC STD MICRODIL/AGAR DIL: CPT

## 2019-02-09 PROCEDURE — 71045 X-RAY EXAM CHEST 1 VIEW: CPT

## 2019-02-09 PROCEDURE — 80053 COMPREHEN METABOLIC PANEL: CPT

## 2019-02-09 PROCEDURE — 84443 ASSAY THYROID STIM HORMONE: CPT

## 2019-02-09 PROCEDURE — 81003 URINALYSIS AUTO W/O SCOPE: CPT | Performed by: EMERGENCY MEDICINE

## 2019-02-09 PROCEDURE — 74011250636 HC RX REV CODE- 250/636: Performed by: EMERGENCY MEDICINE

## 2019-02-09 RX ORDER — CEPHALEXIN 500 MG/1
500 CAPSULE ORAL 4 TIMES DAILY
Qty: 28 CAP | Refills: 0 | Status: SHIPPED | OUTPATIENT
Start: 2019-02-09 | End: 2019-02-16

## 2019-02-09 RX ORDER — SODIUM CHLORIDE, SODIUM LACTATE, POTASSIUM CHLORIDE, CALCIUM CHLORIDE 600; 310; 30; 20 MG/100ML; MG/100ML; MG/100ML; MG/100ML
150 INJECTION, SOLUTION INTRAVENOUS CONTINUOUS
Status: DISCONTINUED | OUTPATIENT
Start: 2019-02-09 | End: 2019-02-09 | Stop reason: HOSPADM

## 2019-02-09 RX ORDER — ERYTHROMYCIN 5 MG/G
OINTMENT OPHTHALMIC
Qty: 3.5 G | Refills: 0 | Status: SHIPPED | OUTPATIENT
Start: 2019-02-09 | End: 2019-04-08 | Stop reason: ALTCHOICE

## 2019-02-09 RX ADMIN — SODIUM CHLORIDE, SODIUM LACTATE, POTASSIUM CHLORIDE, AND CALCIUM CHLORIDE 150 ML/HR: 600; 310; 30; 20 INJECTION, SOLUTION INTRAVENOUS at 14:06

## 2019-02-09 RX ADMIN — CEFTRIAXONE SODIUM 1 G: 1 INJECTION, POWDER, FOR SOLUTION INTRAMUSCULAR; INTRAVENOUS at 15:43

## 2019-02-09 NOTE — ED NOTES
I have reviewed discharge instructions with the patient. The patient verbalized understanding. Patient left ED via Discharge Method: wheelchair to Home with family member. Opportunity for questions and clarification provided. Patient given 2 scripts. To continue your aftercare when you leave the hospital, you may receive an automated call from our care team to check in on how you are doing. This is a free service and part of our promise to provide the best care and service to meet your aftercare needs.  If you have questions, or wish to unsubscribe from this service please call [phone]. Thank you for Choosing our [facility] Emergency Department.

## 2019-02-09 NOTE — DISCHARGE INSTRUCTIONS

## 2019-02-09 NOTE — ED PROVIDER NOTES
Patient was brought in from with a history of altered mental status. The patient was recently discharged from a rehabilitation facility to home. She does have a history of dementia but is oriented to place and day. Family reports the patient had been treated for a urinary tract infection while at the rehabilitation facility for 5 days. The history is provided by the patient, medical records, a relative and the EMS personnel. History limited by: dementia. Altered mental status This is a new problem. Associated symptoms include confusion. Mental status baseline is moderate dementia. Risk factors include dementia. Past Medical History:  
Diagnosis Date  Anemia, unspecified  Chest pain, unspecified 3/21/2016  Chronic anxiety 3/21/2016  Diastolic heart failure ([address] 3/21/2016  Essential hypertension, benign  Flatulence, eructation, and gas pain  Lump or mass in breast   
 Other and unspecified hyperlipidemia  Paroxysmal atrial fibrillation ([address] 3/21/2016  Paroxysmal tachycardia ([facility] [address] 3/21/2016  Pernicious anemia  Postmenopausal atrophic vaginitis  Unspecified deficiency anemia  Unspecified hypothyroidism Past Surgical History:  
Procedure Laterality Date  HX HYSTERECTOMY  HX ORTHOPAEDIC    
 heel spur  HX VEIN STRIPPING Family History:  
Problem Relation Age of Onset  No Known Problems Mother  Heart Disease Father Social History Socioeconomic History  Marital status:  Spouse name: Not on file  Number of children: Not on file  Years of education: Not on file  Highest education level: Not on file Social Needs  Financial resource strain: Not on file  Food insecurity - worry: Not on file  Food insecurity - inability: Not on file  Transportation needs - medical: Not on file  Transportation needs - non-medical: Not on file Occupational History  Not on file Tobacco Use  Smoking status: Never Smoker  Smokeless tobacco: Never Used Substance and Sexual Activity  Alcohol use: No  
  Alcohol/week: 0.0 oz  Drug use: No  
 Sexual activity: Not on file Other Topics Concern  Not on file Social History Narrative , lives alone. 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 ALLERGIES: Patient has no known allergies. Review of Systems Psychiatric/Behavioral: Positive for confusion. All other systems reviewed and are negative. Vitals:  
 02/09/19 1351 BP: (!) 138/111 Pulse: 60 Resp: 18 Temp: 97.4 °F (36.3 °C) SpO2: 96% Weight: 68.9 kg (152 lb) Height: 5' 4\" (1.626 m) Physical Exam  
Constitutional: She appears well-developed and well-nourished. Non-toxic appearance. She does not appear ill. No distress. HENT:  
Head: Normocephalic and atraumatic. Mouth/Throat: Oropharynx is clear and moist.  
Eyes: EOM are normal. Pupils are equal, round, and reactive to light. Neck: Normal range of motion. Cardiovascular: Normal rate and regular rhythm. Pulmonary/Chest: Effort normal and breath sounds normal.  
Abdominal: Soft. Bowel sounds are normal.  
Musculoskeletal: Normal range of motion. Neurological: She is alert. She has normal strength. She is disoriented. Disoriented to time but oriented to day of the week Skin: Skin is warm and dry. Psychiatric: She has a normal mood and affect. Her behavior is normal.  
Nursing note and vitals reviewed. MDM Number of Diagnoses or Management Options Amount and/or Complexity of Data Reviewed Clinical lab tests: ordered and reviewed Tests in the radiology section of CPT®: reviewed and ordered Independent visualization of images, tracings, or specimens: yes (EKG at 1354: atrial paced rhythm with first-degree AV block rate of 64 in no acute ischemic changes or ectopy noted) Risk of Complications, Morbidity, and/or Mortality Presenting problems: moderate Diagnostic procedures: moderate Management options: moderate General comments: Patient is afebrile vital signs are normal in laboratory data is unremarkable other than for urinary tract infection. Patient was given a dose of antibiotics IV here in the restroom and discharged with prescription for Keflex. Urine culture was ordered. Patient is also has some evidence of blepharitis which will be treated as well Patient Progress Patient progress: stable Procedures

## 2019-02-09 NOTE — ED TRIAGE NOTES
Pt to ED from home via [facility]. Was just released from a rehab facility yesterday with recent diagnosis of pneumonia and UTI. Since discharge, per family pt has been acting increasingly altered with decrease in appetite. Pt's only complaint is \"feeling cold. \" 
 
. Hx atrial pacemaker, HTN, dementia.

## 2019-02-12 ENCOUNTER — PATIENT OUTREACH (OUTPATIENT)
Dept: CASE MANAGEMENT | Age: 84
End: 2019-02-12

## 2019-02-12 LAB
BACTERIA SPEC CULT: ABNORMAL
SERVICE CMNT-IMP: ABNORMAL

## 2019-02-25 ENCOUNTER — HOSPITAL ENCOUNTER (OUTPATIENT)
Dept: LAB | Age: 84
Discharge: HOME | End: 2019-02-25
Attending: INTERNAL MEDICINE
Payer: MEDICARE

## 2019-02-25 LAB
APPEARANCE UR: CLEAR
BACTERIA URNS QL MICRO: 0 /HPF
BILIRUB UR QL: NEGATIVE
CASTS URNS QL MICRO: ABNORMAL /LPF
COLOR UR: YELLOW
EPI CELLS #/AREA URNS HPF: ABNORMAL /HPF
GLUCOSE UR STRIP.AUTO-MCNC: NEGATIVE MG/DL
HGB UR QL STRIP: ABNORMAL
KETONES UR QL STRIP.AUTO: NEGATIVE MG/DL
LEUKOCYTE ESTERASE UR QL STRIP.AUTO: ABNORMAL
NITRITE UR QL STRIP.AUTO: NEGATIVE
PH UR STRIP: 6.5 [PH] (ref 5–9)
PROT UR STRIP-MCNC: NEGATIVE MG/DL
RBC #/AREA URNS HPF: ABNORMAL /HPF
SP GR UR REFRACTOMETRY: 1.01 (ref 1–1.02)
UROBILINOGEN UR QL STRIP.AUTO: 0.2 EU/DL (ref 0.2–1)
WBC URNS QL MICRO: ABNORMAL /HPF

## 2019-02-25 PROCEDURE — 87186 SC STD MICRODIL/AGAR DIL: CPT

## 2019-02-25 PROCEDURE — 87088 URINE BACTERIA CULTURE: CPT

## 2019-02-25 PROCEDURE — 81001 URINALYSIS AUTO W/SCOPE: CPT

## 2019-02-25 PROCEDURE — 87086 URINE CULTURE/COLONY COUNT: CPT

## 2019-02-28 LAB
BACTERIA SPEC CULT: ABNORMAL
SERVICE CMNT-IMP: ABNORMAL

## 2019-03-07 ENCOUNTER — PATIENT OUTREACH (OUTPATIENT)
Dept: CASE MANAGEMENT | Age: 84
End: 2019-03-07

## 2019-03-07 ENCOUNTER — HOSPITAL ENCOUNTER (OUTPATIENT)
Dept: LAB | Age: 84
Discharge: HOME | End: 2019-03-07
Payer: MEDICARE

## 2019-03-07 ENCOUNTER — HOSPITAL ENCOUNTER (OUTPATIENT)
Dept: INFUSION THERAPY | Age: 84
Discharge: HOME | End: 2019-03-07
Payer: MEDICARE

## 2019-03-07 DIAGNOSIS — C90.00 MULTIPLE MYELOMA NOT HAVING ACHIEVED REMISSION (HCC): Primary | ICD-10-CM

## 2019-03-07 DIAGNOSIS — C90.00 MULTIPLE MYELOMA NOT HAVING ACHIEVED REMISSION (HCC): ICD-10-CM

## 2019-03-07 LAB
ALBUMIN SERPL-MCNC: 3.6 G/DL (ref 3.2–4.6)
ALBUMIN/GLOB SERPL: 1.2 {RATIO} (ref 1.2–3.5)
ALP SERPL-CCNC: 60 U/L (ref 50–136)
ALT SERPL-CCNC: 18 U/L (ref 12–65)
ANION GAP SERPL CALC-SCNC: 5 MMOL/L (ref 7–16)
AST SERPL-CCNC: 21 U/L (ref 15–37)
BASOPHILS # BLD: 0.1 K/UL (ref 0–0.2)
BASOPHILS NFR BLD: 1 % (ref 0–2)
BILIRUB SERPL-MCNC: 1.1 MG/DL (ref 0.2–1.1)
BUN SERPL-MCNC: 15 MG/DL (ref 8–23)
CALCIUM SERPL-MCNC: 8 MG/DL (ref 8.3–10.4)
CHLORIDE SERPL-SCNC: 105 MMOL/L (ref 98–107)
CO2 SERPL-SCNC: 30 MMOL/L (ref 21–32)
CREAT SERPL-MCNC: 1.19 MG/DL (ref 0.6–1)
DIFFERENTIAL METHOD BLD: ABNORMAL
EOSINOPHIL # BLD: 0 K/UL (ref 0–0.8)
EOSINOPHIL NFR BLD: 0 % (ref 0.5–7.8)
ERYTHROCYTE [DISTWIDTH] IN BLOOD BY AUTOMATED COUNT: 15 % (ref 11.9–14.6)
GLOBULIN SER CALC-MCNC: 3.1 G/DL (ref 2.3–3.5)
GLUCOSE SERPL-MCNC: 111 MG/DL (ref 65–100)
HCT VFR BLD AUTO: 33.8 % (ref 35.8–46.3)
HGB BLD-MCNC: 10.3 G/DL (ref 11.7–15.4)
IMM GRANULOCYTES # BLD AUTO: 0 K/UL (ref 0–0.5)
IMM GRANULOCYTES NFR BLD AUTO: 1 % (ref 0–5)
LYMPHOCYTES # BLD: 1.1 K/UL (ref 0.5–4.6)
LYMPHOCYTES NFR BLD: 27 % (ref 13–44)
MCH RBC QN AUTO: 30.7 PG (ref 26.1–32.9)
MCHC RBC AUTO-ENTMCNC: 30.5 G/DL (ref 31.4–35)
MCV RBC AUTO: 100.6 FL (ref 79.6–97.8)
MONOCYTES # BLD: 0.3 K/UL (ref 0.1–1.3)
MONOCYTES NFR BLD: 8 % (ref 4–12)
NEUTS SEG # BLD: 2.5 K/UL (ref 1.7–8.2)
NEUTS SEG NFR BLD: 63 % (ref 43–78)
NRBC # BLD: 0.01 K/UL (ref 0–0.2)
PLATELET # BLD AUTO: 212 K/UL (ref 150–450)
PMV BLD AUTO: 10.9 FL (ref 9.4–12.3)
POTASSIUM SERPL-SCNC: 3.7 MMOL/L (ref 3.5–5.1)
PROT SERPL-MCNC: 6.7 G/DL (ref 6.3–8.2)
RBC # BLD AUTO: 3.36 M/UL (ref 4.05–5.25)
SODIUM SERPL-SCNC: 140 MMOL/L (ref 136–145)
WBC # BLD AUTO: 4 K/UL (ref 4.3–11.1)

## 2019-03-07 PROCEDURE — 96365 THER/PROPH/DIAG IV INF INIT: CPT

## 2019-03-07 PROCEDURE — 80053 COMPREHEN METABOLIC PANEL: CPT

## 2019-03-07 PROCEDURE — 96372 THER/PROPH/DIAG INJ SC/IM: CPT

## 2019-03-07 PROCEDURE — 74011250636 HC RX REV CODE- 250/636: Performed by: NURSE PRACTITIONER

## 2019-03-07 PROCEDURE — 85025 COMPLETE CBC W/AUTO DIFF WBC: CPT

## 2019-03-07 PROCEDURE — 84165 PROTEIN E-PHORESIS SERUM: CPT

## 2019-03-07 PROCEDURE — 36415 COLL VENOUS BLD VENIPUNCTURE: CPT

## 2019-03-07 PROCEDURE — 74011250636 HC RX REV CODE- 250/636: Performed by: INTERNAL MEDICINE

## 2019-03-07 PROCEDURE — 74011000258 HC RX REV CODE- 258: Performed by: INTERNAL MEDICINE

## 2019-03-07 PROCEDURE — 86334 IMMUNOFIX E-PHORESIS SERUM: CPT

## 2019-03-07 RX ORDER — SODIUM CHLORIDE 0.9 % (FLUSH) 0.9 %
5-10 SYRINGE (ML) INJECTION AS NEEDED
Status: DISCONTINUED | OUTPATIENT
Start: 2019-03-07 | End: 2019-03-11 | Stop reason: HOSPADM

## 2019-03-07 RX ADMIN — Medication 10 ML: at 17:11

## 2019-03-07 RX ADMIN — DENOSUMAB 120 MG: 120 INJECTION SUBCUTANEOUS at 17:09

## 2019-03-07 RX ADMIN — Medication 10 ML: at 16:40

## 2019-03-07 RX ADMIN — CALCIUM GLUCONATE 1 G: 98 INJECTION, SOLUTION INTRAVENOUS at 16:40

## 2019-03-07 NOTE — PROGRESS NOTES
Arrived to the [facility]. Xgeva and IV calcium completed. Patient tolerated well. Any issues or concerns during appointment: none. Patient aware of next infusion appointment on 4/4/19 at 3pm.  Discharged via wheelchair.

## 2019-03-07 NOTE — PROGRESS NOTES
Pt was seen today by Skinny Piedra. Labs reviewed. She has been in and our of the hospital for pne and UTI. She is feeling much better now. She will need xgeva today and will send in a new prescription or her pomalyst today. She will seen in 4 weeks. Advised to call with any further questions or concerns before next visit.

## 2019-03-08 ENCOUNTER — PATIENT OUTREACH (OUTPATIENT)
Dept: CASE MANAGEMENT | Age: 84
End: 2019-03-08

## 2019-03-08 LAB
ALBUMIN SERPL ELPH-MCNC: 3.52 G/DL (ref 3.2–5.6)
ALBUMIN/GLOB SERPL: 1.4 {RATIO}
ALPHA1 GLOB SERPL ELPH-MCNC: 0.32 G/DL (ref 0.1–0.4)
ALPHA2 GLOB SERPL ELPH-MCNC: 0.88 G/DL (ref 0.4–1.2)
B-GLOBULIN SERPL QL ELPH: 0.83 G/DL (ref 0.6–1.3)
GAMMA GLOB MFR SERPL ELPH: 0.56 G/DL (ref 0.5–1.6)
IGA SERPL-MCNC: 25 MG/DL (ref 85–499)
IGG SERPL-MCNC: 457 MG/DL (ref 610–1616)
IGM SERPL-MCNC: 14 MG/DL (ref 35–242)
M PROTEIN SERPL ELPH-MCNC: ABNORMAL G/DL
PROT PATTERN SERPL ELPH-IMP: ABNORMAL
PROT PATTERN SPEC IFE-IMP: ABNORMAL
PROT SERPL-MCNC: 6.1 G/DL (ref 6.3–8.2)

## 2019-04-04 ENCOUNTER — HOSPITAL ENCOUNTER (OUTPATIENT)
Dept: LAB | Age: 84
Discharge: HOME | End: 2019-04-04
Payer: MEDICARE

## 2019-04-04 ENCOUNTER — HOSPITAL ENCOUNTER (OUTPATIENT)
Dept: INFUSION THERAPY | Age: 84
Discharge: HOME | End: 2019-04-04

## 2019-04-04 ENCOUNTER — PATIENT OUTREACH (OUTPATIENT)
Dept: CASE MANAGEMENT | Age: 84
End: 2019-04-04

## 2019-04-04 DIAGNOSIS — C90.00 MULTIPLE MYELOMA NOT HAVING ACHIEVED REMISSION (HCC): ICD-10-CM

## 2019-04-04 LAB
ALBUMIN SERPL-MCNC: 3.8 G/DL (ref 3.2–4.6)
ALBUMIN/GLOB SERPL: 1.3 {RATIO} (ref 1.2–3.5)
ALP SERPL-CCNC: 67 U/L (ref 50–136)
ALT SERPL-CCNC: 23 U/L (ref 12–65)
ANION GAP SERPL CALC-SCNC: 5 MMOL/L (ref 7–16)
AST SERPL-CCNC: 24 U/L (ref 15–37)
BASOPHILS # BLD: 0 K/UL (ref 0–0.2)
BASOPHILS NFR BLD: 0 % (ref 0–2)
BILIRUB SERPL-MCNC: 0.9 MG/DL (ref 0.2–1.1)
BUN SERPL-MCNC: 19 MG/DL (ref 8–23)
CALCIUM SERPL-MCNC: 7.5 MG/DL (ref 8.3–10.4)
CHLORIDE SERPL-SCNC: 104 MMOL/L (ref 98–107)
CO2 SERPL-SCNC: 29 MMOL/L (ref 21–32)
CREAT SERPL-MCNC: 1.3 MG/DL (ref 0.6–1)
DIFFERENTIAL METHOD BLD: ABNORMAL
EOSINOPHIL # BLD: 0 K/UL (ref 0–0.8)
EOSINOPHIL NFR BLD: 1 % (ref 0.5–7.8)
ERYTHROCYTE [DISTWIDTH] IN BLOOD BY AUTOMATED COUNT: 14.4 % (ref 11.9–14.6)
GLOBULIN SER CALC-MCNC: 2.9 G/DL (ref 2.3–3.5)
GLUCOSE SERPL-MCNC: 101 MG/DL (ref 65–100)
HCT VFR BLD AUTO: 33.4 % (ref 35.8–46.3)
HGB BLD-MCNC: 10.5 G/DL (ref 11.7–15.4)
IMM GRANULOCYTES # BLD AUTO: 0 K/UL (ref 0–0.5)
IMM GRANULOCYTES NFR BLD AUTO: 1 % (ref 0–5)
LYMPHOCYTES # BLD: 0.8 K/UL (ref 0.5–4.6)
LYMPHOCYTES NFR BLD: 27 % (ref 13–44)
MCH RBC QN AUTO: 31.3 PG (ref 26.1–32.9)
MCHC RBC AUTO-ENTMCNC: 31.4 G/DL (ref 31.4–35)
MCV RBC AUTO: 99.4 FL (ref 79.6–97.8)
MONOCYTES # BLD: 0.3 K/UL (ref 0.1–1.3)
MONOCYTES NFR BLD: 9 % (ref 4–12)
NEUTS SEG # BLD: 1.8 K/UL (ref 1.7–8.2)
NEUTS SEG NFR BLD: 63 % (ref 43–78)
NRBC # BLD: 0 K/UL (ref 0–0.2)
PLATELET # BLD AUTO: 168 K/UL (ref 150–450)
PMV BLD AUTO: 10.1 FL (ref 9.4–12.3)
POTASSIUM SERPL-SCNC: 3.6 MMOL/L (ref 3.5–5.1)
PROT SERPL-MCNC: 6.7 G/DL (ref 6.3–8.2)
RBC # BLD AUTO: 3.36 M/UL (ref 4.05–5.25)
SODIUM SERPL-SCNC: 138 MMOL/L (ref 136–145)
WBC # BLD AUTO: 2.9 K/UL (ref 4.3–11.1)

## 2019-04-04 PROCEDURE — 85025 COMPLETE CBC W/AUTO DIFF WBC: CPT

## 2019-04-04 PROCEDURE — 84165 PROTEIN E-PHORESIS SERUM: CPT

## 2019-04-04 PROCEDURE — 80053 COMPREHEN METABOLIC PANEL: CPT

## 2019-04-04 PROCEDURE — 36415 COLL VENOUS BLD VENIPUNCTURE: CPT

## 2019-04-04 PROCEDURE — 86334 IMMUNOFIX E-PHORESIS SERUM: CPT

## 2019-04-04 NOTE — PROGRESS NOTES
Pt was seen today by Dr. Cedrick Christopher. Labs reviewed. She will  No longer need Xgeva as her calcium was low. She has been feeling well. She will be seen in 4 weeks. Advised pt to call with any further concerns. Daughter will call to let me know if she needs any refills on med's.

## 2019-04-05 LAB
ALBUMIN SERPL ELPH-MCNC: 3.61 G/DL (ref 3.2–5.6)
ALBUMIN/GLOB SERPL: 1.5 {RATIO}
ALPHA1 GLOB SERPL ELPH-MCNC: 0.27 G/DL (ref 0.1–0.4)
ALPHA2 GLOB SERPL ELPH-MCNC: 0.85 G/DL (ref 0.4–1.2)
B-GLOBULIN SERPL QL ELPH: 0.83 G/DL (ref 0.6–1.3)
GAMMA GLOB MFR SERPL ELPH: 0.53 G/DL (ref 0.5–1.6)
IGA SERPL-MCNC: 32 MG/DL (ref 85–499)
IGG SERPL-MCNC: 528 MG/DL (ref 610–1616)
IGM SERPL-MCNC: 15 MG/DL (ref 35–242)
M PROTEIN SERPL ELPH-MCNC: ABNORMAL G/DL
PROT PATTERN SERPL ELPH-IMP: ABNORMAL
PROT PATTERN SPEC IFE-IMP: ABNORMAL
PROT SERPL-MCNC: 6.1 G/DL (ref 6.3–8.2)

## 2019-04-08 ENCOUNTER — HOSPITAL ENCOUNTER (OUTPATIENT)
Dept: GENERAL RADIOLOGY | Age: 84
Discharge: HOME | End: 2019-04-08
Attending: FAMILY MEDICINE
Payer: MEDICARE

## 2019-04-08 DIAGNOSIS — J20.9 ACUTE BRONCHITIS, UNSPECIFIED ORGANISM: ICD-10-CM

## 2019-04-08 PROCEDURE — 71046 X-RAY EXAM CHEST 2 VIEWS: CPT

## 2019-04-12 ENCOUNTER — DOCUMENTATION ONLY (OUTPATIENT)
Dept: CASE MANAGEMENT | Age: 84
End: 2019-04-12

## 2019-04-12 NOTE — PROGRESS NOTES
Pt daughter called looking for the number to call [employer] to get her moms Debi delivered. I gave her the number that annmarie provided of [phone].

## 2019-04-17 ENCOUNTER — HOSPITAL ENCOUNTER (OUTPATIENT)
Dept: GENERAL RADIOLOGY | Age: 84
Discharge: HOME | End: 2019-04-17
Attending: FAMILY MEDICINE
Payer: MEDICARE

## 2019-04-17 DIAGNOSIS — J18.9 ACUTE PNEUMONIA: ICD-10-CM

## 2019-04-17 PROCEDURE — 71046 X-RAY EXAM CHEST 2 VIEWS: CPT

## 2019-05-02 ENCOUNTER — PATIENT OUTREACH (OUTPATIENT)
Dept: CASE MANAGEMENT | Age: 84
End: 2019-05-02

## 2019-05-02 ENCOUNTER — HOSPITAL ENCOUNTER (OUTPATIENT)
Dept: LAB | Age: 84
Discharge: HOME | End: 2019-05-02
Payer: MEDICARE

## 2019-05-02 DIAGNOSIS — C90.00 MULTIPLE MYELOMA NOT HAVING ACHIEVED REMISSION (HCC): ICD-10-CM

## 2019-05-02 LAB
ALBUMIN SERPL-MCNC: 3.8 G/DL (ref 3.2–4.6)
ALBUMIN/GLOB SERPL: 1.4 {RATIO} (ref 1.2–3.5)
ALP SERPL-CCNC: 63 U/L (ref 50–136)
ALT SERPL-CCNC: 28 U/L (ref 12–65)
ANION GAP SERPL CALC-SCNC: 7 MMOL/L (ref 7–16)
AST SERPL-CCNC: 29 U/L (ref 15–37)
BASOPHILS # BLD: 0 K/UL (ref 0–0.2)
BASOPHILS NFR BLD: 1 % (ref 0–2)
BILIRUB SERPL-MCNC: 0.7 MG/DL (ref 0.2–1.1)
BUN SERPL-MCNC: 20 MG/DL (ref 8–23)
CALCIUM SERPL-MCNC: 7.9 MG/DL (ref 8.3–10.4)
CHLORIDE SERPL-SCNC: 100 MMOL/L (ref 98–107)
CO2 SERPL-SCNC: 25 MMOL/L (ref 21–32)
CREAT SERPL-MCNC: 1.61 MG/DL (ref 0.6–1)
DIFFERENTIAL METHOD BLD: ABNORMAL
EOSINOPHIL # BLD: 0 K/UL (ref 0–0.8)
EOSINOPHIL NFR BLD: 0 % (ref 0.5–7.8)
ERYTHROCYTE [DISTWIDTH] IN BLOOD BY AUTOMATED COUNT: 13.9 % (ref 11.9–14.6)
GLOBULIN SER CALC-MCNC: 2.8 G/DL (ref 2.3–3.5)
GLUCOSE SERPL-MCNC: 90 MG/DL (ref 65–100)
HCT VFR BLD AUTO: 33.6 % (ref 35.8–46.3)
HGB BLD-MCNC: 10.5 G/DL (ref 11.7–15.4)
IMM GRANULOCYTES # BLD AUTO: 0 K/UL (ref 0–0.5)
IMM GRANULOCYTES NFR BLD AUTO: 1 % (ref 0–5)
LYMPHOCYTES # BLD: 1.4 K/UL (ref 0.5–4.6)
LYMPHOCYTES NFR BLD: 21 % (ref 13–44)
MCH RBC QN AUTO: 30.8 PG (ref 26.1–32.9)
MCHC RBC AUTO-ENTMCNC: 31.3 G/DL (ref 31.4–35)
MCV RBC AUTO: 98.5 FL (ref 79.6–97.8)
MONOCYTES # BLD: 0.4 K/UL (ref 0.1–1.3)
MONOCYTES NFR BLD: 7 % (ref 4–12)
NEUTS SEG # BLD: 4.5 K/UL (ref 1.7–8.2)
NEUTS SEG NFR BLD: 71 % (ref 43–78)
NRBC # BLD: 0 K/UL (ref 0–0.2)
PLATELET # BLD AUTO: 166 K/UL (ref 150–450)
PMV BLD AUTO: 10.5 FL (ref 9.4–12.3)
POTASSIUM SERPL-SCNC: 5 MMOL/L (ref 3.5–5.1)
PROT SERPL-MCNC: 6.6 G/DL (ref 6.3–8.2)
RBC # BLD AUTO: 3.41 M/UL (ref 4.05–5.25)
SODIUM SERPL-SCNC: 132 MMOL/L (ref 136–145)
WBC # BLD AUTO: 6.4 K/UL (ref 4.3–11.1)

## 2019-05-02 PROCEDURE — 85025 COMPLETE CBC W/AUTO DIFF WBC: CPT

## 2019-05-02 PROCEDURE — 84165 PROTEIN E-PHORESIS SERUM: CPT

## 2019-05-02 PROCEDURE — 80053 COMPREHEN METABOLIC PANEL: CPT

## 2019-05-02 PROCEDURE — 36415 COLL VENOUS BLD VENIPUNCTURE: CPT

## 2019-05-02 PROCEDURE — 86334 IMMUNOFIX E-PHORESIS SERUM: CPT

## 2019-05-02 NOTE — PROGRESS NOTES
Pt was seen today by Dr. Asad Yang. Labs reviewed. She has had pneumonia and a UTI since we have seen her last. Her Creatine is increased. She is on bactrim for her UTI. We will decrease her Pomalyst to 1 mg on 14 days off 14 days. She will be completed with her last dose on 5/7 and will restart on 5/21 with the 1 mg regimen. She will call with any further needs.

## 2019-05-03 LAB
ALBUMIN SERPL ELPH-MCNC: 4.56 G/DL (ref 3.2–5.6)
ALBUMIN/GLOB SERPL: 1.5 {RATIO}
ALPHA1 GLOB SERPL ELPH-MCNC: 0.38 G/DL (ref 0.1–0.4)
ALPHA2 GLOB SERPL ELPH-MCNC: 1.09 G/DL (ref 0.4–1.2)
B-GLOBULIN SERPL QL ELPH: 1.06 G/DL (ref 0.6–1.3)
GAMMA GLOB MFR SERPL ELPH: 0.61 G/DL (ref 0.5–1.6)
IGA SERPL-MCNC: 29 MG/DL (ref 85–499)
IGG SERPL-MCNC: 444 MG/DL (ref 610–1616)
IGM SERPL-MCNC: 15 MG/DL (ref 35–242)
M PROTEIN SERPL ELPH-MCNC: ABNORMAL G/DL
PROT PATTERN SERPL ELPH-IMP: ABNORMAL
PROT PATTERN SPEC IFE-IMP: ABNORMAL
PROT SERPL-MCNC: 6.2 G/DL (ref 6.3–8.2)

## 2019-05-30 ENCOUNTER — HOSPITAL ENCOUNTER (OUTPATIENT)
Dept: LAB | Age: 84
Discharge: HOME | End: 2019-05-30
Payer: MEDICARE

## 2019-05-30 ENCOUNTER — PATIENT OUTREACH (OUTPATIENT)
Dept: CASE MANAGEMENT | Age: 84
End: 2019-05-30

## 2019-05-30 DIAGNOSIS — C90.00 MULTIPLE MYELOMA NOT HAVING ACHIEVED REMISSION (HCC): ICD-10-CM

## 2019-05-30 LAB
ALBUMIN SERPL-MCNC: 3.5 G/DL (ref 3.2–4.6)
ALBUMIN/GLOB SERPL: 1.2 {RATIO} (ref 1.2–3.5)
ALP SERPL-CCNC: 64 U/L (ref 50–136)
ALT SERPL-CCNC: 33 U/L (ref 12–65)
ANION GAP SERPL CALC-SCNC: 8 MMOL/L (ref 7–16)
AST SERPL-CCNC: 29 U/L (ref 15–37)
BASOPHILS # BLD: 0 K/UL (ref 0–0.2)
BASOPHILS NFR BLD: 0 % (ref 0–2)
BILIRUB SERPL-MCNC: 0.5 MG/DL (ref 0.2–1.1)
BUN SERPL-MCNC: 22 MG/DL (ref 8–23)
CALCIUM SERPL-MCNC: 7.8 MG/DL (ref 8.3–10.4)
CHLORIDE SERPL-SCNC: 103 MMOL/L (ref 98–107)
CO2 SERPL-SCNC: 26 MMOL/L (ref 21–32)
CREAT SERPL-MCNC: 1.59 MG/DL (ref 0.6–1)
DIFFERENTIAL METHOD BLD: ABNORMAL
EOSINOPHIL # BLD: 0 K/UL (ref 0–0.8)
EOSINOPHIL NFR BLD: 1 % (ref 0.5–7.8)
ERYTHROCYTE [DISTWIDTH] IN BLOOD BY AUTOMATED COUNT: 14.6 % (ref 11.9–14.6)
GLOBULIN SER CALC-MCNC: 3 G/DL (ref 2.3–3.5)
GLUCOSE SERPL-MCNC: 101 MG/DL (ref 65–100)
HCT VFR BLD AUTO: 34.5 % (ref 35.8–46.3)
HGB BLD-MCNC: 11 G/DL (ref 11.7–15.4)
IMM GRANULOCYTES # BLD AUTO: 0.1 K/UL (ref 0–0.5)
IMM GRANULOCYTES NFR BLD AUTO: 2 % (ref 0–5)
LYMPHOCYTES # BLD: 1.1 K/UL (ref 0.5–4.6)
LYMPHOCYTES NFR BLD: 33 % (ref 13–44)
MCH RBC QN AUTO: 31.2 PG (ref 26.1–32.9)
MCHC RBC AUTO-ENTMCNC: 31.9 G/DL (ref 31.4–35)
MCV RBC AUTO: 97.7 FL (ref 79.6–97.8)
MONOCYTES # BLD: 0.3 K/UL (ref 0.1–1.3)
MONOCYTES NFR BLD: 9 % (ref 4–12)
NEUTS SEG # BLD: 1.8 K/UL (ref 1.7–8.2)
NEUTS SEG NFR BLD: 55 % (ref 43–78)
NRBC # BLD: 0 K/UL (ref 0–0.2)
PLATELET # BLD AUTO: 150 K/UL (ref 150–450)
PMV BLD AUTO: 10.7 FL (ref 9.4–12.3)
POTASSIUM SERPL-SCNC: 3.8 MMOL/L (ref 3.5–5.1)
PROT SERPL-MCNC: 6.5 G/DL (ref 6.3–8.2)
RBC # BLD AUTO: 3.53 M/UL (ref 4.05–5.25)
SODIUM SERPL-SCNC: 137 MMOL/L (ref 136–145)
WBC # BLD AUTO: 3.4 K/UL (ref 4.3–11.1)

## 2019-05-30 PROCEDURE — 84165 PROTEIN E-PHORESIS SERUM: CPT

## 2019-05-30 PROCEDURE — 86334 IMMUNOFIX E-PHORESIS SERUM: CPT

## 2019-05-30 PROCEDURE — 36415 COLL VENOUS BLD VENIPUNCTURE: CPT

## 2019-05-30 PROCEDURE — 85025 COMPLETE CBC W/AUTO DIFF WBC: CPT

## 2019-05-30 PROCEDURE — 80053 COMPREHEN METABOLIC PANEL: CPT

## 2019-05-30 NOTE — PROGRESS NOTES
Pt was seen today, labs reviewed. Ok to proceed with pomlayst. Daughter is unsure when she needs to restart pills. She should be starting Monday next week. New prescription sent to [facility] today. Pt will return in 4 weeks on a Friday. Advised to call with any further questions or concerns.

## 2019-05-31 LAB
ALBUMIN SERPL ELPH-MCNC: 3.64 G/DL (ref 3.2–5.6)
ALBUMIN/GLOB SERPL: 1.3 {RATIO}
ALPHA1 GLOB SERPL ELPH-MCNC: 0.32 G/DL (ref 0.1–0.4)
ALPHA2 GLOB SERPL ELPH-MCNC: 0.99 G/DL (ref 0.4–1.2)
B-GLOBULIN SERPL QL ELPH: 0.92 G/DL (ref 0.6–1.3)
GAMMA GLOB MFR SERPL ELPH: 0.53 G/DL (ref 0.5–1.6)
IGA SERPL-MCNC: 31 MG/DL (ref 85–499)
IGG SERPL-MCNC: 472 MG/DL (ref 610–1616)
IGM SERPL-MCNC: 24 MG/DL (ref 35–242)
M PROTEIN SERPL ELPH-MCNC: ABNORMAL G/DL
PROT PATTERN SERPL ELPH-IMP: ABNORMAL
PROT PATTERN SPEC IFE-IMP: ABNORMAL
PROT SERPL-MCNC: 6.4 G/DL (ref 6.3–8.2)

## 2019-06-07 ENCOUNTER — DOCUMENTATION ONLY (OUTPATIENT)
Dept: CASE MANAGEMENT | Age: 84
End: 2019-06-07

## 2019-06-07 NOTE — PROGRESS NOTES
Pt daughter called and stated they ordered the last pomalyst late so she wont need to restart it until  June 21. They will be on vacation until 6/18. Janell Etienne will send specialty pharmacy an e-mail to let them know to call daughter for delivery after 6/18.

## 2019-06-28 ENCOUNTER — PATIENT OUTREACH (OUTPATIENT)
Dept: CASE MANAGEMENT | Age: 84
End: 2019-06-28

## 2019-06-28 ENCOUNTER — HOSPITAL ENCOUNTER (OUTPATIENT)
Dept: LAB | Age: 84
Discharge: HOME | End: 2019-06-28
Payer: MEDICARE

## 2019-06-28 DIAGNOSIS — C90.00 MULTIPLE MYELOMA NOT HAVING ACHIEVED REMISSION (HCC): ICD-10-CM

## 2019-06-28 LAB
ALBUMIN SERPL-MCNC: 3.9 G/DL (ref 3.2–4.6)
ALBUMIN/GLOB SERPL: 1.4 {RATIO} (ref 1.2–3.5)
ALP SERPL-CCNC: 62 U/L (ref 50–136)
ALT SERPL-CCNC: 39 U/L (ref 12–65)
ANION GAP SERPL CALC-SCNC: 4 MMOL/L (ref 7–16)
AST SERPL-CCNC: 32 U/L (ref 15–37)
BASOPHILS # BLD: 0 K/UL (ref 0–0.2)
BASOPHILS NFR BLD: 0 % (ref 0–2)
BILIRUB SERPL-MCNC: 0.9 MG/DL (ref 0.2–1.1)
BUN SERPL-MCNC: 22 MG/DL (ref 8–23)
CALCIUM SERPL-MCNC: 8.5 MG/DL (ref 8.3–10.4)
CHLORIDE SERPL-SCNC: 101 MMOL/L (ref 98–107)
CO2 SERPL-SCNC: 30 MMOL/L (ref 21–32)
CREAT SERPL-MCNC: 1.53 MG/DL (ref 0.6–1)
DIFFERENTIAL METHOD BLD: ABNORMAL
EOSINOPHIL # BLD: 0 K/UL (ref 0–0.8)
EOSINOPHIL NFR BLD: 1 % (ref 0.5–7.8)
ERYTHROCYTE [DISTWIDTH] IN BLOOD BY AUTOMATED COUNT: 14.5 % (ref 11.9–14.6)
GLOBULIN SER CALC-MCNC: 2.8 G/DL (ref 2.3–3.5)
GLUCOSE SERPL-MCNC: 99 MG/DL (ref 65–100)
HCT VFR BLD AUTO: 34.2 % (ref 35.8–46.3)
HGB BLD-MCNC: 11 G/DL (ref 11.7–15.4)
IMM GRANULOCYTES # BLD AUTO: 0 K/UL (ref 0–0.5)
IMM GRANULOCYTES NFR BLD AUTO: 1 % (ref 0–5)
LYMPHOCYTES # BLD: 1.3 K/UL (ref 0.5–4.6)
LYMPHOCYTES NFR BLD: 34 % (ref 13–44)
MCH RBC QN AUTO: 31.1 PG (ref 26.1–32.9)
MCHC RBC AUTO-ENTMCNC: 32.2 G/DL (ref 31.4–35)
MCV RBC AUTO: 96.6 FL (ref 79.6–97.8)
MONOCYTES # BLD: 0.3 K/UL (ref 0.1–1.3)
MONOCYTES NFR BLD: 9 % (ref 4–12)
NEUTS SEG # BLD: 2 K/UL (ref 1.7–8.2)
NEUTS SEG NFR BLD: 55 % (ref 43–78)
NRBC # BLD: 0 K/UL (ref 0–0.2)
PLATELET # BLD AUTO: 162 K/UL (ref 150–450)
PMV BLD AUTO: 10.2 FL (ref 9.4–12.3)
POTASSIUM SERPL-SCNC: 4 MMOL/L (ref 3.5–5.1)
PROT SERPL-MCNC: 6.7 G/DL (ref 6.3–8.2)
RBC # BLD AUTO: 3.54 M/UL (ref 4.05–5.25)
SODIUM SERPL-SCNC: 135 MMOL/L (ref 136–145)
WBC # BLD AUTO: 3.7 K/UL (ref 4.3–11.1)

## 2019-06-28 PROCEDURE — 85025 COMPLETE CBC W/AUTO DIFF WBC: CPT

## 2019-06-28 PROCEDURE — 84165 PROTEIN E-PHORESIS SERUM: CPT

## 2019-06-28 PROCEDURE — 36415 COLL VENOUS BLD VENIPUNCTURE: CPT

## 2019-06-28 PROCEDURE — 86334 IMMUNOFIX E-PHORESIS SERUM: CPT

## 2019-06-28 PROCEDURE — 80053 COMPREHEN METABOLIC PANEL: CPT

## 2019-06-28 NOTE — PROGRESS NOTES
Pt was seen today by Dr. Natalie Allen. Labs reviewed. Ok to proceed with Pomlayst. We will see her again in 4 weeks. She c/o fatigue as would be noted with her age and taking pomalyst however she is on the lowest dose. Pt ESME. Advised to call with any further issues before next visit.

## 2019-07-01 LAB
ALBUMIN SERPL ELPH-MCNC: 3.65 G/DL (ref 3.2–5.6)
ALBUMIN/GLOB SERPL: 1.4 {RATIO}
ALPHA1 GLOB SERPL ELPH-MCNC: 0.28 G/DL (ref 0.1–0.4)
ALPHA2 GLOB SERPL ELPH-MCNC: 0.96 G/DL (ref 0.4–1.2)
B-GLOBULIN SERPL QL ELPH: 0.78 G/DL (ref 0.6–1.3)
GAMMA GLOB MFR SERPL ELPH: 0.54 G/DL (ref 0.5–1.6)
IGA SERPL-MCNC: 29 MG/DL (ref 85–499)
IGG SERPL-MCNC: 429 MG/DL (ref 610–1616)
IGM SERPL-MCNC: 15 MG/DL (ref 35–242)
M PROTEIN SERPL ELPH-MCNC: ABNORMAL G/DL
PROT PATTERN SERPL ELPH-IMP: ABNORMAL
PROT PATTERN SPEC IFE-IMP: ABNORMAL
PROT SERPL-MCNC: 6.2 G/DL (ref 6.3–8.2)

## 2019-08-02 ENCOUNTER — PATIENT OUTREACH (OUTPATIENT)
Dept: CASE MANAGEMENT | Age: 84
End: 2019-08-02

## 2019-08-02 ENCOUNTER — HOSPITAL ENCOUNTER (OUTPATIENT)
Dept: LAB | Age: 84
Discharge: HOME | End: 2019-08-02
Payer: MEDICARE

## 2019-08-02 DIAGNOSIS — C90.00 MULTIPLE MYELOMA NOT HAVING ACHIEVED REMISSION (HCC): ICD-10-CM

## 2019-08-02 LAB
ALBUMIN SERPL-MCNC: 3.6 G/DL (ref 3.2–4.6)
ALBUMIN/GLOB SERPL: 1.3 {RATIO} (ref 1.2–3.5)
ALP SERPL-CCNC: 62 U/L (ref 50–136)
ALT SERPL-CCNC: 34 U/L (ref 12–65)
ANION GAP SERPL CALC-SCNC: 5 MMOL/L (ref 7–16)
AST SERPL-CCNC: 30 U/L (ref 15–37)
BASOPHILS # BLD: 0 K/UL (ref 0–0.2)
BASOPHILS NFR BLD: 0 % (ref 0–2)
BILIRUB SERPL-MCNC: 0.7 MG/DL (ref 0.2–1.1)
BUN SERPL-MCNC: 23 MG/DL (ref 8–23)
CALCIUM SERPL-MCNC: 9 MG/DL (ref 8.3–10.4)
CHLORIDE SERPL-SCNC: 102 MMOL/L (ref 98–107)
CO2 SERPL-SCNC: 29 MMOL/L (ref 21–32)
CREAT SERPL-MCNC: 1.79 MG/DL (ref 0.6–1)
DIFFERENTIAL METHOD BLD: ABNORMAL
EOSINOPHIL # BLD: 0.1 K/UL (ref 0–0.8)
EOSINOPHIL NFR BLD: 1 % (ref 0.5–7.8)
ERYTHROCYTE [DISTWIDTH] IN BLOOD BY AUTOMATED COUNT: 14.5 % (ref 11.9–14.6)
GLOBULIN SER CALC-MCNC: 2.8 G/DL (ref 2.3–3.5)
GLUCOSE SERPL-MCNC: 103 MG/DL (ref 65–100)
HCT VFR BLD AUTO: 32.8 % (ref 35.8–46.3)
HGB BLD-MCNC: 10.6 G/DL (ref 11.7–15.4)
IMM GRANULOCYTES # BLD AUTO: 0 K/UL (ref 0–0.5)
IMM GRANULOCYTES NFR BLD AUTO: 1 % (ref 0–5)
KAPPA LC FREE SER-MCNC: 24.6 MG/L (ref 3.3–19.4)
KAPPA LC FREE/LAMBDA FREE SER: 2.42 {RATIO} (ref 0.26–1.65)
LAMBDA LC FREE SERPL-MCNC: 10.16 MG/L (ref 5.71–26.3)
LYMPHOCYTES # BLD: 1 K/UL (ref 0.5–4.6)
LYMPHOCYTES NFR BLD: 26 % (ref 13–44)
MCH RBC QN AUTO: 31.5 PG (ref 26.1–32.9)
MCHC RBC AUTO-ENTMCNC: 32.3 G/DL (ref 31.4–35)
MCV RBC AUTO: 97.6 FL (ref 79.6–97.8)
MONOCYTES # BLD: 0.4 K/UL (ref 0.1–1.3)
MONOCYTES NFR BLD: 10 % (ref 4–12)
NEUTS SEG # BLD: 2.4 K/UL (ref 1.7–8.2)
NEUTS SEG NFR BLD: 63 % (ref 43–78)
NRBC # BLD: 0 K/UL (ref 0–0.2)
PLATELET # BLD AUTO: 147 K/UL (ref 150–450)
PMV BLD AUTO: 10.9 FL (ref 9.4–12.3)
POTASSIUM SERPL-SCNC: 4 MMOL/L (ref 3.5–5.1)
PROT SERPL-MCNC: 6.4 G/DL (ref 6.3–8.2)
RBC # BLD AUTO: 3.36 M/UL (ref 4.05–5.25)
SODIUM SERPL-SCNC: 136 MMOL/L (ref 136–145)
WBC # BLD AUTO: 3.8 K/UL (ref 4.3–11.1)

## 2019-08-02 PROCEDURE — 85025 COMPLETE CBC W/AUTO DIFF WBC: CPT

## 2019-08-02 PROCEDURE — 86334 IMMUNOFIX E-PHORESIS SERUM: CPT

## 2019-08-02 PROCEDURE — 80053 COMPREHEN METABOLIC PANEL: CPT

## 2019-08-02 PROCEDURE — 36415 COLL VENOUS BLD VENIPUNCTURE: CPT

## 2019-08-02 PROCEDURE — 84165 PROTEIN E-PHORESIS SERUM: CPT

## 2019-08-02 PROCEDURE — 83883 ASSAY NEPHELOMETRY NOT SPEC: CPT

## 2019-08-02 NOTE — PROGRESS NOTES
Pt was seen here today. Labs reviewed. She will continue with taking pomalyst. We will see her again in 5 weeks. Per daughter pt just started  Back on pomalyst this past week due to them forgetting to call to have them ship her drug. Refill on pomlayst given to [facility] but will more likely like not be ready to fill for another 1-2 weeks.

## 2019-08-05 LAB
ALBUMIN SERPL ELPH-MCNC: 3.68 G/DL (ref 3.2–5.6)
ALBUMIN/GLOB SERPL: 1.5 {RATIO}
ALPHA1 GLOB SERPL ELPH-MCNC: 0.25 G/DL (ref 0.1–0.4)
ALPHA2 GLOB SERPL ELPH-MCNC: 0.94 G/DL (ref 0.4–1.2)
B-GLOBULIN SERPL QL ELPH: 0.83 G/DL (ref 0.6–1.3)
GAMMA GLOB MFR SERPL ELPH: 0.39 G/DL (ref 0.5–1.6)
IGA SERPL-MCNC: 33 MG/DL (ref 85–499)
IGG SERPL-MCNC: 433 MG/DL (ref 610–1616)
IGM SERPL-MCNC: 13 MG/DL (ref 35–242)
M PROTEIN SERPL ELPH-MCNC: 0.18 G/DL
PROT PATTERN SERPL ELPH-IMP: ABNORMAL
PROT PATTERN SPEC IFE-IMP: ABNORMAL
PROT SERPL-MCNC: 6.1 G/DL (ref 6.3–8.2)

## 2019-09-06 ENCOUNTER — PATIENT OUTREACH (OUTPATIENT)
Dept: CASE MANAGEMENT | Age: 84
End: 2019-09-06

## 2019-09-06 ENCOUNTER — HOSPITAL ENCOUNTER (OUTPATIENT)
Dept: LAB | Age: 84
Discharge: HOME | End: 2019-09-06
Payer: MEDICARE

## 2019-09-06 DIAGNOSIS — C90.00 MULTIPLE MYELOMA NOT HAVING ACHIEVED REMISSION (HCC): ICD-10-CM

## 2019-09-06 LAB
ALBUMIN SERPL-MCNC: 3.4 G/DL (ref 3.2–4.6)
ALBUMIN/GLOB SERPL: 1.1 {RATIO} (ref 1.2–3.5)
ALP SERPL-CCNC: 61 U/L (ref 50–136)
ALT SERPL-CCNC: 24 U/L (ref 12–65)
ANION GAP SERPL CALC-SCNC: 9 MMOL/L (ref 7–16)
AST SERPL-CCNC: 23 U/L (ref 15–37)
BASOPHILS # BLD: 0 K/UL (ref 0–0.2)
BASOPHILS NFR BLD: 1 % (ref 0–2)
BILIRUB SERPL-MCNC: 0.7 MG/DL (ref 0.2–1.1)
BUN SERPL-MCNC: 20 MG/DL (ref 8–23)
CALCIUM SERPL-MCNC: 7.8 MG/DL (ref 8.3–10.4)
CHLORIDE SERPL-SCNC: 103 MMOL/L (ref 98–107)
CO2 SERPL-SCNC: 24 MMOL/L (ref 21–32)
CREAT SERPL-MCNC: 1.51 MG/DL (ref 0.6–1)
DIFFERENTIAL METHOD BLD: ABNORMAL
EOSINOPHIL # BLD: 0 K/UL (ref 0–0.8)
EOSINOPHIL NFR BLD: 1 % (ref 0.5–7.8)
ERYTHROCYTE [DISTWIDTH] IN BLOOD BY AUTOMATED COUNT: 13.6 % (ref 11.9–14.6)
GLOBULIN SER CALC-MCNC: 3 G/DL (ref 2.3–3.5)
GLUCOSE SERPL-MCNC: 93 MG/DL (ref 65–100)
HCT VFR BLD AUTO: 34 % (ref 35.8–46.3)
HGB BLD-MCNC: 10.8 G/DL (ref 11.7–15.4)
IMM GRANULOCYTES # BLD AUTO: 0 K/UL (ref 0–0.5)
IMM GRANULOCYTES NFR BLD AUTO: 1 % (ref 0–5)
KAPPA LC FREE SER-MCNC: 30.2 MG/L (ref 3.3–19.4)
KAPPA LC FREE/LAMBDA FREE SER: 2.62 {RATIO} (ref 0.26–1.65)
LAMBDA LC FREE SERPL-MCNC: 11.53 MG/L (ref 5.71–26.3)
LYMPHOCYTES # BLD: 1.1 K/UL (ref 0.5–4.6)
LYMPHOCYTES NFR BLD: 26 % (ref 13–44)
MCH RBC QN AUTO: 31.2 PG (ref 26.1–32.9)
MCHC RBC AUTO-ENTMCNC: 31.8 G/DL (ref 31.4–35)
MCV RBC AUTO: 98.3 FL (ref 79.6–97.8)
MONOCYTES # BLD: 0.7 K/UL (ref 0.1–1.3)
MONOCYTES NFR BLD: 17 % (ref 4–12)
NEUTS SEG # BLD: 2.2 K/UL (ref 1.7–8.2)
NEUTS SEG NFR BLD: 54 % (ref 43–78)
NRBC # BLD: 0 K/UL (ref 0–0.2)
PLATELET # BLD AUTO: 144 K/UL (ref 150–450)
PMV BLD AUTO: 10.2 FL (ref 9.4–12.3)
POTASSIUM SERPL-SCNC: 4.4 MMOL/L (ref 3.5–5.1)
PROT SERPL-MCNC: 6.4 G/DL (ref 6.3–8.2)
RBC # BLD AUTO: 3.46 M/UL (ref 4.05–5.25)
SODIUM SERPL-SCNC: 136 MMOL/L (ref 136–145)
WBC # BLD AUTO: 4.1 K/UL (ref 4.3–11.1)

## 2019-09-06 PROCEDURE — 85025 COMPLETE CBC W/AUTO DIFF WBC: CPT

## 2019-09-06 PROCEDURE — 83883 ASSAY NEPHELOMETRY NOT SPEC: CPT

## 2019-09-06 PROCEDURE — 80053 COMPREHEN METABOLIC PANEL: CPT

## 2019-09-06 PROCEDURE — 36415 COLL VENOUS BLD VENIPUNCTURE: CPT

## 2019-09-06 PROCEDURE — 86334 IMMUNOFIX E-PHORESIS SERUM: CPT

## 2019-09-06 PROCEDURE — 84165 PROTEIN E-PHORESIS SERUM: CPT

## 2019-09-06 NOTE — PROGRESS NOTES
Pt was seen today by Dr. Sofy Arellano today. Labs reviewed. She will continue with taking pomalyst 1 mg daily. We will see her in 5 weeks. Advised to call with any further needs.

## 2019-10-11 NOTE — PROGRESS NOTES
Pt was seen today by D,r Glenna Hashimoto. Labs reviewed. She is dong well. More fatigued lately. She will restart her Pomalyst on 10/18 per daughter. otherwise no other issues. We will see her again in 5 weeks. MM labs pending today.

## 2019-11-20 PROBLEM — C90.00 MULTIPLE MYELOMA (HCC): Status: ACTIVE | Noted: 2019-01-01

## 2019-11-20 PROBLEM — R06.09 DYSPNEA ON EFFORT: Status: ACTIVE | Noted: 2019-01-01

## 2019-12-03 NOTE — PROGRESS NOTES
Good news is there is no acute fracture seeen. There is some soft tissue swelling, and i'd use ice, elevation. Ace bandage wrap around it during day. Use the keflex to make sure no infection. F/u with dr. Margo Cruz in 7-10 days, if not improving.

## 2019-12-12 NOTE — PROGRESS NOTES
Pt was seen and labs reviewed by Dr. Luciano Paul. Pt is doing well, no complaints today. She will continue her current medications. Pt will return to clinic in 4 weeks. New pomalyst prescription written today. Advised to call with any further needs.

## 2019-12-16 PROBLEM — D48.5 NEOPLASM OF UNCERTAIN BEHAVIOR OF SKIN: Status: ACTIVE | Noted: 2019-01-01

## 2019-12-16 PROBLEM — C44.629 SQUAMOUS CELL CARCINOMA OF SKIN OF LEFT UPPER LIMB, INCLUDING SHOULDER: Status: ACTIVE | Noted: 2019-01-01

## 2019-12-16 PROBLEM — L82.0 INFLAMED SEBORRHEIC KERATOSIS: Status: ACTIVE | Noted: 2019-01-01

## 2019-12-16 NOTE — PROCEDURE: BIOPSY BY SHAVE METHOD
Hide Accession Number?: No
Dressing: bandage
Consent: Written consent was obtained and risks were reviewed including but not limited to scarring, infection, bleeding, scabbing, incomplete removal, nerve damage and allergy to anesthesia.
Notification Instructions: Patient will be notified of biopsy results. However, patient instructed to call the office if not contacted within 2 weeks.
Size Of Lesion In Cm: 1
Electrodesiccation And Curettage Text: The wound bed was treated with electrodesiccation and curettage after the biopsy was performed.
Anesthesia Type: 1% lidocaine without epinephrine and a 1:10 solution of 8.4% sodium bicarbonate
Cryotherapy Text: The wound bed was treated with cryotherapy after the biopsy was performed.
Silver Nitrate Text: The wound bed was treated with silver nitrate after the biopsy was performed.
Detail Level: Detailed
Accession #: PC
Depth Of Biopsy: dermis
Biopsy Method: Dermablade
Electrodesiccation Text: The wound bed was treated with electrodesiccation after the biopsy was performed.
Billing Type: Third-Party Bill
Type Of Destruction Used: Curettage
Anesthesia Volume In Cc: 0.5
Biopsy Type: H and E
X Size Of Lesion In Cm: 0
Post-Care Instructions: I reviewed with the patient in detail post-care instructions. Patient is to keep the biopsy site dry overnight, and then apply bacitracin twice daily until healed. Patient may apply hydrogen peroxide soaks to remove any crusting.
Curettage Text: The wound bed was treated with curettage after the biopsy was performed.
Wound Care: Vaseline
Path Notes (To The Dermatopathologist): .
Was A Bandage Applied: Yes
Hemostasis: Aluminum Chloride

## 2019-12-16 NOTE — PROCEDURE: EXCISION
Complex Repair Preamble Text (Leave Blank If You Do Not Want): Extensive wide undermining was performed.
Accession #: [id number]
Melolabial Interpolation Flap Text: A decision was made to reconstruct the defect utilizing an interpolation axial flap and a staged reconstruction.  A telfa template was made of the defect.  This telfa template was then used to outline the melolabial interpolation flap.  The donor area for the pedicle flap was then injected with anesthesia.  The flap was excised through the skin and subcutaneous tissue down to the layer of the underlying musculature.  The pedicle flap was carefully excised within this deep plane to maintain its blood supply.  The edges of the donor site were undermined.   The donor site was closed in a primary fashion.  The pedicle was then rotated into position and sutured.  Once the tube was sutured into place, adequate blood supply was confirmed with blanching and refill.  The pedicle was then wrapped with xeroform gauze and dressed appropriately with a telfa and gauze bandage to ensure continued blood supply and protect the attached pedicle.
Modified Advancement Flap Text: The defect edges were debeveled with a #15 scalpel blade.  Given the location of the defect, shape of the defect and the proximity to free margins a modified advancement flap was deemed most appropriate.  Using a sterile surgical marker, an appropriate advancement flap was drawn incorporating the defect and placing the expected incisions within the relaxed skin tension lines where possible.    The area thus outlined was incised deep to adipose tissue with a #15 scalpel blade.  The skin margins were undermined to an appropriate distance in all directions utilizing iris scissors.
Validate That Anesthesia Volume Is Not Zero (If You Leave At 0 It Will Not Render In Note): No
A-T Advancement Flap Text: The defect edges were debeveled with a #15 scalpel blade.  Given the location of the defect, shape of the defect and the proximity to free margins an A-T advancement flap was deemed most appropriate.  Using a sterile surgical marker, an appropriate advancement flap was drawn incorporating the defect and placing the expected incisions within the relaxed skin tension lines where possible.    The area thus outlined was incised deep to adipose tissue with a #15 scalpel blade.  The skin margins were undermined to an appropriate distance in all directions utilizing iris scissors.
Show Curettage Variables: Yes
Complex Repair And Rhombic Flap Text: The defect edges were debeveled with a #15 scalpel blade.  The primary defect was closed partially with a complex linear closure.  Given the location of the remaining defect, shape of the defect and the proximity to free margins a rhombic flap was deemed most appropriate for complete closure of the defect.  Using a sterile surgical marker, an appropriate advancement flap was drawn incorporating the defect and placing the expected incisions within the relaxed skin tension lines where possible.    The area thus outlined was incised deep to adipose tissue with a #15 scalpel blade.  The skin margins were undermined to an appropriate distance in all directions utilizing iris scissors.
Cheek-To-Nose Interpolation Flap Text: A decision was made to reconstruct the defect utilizing an interpolation axial flap and a staged reconstruction.  A telfa template was made of the defect.  This telfa template was then used to outline the Cheek-To-Nose Interpolation flap.  The donor area for the pedicle flap was then injected with anesthesia.  The flap was excised through the skin and subcutaneous tissue down to the layer of the underlying musculature.  The interpolation flap was carefully excised within this deep plane to maintain its blood supply.  The edges of the donor site were undermined.   The donor site was closed in a primary fashion.  The pedicle was then rotated into position and sutured.  Once the tube was sutured into place, adequate blood supply was confirmed with blanching and refill.  The pedicle was then wrapped with xeroform gauze and dressed appropriately with a telfa and gauze bandage to ensure continued blood supply and protect the attached pedicle.
Additional Anesthesia Volume In Cc: 2
M-Plasty Intermediate Repair Preamble Text (Leave Blank If You Do Not Want): Undermining was performed with blunt dissection.
Epidermal Sutures: 5-0 Prolene
Skin Substitute Units (Will Override Primary Defect Units If Greater Than 0): 0
Island Pedicle Flap Text: The defect edges were debeveled with a #15 scalpel blade.  Given the location of the defect, shape of the defect and the proximity to free margins an island pedicle advancement flap was deemed most appropriate.  Using a sterile surgical marker, an appropriate advancement flap was drawn incorporating the defect, outlining the appropriate donor tissue and placing the expected incisions within the relaxed skin tension lines where possible.    The area thus outlined was incised deep to adipose tissue with a #15 scalpel blade.  The skin margins were undermined to an appropriate distance in all directions around the primary defect and laterally outward around the island pedicle utilizing iris scissors.  There was minimal undermining beneath the pedicle flap.
Perilesional Excision Additional Text (Leave Blank If You Do Not Want): The margin was drawn around the clinically apparent lesion. Incisions were then made along these lines to the appropriate tissue plane and the lesion was extirpated.
Ftsg Text: The defect edges were debeveled with a #15 scalpel blade.  Given the location of the defect, shape of the defect and the proximity to free margins a full thickness skin graft was deemed most appropriate.  Using a sterile surgical marker, the primary defect shape was transferred to the donor site. The area thus outlined was incised deep to adipose tissue with a #15 scalpel blade.  The harvested graft was then trimmed of adipose tissue until only dermis and epidermis was left.  The skin margins of the secondary defect were undermined to an appropriate distance in all directions utilizing iris scissors.  The secondary defect was closed with interrupted buried subcutaneous sutures.  The skin edges were then re-apposed with running  sutures.  The skin graft was then placed in the primary defect and oriented appropriately.
Rhombic Flap Text: The defect edges were debeveled with a #15 scalpel blade.  Given the location of the defect and the proximity to free margins a rhombic flap was deemed most appropriate.  Using a sterile surgical marker, an appropriate rhombic flap was drawn incorporating the defect.    The area thus outlined was incised deep to adipose tissue with a #15 scalpel blade.  The skin margins were undermined to an appropriate distance in all directions utilizing iris scissors.
Complex Repair And W Plasty Text: The defect edges were debeveled with a #15 scalpel blade.  The primary defect was closed partially with a complex linear closure.  Given the location of the remaining defect, shape of the defect and the proximity to free margins a W plasty was deemed most appropriate for complete closure of the defect.  Using a sterile surgical marker, an appropriate advancement flap was drawn incorporating the defect and placing the expected incisions within the relaxed skin tension lines where possible.    The area thus outlined was incised deep to adipose tissue with a #15 scalpel blade.  The skin margins were undermined to an appropriate distance in all directions utilizing iris scissors.
Slit Excision Additional Text (Leave Blank If You Do Not Want): A linear line was drawn on the skin overlying the lesion. An incision was made slowly until the lesion was visualized.  Once visualized, the lesion was removed with blunt dissection.
Anesthesia Type: 1% lidocaine with epinephrine and a 1:10 solution of 8.4% sodium bicarbonate
Complex Repair And Dorsal Nasal Flap Text: The defect edges were debeveled with a #15 scalpel blade.  The primary defect was closed partially with a complex linear closure.  Given the location of the remaining defect, shape of the defect and the proximity to free margins a dorsal nasal flap was deemed most appropriate for complete closure of the defect.  Using a sterile surgical marker, an appropriate flap was drawn incorporating the defect and placing the expected incisions within the relaxed skin tension lines where possible.    The area thus outlined was incised deep to adipose tissue with a #15 scalpel blade.  The skin margins were undermined to an appropriate distance in all directions utilizing iris scissors.
Island Pedicle Flap With Canthal Suspension Text: The defect edges were debeveled with a #15 scalpel blade.  Given the location of the defect, shape of the defect and the proximity to free margins an island pedicle advancement flap was deemed most appropriate.  Using a sterile surgical marker, an appropriate advancement flap was drawn incorporating the defect, outlining the appropriate donor tissue and placing the expected incisions within the relaxed skin tension lines where possible. The area thus outlined was incised deep to adipose tissue with a #15 scalpel blade.  The skin margins were undermined to an appropriate distance in all directions around the primary defect and laterally outward around the island pedicle utilizing iris scissors.  There was minimal undermining beneath the pedicle flap. A suspension suture was placed in the canthal tendon to prevent tension and prevent ectropion.
Repair Performed By Another Provider Text (Leave Blank If You Do Not Want): After the tissue was excised the defect was repaired by another provider.
Estimated Blood Loss (Cc): minimal
Home Suture Removal Text: Patient was provided a home suture removal kit and will remove their sutures at home.  If they have any questions or difficulties they will call the office.
Melolabial Transposition Flap Text: The defect edges were debeveled with a #15 scalpel blade.  Given the location of the defect and the proximity to free margins a melolabial flap was deemed most appropriate.  Using a sterile surgical marker, an appropriate melolabial transposition flap was drawn incorporating the defect.    The area thus outlined was incised deep to adipose tissue with a #15 scalpel blade.  The skin margins were undermined to an appropriate distance in all directions utilizing iris scissors.
Advancement-Rotation Flap Text: The defect edges were debeveled with a #15 scalpel blade.  Given the location of the defect, shape of the defect and the proximity to free margins an advancement-rotation flap was deemed most appropriate.  Using a sterile surgical marker, an appropriate flap was drawn incorporating the defect and placing the expected incisions within the relaxed skin tension lines where possible. The area thus outlined was incised deep to adipose tissue with a #15 scalpel blade.  The skin margins were undermined to an appropriate distance in all directions utilizing iris scissors.
Muscle Hinge Flap Text: The defect edges were debeveled with a #15 scalpel blade.  Given the size, depth and location of the defect and the proximity to free margins a muscle hinge flap was deemed most appropriate.  Using a sterile surgical marker, an appropriate hinge flap was drawn incorporating the defect. The area thus outlined was incised with a #15 scalpel blade.  The skin margins were undermined to an appropriate distance in all directions utilizing iris scissors.
S Plasty Text: Given the location and shape of the defect, and the orientation of relaxed skin tension lines, an S-plasty was deemed most appropriate for repair.  Using a sterile surgical marker, the appropriate outline of the S-plasty was drawn, incorporating the defect and placing the expected incisions within the relaxed skin tension lines where possible.  The area thus outlined was incised deep to adipose tissue with a #15 scalpel blade.  The skin margins were undermined to an appropriate distance in all directions utilizing iris scissors. The skin flaps were advanced over the defect.  The opposing margins were then approximated with interrupted buried subcutaneous sutures.
Suture Removal: 14 days
Complex Repair And Melolabial Flap Text: The defect edges were debeveled with a #15 scalpel blade.  The primary defect was closed partially with a complex linear closure.  Given the location of the remaining defect, shape of the defect and the proximity to free margins a melolabial flap was deemed most appropriate for complete closure of the defect.  Using a sterile surgical marker, an appropriate advancement flap was drawn incorporating the defect and placing the expected incisions within the relaxed skin tension lines where possible.    The area thus outlined was incised deep to adipose tissue with a #15 scalpel blade.  The skin margins were undermined to an appropriate distance in all directions utilizing iris scissors.
O-T Advancement Flap Text: The defect edges were debeveled with a #15 scalpel blade.  Given the location of the defect, shape of the defect and the proximity to free margins an O-T advancement flap was deemed most appropriate.  Using a sterile surgical marker, an appropriate advancement flap was drawn incorporating the defect and placing the expected incisions within the relaxed skin tension lines where possible.    The area thus outlined was incised deep to adipose tissue with a #15 scalpel blade.  The skin margins were undermined to an appropriate distance in all directions utilizing iris scissors.
W Plasty Text: The lesion was extirpated to the level of the fat with a #15 scalpel blade.  Given the location of the defect, shape of the defect and the proximity to free margins a W-plasty was deemed most appropriate for repair.  Using a sterile surgical marker, the appropriate transposition arms of the W-plasty were drawn incorporating the defect and placing the expected incisions within the relaxed skin tension lines where possible.    The area thus outlined was incised deep to adipose tissue with a #15 scalpel blade.  The skin margins were undermined to an appropriate distance in all directions utilizing iris scissors.  The opposing transposition arms were then transposed into place in opposite direction and anchored with interrupted buried subcutaneous sutures.
Double Island Pedicle Flap Text: The defect edges were debeveled with a #15 scalpel blade.  Given the location of the defect, shape of the defect and the proximity to free margins a double island pedicle advancement flap was deemed most appropriate.  Using a sterile surgical marker, an appropriate advancement flap was drawn incorporating the defect, outlining the appropriate donor tissue and placing the expected incisions within the relaxed skin tension lines where possible.    The area thus outlined was incised deep to adipose tissue with a #15 scalpel blade.  The skin margins were undermined to an appropriate distance in all directions around the primary defect and laterally outward around the island pedicle utilizing iris scissors.  There was minimal undermining beneath the pedicle flap.
Complex Repair And Ftsg Text: The defect edges were debeveled with a #15 scalpel blade.  The primary defect was closed partially with a complex linear closure.  Given the location of the defect, shape of the defect and the proximity to free margins a full thickness skin graft was deemed most appropriate to repair the remaining defect.  The graft was trimmed to fit the size of the remaining defect.  The graft was then placed in the primary defect, oriented appropriately, and sutured into place.
Posterior Auricular Interpolation Flap Text: A decision was made to reconstruct the defect utilizing an interpolation axial flap and a staged reconstruction.  A telfa template was made of the defect.  This telfa template was then used to outline the posterior auricular interpolation flap.  The donor area for the pedicle flap was then injected with anesthesia.  The flap was excised through the skin and subcutaneous tissue down to the layer of the underlying musculature.  The pedicle flap was carefully excised within this deep plane to maintain its blood supply.  The edges of the donor site were undermined.   The donor site was closed in a primary fashion.  The pedicle was then rotated into position and sutured.  Once the tube was sutured into place, adequate blood supply was confirmed with blanching and refill.  The pedicle was then wrapped with xeroform gauze and dressed appropriately with a telfa and gauze bandage to ensure continued blood supply and protect the attached pedicle.
Rotation Flap Text: The defect edges were debeveled with a #15 scalpel blade.  Given the location of the defect, shape of the defect and the proximity to free margins a rotation flap was deemed most appropriate.  Using a sterile surgical marker, an appropriate rotation flap was drawn incorporating the defect and placing the expected incisions within the relaxed skin tension lines where possible.    The area thus outlined was incised deep to adipose tissue with a #15 scalpel blade.  The skin margins were undermined to an appropriate distance in all directions utilizing iris scissors.
Graft Donor Site Bandage (Optional-Leave Blank If You Don't Want In Note): Steri-strips and a pressure bandage were applied to the donor site.
Cheek Interpolation Flap Text: A decision was made to reconstruct the defect utilizing an interpolation axial flap and a staged reconstruction.  A telfa template was made of the defect.  This telfa template was then used to outline the Cheek Interpolation flap.  The donor area for the pedicle flap was then injected with anesthesia.  The flap was excised through the skin and subcutaneous tissue down to the layer of the underlying musculature.  The interpolation flap was carefully excised within this deep plane to maintain its blood supply.  The edges of the donor site were undermined.   The donor site was closed in a primary fashion.  The pedicle was then rotated into position and sutured.  Once the tube was sutured into place, adequate blood supply was confirmed with blanching and refill.  The pedicle was then wrapped with xeroform gauze and dressed appropriately with a telfa and gauze bandage to ensure continued blood supply and protect the attached pedicle.
O-T Plasty Text: The defect edges were debeveled with a #15 scalpel blade.  Given the location of the defect, shape of the defect and the proximity to free margins an O-T plasty was deemed most appropriate.  Using a sterile surgical marker, an appropriate O-T plasty was drawn incorporating the defect and placing the expected incisions within the relaxed skin tension lines where possible.    The area thus outlined was incised deep to adipose tissue with a #15 scalpel blade.  The skin margins were undermined to an appropriate distance in all directions utilizing iris scissors.
Epidermal Closure Graft Donor Site (Optional): simple interrupted
Consent was obtained from the patient. The risks and benefits to therapy were discussed in detail. Specifically, the risks of infection, scarring, bleeding, prolonged wound healing, incomplete removal, allergy to anesthesia, nerve injury and recurrence were addressed. Prior to the procedure, the treatment site was clearly identified and confirmed by the patient. All components of Universal Protocol/PAUSE Rule completed.
Complex Repair And Double Advancement Flap Text: The defect edges were debeveled with a #15 scalpel blade.  The primary defect was closed partially with a complex linear closure.  Given the location of the remaining defect, shape of the defect and the proximity to free margins a double advancement flap was deemed most appropriate for complete closure of the defect.  Using a sterile surgical marker, an appropriate advancement flap was drawn incorporating the defect and placing the expected incisions within the relaxed skin tension lines where possible.    The area thus outlined was incised deep to adipose tissue with a #15 scalpel blade.  The skin margins were undermined to an appropriate distance in all directions utilizing iris scissors.
Purse String (Simple) Text: Given the location of the defect and the characteristics of the surrounding skin a purse string simple closure was deemed most appropriate.  Undermining was performed circumferentially around the surgical defect.  A purse string suture was then placed and tightened.
Bilobed Flap Text: The defect edges were debeveled with a #15 scalpel blade.  Given the location of the defect and the proximity to free margins a bilobe flap was deemed most appropriate.  Using a sterile surgical marker, an appropriate bilobe flap drawn around the defect.    The area thus outlined was incised deep to adipose tissue with a #15 scalpel blade.  The skin margins were undermined to an appropriate distance in all directions utilizing iris scissors.
O-L Flap Text: The defect edges were debeveled with a #15 scalpel blade.  Given the location of the defect, shape of the defect and the proximity to free margins an O-L flap was deemed most appropriate.  Using a sterile surgical marker, an appropriate advancement flap was drawn incorporating the defect and placing the expected incisions within the relaxed skin tension lines where possible.    The area thus outlined was incised deep to adipose tissue with a #15 scalpel blade.  The skin margins were undermined to an appropriate distance in all directions utilizing iris scissors.
Trilobed Flap Text: The defect edges were debeveled with a #15 scalpel blade.  Given the location of the defect and the proximity to free margins a trilobed flap was deemed most appropriate.  Using a sterile surgical marker, an appropriate trilobed flap drawn around the defect.    The area thus outlined was incised deep to adipose tissue with a #15 scalpel blade.  The skin margins were undermined to an appropriate distance in all directions utilizing iris scissors.
Hemostasis: Electrocautery
No Repair - Repaired With Adjacent Surgical Defect Text (Leave Blank If You Do Not Want): After the excision the defect was repaired concurrently with another surgical defect which was in close approximation.
Spiral Flap Text: The defect edges were debeveled with a #15 scalpel blade.  Given the location of the defect, shape of the defect and the proximity to free margins a spiral flap was deemed most appropriate.  Using a sterile surgical marker, an appropriate rotation flap was drawn incorporating the defect and placing the expected incisions within the relaxed skin tension lines where possible. The area thus outlined was incised deep to adipose tissue with a #15 scalpel blade.  The skin margins were undermined to an appropriate distance in all directions utilizing iris scissors.
Split-Thickness Skin Graft Text: The defect edges were debeveled with a #15 scalpel blade.  Given the location of the defect, shape of the defect and the proximity to free margins a split thickness skin graft was deemed most appropriate.  Using a sterile surgical marker, the primary defect shape was transferred to the donor site. The split thickness graft was then harvested.  The skin graft was then placed in the primary defect and oriented appropriately.
Cartilage Graft Text: The defect edges were debeveled with a #15 scalpel blade.  Given the location of the defect, shape of the defect, the fact the defect involved a full thickness cartilage defect a cartilage graft was deemed most appropriate.  An appropriate donor site was identified, cleansed, and anesthetized. The cartilage graft was then harvested and transferred to the recipient site, oriented appropriately and then sutured into place.  The secondary defect was then repaired using a primary closure.
Interpolation Flap Text: A decision was made to reconstruct the defect utilizing an interpolation axial flap and a staged reconstruction.  A telfa template was made of the defect.  This telfa template was then used to outline the interpolation flap.  The donor area for the pedicle flap was then injected with anesthesia.  The flap was excised through the skin and subcutaneous tissue down to the layer of the underlying musculature.  The interpolation flap was carefully excised within this deep plane to maintain its blood supply.  The edges of the donor site were undermined.   The donor site was closed in a primary fashion.  The pedicle was then rotated into position and sutured.  Once the tube was sutured into place, adequate blood supply was confirmed with blanching and refill.  The pedicle was then wrapped with xeroform gauze and dressed appropriately with a telfa and gauze bandage to ensure continued blood supply and protect the attached pedicle.
Anesthesia Type: 1% lidocaine with 1:100,000 epinephrine and 408mcg clindamycin/ml and a 1:10 solution of 8.4% sodium bicarbonate
Star Wedge Flap Text: The defect edges were debeveled with a #15 scalpel blade.  Given the location of the defect, shape of the defect and the proximity to free margins a star wedge flap was deemed most appropriate.  Using a sterile surgical marker, an appropriate rotation flap was drawn incorporating the defect and placing the expected incisions within the relaxed skin tension lines where possible. The area thus outlined was incised deep to adipose tissue with a #15 scalpel blade.  The skin margins were undermined to an appropriate distance in all directions utilizing iris scissors.
Tissue Cultured Epidermal Autograft Text: The defect edges were debeveled with a #15 scalpel blade.  Given the location of the defect, shape of the defect and the proximity to free margins a tissue cultured epidermal autograft was deemed most appropriate.  The graft was then trimmed to fit the size of the defect.  The graft was then placed in the primary defect and oriented appropriately.
Keystone Flap Text: The defect edges were debeveled with a #15 scalpel blade.  Given the location of the defect, shape of the defect a keystone flap was deemed most appropriate.  Using a sterile surgical marker, an appropriate keystone flap was drawn incorporating the defect, outlining the appropriate donor tissue and placing the expected incisions within the relaxed skin tension lines where possible. The area thus outlined was incised deep to adipose tissue with a #15 scalpel blade.  The skin margins were undermined to an appropriate distance in all directions around the primary defect and laterally outward around the flap utilizing iris scissors.
Anesthesia Volume In Cc: 3
Mercedes Flap Text: The defect edges were debeveled with a #15 scalpel blade.  Given the location of the defect, shape of the defect and the proximity to free margins a Mercedes flap was deemed most appropriate.  Using a sterile surgical marker, an appropriate advancement flap was drawn incorporating the defect and placing the expected incisions within the relaxed skin tension lines where possible. The area thus outlined was incised deep to adipose tissue with a #15 scalpel blade.  The skin margins were undermined to an appropriate distance in all directions utilizing iris scissors.
Complex Repair And O-T Advancement Flap Text: The defect edges were debeveled with a #15 scalpel blade.  The primary defect was closed partially with a complex linear closure.  Given the location of the remaining defect, shape of the defect and the proximity to free margins an O-T advancement flap was deemed most appropriate for complete closure of the defect.  Using a sterile surgical marker, an appropriate advancement flap was drawn incorporating the defect and placing the expected incisions within the relaxed skin tension lines where possible.    The area thus outlined was incised deep to adipose tissue with a #15 scalpel blade.  The skin margins were undermined to an appropriate distance in all directions utilizing iris scissors.
Dermal Closure: with plication
Saucerization Excision Additional Text (Leave Blank If You Do Not Want): The margin was drawn around the clinically apparent lesion.  Incisions were then made along these lines, in a tangential fashion, to the appropriate tissue plane and the lesion was extirpated.
Chonodrocutaneous Helical Advancement Flap Text: The defect edges were debeveled with a #15 scalpel blade.  Given the location of the defect and the proximity to free margins a chondrocutaneous helical advancement flap was deemed most appropriate.  Using a sterile surgical marker, the appropriate advancement flap was drawn incorporating the defect and placing the expected incisions within the relaxed skin tension lines where possible.    The area thus outlined was incised deep to adipose tissue with a #15 scalpel blade.  The skin margins were undermined to an appropriate distance in all directions utilizing iris scissors.
Repair Type: Complex
Alar Island Pedicle Flap Text: The defect edges were debeveled with a #15 scalpel blade.  Given the location of the defect, shape of the defect and the proximity to the alar rim an island pedicle advancement flap was deemed most appropriate.  Using a sterile surgical marker, an appropriate advancement flap was drawn incorporating the defect, outlining the appropriate donor tissue and placing the expected incisions within the nasal ala running parallel to the alar rim. The area thus outlined was incised with a #15 scalpel blade.  The skin margins were undermined minimally to an appropriate distance in all directions around the primary defect and laterally outward around the island pedicle utilizing iris scissors.  There was minimal undermining beneath the pedicle flap.
Epidermal Autograft Text: The defect edges were debeveled with a #15 scalpel blade.  Given the location of the defect, shape of the defect and the proximity to free margins an epidermal autograft was deemed most appropriate.  Using a sterile surgical marker, the primary defect shape was transferred to the donor site. The epidermal graft was then harvested.  The skin graft was then placed in the primary defect and oriented appropriately.
Skin Substitute Text: The defect edges were debeveled with a #15 scalpel blade.  Given the location of the defect, shape of the defect and the proximity to free margins a skin substitute graft was deemed most appropriate.  The graft material was trimmed to fit the size of the defect. The graft was then placed in the primary defect and oriented appropriately.
Elliptical Excision Additional Text (Leave Blank If You Do Not Want): The margin was drawn around the clinically apparent lesion.  An elliptical shape was then drawn on the skin incorporating the lesion and margins.  Incisions were then made along these lines to the appropriate tissue plane and the lesion was extirpated.
Fusiform Excision Additional Text (Leave Blank If You Do Not Want): The margin was drawn around the clinically apparent lesion.  A fusiform shape was then drawn on the skin incorporating the lesion and margins.  Incisions were then made along these lines to the appropriate tissue plane and the lesion was extirpated.
Bilobed Transposition Flap Text: The defect edges were debeveled with a #15 scalpel blade.  Given the location of the defect and the proximity to free margins a bilobed transposition flap was deemed most appropriate.  Using a sterile surgical marker, an appropriate bilobe flap drawn around the defect.    The area thus outlined was incised deep to adipose tissue with a #15 scalpel blade.  The skin margins were undermined to an appropriate distance in all directions utilizing iris scissors.
Dorsal Nasal Flap Text: The defect edges were debeveled with a #15 scalpel blade.  Given the location of the defect and the proximity to free margins a dorsal nasal flap was deemed most appropriate.  Using a sterile surgical marker, an appropriate dorsal nasal flap was drawn around the defect.    The area thus outlined was incised deep to adipose tissue with a #15 scalpel blade.  The skin margins were undermined to an appropriate distance in all directions utilizing iris scissors.
Complex Repair And Transposition Flap Text: The defect edges were debeveled with a #15 scalpel blade.  The primary defect was closed partially with a complex linear closure.  Given the location of the remaining defect, shape of the defect and the proximity to free margins a transposition flap was deemed most appropriate for complete closure of the defect.  Using a sterile surgical marker, an appropriate advancement flap was drawn incorporating the defect and placing the expected incisions within the relaxed skin tension lines where possible.    The area thus outlined was incised deep to adipose tissue with a #15 scalpel blade.  The skin margins were undermined to an appropriate distance in all directions utilizing iris scissors.
Rhomboid Transposition Flap Text: The defect edges were debeveled with a #15 scalpel blade.  Given the location of the defect and the proximity to free margins a rhomboid transposition flap was deemed most appropriate.  Using a sterile surgical marker, an appropriate rhomboid flap was drawn incorporating the defect.    The area thus outlined was incised deep to adipose tissue with a #15 scalpel blade.  The skin margins were undermined to an appropriate distance in all directions utilizing iris scissors.
Lip Wedge Excision Repair Text: Given the location of the defect and the proximity to free margins a full thickness wedge repair was deemed most appropriate.  Using a sterile surgical marker, the appropriate repair was drawn incorporating the defect and placing the expected incisions perpendicular to the vermilion border.  The vermilion border was also meticulously outlined to ensure appropriate reapproximation during the repair.  The area thus outlined was incised through and through with a #15 scalpel blade.  The muscularis and dermis were reaproximated with deep sutures following hemostasis. Care was taken to realign the vermilion border before proceeding with the superficial closure.  Once the vermilion was realigned the superfical and mucosal closure was finished.
Xenograft Text: The defect edges were debeveled with a #15 scalpel blade.  Given the location of the defect, shape of the defect and the proximity to free margins a xenograft was deemed most appropriate.  The graft was then trimmed to fit the size of the defect.  The graft was then placed in the primary defect and oriented appropriately.
V-Y Plasty Text: The defect edges were debeveled with a #15 scalpel blade.  Given the location of the defect, shape of the defect and the proximity to free margins an V-Y advancement flap was deemed most appropriate.  Using a sterile surgical marker, an appropriate advancement flap was drawn incorporating the defect and placing the expected incisions within the relaxed skin tension lines where possible.    The area thus outlined was incised deep to adipose tissue with a #15 scalpel blade.  The skin margins were undermined to an appropriate distance in all directions utilizing iris scissors.
Z Plasty Text: The lesion was extirpated to the level of the fat with a #15 scalpel blade.  Given the location of the defect, shape of the defect and the proximity to free margins a Z-plasty was deemed most appropriate for repair.  Using a sterile surgical marker, the appropriate transposition arms of the Z-plasty were drawn incorporating the defect and placing the expected incisions within the relaxed skin tension lines where possible.    The area thus outlined was incised deep to adipose tissue with a #15 scalpel blade.  The skin margins were undermined to an appropriate distance in all directions utilizing iris scissors.  The opposing transposition arms were then transposed into place in opposite direction and anchored with interrupted buried subcutaneous sutures.
Intermediate / Complex Repair - Final Wound Length In Cm: 7
Advancement Flap (Double) Text: The defect edges were debeveled with a #15 scalpel blade.  Given the location of the defect and the proximity to free margins a double advancement flap was deemed most appropriate.  Using a sterile surgical marker, the appropriate advancement flaps were drawn incorporating the defect and placing the expected incisions within the relaxed skin tension lines where possible.    The area thus outlined was incised deep to adipose tissue with a #15 scalpel blade.  The skin margins were undermined to an appropriate distance in all directions utilizing iris scissors.
Complex Repair And Xenograft Text: The defect edges were debeveled with a #15 scalpel blade.  The primary defect was closed partially with a complex linear closure.  Given the location of the defect, shape of the defect and the proximity to free margins a xenograft was deemed most appropriate to repair the remaining defect.  The graft was trimmed to fit the size of the remaining defect.  The graft was then placed in the primary defect, oriented appropriately, and sutured into place.
Mucosal Advancement Flap Text: Given the location of the defect, shape of the defect and the proximity to free margins a mucosal advancement flap was deemed most appropriate. Incisions were made with a 15 blade scalpel in the appropriate fashion along the cutaneous vermilion border and the mucosal lip. The remaining actinically damaged mucosal tissue was excised.  The mucosal advancement flap was then elevated to the gingival sulcus with care taken to preserve the neurovascular structures and advanced into the primary defect. Care was taken to ensure that precise realignment of the vermilion border was achieved.
Hatchet Flap Text: The defect edges were debeveled with a #15 scalpel blade.  Given the location of the defect, shape of the defect and the proximity to free margins a hatchet flap was deemed most appropriate.  Using a sterile surgical marker, an appropriate hatchet flap was drawn incorporating the defect and placing the expected incisions within the relaxed skin tension lines where possible.    The area thus outlined was incised deep to adipose tissue with a #15 scalpel blade.  The skin margins were undermined to an appropriate distance in all directions utilizing iris scissors.
O-Z Flap Text: The defect edges were debeveled with a #15 scalpel blade.  Given the location of the defect, shape of the defect and the proximity to free margins an O-Z flap was deemed most appropriate.  Using a sterile surgical marker, an appropriate transposition flap was drawn incorporating the defect and placing the expected incisions within the relaxed skin tension lines where possible. The area thus outlined was incised deep to adipose tissue with a #15 scalpel blade.  The skin margins were undermined to an appropriate distance in all directions utilizing iris scissors.
Epidermal Closure: running
Complex Repair And O-L Flap Text: The defect edges were debeveled with a #15 scalpel blade.  The primary defect was closed partially with a complex linear closure.  Given the location of the remaining defect, shape of the defect and the proximity to free margins an O-L flap was deemed most appropriate for complete closure of the defect.  Using a sterile surgical marker, an appropriate flap was drawn incorporating the defect and placing the expected incisions within the relaxed skin tension lines where possible.    The area thus outlined was incised deep to adipose tissue with a #15 scalpel blade.  The skin margins were undermined to an appropriate distance in all directions utilizing iris scissors.
Complex Repair And Split-Thickness Skin Graft Text: The defect edges were debeveled with a #15 scalpel blade.  The primary defect was closed partially with a complex linear closure.  Given the location of the defect, shape of the defect and the proximity to free margins a split thickness skin graft was deemed most appropriate to repair the remaining defect.  The graft was trimmed to fit the size of the remaining defect.  The graft was then placed in the primary defect, oriented appropriately, and sutured into place.
V-Y Flap Text: The defect edges were debeveled with a #15 scalpel blade.  Given the location of the defect, shape of the defect and the proximity to free margins a V-Y flap was deemed most appropriate.  Using a sterile surgical marker, an appropriate advancement flap was drawn incorporating the defect and placing the expected incisions within the relaxed skin tension lines where possible.    The area thus outlined was incised deep to adipose tissue with a #15 scalpel blade.  The skin margins were undermined to an appropriate distance in all directions utilizing iris scissors.
Transposition Flap Text: The defect edges were debeveled with a #15 scalpel blade.  Given the location of the defect and the proximity to free margins a transposition flap was deemed most appropriate.  Using a sterile surgical marker, an appropriate transposition flap was drawn incorporating the defect.    The area thus outlined was incised deep to adipose tissue with a #15 scalpel blade.  The skin margins were undermined to an appropriate distance in all directions utilizing iris scissors.
Dressing: pressure dressing with telfa
Path Notes (To The Dermatopathologist): Please check margins.
Excision Method: Elliptical
Excisional Biopsy Additional Text (Leave Blank If You Do Not Want): The margin was drawn around the clinically apparent lesion. An elliptical shape was then drawn on the skin incorporating the lesion and margins.  Incisions were then made along these lines to the appropriate tissue plane and the lesion was extirpated.
Bi-Rhombic Flap Text: The defect edges were debeveled with a #15 scalpel blade.  Given the location of the defect and the proximity to free margins a bi-rhombic flap was deemed most appropriate.  Using a sterile surgical marker, an appropriate rhombic flap was drawn incorporating the defect. The area thus outlined was incised deep to adipose tissue with a #15 scalpel blade.  The skin margins were undermined to an appropriate distance in all directions utilizing iris scissors.
Island Pedicle Flap-Requiring Vessel Identification Text: The defect edges were debeveled with a #15 scalpel blade.  Given the location of the defect, shape of the defect and the proximity to free margins an island pedicle advancement flap was deemed most appropriate.  Using a sterile surgical marker, an appropriate advancement flap was drawn, based on the axial vessel mentioned above, incorporating the defect, outlining the appropriate donor tissue and placing the expected incisions within the relaxed skin tension lines where possible.    The area thus outlined was incised deep to adipose tissue with a #15 scalpel blade.  The skin margins were undermined to an appropriate distance in all directions around the primary defect and laterally outward around the island pedicle utilizing iris scissors.  There was minimal undermining beneath the pedicle flap.
Purse String (Intermediate) Text: Given the location of the defect and the characteristics of the surrounding skin a purse string intermediate closure was deemed most appropriate.  Undermining was performed circumfirentially around the surgical defect.  A purse string suture was then placed and tightened.
Partial Purse String (Intermediate) Text: Given the location of the defect and the characteristics of the surrounding skin an intermediate purse string closure was deemed most appropriate.  Undermining was performed circumferentially around the surgical defect.  A purse string suture was then placed and tightened. Wound tension of the circular defect prevented complete closure of the wound.
Paramedian Forehead Flap Text: A decision was made to reconstruct the defect utilizing an interpolation axial flap and a staged reconstruction.  A telfa template was made of the defect.  This telfa template was then used to outline the paramedian forehead pedicle flap.  The donor area for the pedicle flap was then injected with anesthesia.  The flap was excised through the skin and subcutaneous tissue down to the layer of the underlying musculature.  The pedicle flap was carefully excised within this deep plane to maintain its blood supply.  The edges of the donor site were undermined.   The donor site was closed in a primary fashion.  The pedicle was then rotated into position and sutured.  Once the tube was sutured into place, adequate blood supply was confirmed with blanching and refill.  The pedicle was then wrapped with xeroform gauze and dressed appropriately with a telfa and gauze bandage to ensure continued blood supply and protect the attached pedicle.
Composite Graft Text: The defect edges were debeveled with a #15 scalpel blade.  Given the location of the defect, shape of the defect, the proximity to free margins and the fact the defect was full thickness a composite graft was deemed most appropriate.  The defect was outline and then transferred to the donor site.  A full thickness graft was then excised from the donor site. The graft was then placed in the primary defect, oriented appropriately and then sutured into place.  The secondary defect was then repaired using a primary closure.
Scalpel Size: 15 blade
Mastoid Interpolation Flap Text: A decision was made to reconstruct the defect utilizing an interpolation axial flap and a staged reconstruction.  A telfa template was made of the defect.  This telfa template was then used to outline the mastoid interpolation flap.  The donor area for the pedicle flap was then injected with anesthesia.  The flap was excised through the skin and subcutaneous tissue down to the layer of the underlying musculature.  The pedicle flap was carefully excised within this deep plane to maintain its blood supply.  The edges of the donor site were undermined.   The donor site was closed in a primary fashion.  The pedicle was then rotated into position and sutured.  Once the tube was sutured into place, adequate blood supply was confirmed with blanching and refill.  The pedicle was then wrapped with xeroform gauze and dressed appropriately with a telfa and gauze bandage to ensure continued blood supply and protect the attached pedicle.
Ear Star Wedge Flap Text: The defect edges were debeveled with a #15 blade scalpel.  Given the location of the defect and the proximity to free margins (helical rim) an ear star wedge flap was deemed most appropriate.  Using a sterile surgical marker, the appropriate flap was drawn incorporating the defect and placing the expected incisions between the helical rim and antihelix where possible.  The area thus outlined was incised through and through with a #15 scalpel blade.
Double O-Z Flap Text: The defect edges were debeveled with a #15 scalpel blade.  Given the location of the defect, shape of the defect and the proximity to free margins a Double O-Z flap was deemed most appropriate.  Using a sterile surgical marker, an appropriate transposition flap was drawn incorporating the defect and placing the expected incisions within the relaxed skin tension lines where possible. The area thus outlined was incised deep to adipose tissue with a #15 scalpel blade.  The skin margins were undermined to an appropriate distance in all directions utilizing iris scissors.
Complex Repair And Z Plasty Text: The defect edges were debeveled with a #15 scalpel blade.  The primary defect was closed partially with a complex linear closure.  Given the location of the remaining defect, shape of the defect and the proximity to free margins a Z plasty was deemed most appropriate for complete closure of the defect.  Using a sterile surgical marker, an appropriate advancement flap was drawn incorporating the defect and placing the expected incisions within the relaxed skin tension lines where possible.    The area thus outlined was incised deep to adipose tissue with a #15 scalpel blade.  The skin margins were undermined to an appropriate distance in all directions utilizing iris scissors.
Double O-Z Plasty Text: The defect edges were debeveled with a #15 scalpel blade.  Given the location of the defect, shape of the defect and the proximity to free margins a Double O-Z plasty (double transposition flap) was deemed most appropriate.  Using a sterile surgical marker, the appropriate transposition flaps were drawn incorporating the defect and placing the expected incisions within the relaxed skin tension lines where possible. The area thus outlined was incised deep to adipose tissue with a #15 scalpel blade.  The skin margins were undermined to an appropriate distance in all directions utilizing iris scissors.  Hemostasis was achieved with electrocautery.  The flaps were then transposed into place, one clockwise and the other counterclockwise, and anchored with interrupted buried subcutaneous sutures.
Complex Repair And A-T Advancement Flap Text: The defect edges were debeveled with a #15 scalpel blade.  The primary defect was closed partially with a complex linear closure.  Given the location of the remaining defect, shape of the defect and the proximity to free margins an A-T advancement flap was deemed most appropriate for complete closure of the defect.  Using a sterile surgical marker, an appropriate advancement flap was drawn incorporating the defect and placing the expected incisions within the relaxed skin tension lines where possible.    The area thus outlined was incised deep to adipose tissue with a #15 scalpel blade.  The skin margins were undermined to an appropriate distance in all directions utilizing iris scissors.
Burow's Advancement Flap Text: The defect edges were debeveled with a #15 scalpel blade.  Given the location of the defect and the proximity to free margins a Burow's advancement flap was deemed most appropriate.  Using a sterile surgical marker, the appropriate advancement flap was drawn incorporating the defect and placing the expected incisions within the relaxed skin tension lines where possible.    The area thus outlined was incised deep to adipose tissue with a #15 scalpel blade.  The skin margins were undermined to an appropriate distance in all directions utilizing iris scissors.
Pre-Excision Curettage Text (Leave Blank If You Do Not Want): Prior to drawing the surgical margin the visible lesion was removed with electrodesiccation and curettage to clearly define the lesion size.
Advancement Flap (Single) Text: The defect edges were debeveled with a #15 scalpel blade.  Given the location of the defect and the proximity to free margins a single advancement flap was deemed most appropriate.  Using a sterile surgical marker, an appropriate advancement flap was drawn incorporating the defect and placing the expected incisions within the relaxed skin tension lines where possible.    The area thus outlined was incised deep to adipose tissue with a #15 scalpel blade.  The skin margins were undermined to an appropriate distance in all directions utilizing iris scissors.
Banner Transposition Flap Text: The defect edges were debeveled with a #15 scalpel blade.  Given the location of the defect and the proximity to free margins a Banner transposition flap was deemed most appropriate.  Using a sterile surgical marker, an appropriate flap drawn around the defect. The area thus outlined was incised deep to adipose tissue with a #15 scalpel blade.  The skin margins were undermined to an appropriate distance in all directions utilizing iris scissors.
Where Do You Want The Question To Include Opioid Counseling Located?: Case Summary Tab
Detail Level: Detailed
Complex Repair And Double M Plasty Text: The defect edges were debeveled with a #15 scalpel blade.  The primary defect was closed partially with a complex linear closure.  Given the location of the remaining defect, shape of the defect and the proximity to free margins a double M plasty was deemed most appropriate for complete closure of the defect.  Using a sterile surgical marker, an appropriate advancement flap was drawn incorporating the defect and placing the expected incisions within the relaxed skin tension lines where possible.    The area thus outlined was incised deep to adipose tissue with a #15 scalpel blade.  The skin margins were undermined to an appropriate distance in all directions utilizing iris scissors.
Dermal Autograft Text: The defect edges were debeveled with a #15 scalpel blade.  Given the location of the defect, shape of the defect and the proximity to free margins a dermal autograft was deemed most appropriate.  Using a sterile surgical marker, the primary defect shape was transferred to the donor site. The area thus outlined was incised deep to adipose tissue with a #15 scalpel blade.  The harvested graft was then trimmed of adipose and epidermal tissue until only dermis was left.  The skin graft was then placed in the primary defect and oriented appropriately.
Billing Type: Third-Party Bill
Curvilinear Excision Additional Text (Leave Blank If You Do Not Want): The margin was drawn around the clinically apparent lesion.  A curvilinear shape was then drawn on the skin incorporating the lesion and margins.  Incisions were then made along these lines to the appropriate tissue plane and the lesion was extirpated.
Information: Selecting Yes will display possible errors in your note based on the variables you have selected. This validation is only offered as a suggestion for you. PLEASE NOTE THAT THE VALIDATION TEXT WILL BE REMOVED WHEN YOU FINALIZE YOUR NOTE. IF YOU WANT TO FAX A PRELIMINARY NOTE YOU WILL NEED TO TOGGLE THIS TO 'NO' IF YOU DO NOT WANT IT IN YOUR FAXED NOTE.
Deep Sutures: 4-0 PDS
Complex Repair And Single Advancement Flap Text: The defect edges were debeveled with a #15 scalpel blade.  The primary defect was closed partially with a complex linear closure.  Given the location of the remaining defect, shape of the defect and the proximity to free margins a single advancement flap was deemed most appropriate for complete closure of the defect.  Using a sterile surgical marker, an appropriate advancement flap was drawn incorporating the defect and placing the expected incisions within the relaxed skin tension lines where possible.    The area thus outlined was incised deep to adipose tissue with a #15 scalpel blade.  The skin margins were undermined to an appropriate distance in all directions utilizing iris scissors.
Additional Epidermal Sutures: steri-strips
Complex Repair And M Plasty Text: The defect edges were debeveled with a #15 scalpel blade.  The primary defect was closed partially with a complex linear closure.  Given the location of the remaining defect, shape of the defect and the proximity to free margins an M plasty was deemed most appropriate for complete closure of the defect.  Using a sterile surgical marker, an appropriate advancement flap was drawn incorporating the defect and placing the expected incisions within the relaxed skin tension lines where possible.    The area thus outlined was incised deep to adipose tissue with a #15 scalpel blade.  The skin margins were undermined to an appropriate distance in all directions utilizing iris scissors.
Complex Repair And Bilobe Flap Text: The defect edges were debeveled with a #15 scalpel blade.  The primary defect was closed partially with a complex linear closure.  Given the location of the remaining defect, shape of the defect and the proximity to free margins a bilobe flap was deemed most appropriate for complete closure of the defect.  Using a sterile surgical marker, an appropriate advancement flap was drawn incorporating the defect and placing the expected incisions within the relaxed skin tension lines where possible.    The area thus outlined was incised deep to adipose tissue with a #15 scalpel blade.  The skin margins were undermined to an appropriate distance in all directions utilizing iris scissors.
Excision Depth: fascia
Wound Care: Waterproof dressing
Partial Purse String (Simple) Text: Given the location of the defect and the characteristics of the surrounding skin a simple purse string closure was deemed most appropriate.  Undermining was performed circumferentially around the surgical defect.  A purse string suture was then placed and tightened. Wound tension of the circular defect prevented complete closure of the wound.
Bilateral Helical Rim Advancement Flap Text: The defect edges were debeveled with a #15 blade scalpel.  Given the location of the defect and the proximity to free margins (helical rim) a bilateral helical rim advancement flap was deemed most appropriate.  Using a sterile surgical marker, the appropriate advancement flaps were drawn incorporating the defect and placing the expected incisions between the helical rim and antihelix where possible.  The area thus outlined was incised through and through with a #15 scalpel blade.  With a skin hook and iris scissors, the flaps were gently and sharply undermined and freed up.
Post-Care Instructions: I reviewed with the patient in detail post-care instructions. Patient is not to engage in any heavy lifting, exercise, or swimming for the next 14 days. Should the patient develop any fevers, chills, bleeding, severe pain patient will contact the office immediately.
Complex Repair And Tissue Cultured Epidermal Autograft Text: The defect edges were debeveled with a #15 scalpel blade.  The primary defect was closed partially with a complex linear closure.  Given the location of the defect, shape of the defect and the proximity to free margins an tissue cultured epidermal autograft was deemed most appropriate to repair the remaining defect.  The graft was trimmed to fit the size of the remaining defect.  The graft was then placed in the primary defect, oriented appropriately, and sutured into place.
Positioning (Leave Blank If You Do Not Want): The patient was placed in a comfortable position exposing the surgical site.
H Plasty Text: Given the location of the defect, shape of the defect and the proximity to free margins a H-plasty was deemed most appropriate for repair.  Using a sterile surgical marker, the appropriate advancement arms of the H-plasty were drawn incorporating the defect and placing the expected incisions within the relaxed skin tension lines where possible. The area thus outlined was incised deep to adipose tissue with a #15 scalpel blade. The skin margins were undermined to an appropriate distance in all directions utilizing iris scissors.  The opposing advancement arms were then advanced into place in opposite direction and anchored with interrupted buried subcutaneous sutures.
Crescentic Advancement Flap Text: The defect edges were debeveled with a #15 scalpel blade.  Given the location of the defect and the proximity to free margins a crescentic advancement flap was deemed most appropriate.  Using a sterile surgical marker, the appropriate advancement flap was drawn incorporating the defect and placing the expected incisions within the relaxed skin tension lines where possible.    The area thus outlined was incised deep to adipose tissue with a #15 scalpel blade.  The skin margins were undermined to an appropriate distance in all directions utilizing iris scissors.
Size Of Lesion In Cm: 2.2
Complex Repair And V-Y Plasty Text: The defect edges were debeveled with a #15 scalpel blade.  The primary defect was closed partially with a complex linear closure.  Given the location of the remaining defect, shape of the defect and the proximity to free margins a V-Y plasty was deemed most appropriate for complete closure of the defect.  Using a sterile surgical marker, an appropriate advancement flap was drawn incorporating the defect and placing the expected incisions within the relaxed skin tension lines where possible.    The area thus outlined was incised deep to adipose tissue with a #15 scalpel blade.  The skin margins were undermined to an appropriate distance in all directions utilizing iris scissors.
Size Of Margin In Cm: 0.6
O-Z Plasty Text: The defect edges were debeveled with a #15 scalpel blade.  Given the location of the defect, shape of the defect and the proximity to free margins an O-Z plasty (double transposition flap) was deemed most appropriate.  Using a sterile surgical marker, the appropriate transposition flaps were drawn incorporating the defect and placing the expected incisions within the relaxed skin tension lines where possible.    The area thus outlined was incised deep to adipose tissue with a #15 scalpel blade.  The skin margins were undermined to an appropriate distance in all directions utilizing iris scissors.  Hemostasis was achieved with electrocautery.  The flaps were then transposed into place, one clockwise and the other counterclockwise, and anchored with interrupted buried subcutaneous sutures.
Complex Repair And Modified Advancement Flap Text: The defect edges were debeveled with a #15 scalpel blade.  The primary defect was closed partially with a complex linear closure.  Given the location of the remaining defect, shape of the defect and the proximity to free margins a modified advancement flap was deemed most appropriate for complete closure of the defect.  Using a sterile surgical marker, an appropriate advancement flap was drawn incorporating the defect and placing the expected incisions within the relaxed skin tension lines where possible.    The area thus outlined was incised deep to adipose tissue with a #15 scalpel blade.  The skin margins were undermined to an appropriate distance in all directions utilizing iris scissors.
Helical Rim Advancement Flap Text: The defect edges were debeveled with a #15 blade scalpel.  Given the location of the defect and the proximity to free margins (helical rim) a double helical rim advancement flap was deemed most appropriate.  Using a sterile surgical marker, the appropriate advancement flaps were drawn incorporating the defect and placing the expected incisions between the helical rim and antihelix where possible.  The area thus outlined was incised through and through with a #15 scalpel blade.  With a skin hook and iris scissors, the flaps were gently and sharply undermined and freed up.
Complex Repair And Dermal Autograft Text: The defect edges were debeveled with a #15 scalpel blade.  The primary defect was closed partially with a complex linear closure.  Given the location of the defect, shape of the defect and the proximity to free margins an dermal autograft was deemed most appropriate to repair the remaining defect.  The graft was trimmed to fit the size of the remaining defect.  The graft was then placed in the primary defect, oriented appropriately, and sutured into place.
Complex Repair And Rotation Flap Text: The defect edges were debeveled with a #15 scalpel blade.  The primary defect was closed partially with a complex linear closure.  Given the location of the remaining defect, shape of the defect and the proximity to free margins a rotation flap was deemed most appropriate for complete closure of the defect.  Using a sterile surgical marker, an appropriate advancement flap was drawn incorporating the defect and placing the expected incisions within the relaxed skin tension lines where possible.    The area thus outlined was incised deep to adipose tissue with a #15 scalpel blade.  The skin margins were undermined to an appropriate distance in all directions utilizing iris scissors.
Complex Repair And Epidermal Autograft Text: The defect edges were debeveled with a #15 scalpel blade.  The primary defect was closed partially with a complex linear closure.  Given the location of the defect, shape of the defect and the proximity to free margins an epidermal autograft was deemed most appropriate to repair the remaining defect.  The graft was trimmed to fit the size of the remaining defect.  The graft was then placed in the primary defect, oriented appropriately, and sutured into place.
Complex Repair And Skin Substitute Graft Text: The defect edges were debeveled with a #15 scalpel blade.  The primary defect was closed partially with a complex linear closure.  Given the location of the remaining defect, shape of the defect and the proximity to free margins a skin substitute graft was deemed most appropriate to repair the remaining defect.  The graft was trimmed to fit the size of the remaining defect.  The graft was then placed in the primary defect, oriented appropriately, and sutured into place.

## 2019-12-27 NOTE — ED PROVIDER NOTES
[facility] Emergency Department  Arrival Date/Time: 12/27/2019  2:57 PM      Grazyna Brantley  MRN: [id number]    YOB: 1930   80 y.o. female    [facility] EMERGENCY DEPT ER18/18  Seen on 12/27/2019 @ 5:00 PM      Today's Chief Complaint:   Chief Complaint   Patient presents with    Laceration     HPI: 79-year-old female was putting her Belt up. She slipped caught her hand on the latch of the door tore the ulnar aspect of her palm open. HPI    Review of Systems: Review of Systems   Constitutional: Negative. Past Medical History: Primary Care Doctor: Adeola Caldwell MD  Meds, PMH, PSHx, SocHx at end of this note     Allergies: No Known Allergies      Key Anti-Platelet Anticoagulant Meds             Aspirin, Buffered 81 mg tab Take  by mouth daily. Physical Exam:  Nursing documentation reviewed. Patient Vitals for the past 24 hrs:   Temp Pulse Resp BP SpO2   12/27/19 1409 97.7 °F (36.5 °C) 61 15 136/66 97 %     Oxygen Therapy  O2 Sat (%): 97 % (12/27/19 1409)  O2 Device: Room air (12/27/19 1409) Vital signs were reviewed. Physical Exam  Vitals signs and nursing note reviewed. Musculoskeletal:        Hands:    Skin:     General: Skin is warm and dry. Neurological:      Mental Status: She is alert. MEDICAL DECISION MAKING:   Differential Diagnosis:    MDM  Number of Diagnoses or Management Options  Diagnosis management comments: 79-year-old female with laceration of the hand. Will proceed with laceration repair    There is no foreign body. Bleeding is minimal        Risk of Complications, Morbidity, and/or Mortality  Presenting problems: moderate  Diagnostic procedures: minimal  Management options: moderate          Data/Management:    Lab findings during this visit: No results found for this or any previous visit (from the past 48 hour(s)). Radiology studies during this visit: Xr Hand Rt Min 3 V    Result Date: 12/27/2019  IMPRESSION: 1.  No evidence of acute bony abnormality. 2. Chronic findings as described. Medications given in the ED: Medications - No data to display    Recheck and Additional Documentation:  (use .addrecheck  . addsepsis   . addstroke   . addhip  . addhandoff  . addcctime)          Procedure Documentation:   Wound Repair  Date/Time: 12/27/2019 5:01 PM  Performed by: attendingPreparation: skin prepped with Betadine  Time out: Immediately prior to the procedure a time out was called to verify the correct patient, procedure, equipment, staff and marking as appropriate. .  Location details: right hand  Wound length:2.6 - 7.5 cm  Anesthesia: local infiltration    Anesthesia:  Local Anesthetic: lidocaine 1% without epinephrine  Anesthetic total: 9 mL  Foreign bodies: no foreign bodies  Irrigation solution: saline  Skin closure: 4-0 nylon  Number of sutures: 10  Technique: simple  Dressing: 4x4  Patient tolerance: Patient tolerated the procedure well with no immediate complications  My total time at bedside, performing this procedure was 16-30 minutes. Other ED Course Notes:      Wound closed without difficulty. Assessment and Plan:    Impression: No diagnosis found.   Disposition:      Follow-up:   Follow-up Information    None         Discharge Medications:   Current Discharge Medication List          Past Medical History:      Past Medical History:   Diagnosis Date    Anemia, unspecified     Chest pain, unspecified 3/21/2016    Chronic anxiety 2/03/7600    Diastolic heart failure ([address] 3/21/2016    Essential hypertension, benign     Flatulence, eructation, and gas pain     Lump or mass in breast     Other and unspecified hyperlipidemia     Paroxysmal atrial fibrillation ([address] 3/21/2016    Paroxysmal tachycardia (HCC) 3/21/2016    Pernicious anemia     Postmenopausal atrophic vaginitis     Unspecified deficiency anemia     Unspecified hypothyroidism      Past Surgical History:   Procedure Laterality Date    HX HYSTERECTOMY  HX ORTHOPAEDIC      heel spur    HX VEIN STRIPPING       Social History     Tobacco Use    Smoking status: Never Smoker    Smokeless tobacco: Never Used   Substance Use Topics    Alcohol use: No     Alcohol/week: 0.0 standard drinks    Drug use: No     Prior to Admission Medications   Prescriptions Last Dose Informant Patient Reported? Taking? Aspirin, Buffered 81 mg tab   Yes No   Sig: Take  by mouth daily. DISABLED PLACARD (DISABLED PLACARD) DMV   No No   Sig: Apply to car. OXYGEN-AIR DELIVERY SYSTEMS   Yes No   Sig: 3 L by Does Not Apply route nightly. amLODIPine (NORVASC) 2.5 mg tablet   No No   Sig: Take 1 Tab by mouth daily. amiodarone (CORDARONE) 200 mg tablet   No No   Sig: Take 1 Tab by mouth daily. atorvastatin (LIPITOR) 80 mg tablet   No No   Sig: TAKE 1 TABLET BY MOUTH DAILY   cephALEXin (KEFLEX) 500 mg capsule   No No   Sig: Take 1 Cap by mouth three (3) times daily. cholecalciferol (VITAMIN D3) 1,000 unit tablet   No No   Sig: Take 1 Tab by mouth daily. clorazepate (TRANXENE) 7.5 mg tablet   No No   Sig: TAKE 1 TABLET BY MOUTH TWICE A DAY. docusate sodium (STOOL SOFTENER) 100 mg capsule   No No   Sig: Take 1 Cap by mouth two (2) times a day. estradiol (ESTRACE) 1 mg tablet   No No   Sig: Take 1 Tab by mouth daily. ferrous sulfate 325 mg (65 mg iron) tablet   No No   Sig: TAKE 1 TABLET BY MOUTH DAILY. furosemide (LASIX) 40 mg tablet   No No   Sig: Take 1 Tab by mouth daily. Taking 2 po qd   Patient taking differently: Take 40 mg by mouth daily. ipratropium (ATROVENT) 42 mcg (0.06 %) nasal spray   No No   [place] by Both Nostrils route three (3) times daily. lactulose (KRISTALOSE) 20 gram packet   No No   Sig: Take 1 Packet by mouth three (3) times daily. levothyroxine (SYNTHROID) 50 mcg tablet   No No   Sig: Take 1 Tab by mouth Daily (before breakfast). mirtazapine (REMERON) 15 mg tablet   Yes No   Sig: nightly.    mupirocin (BACTROBAN) 2 % ointment   No No Sig: Apply  to affected area daily. nadolol (CORGARD) 80 mg tablet   No No   Sig: Take 1 Tab by mouth daily. pomalidomide (POMALYST) 1 mg cap   No No   Sig: Take 1 Cap by mouth daily. Take 1 capsule daily for 14 days and then she will be off 14 days   potassium chloride SR (KLOR-CON 10) 10 mEq tablet   No No   Sig: Take 2 Tabs by mouth daily. raNITIdine (ZANTAC) 75 mg tablet   Yes No   Sig: Take 75 mg by mouth two (2) times a day. traZODone (DESYREL) 100 mg tablet   No No   Sig: Take 1 Tab by mouth nightly. trimethoprim (TRIMPEX) 100 mg tablet   No No   Sig: Take 1 Tab by mouth daily.       Facility-Administered Medications: None

## 2019-12-27 NOTE — ED NOTES
I have reviewed discharge instructions with the patient. The patient verbalized understanding. Patient left ED via Discharge Method: wheelchair to Home with son. Opportunity for questions and clarification provided. Patient given 0 scripts. To continue your aftercare when you leave the hospital, you may receive an automated call from our care team to check in on how you are doing. This is a free service and part of our promise to provide the best care and service to meet your aftercare needs.  If you have questions, or wish to unsubscribe from this service please call [phone]. Thank you for Choosing our [facility] Emergency Department.

## 2019-12-27 NOTE — DISCHARGE INSTRUCTIONS
Keep wound clean and dry  Remove bandage. Wash wound with warm water and gentle soap. Dry reapply bandage    Wear splint    Sutures out in 10 days    Come back here if you get worse or have new problems.

## 2019-12-30 PROBLEM — Z48.01 ENCOUNTER FOR CHANGE OR REMOVAL OF SURGICAL WOUND DRESSING: Status: ACTIVE | Noted: 2019-01-01

## 2019-12-30 NOTE — PROCEDURE: SUTURE REMOVAL (GLOBAL PERIOD)
Detail Level: Detailed
Add 87362 Cpt? (Important Note: In 2017 The Use Of 56860 Is Being Tracked By Cms To Determine Future Global Period Reimbursement For Global Periods): no

## 2020-01-01 ENCOUNTER — HOSPITAL ENCOUNTER (OUTPATIENT)
Dept: LAB | Age: 85
Discharge: HOME | End: 2020-02-13
Payer: MEDICARE

## 2020-01-01 ENCOUNTER — HOSPITAL ENCOUNTER (INPATIENT)
Age: 85
LOS: 7 days | Discharge: HOSPICE | DRG: 469 | End: 2020-08-28
Attending: EMERGENCY MEDICINE | Admitting: INTERNAL MEDICINE
Payer: MEDICARE

## 2020-01-01 ENCOUNTER — PATIENT OUTREACH (OUTPATIENT)
Dept: CASE MANAGEMENT | Age: 85
End: 2020-01-01

## 2020-01-01 ENCOUNTER — HOSPITAL ENCOUNTER (OUTPATIENT)
Dept: LAB | Age: 85
Discharge: HOME | End: 2020-06-29
Payer: MEDICARE

## 2020-01-01 ENCOUNTER — HOSPITAL ENCOUNTER (OUTPATIENT)
Dept: LAB | Age: 85
Discharge: HOME | End: 2020-07-31
Payer: MEDICARE

## 2020-01-01 ENCOUNTER — APPOINTMENT (OUTPATIENT)
Dept: GENERAL RADIOLOGY | Age: 85
DRG: 469 | End: 2020-01-01
Attending: INTERNAL MEDICINE
Payer: MEDICARE

## 2020-01-01 ENCOUNTER — HOSPITAL ENCOUNTER (OUTPATIENT)
Dept: LAB | Age: 85
Discharge: HOME | End: 2020-03-12
Payer: MEDICARE

## 2020-01-01 ENCOUNTER — ANESTHESIA (OUTPATIENT)
Dept: SURGERY | Age: 85
DRG: 469 | End: 2020-01-01
Payer: MEDICARE

## 2020-01-01 ENCOUNTER — APPOINTMENT (RX ONLY)
Dept: URBAN - METROPOLITAN AREA CLINIC 23 | Facility: CLINIC | Age: 85
Setting detail: DERMATOLOGY
End: 2020-01-01

## 2020-01-01 ENCOUNTER — APPOINTMENT (OUTPATIENT)
Dept: GENERAL RADIOLOGY | Age: 85
DRG: 469 | End: 2020-01-01
Attending: ORTHOPAEDIC SURGERY
Payer: MEDICARE

## 2020-01-01 ENCOUNTER — APPOINTMENT (OUTPATIENT)
Dept: GENERAL RADIOLOGY | Age: 85
DRG: 469 | End: 2020-01-01
Attending: EMERGENCY MEDICINE
Payer: MEDICARE

## 2020-01-01 ENCOUNTER — HOSPITAL ENCOUNTER (OUTPATIENT)
Dept: LAB | Age: 85
Discharge: HOME | End: 2020-05-28
Payer: MEDICARE

## 2020-01-01 ENCOUNTER — ANESTHESIA EVENT (OUTPATIENT)
Dept: SURGERY | Age: 85
DRG: 469 | End: 2020-01-01
Payer: MEDICARE

## 2020-01-01 ENCOUNTER — HOSPITAL ENCOUNTER (OUTPATIENT)
Dept: LAB | Age: 85
Discharge: HOME | End: 2020-01-09
Payer: MEDICARE

## 2020-01-01 ENCOUNTER — APPOINTMENT (OUTPATIENT)
Dept: CT IMAGING | Age: 85
End: 2020-01-01
Attending: EMERGENCY MEDICINE
Payer: MEDICARE

## 2020-01-01 ENCOUNTER — APPOINTMENT (OUTPATIENT)
Dept: GENERAL RADIOLOGY | Age: 85
DRG: 469 | End: 2020-01-01
Attending: FAMILY MEDICINE
Payer: MEDICARE

## 2020-01-01 ENCOUNTER — HOSPITAL ENCOUNTER (OUTPATIENT)
Dept: LAB | Age: 85
Discharge: HOME | End: 2020-04-30
Payer: MEDICARE

## 2020-01-01 ENCOUNTER — HOSPITAL ENCOUNTER (EMERGENCY)
Age: 85
Discharge: HOME | End: 2020-05-08
Attending: EMERGENCY MEDICINE
Payer: MEDICARE

## 2020-01-01 VITALS
SYSTOLIC BLOOD PRESSURE: 150 MMHG | RESPIRATION RATE: 16 BRPM | HEIGHT: 63 IN | HEART RATE: 65 BPM | DIASTOLIC BLOOD PRESSURE: 64 MMHG | TEMPERATURE: 97.7 F | BODY MASS INDEX: 24.98 KG/M2 | WEIGHT: 141 LBS | OXYGEN SATURATION: 95 %

## 2020-01-01 VITALS
RESPIRATION RATE: 18 BRPM | HEART RATE: 64 BPM | DIASTOLIC BLOOD PRESSURE: 62 MMHG | TEMPERATURE: 98.9 F | HEIGHT: 63 IN | BODY MASS INDEX: 24.45 KG/M2 | OXYGEN SATURATION: 98 % | WEIGHT: 138 LBS | SYSTOLIC BLOOD PRESSURE: 103 MMHG

## 2020-01-01 DIAGNOSIS — C90.01 MULTIPLE MYELOMA IN REMISSION (HCC): ICD-10-CM

## 2020-01-01 DIAGNOSIS — C90.00 MULTIPLE MYELOMA NOT HAVING ACHIEVED REMISSION (HCC): ICD-10-CM

## 2020-01-01 DIAGNOSIS — Z51.5 ENCOUNTER FOR PALLIATIVE CARE: ICD-10-CM

## 2020-01-01 DIAGNOSIS — Z48.817 ENCOUNTER FOR SURGICAL AFTERCARE FOLLOWING SURGERY ON THE SKIN AND SUBCUTANEOUS TISSUE: ICD-10-CM

## 2020-01-01 DIAGNOSIS — E55.9 VITAMIN D DEFICIENCY, UNSPECIFIED: ICD-10-CM

## 2020-01-01 DIAGNOSIS — R13.10 DYSPHAGIA, UNSPECIFIED TYPE: ICD-10-CM

## 2020-01-01 DIAGNOSIS — D485 NEOPLASM OF UNCERTAIN BEHAVIOR OF SKIN: ICD-10-CM

## 2020-01-01 DIAGNOSIS — S72.002A CLOSED FRACTURE OF LEFT HIP, INITIAL ENCOUNTER (HCC): Primary | ICD-10-CM

## 2020-01-01 DIAGNOSIS — C90.00 MULTIPLE MYELOMA, REMISSION STATUS UNSPECIFIED (HCC): ICD-10-CM

## 2020-01-01 DIAGNOSIS — Z48.01 ENCOUNTER FOR CHANGE OR REMOVAL OF SURGICAL WOUND DRESSING: ICD-10-CM

## 2020-01-01 DIAGNOSIS — R41.82 ALTERED MENTAL STATUS, UNSPECIFIED ALTERED MENTAL STATUS TYPE: ICD-10-CM

## 2020-01-01 DIAGNOSIS — Z71.89 ADVANCED CARE PLANNING/COUNSELING DISCUSSION: ICD-10-CM

## 2020-01-01 DIAGNOSIS — K59.01 SLOW TRANSIT CONSTIPATION: ICD-10-CM

## 2020-01-01 DIAGNOSIS — R51.9 ACUTE NONINTRACTABLE HEADACHE, UNSPECIFIED HEADACHE TYPE: Primary | ICD-10-CM

## 2020-01-01 LAB
25(OH)D3 SERPL-MCNC: 34.4 NG/ML (ref 30–100)
25(OH)D3 SERPL-MCNC: 59 NG/ML (ref 30–100)
25(OH)D3+25(OH)D2 SERPL-MCNC: 21.8 NG/ML (ref 30–100)
ABO + RH BLD: NORMAL
ALBUMIN SERPL ELPH-MCNC: 3.4 G/DL (ref 2.9–4.4)
ALBUMIN SERPL ELPH-MCNC: 3.46 G/DL (ref 3.2–5.6)
ALBUMIN SERPL ELPH-MCNC: 3.5 G/DL (ref 2.9–4.4)
ALBUMIN SERPL ELPH-MCNC: 3.51 G/DL (ref 3.2–5.6)
ALBUMIN SERPL ELPH-MCNC: 3.61 G/DL (ref 3.2–5.6)
ALBUMIN SERPL ELPH-MCNC: 3.63 G/DL (ref 3.2–5.6)
ALBUMIN SERPL ELPH-MCNC: 3.78 G/DL (ref 3.2–5.6)
ALBUMIN SERPL-MCNC: 2.9 G/DL (ref 3.2–4.6)
ALBUMIN SERPL-MCNC: 3.4 G/DL (ref 3.2–4.6)
ALBUMIN SERPL-MCNC: 3.5 G/DL (ref 3.2–4.6)
ALBUMIN SERPL-MCNC: 3.6 G/DL (ref 3.2–4.6)
ALBUMIN SERPL-MCNC: 3.7 G/DL (ref 3.2–4.6)
ALBUMIN/GLOB SERPL: 0.8 {RATIO} (ref 1.2–3.5)
ALBUMIN/GLOB SERPL: 1 {RATIO} (ref 1.2–3.5)
ALBUMIN/GLOB SERPL: 1.2 {RATIO} (ref 1.2–3.5)
ALBUMIN/GLOB SERPL: 1.3 {RATIO} (ref 1.2–3.5)
ALBUMIN/GLOB SERPL: 1.4 {RATIO}
ALBUMIN/GLOB SERPL: 1.4 {RATIO} (ref 1.2–3.5)
ALBUMIN/GLOB SERPL: 1.5 {RATIO} (ref 0.7–1.7)
ALBUMIN/GLOB SERPL: 1.5 {RATIO} (ref 1.2–3.5)
ALBUMIN/GLOB SERPL: 1.6 {RATIO} (ref 0.7–1.7)
ALP SERPL-CCNC: 64 U/L (ref 50–136)
ALP SERPL-CCNC: 65 U/L (ref 50–136)
ALP SERPL-CCNC: 66 U/L (ref 50–136)
ALP SERPL-CCNC: 69 U/L (ref 50–136)
ALP SERPL-CCNC: 69 U/L (ref 50–136)
ALP SERPL-CCNC: 75 U/L (ref 50–136)
ALP SERPL-CCNC: 75 U/L (ref 50–136)
ALP SERPL-CCNC: 80 U/L (ref 50–136)
ALPHA1 GLOB SERPL ELPH-MCNC: 0.2 G/DL (ref 0–0.4)
ALPHA1 GLOB SERPL ELPH-MCNC: 0.26 G/DL (ref 0.1–0.4)
ALPHA1 GLOB SERPL ELPH-MCNC: 0.27 G/DL (ref 0.1–0.4)
ALPHA1 GLOB SERPL ELPH-MCNC: 0.28 G/DL (ref 0.1–0.4)
ALPHA1 GLOB SERPL ELPH-MCNC: 0.29 G/DL (ref 0.1–0.4)
ALPHA1 GLOB SERPL ELPH-MCNC: 0.29 G/DL (ref 0.1–0.4)
ALPHA1 GLOB SERPL ELPH-MCNC: 0.3 G/DL (ref 0–0.4)
ALPHA2 GLOB SERPL ELPH-MCNC: 0.83 G/DL (ref 0.4–1.2)
ALPHA2 GLOB SERPL ELPH-MCNC: 0.88 G/DL (ref 0.4–1.2)
ALPHA2 GLOB SERPL ELPH-MCNC: 0.9 G/DL (ref 0.4–1)
ALPHA2 GLOB SERPL ELPH-MCNC: 0.9 G/DL (ref 0.4–1)
ALPHA2 GLOB SERPL ELPH-MCNC: 0.92 G/DL (ref 0.4–1.2)
ALPHA2 GLOB SERPL ELPH-MCNC: 0.94 G/DL (ref 0.4–1.2)
ALPHA2 GLOB SERPL ELPH-MCNC: 1.06 G/DL (ref 0.4–1.2)
ALT SERPL-CCNC: 17 U/L (ref 12–65)
ALT SERPL-CCNC: 19 U/L (ref 12–65)
ALT SERPL-CCNC: 20 U/L (ref 12–65)
ALT SERPL-CCNC: 26 U/L (ref 12–65)
ALT SERPL-CCNC: 26 U/L (ref 12–65)
ALT SERPL-CCNC: 37 U/L (ref 12–65)
ALT SERPL-CCNC: 49 U/L (ref 12–65)
ALT SERPL-CCNC: 54 U/L (ref 12–65)
ANION GAP SERPL CALC-SCNC: 3 MMOL/L (ref 7–16)
ANION GAP SERPL CALC-SCNC: 4 MMOL/L (ref 7–16)
ANION GAP SERPL CALC-SCNC: 5 MMOL/L (ref 7–16)
ANION GAP SERPL CALC-SCNC: 5 MMOL/L (ref 7–16)
ANION GAP SERPL CALC-SCNC: 6 MMOL/L (ref 7–16)
ANION GAP SERPL CALC-SCNC: 7 MMOL/L (ref 7–16)
ANION GAP SERPL CALC-SCNC: 7 MMOL/L (ref 7–16)
ANION GAP SERPL CALC-SCNC: 8 MMOL/L (ref 7–16)
ANION GAP SERPL CALC-SCNC: 9 MMOL/L (ref 7–16)
AST SERPL-CCNC: 17 U/L (ref 15–37)
AST SERPL-CCNC: 18 U/L (ref 15–37)
AST SERPL-CCNC: 18 U/L (ref 15–37)
AST SERPL-CCNC: 20 U/L (ref 15–37)
AST SERPL-CCNC: 24 U/L (ref 15–37)
AST SERPL-CCNC: 31 U/L (ref 15–37)
AST SERPL-CCNC: 43 U/L (ref 15–37)
AST SERPL-CCNC: 44 U/L (ref 15–37)
ATRIAL RATE: 174 BPM
ATRIAL RATE: 220 BPM
B-GLOBULIN SERPL ELPH-MCNC: 0.8 G/DL (ref 0.7–1.3)
B-GLOBULIN SERPL ELPH-MCNC: 0.8 G/DL (ref 0.7–1.3)
B-GLOBULIN SERPL QL ELPH: 0.85 G/DL (ref 0.6–1.3)
B-GLOBULIN SERPL QL ELPH: 0.87 G/DL (ref 0.6–1.3)
B-GLOBULIN SERPL QL ELPH: 0.87 G/DL (ref 0.6–1.3)
B-GLOBULIN SERPL QL ELPH: 0.94 G/DL (ref 0.6–1.3)
B-GLOBULIN SERPL QL ELPH: 0.99 G/DL (ref 0.6–1.3)
BACTERIA SPEC CULT: NORMAL
BACTERIA URNS QL MICRO: ABNORMAL /HPF
BASOPHILS # BLD: 0 K/UL (ref 0–0.2)
BASOPHILS # BLD: 0.1 K/UL (ref 0–0.2)
BASOPHILS NFR BLD: 1 % (ref 0–2)
BILIRUB SERPL-MCNC: 0.6 MG/DL (ref 0.2–1.1)
BILIRUB SERPL-MCNC: 0.7 MG/DL (ref 0.2–1.1)
BILIRUB SERPL-MCNC: 0.7 MG/DL (ref 0.2–1.1)
BILIRUB SERPL-MCNC: 0.9 MG/DL (ref 0.2–1.1)
BILIRUB SERPL-MCNC: 0.9 MG/DL (ref 0.2–1.1)
BILIRUB SERPL-MCNC: 1 MG/DL (ref 0.2–1.1)
BILIRUB SERPL-MCNC: 1 MG/DL (ref 0.2–1.1)
BILIRUB SERPL-MCNC: 1.1 MG/DL (ref 0.2–1.1)
BLOOD GROUP ANTIBODIES SERPL: NORMAL
BUN SERPL-MCNC: 15 MG/DL (ref 8–23)
BUN SERPL-MCNC: 18 MG/DL (ref 8–23)
BUN SERPL-MCNC: 19 MG/DL (ref 8–23)
BUN SERPL-MCNC: 19 MG/DL (ref 8–23)
BUN SERPL-MCNC: 20 MG/DL (ref 8–23)
BUN SERPL-MCNC: 21 MG/DL (ref 8–23)
BUN SERPL-MCNC: 21 MG/DL (ref 8–23)
BUN SERPL-MCNC: 23 MG/DL (ref 8–23)
BUN SERPL-MCNC: 23 MG/DL (ref 8–23)
BUN SERPL-MCNC: 28 MG/DL (ref 8–23)
BUN SERPL-MCNC: 28 MG/DL (ref 8–23)
BUN SERPL-MCNC: 29 MG/DL (ref 8–23)
BUN SERPL-MCNC: 30 MG/DL (ref 8–23)
BUN SERPL-MCNC: 31 MG/DL (ref 8–23)
BUN SERPL-MCNC: 32 MG/DL (ref 8–23)
CALCIUM SERPL-MCNC: 7.7 MG/DL (ref 8.3–10.4)
CALCIUM SERPL-MCNC: 8.1 MG/DL (ref 8.3–10.4)
CALCIUM SERPL-MCNC: 8.2 MG/DL (ref 8.3–10.4)
CALCIUM SERPL-MCNC: 8.3 MG/DL (ref 8.3–10.4)
CALCIUM SERPL-MCNC: 8.4 MG/DL (ref 8.3–10.4)
CALCIUM SERPL-MCNC: 8.5 MG/DL (ref 8.3–10.4)
CALCIUM SERPL-MCNC: 8.5 MG/DL (ref 8.3–10.4)
CALCIUM SERPL-MCNC: 8.7 MG/DL (ref 8.3–10.4)
CALCIUM SERPL-MCNC: 8.8 MG/DL (ref 8.3–10.4)
CALCIUM SERPL-MCNC: 8.9 MG/DL (ref 8.3–10.4)
CALCIUM SERPL-MCNC: 9 MG/DL (ref 8.3–10.4)
CALCIUM SERPL-MCNC: 9 MG/DL (ref 8.3–10.4)
CALCIUM SERPL-MCNC: 9.1 MG/DL (ref 8.3–10.4)
CALCULATED R AXIS, ECG10: 25 DEGREES
CALCULATED R AXIS, ECG10: 44 DEGREES
CALCULATED T AXIS, ECG11: -98 DEGREES
CALCULATED T AXIS, ECG11: 90 DEGREES
CASTS URNS QL MICRO: ABNORMAL /LPF
CHLORIDE SERPL-SCNC: 100 MMOL/L (ref 98–107)
CHLORIDE SERPL-SCNC: 101 MMOL/L (ref 98–107)
CHLORIDE SERPL-SCNC: 102 MMOL/L (ref 98–107)
CHLORIDE SERPL-SCNC: 103 MMOL/L (ref 98–107)
CHLORIDE SERPL-SCNC: 104 MMOL/L (ref 98–107)
CHLORIDE SERPL-SCNC: 104 MMOL/L (ref 98–107)
CHLORIDE SERPL-SCNC: 105 MMOL/L (ref 98–107)
CHLORIDE SERPL-SCNC: 107 MMOL/L (ref 98–107)
CHLORIDE SERPL-SCNC: 108 MMOL/L (ref 98–107)
CHLORIDE SERPL-SCNC: 109 MMOL/L (ref 98–107)
CHLORIDE SERPL-SCNC: 109 MMOL/L (ref 98–107)
CHLORIDE SERPL-SCNC: 111 MMOL/L (ref 98–107)
CO2 SERPL-SCNC: 26 MMOL/L (ref 21–32)
CO2 SERPL-SCNC: 28 MMOL/L (ref 21–32)
CO2 SERPL-SCNC: 29 MMOL/L (ref 21–32)
CO2 SERPL-SCNC: 29 MMOL/L (ref 21–32)
CO2 SERPL-SCNC: 30 MMOL/L (ref 21–32)
CO2 SERPL-SCNC: 30 MMOL/L (ref 21–32)
CO2 SERPL-SCNC: 31 MMOL/L (ref 21–32)
CO2 SERPL-SCNC: 32 MMOL/L (ref 21–32)
CO2 SERPL-SCNC: 32 MMOL/L (ref 21–32)
CO2 SERPL-SCNC: 33 MMOL/L (ref 21–32)
CO2 SERPL-SCNC: 33 MMOL/L (ref 21–32)
COVID-19 RAPID TEST, COVR: NOT DETECTED
CREAT SERPL-MCNC: 1.13 MG/DL (ref 0.6–1)
CREAT SERPL-MCNC: 1.22 MG/DL (ref 0.6–1)
CREAT SERPL-MCNC: 1.25 MG/DL (ref 0.6–1)
CREAT SERPL-MCNC: 1.37 MG/DL (ref 0.6–1)
CREAT SERPL-MCNC: 1.4 MG/DL (ref 0.6–1)
CREAT SERPL-MCNC: 1.4 MG/DL (ref 0.6–1)
CREAT SERPL-MCNC: 1.46 MG/DL (ref 0.6–1)
CREAT SERPL-MCNC: 1.49 MG/DL (ref 0.6–1)
CREAT SERPL-MCNC: 1.5 MG/DL (ref 0.6–1)
CREAT SERPL-MCNC: 1.57 MG/DL (ref 0.6–1)
CREAT SERPL-MCNC: 1.67 MG/DL (ref 0.6–1)
CREAT SERPL-MCNC: 1.7 MG/DL (ref 0.6–1)
CREAT SERPL-MCNC: 1.71 MG/DL (ref 0.6–1)
CREAT SERPL-MCNC: 1.78 MG/DL (ref 0.6–1)
CREAT SERPL-MCNC: 1.96 MG/DL (ref 0.6–1)
DIAGNOSIS, 93000: NORMAL
DIAGNOSIS, 93000: NORMAL
DIFFERENTIAL METHOD BLD: ABNORMAL
EOSINOPHIL # BLD: 0 K/UL (ref 0–0.8)
EOSINOPHIL # BLD: 0.1 K/UL (ref 0–0.8)
EOSINOPHIL NFR BLD: 0 % (ref 0.5–7.8)
EOSINOPHIL NFR BLD: 1 % (ref 0.5–7.8)
EOSINOPHIL NFR BLD: 3 % (ref 0.5–7.8)
EPI CELLS #/AREA URNS HPF: 0 /HPF
ERYTHROCYTE [DISTWIDTH] IN BLOOD BY AUTOMATED COUNT: 14.4 % (ref 11.9–14.6)
ERYTHROCYTE [DISTWIDTH] IN BLOOD BY AUTOMATED COUNT: 14.6 % (ref 11.9–14.6)
ERYTHROCYTE [DISTWIDTH] IN BLOOD BY AUTOMATED COUNT: 14.7 % (ref 11.9–14.6)
ERYTHROCYTE [DISTWIDTH] IN BLOOD BY AUTOMATED COUNT: 14.8 % (ref 11.9–14.6)
ERYTHROCYTE [DISTWIDTH] IN BLOOD BY AUTOMATED COUNT: 15 % (ref 11.9–14.6)
ERYTHROCYTE [DISTWIDTH] IN BLOOD BY AUTOMATED COUNT: 15 % (ref 11.9–14.6)
ERYTHROCYTE [DISTWIDTH] IN BLOOD BY AUTOMATED COUNT: 15.2 % (ref 11.9–14.6)
ERYTHROCYTE [DISTWIDTH] IN BLOOD BY AUTOMATED COUNT: 15.2 % (ref 11.9–14.6)
GAMMA GLOB MFR SERPL ELPH: 0.37 G/DL (ref 0.5–1.6)
GAMMA GLOB MFR SERPL ELPH: 0.43 G/DL (ref 0.5–1.6)
GAMMA GLOB MFR SERPL ELPH: 0.46 G/DL (ref 0.5–1.6)
GAMMA GLOB MFR SERPL ELPH: 0.49 G/DL (ref 0.5–1.6)
GAMMA GLOB MFR SERPL ELPH: 0.51 G/DL (ref 0.5–1.6)
GAMMA GLOB SERPL ELPH-MCNC: 0.4 G/DL (ref 0.4–1.8)
GAMMA GLOB SERPL ELPH-MCNC: 0.5 G/DL (ref 0.4–1.8)
GLOBULIN SER CALC-MCNC: 2.7 G/DL (ref 2.3–3.5)
GLOBULIN SER CALC-MCNC: 2.9 G/DL (ref 2.3–3.5)
GLOBULIN SER CALC-MCNC: 2.9 G/DL (ref 2.3–3.5)
GLOBULIN SER CALC-MCNC: 3 G/DL (ref 2.3–3.5)
GLOBULIN SER CALC-MCNC: 3 G/DL (ref 2.3–3.5)
GLOBULIN SER CALC-MCNC: 3.2 G/DL (ref 2.3–3.5)
GLOBULIN SER CALC-MCNC: 3.4 G/DL (ref 2.3–3.5)
GLOBULIN SER CALC-MCNC: 3.5 G/DL (ref 2.3–3.5)
GLOBULIN SER-MCNC: 2.3 G/DL (ref 2.2–3.9)
GLOBULIN SER-MCNC: 2.4 G/DL (ref 2.2–3.9)
GLUCOSE SERPL-MCNC: 102 MG/DL (ref 65–100)
GLUCOSE SERPL-MCNC: 102 MG/DL (ref 65–100)
GLUCOSE SERPL-MCNC: 110 MG/DL (ref 65–100)
GLUCOSE SERPL-MCNC: 110 MG/DL (ref 65–100)
GLUCOSE SERPL-MCNC: 112 MG/DL (ref 65–100)
GLUCOSE SERPL-MCNC: 113 MG/DL (ref 65–100)
GLUCOSE SERPL-MCNC: 114 MG/DL (ref 65–100)
GLUCOSE SERPL-MCNC: 114 MG/DL (ref 65–100)
GLUCOSE SERPL-MCNC: 133 MG/DL (ref 65–100)
GLUCOSE SERPL-MCNC: 136 MG/DL (ref 65–100)
GLUCOSE SERPL-MCNC: 145 MG/DL (ref 65–100)
GLUCOSE SERPL-MCNC: 94 MG/DL (ref 65–100)
GLUCOSE SERPL-MCNC: 94 MG/DL (ref 65–100)
GLUCOSE SERPL-MCNC: 95 MG/DL (ref 65–100)
GLUCOSE SERPL-MCNC: 99 MG/DL (ref 65–100)
HCT VFR BLD AUTO: 26.3 % (ref 35.8–46.3)
HCT VFR BLD AUTO: 27.4 % (ref 35.8–46.3)
HCT VFR BLD AUTO: 28.1 % (ref 35.8–46.3)
HCT VFR BLD AUTO: 28.4 % (ref 35.8–46.3)
HCT VFR BLD AUTO: 30.1 % (ref 35.8–46.3)
HCT VFR BLD AUTO: 32 % (ref 35.8–46.3)
HCT VFR BLD AUTO: 32.4 % (ref 35.8–46.3)
HCT VFR BLD AUTO: 32.5 % (ref 35.8–46.3)
HCT VFR BLD AUTO: 33.1 % (ref 35.8–46.3)
HCT VFR BLD AUTO: 34 % (ref 35.8–46.3)
HCT VFR BLD AUTO: 34.3 % (ref 35.8–46.3)
HCT VFR BLD AUTO: 34.7 % (ref 35.8–46.3)
HCT VFR BLD AUTO: 34.9 % (ref 35.8–46.3)
HCT VFR BLD AUTO: 35.1 % (ref 35.8–46.3)
HCT VFR BLD AUTO: 35.8 % (ref 35.8–46.3)
HGB BLD-MCNC: 10.3 G/DL (ref 11.7–15.4)
HGB BLD-MCNC: 10.4 G/DL (ref 11.7–15.4)
HGB BLD-MCNC: 10.6 G/DL (ref 11.7–15.4)
HGB BLD-MCNC: 10.8 G/DL (ref 11.7–15.4)
HGB BLD-MCNC: 10.8 G/DL (ref 11.7–15.4)
HGB BLD-MCNC: 10.9 G/DL (ref 11.7–15.4)
HGB BLD-MCNC: 10.9 G/DL (ref 11.7–15.4)
HGB BLD-MCNC: 11.3 G/DL (ref 11.7–15.4)
HGB BLD-MCNC: 11.4 G/DL (ref 11.7–15.4)
HGB BLD-MCNC: 7.9 G/DL (ref 11.7–15.4)
HGB BLD-MCNC: 8.2 G/DL (ref 11.7–15.4)
HGB BLD-MCNC: 8.4 G/DL (ref 11.7–15.4)
HGB BLD-MCNC: 8.6 G/DL (ref 11.7–15.4)
HGB BLD-MCNC: 9.3 G/DL (ref 11.7–15.4)
HGB BLD-MCNC: 9.8 G/DL (ref 11.7–15.4)
IGA SERPL-MCNC: 43 MG/DL (ref 85–499)
IGA SERPL-MCNC: 49 MG/DL (ref 85–499)
IGA SERPL-MCNC: 51 MG/DL (ref 85–499)
IGA SERPL-MCNC: 51 MG/DL (ref 85–499)
IGA SERPL-MCNC: 52 MG/DL (ref 64–422)
IGA SERPL-MCNC: 52 MG/DL (ref 64–422)
IGA SERPL-MCNC: 54 MG/DL (ref 85–499)
IGG SERPL-MCNC: 405 MG/DL (ref 610–1616)
IGG SERPL-MCNC: 424 MG/DL (ref 610–1616)
IGG SERPL-MCNC: 447 MG/DL (ref 610–1616)
IGG SERPL-MCNC: 450 MG/DL (ref 610–1616)
IGG SERPL-MCNC: 492 MG/DL (ref 700–1600)
IGG SERPL-MCNC: 493 MG/DL (ref 610–1616)
IGG SERPL-MCNC: 499 MG/DL (ref 586–1602)
IGM SERPL-MCNC: 17 MG/DL (ref 35–242)
IGM SERPL-MCNC: 24 MG/DL (ref 35–242)
IGM SERPL-MCNC: 25 MG/DL (ref 26–217)
IGM SERPL-MCNC: 25 MG/DL (ref 35–242)
IGM SERPL-MCNC: 27 MG/DL (ref 26–217)
IGM SERPL-MCNC: 29 MG/DL (ref 35–242)
IGM SERPL-MCNC: 33 MG/DL (ref 35–242)
IMM GRANULOCYTES # BLD AUTO: 0 K/UL (ref 0–0.5)
IMM GRANULOCYTES # BLD AUTO: 0.1 K/UL (ref 0–0.5)
IMM GRANULOCYTES # BLD AUTO: 0.2 K/UL (ref 0–0.5)
IMM GRANULOCYTES NFR BLD AUTO: 1 % (ref 0–5)
IMM GRANULOCYTES NFR BLD AUTO: 2 % (ref 0–5)
IMM GRANULOCYTES NFR BLD AUTO: 3 % (ref 0–5)
INR PPP: 1
INTERPRETATION SERPL IEP-IMP: ABNORMAL
INTERPRETATION SERPL IEP-IMP: ABNORMAL
KAPPA LC FREE SER-MCNC: 22.62 MG/L (ref 3.3–19.4)
KAPPA LC FREE SER-MCNC: 24.7 MG/L (ref 3.3–19.4)
KAPPA LC FREE SER-MCNC: 25.84 MG/L (ref 3.3–19.4)
KAPPA LC FREE SER-MCNC: 29.7 MG/L (ref 3.3–19.4)
KAPPA LC FREE SER-MCNC: 32.02 MG/L (ref 3.3–19.4)
KAPPA LC FREE SER-MCNC: 37.01 MG/L (ref 3.3–19.4)
KAPPA LC FREE SER-MCNC: 38.9 MG/L (ref 3.3–19.4)
KAPPA LC FREE/LAMBDA FREE SER: 1.63 {RATIO} (ref 0.26–1.65)
KAPPA LC FREE/LAMBDA FREE SER: 1.79 {RATIO} (ref 0.26–1.65)
KAPPA LC FREE/LAMBDA FREE SER: 1.85 {RATIO} (ref 0.26–1.65)
KAPPA LC FREE/LAMBDA FREE SER: 1.94 {RATIO} (ref 0.26–1.65)
KAPPA LC FREE/LAMBDA FREE SER: 2.2 {RATIO} (ref 0.26–1.65)
KAPPA LC FREE/LAMBDA FREE SER: 2.31 {RATIO} (ref 0.26–1.65)
KAPPA LC FREE/LAMBDA FREE SER: 2.95 {RATIO} (ref 0.26–1.65)
LACTATE SERPL-SCNC: 1.2 MMOL/L (ref 0.4–2)
LAMBDA LC FREE SERPL-MCNC: 10.7 MG/L (ref 5.7–26.3)
LAMBDA LC FREE SERPL-MCNC: 12.25 MG/L (ref 5.71–26.3)
LAMBDA LC FREE SERPL-MCNC: 13.2 MG/L (ref 5.7–26.3)
LAMBDA LC FREE SERPL-MCNC: 13.34 MG/L (ref 5.71–26.3)
LAMBDA LC FREE SERPL-MCNC: 16.63 MG/L (ref 5.71–26.3)
LAMBDA LC FREE SERPL-MCNC: 16.8 MG/L (ref 5.71–26.3)
LAMBDA LC FREE SERPL-MCNC: 19.7 MG/L (ref 5.71–26.3)
LYMPHOCYTES # BLD: 0.7 K/UL (ref 0.5–4.6)
LYMPHOCYTES # BLD: 1 K/UL (ref 0.5–4.6)
LYMPHOCYTES # BLD: 1.1 K/UL (ref 0.5–4.6)
LYMPHOCYTES # BLD: 1.1 K/UL (ref 0.5–4.6)
LYMPHOCYTES # BLD: 1.2 K/UL (ref 0.5–4.6)
LYMPHOCYTES # BLD: 1.3 K/UL (ref 0.5–4.6)
LYMPHOCYTES # BLD: 1.3 K/UL (ref 0.5–4.6)
LYMPHOCYTES # BLD: 1.6 K/UL (ref 0.5–4.6)
LYMPHOCYTES # BLD: 1.6 K/UL (ref 0.5–4.6)
LYMPHOCYTES NFR BLD: 11 % (ref 13–44)
LYMPHOCYTES NFR BLD: 13 % (ref 13–44)
LYMPHOCYTES NFR BLD: 15 % (ref 13–44)
LYMPHOCYTES NFR BLD: 23 % (ref 13–44)
LYMPHOCYTES NFR BLD: 31 % (ref 13–44)
LYMPHOCYTES NFR BLD: 32 % (ref 13–44)
LYMPHOCYTES NFR BLD: 34 % (ref 13–44)
LYMPHOCYTES NFR BLD: 37 % (ref 13–44)
LYMPHOCYTES NFR BLD: 37 % (ref 13–44)
LYMPHOCYTES NFR BLD: 47 % (ref 13–44)
LYMPHOCYTES NFR BLD: 9 % (ref 13–44)
M PROTEIN SERPL ELPH-MCNC: 0.1 G/DL
M PROTEIN SERPL ELPH-MCNC: 0.1 G/DL
M PROTEIN SERPL ELPH-MCNC: 0.16 G/DL
M PROTEIN SERPL ELPH-MCNC: ABNORMAL G/DL
MAGNESIUM SERPL-MCNC: 2.2 MG/DL (ref 1.8–2.4)
MAGNESIUM SERPL-MCNC: 2.3 MG/DL (ref 1.8–2.4)
MAGNESIUM SERPL-MCNC: 2.3 MG/DL (ref 1.8–2.4)
MAGNESIUM SERPL-MCNC: 2.4 MG/DL (ref 1.8–2.4)
MCH RBC QN AUTO: 29.8 PG (ref 26.1–32.9)
MCH RBC QN AUTO: 29.9 PG (ref 26.1–32.9)
MCH RBC QN AUTO: 30 PG (ref 26.1–32.9)
MCH RBC QN AUTO: 30 PG (ref 26.1–32.9)
MCH RBC QN AUTO: 30.2 PG (ref 26.1–32.9)
MCH RBC QN AUTO: 30.4 PG (ref 26.1–32.9)
MCH RBC QN AUTO: 30.5 PG (ref 26.1–32.9)
MCH RBC QN AUTO: 30.6 PG (ref 26.1–32.9)
MCH RBC QN AUTO: 30.7 PG (ref 26.1–32.9)
MCH RBC QN AUTO: 30.9 PG (ref 26.1–32.9)
MCH RBC QN AUTO: 31.5 PG (ref 26.1–32.9)
MCHC RBC AUTO-ENTMCNC: 29.9 G/DL (ref 31.4–35)
MCHC RBC AUTO-ENTMCNC: 29.9 G/DL (ref 31.4–35)
MCHC RBC AUTO-ENTMCNC: 30 G/DL (ref 31.4–35)
MCHC RBC AUTO-ENTMCNC: 30.3 G/DL (ref 31.4–35)
MCHC RBC AUTO-ENTMCNC: 30.6 G/DL (ref 31.4–35)
MCHC RBC AUTO-ENTMCNC: 30.9 G/DL (ref 31.4–35)
MCHC RBC AUTO-ENTMCNC: 31.2 G/DL (ref 31.4–35)
MCHC RBC AUTO-ENTMCNC: 31.4 G/DL (ref 31.4–35)
MCHC RBC AUTO-ENTMCNC: 31.4 G/DL (ref 31.4–35)
MCHC RBC AUTO-ENTMCNC: 31.5 G/DL (ref 31.4–35)
MCHC RBC AUTO-ENTMCNC: 31.8 G/DL (ref 31.4–35)
MCHC RBC AUTO-ENTMCNC: 32.2 G/DL (ref 31.4–35)
MCHC RBC AUTO-ENTMCNC: 32.6 G/DL (ref 31.4–35)
MCV RBC AUTO: 100 FL (ref 79.6–97.8)
MCV RBC AUTO: 100.4 FL (ref 79.6–97.8)
MCV RBC AUTO: 95.4 FL (ref 79.6–97.8)
MCV RBC AUTO: 96.4 FL (ref 79.6–97.8)
MCV RBC AUTO: 96.6 FL (ref 79.6–97.8)
MCV RBC AUTO: 97 FL (ref 79.6–97.8)
MCV RBC AUTO: 97.3 FL (ref 79.6–97.8)
MCV RBC AUTO: 97.4 FL (ref 79.6–97.8)
MCV RBC AUTO: 97.4 FL (ref 79.6–97.8)
MCV RBC AUTO: 97.5 FL (ref 79.6–97.8)
MCV RBC AUTO: 98.3 FL (ref 79.6–97.8)
MCV RBC AUTO: 98.7 FL (ref 79.6–97.8)
MCV RBC AUTO: 98.8 FL (ref 79.6–97.8)
MCV RBC AUTO: 99 FL (ref 79.6–97.8)
MCV RBC AUTO: 99.2 FL (ref 79.6–97.8)
MM INDURATION POC: 0 MM (ref 0–5)
MONOCYTES # BLD: 0.3 K/UL (ref 0.1–1.3)
MONOCYTES # BLD: 0.4 K/UL (ref 0.1–1.3)
MONOCYTES # BLD: 0.4 K/UL (ref 0.1–1.3)
MONOCYTES # BLD: 0.5 K/UL (ref 0.1–1.3)
MONOCYTES # BLD: 0.5 K/UL (ref 0.1–1.3)
MONOCYTES # BLD: 0.6 K/UL (ref 0.1–1.3)
MONOCYTES # BLD: 0.7 K/UL (ref 0.1–1.3)
MONOCYTES # BLD: 0.7 K/UL (ref 0.1–1.3)
MONOCYTES NFR BLD: 10 % (ref 4–12)
MONOCYTES NFR BLD: 12 % (ref 4–12)
MONOCYTES NFR BLD: 6 % (ref 4–12)
MONOCYTES NFR BLD: 8 % (ref 4–12)
MONOCYTES NFR BLD: 9 % (ref 4–12)
NEUTS SEG # BLD: 1.4 K/UL (ref 1.7–8.2)
NEUTS SEG # BLD: 1.8 K/UL (ref 1.7–8.2)
NEUTS SEG # BLD: 1.8 K/UL (ref 1.7–8.2)
NEUTS SEG # BLD: 2.1 K/UL (ref 1.7–8.2)
NEUTS SEG # BLD: 2.1 K/UL (ref 1.7–8.2)
NEUTS SEG # BLD: 2.3 K/UL (ref 1.7–8.2)
NEUTS SEG # BLD: 3.2 K/UL (ref 1.7–8.2)
NEUTS SEG # BLD: 4.7 K/UL (ref 1.7–8.2)
NEUTS SEG # BLD: 5.9 K/UL (ref 1.7–8.2)
NEUTS SEG # BLD: 6.5 K/UL (ref 1.7–8.2)
NEUTS SEG # BLD: 6.9 K/UL (ref 1.7–8.2)
NEUTS SEG NFR BLD: 41 % (ref 43–78)
NEUTS SEG NFR BLD: 48 % (ref 43–78)
NEUTS SEG NFR BLD: 52 % (ref 43–78)
NEUTS SEG NFR BLD: 53 % (ref 43–78)
NEUTS SEG NFR BLD: 56 % (ref 43–78)
NEUTS SEG NFR BLD: 57 % (ref 43–78)
NEUTS SEG NFR BLD: 66 % (ref 43–78)
NEUTS SEG NFR BLD: 70 % (ref 43–78)
NEUTS SEG NFR BLD: 77 % (ref 43–78)
NEUTS SEG NFR BLD: 78 % (ref 43–78)
NEUTS SEG NFR BLD: 82 % (ref 43–78)
NRBC # BLD: 0 K/UL (ref 0–0.2)
NRBC # BLD: 0.02 K/UL (ref 0–0.2)
PLATELET # BLD AUTO: 102 K/UL (ref 150–450)
PLATELET # BLD AUTO: 111 K/UL (ref 150–450)
PLATELET # BLD AUTO: 119 K/UL (ref 150–450)
PLATELET # BLD AUTO: 122 K/UL (ref 150–450)
PLATELET # BLD AUTO: 128 K/UL (ref 150–450)
PLATELET # BLD AUTO: 147 K/UL (ref 150–450)
PLATELET # BLD AUTO: 150 K/UL (ref 150–450)
PLATELET # BLD AUTO: 157 K/UL (ref 150–450)
PLATELET # BLD AUTO: 166 K/UL (ref 150–450)
PLATELET # BLD AUTO: 174 K/UL (ref 150–450)
PLATELET # BLD AUTO: 177 K/UL (ref 150–450)
PLATELET # BLD AUTO: 193 K/UL (ref 150–450)
PLATELET # BLD AUTO: 196 K/UL (ref 150–450)
PLATELET # BLD AUTO: 201 K/UL (ref 150–450)
PLATELET # BLD AUTO: 213 K/UL (ref 150–450)
PMV BLD AUTO: 10.2 FL (ref 9.4–12.3)
PMV BLD AUTO: 10.5 FL (ref 9.4–12.3)
PMV BLD AUTO: 10.7 FL (ref 9.4–12.3)
PMV BLD AUTO: 10.8 FL (ref 9.4–12.3)
PMV BLD AUTO: 10.8 FL (ref 9.4–12.3)
PMV BLD AUTO: 10.9 FL (ref 9.4–12.3)
PMV BLD AUTO: 11.1 FL (ref 9.4–12.3)
PMV BLD AUTO: 11.5 FL (ref 9.4–12.3)
PMV BLD AUTO: 11.6 FL (ref 9.4–12.3)
PMV BLD AUTO: 12.1 FL (ref 9.4–12.3)
PMV BLD AUTO: 12.4 FL (ref 9.4–12.3)
POTASSIUM SERPL-SCNC: 2.8 MMOL/L (ref 3.5–5.1)
POTASSIUM SERPL-SCNC: 2.9 MMOL/L (ref 3.5–5.1)
POTASSIUM SERPL-SCNC: 3 MMOL/L (ref 3.5–5.1)
POTASSIUM SERPL-SCNC: 3.1 MMOL/L (ref 3.5–5.1)
POTASSIUM SERPL-SCNC: 3.4 MMOL/L (ref 3.5–5.1)
POTASSIUM SERPL-SCNC: 3.4 MMOL/L (ref 3.5–5.1)
POTASSIUM SERPL-SCNC: 3.7 MMOL/L (ref 3.5–5.1)
POTASSIUM SERPL-SCNC: 3.9 MMOL/L (ref 3.5–5.1)
POTASSIUM SERPL-SCNC: 4 MMOL/L (ref 3.5–5.1)
POTASSIUM SERPL-SCNC: 4 MMOL/L (ref 3.5–5.1)
POTASSIUM SERPL-SCNC: 4.2 MMOL/L (ref 3.5–5.1)
POTASSIUM SERPL-SCNC: 4.2 MMOL/L (ref 3.5–5.1)
POTASSIUM SERPL-SCNC: 4.3 MMOL/L (ref 3.5–5.1)
POTASSIUM SERPL-SCNC: 4.3 MMOL/L (ref 3.5–5.1)
POTASSIUM SERPL-SCNC: 4.5 MMOL/L (ref 3.5–5.1)
PPD POC: NEGATIVE NEGATIVE
PROCALCITONIN SERPL-MCNC: 8.19 NG/ML
PROT PATTERN SERPL ELPH-IMP: ABNORMAL
PROT PATTERN SPEC IFE-IMP: ABNORMAL
PROT SERPL-MCNC: 5.8 G/DL (ref 6–8.5)
PROT SERPL-MCNC: 5.8 G/DL (ref 6–8.5)
PROT SERPL-MCNC: 5.9 G/DL (ref 6.3–8.2)
PROT SERPL-MCNC: 6.1 G/DL (ref 6.3–8.2)
PROT SERPL-MCNC: 6.1 G/DL (ref 6.3–8.2)
PROT SERPL-MCNC: 6.2 G/DL (ref 6.3–8.2)
PROT SERPL-MCNC: 6.3 G/DL (ref 6.3–8.2)
PROT SERPL-MCNC: 6.3 G/DL (ref 6.3–8.2)
PROT SERPL-MCNC: 6.4 G/DL (ref 6.3–8.2)
PROT SERPL-MCNC: 6.5 G/DL (ref 6.3–8.2)
PROT SERPL-MCNC: 6.6 G/DL (ref 6.3–8.2)
PROT SERPL-MCNC: 6.9 G/DL (ref 6.3–8.2)
PROT SERPL-MCNC: 6.9 G/DL (ref 6.3–8.2)
PROTHROMBIN TIME: 13.6 SEC (ref 12–14.7)
Q-T INTERVAL, ECG07: 402 MS
Q-T INTERVAL, ECG07: 414 MS
QRS DURATION, ECG06: 76 MS
QRS DURATION, ECG06: 76 MS
QTC CALCULATION (BEZET), ECG08: 408 MS
QTC CALCULATION (BEZET), ECG08: 414 MS
RBC # BLD AUTO: 2.65 M/UL (ref 4.05–5.2)
RBC # BLD AUTO: 2.74 M/UL (ref 4.05–5.2)
RBC # BLD AUTO: 2.8 M/UL (ref 4.05–5.2)
RBC # BLD AUTO: 2.87 M/UL (ref 4.05–5.2)
RBC # BLD AUTO: 3.05 M/UL (ref 4.05–5.2)
RBC # BLD AUTO: 3.24 M/UL (ref 4.05–5.2)
RBC # BLD AUTO: 3.33 M/UL (ref 4.05–5.2)
RBC # BLD AUTO: 3.37 M/UL (ref 4.05–5.2)
RBC # BLD AUTO: 3.4 M/UL (ref 4.05–5.25)
RBC # BLD AUTO: 3.52 M/UL (ref 4.05–5.25)
RBC # BLD AUTO: 3.52 M/UL (ref 4.05–5.25)
RBC # BLD AUTO: 3.53 M/UL (ref 4.05–5.25)
RBC # BLD AUTO: 3.58 M/UL (ref 4.05–5.25)
RBC # BLD AUTO: 3.68 M/UL (ref 4.05–5.25)
RBC # BLD AUTO: 3.69 M/UL (ref 4.05–5.25)
RBC #/AREA URNS HPF: ABNORMAL /HPF
SARS COV-2, XPGCVT: NEGATIVE
SERVICE CMNT-IMP: NORMAL
SODIUM SERPL-SCNC: 136 MMOL/L (ref 136–145)
SODIUM SERPL-SCNC: 137 MMOL/L (ref 136–145)
SODIUM SERPL-SCNC: 138 MMOL/L (ref 136–145)
SODIUM SERPL-SCNC: 140 MMOL/L (ref 136–145)
SODIUM SERPL-SCNC: 141 MMOL/L (ref 136–145)
SODIUM SERPL-SCNC: 142 MMOL/L (ref 136–145)
SODIUM SERPL-SCNC: 146 MMOL/L (ref 136–145)
SODIUM SERPL-SCNC: 147 MMOL/L (ref 136–145)
SODIUM SERPL-SCNC: 148 MMOL/L (ref 136–145)
SODIUM SERPL-SCNC: 150 MMOL/L (ref 136–145)
SOURCE, COVRS: NORMAL
SPECIMEN EXP DATE BLD: NORMAL
VENTRICULAR RATE, ECG03: 60 BPM
VENTRICULAR RATE, ECG03: 62 BPM
WBC # BLD AUTO: 3.3 K/UL (ref 4.3–11.1)
WBC # BLD AUTO: 3.4 K/UL (ref 4.3–11.1)
WBC # BLD AUTO: 3.5 K/UL (ref 4.3–11.1)
WBC # BLD AUTO: 3.7 K/UL (ref 4.3–11.1)
WBC # BLD AUTO: 4.1 K/UL (ref 4.3–11.1)
WBC # BLD AUTO: 4.2 K/UL (ref 4.3–11.1)
WBC # BLD AUTO: 4.9 K/UL (ref 4.3–11.1)
WBC # BLD AUTO: 5.6 K/UL (ref 4.3–11.1)
WBC # BLD AUTO: 5.9 K/UL (ref 4.3–11.1)
WBC # BLD AUTO: 6.8 K/UL (ref 4.3–11.1)
WBC # BLD AUTO: 7.7 K/UL (ref 4.3–11.1)
WBC # BLD AUTO: 8.2 K/UL (ref 4.3–11.1)
WBC # BLD AUTO: 8.4 K/UL (ref 4.3–11.1)
WBC # BLD AUTO: 8.5 K/UL (ref 4.3–11.1)
WBC # BLD AUTO: 9 K/UL (ref 4.3–11.1)
WBC URNS QL MICRO: >100 /HPF

## 2020-01-01 PROCEDURE — 97535 SELF CARE MNGMENT TRAINING: CPT

## 2020-01-01 PROCEDURE — 99232 SBSQ HOSP IP/OBS MODERATE 35: CPT | Performed by: INTERNAL MEDICINE

## 2020-01-01 PROCEDURE — 36415 COLL VENOUS BLD VENIPUNCTURE: CPT

## 2020-01-01 PROCEDURE — 81015 MICROSCOPIC EXAM OF URINE: CPT

## 2020-01-01 PROCEDURE — ? ADDITIONAL NOTES

## 2020-01-01 PROCEDURE — 74011250636 HC RX REV CODE- 250/636: Performed by: INTERNAL MEDICINE

## 2020-01-01 PROCEDURE — 85025 COMPLETE CBC W/AUTO DIFF WBC: CPT

## 2020-01-01 PROCEDURE — 65270000029 HC RM PRIVATE

## 2020-01-01 PROCEDURE — 74011250637 HC RX REV CODE- 250/637: Performed by: ORTHOPAEDIC SURGERY

## 2020-01-01 PROCEDURE — 83883 ASSAY NEPHELOMETRY NOT SPEC: CPT

## 2020-01-01 PROCEDURE — 80048 BASIC METABOLIC PNL TOTAL CA: CPT

## 2020-01-01 PROCEDURE — 97530 THERAPEUTIC ACTIVITIES: CPT

## 2020-01-01 PROCEDURE — 76450000000

## 2020-01-01 PROCEDURE — 96374 THER/PROPH/DIAG INJ IV PUSH: CPT

## 2020-01-01 PROCEDURE — 87641 MR-STAPH DNA AMP PROBE: CPT

## 2020-01-01 PROCEDURE — 77030006835 HC BLD SAW SAG STRY -B: Performed by: ORTHOPAEDIC SURGERY

## 2020-01-01 PROCEDURE — 73502 X-RAY EXAM HIP UNI 2-3 VIEWS: CPT

## 2020-01-01 PROCEDURE — 86580 TB INTRADERMAL TEST: CPT | Performed by: INTERNAL MEDICINE

## 2020-01-01 PROCEDURE — 74011250636 HC RX REV CODE- 250/636: Performed by: ORTHOPAEDIC SURGERY

## 2020-01-01 PROCEDURE — 11102 TANGNTL BX SKIN SINGLE LES: CPT | Mod: 79

## 2020-01-01 PROCEDURE — 83735 ASSAY OF MAGNESIUM: CPT

## 2020-01-01 PROCEDURE — 84165 PROTEIN E-PHORESIS SERUM: CPT

## 2020-01-01 PROCEDURE — 80053 COMPREHEN METABOLIC PANEL: CPT

## 2020-01-01 PROCEDURE — 77030008462 HC STPLR SKN PROX J&J -A: Performed by: ORTHOPAEDIC SURGERY

## 2020-01-01 PROCEDURE — 74230 X-RAY XM SWLNG FUNCJ C+: CPT

## 2020-01-01 PROCEDURE — 93005 ELECTROCARDIOGRAM TRACING: CPT | Performed by: EMERGENCY MEDICINE

## 2020-01-01 PROCEDURE — L1830 KO IMMOB CANVAS LONG PRE OTS: HCPCS | Performed by: ORTHOPAEDIC SURGERY

## 2020-01-01 PROCEDURE — ? BIOPSY BY SHAVE METHOD

## 2020-01-01 PROCEDURE — 85027 COMPLETE CBC AUTOMATED: CPT

## 2020-01-01 PROCEDURE — 74011250637 HC RX REV CODE- 250/637: Performed by: PHYSICIAN ASSISTANT

## 2020-01-01 PROCEDURE — 97166 OT EVAL MOD COMPLEX 45 MIN: CPT

## 2020-01-01 PROCEDURE — 97110 THERAPEUTIC EXERCISES: CPT

## 2020-01-01 PROCEDURE — 71045 X-RAY EXAM CHEST 1 VIEW: CPT

## 2020-01-01 PROCEDURE — 82306 VITAMIN D 25 HYDROXY: CPT

## 2020-01-01 PROCEDURE — 77030033067 HC SUT PDO STRATFX SPIR J&J -B: Performed by: ORTHOPAEDIC SURGERY

## 2020-01-01 PROCEDURE — 85610 PROTHROMBIN TIME: CPT

## 2020-01-01 PROCEDURE — 77030017016 HC DSG ANTIMIC BARR2 S&N -B: Performed by: ORTHOPAEDIC SURGERY

## 2020-01-01 PROCEDURE — 74011000250 HC RX REV CODE- 250: Performed by: NURSE ANESTHETIST, CERTIFIED REGISTERED

## 2020-01-01 PROCEDURE — 76210000006 HC OR PH I REC 0.5 TO 1 HR: Performed by: ORTHOPAEDIC SURGERY

## 2020-01-01 PROCEDURE — 77030041070 HC DRSG ALG MDII -A

## 2020-01-01 PROCEDURE — 99285 EMERGENCY DEPT VISIT HI MDM: CPT

## 2020-01-01 PROCEDURE — ? COUNSELING

## 2020-01-01 PROCEDURE — 74011000302 HC RX REV CODE- 302: Performed by: INTERNAL MEDICINE

## 2020-01-01 PROCEDURE — 94760 N-INVAS EAR/PLS OXIMETRY 1: CPT

## 2020-01-01 PROCEDURE — 99223 1ST HOSP IP/OBS HIGH 75: CPT | Performed by: INTERNAL MEDICINE

## 2020-01-01 PROCEDURE — 92611 MOTION FLUOROSCOPY/SWALLOW: CPT

## 2020-01-01 PROCEDURE — 92526 ORAL FUNCTION THERAPY: CPT

## 2020-01-01 PROCEDURE — 74011250637 HC RX REV CODE- 250/637: Performed by: INTERNAL MEDICINE

## 2020-01-01 PROCEDURE — 96375 TX/PRO/DX INJ NEW DRUG ADDON: CPT

## 2020-01-01 PROCEDURE — ? SUTURE REMOVAL (GLOBAL PERIOD)

## 2020-01-01 PROCEDURE — 74011250636 HC RX REV CODE- 250/636: Performed by: EMERGENCY MEDICINE

## 2020-01-01 PROCEDURE — 99215 OFFICE O/P EST HI 40 MIN: CPT | Mod: 25

## 2020-01-01 PROCEDURE — 77010033711 HC HIGH FLOW OXYGEN

## 2020-01-01 PROCEDURE — 74011250636 HC RX REV CODE- 250/636: Performed by: ANESTHESIOLOGY

## 2020-01-01 PROCEDURE — 86334 IMMUNOFIX E-PHORESIS SERUM: CPT

## 2020-01-01 PROCEDURE — 99212 OFFICE O/P EST SF 10 MIN: CPT | Mod: 24,25

## 2020-01-01 PROCEDURE — 74011250636 HC RX REV CODE- 250/636: Performed by: FAMILY MEDICINE

## 2020-01-01 PROCEDURE — 15271 SKIN SUB GRAFT TRNK/ARM/LEG: CPT

## 2020-01-01 PROCEDURE — 97161 PT EVAL LOW COMPLEX 20 MIN: CPT

## 2020-01-01 PROCEDURE — 87635 SARS-COV-2 COVID-19 AMP PRB: CPT

## 2020-01-01 PROCEDURE — 74011000258 HC RX REV CODE- 258: Performed by: INTERNAL MEDICINE

## 2020-01-01 PROCEDURE — 11103 TANGNTL BX SKIN EA SEP/ADDL: CPT | Mod: 79

## 2020-01-01 PROCEDURE — 86900 BLOOD TYPING SEROLOGIC ABO: CPT

## 2020-01-01 PROCEDURE — 77030031139 HC SUT VCRL2 J&J -A: Performed by: ORTHOPAEDIC SURGERY

## 2020-01-01 PROCEDURE — 84145 PROCALCITONIN (PCT): CPT

## 2020-01-01 PROCEDURE — 77030040393 HC DRSG OPTIFOAM GENT MDII -B

## 2020-01-01 PROCEDURE — 76010000160 HC OR TIME 0.5 TO 1 HR INTENSV-TIER 1: Performed by: ORTHOPAEDIC SURGERY

## 2020-01-01 PROCEDURE — ? EXCISION

## 2020-01-01 PROCEDURE — 74011250636 HC RX REV CODE- 250/636: Performed by: NURSE ANESTHETIST, CERTIFIED REGISTERED

## 2020-01-01 PROCEDURE — 0SRS0JZ REPLACEMENT OF LEFT HIP JOINT, FEMORAL SURFACE WITH SYNTHETIC SUBSTITUTE, OPEN APPROACH: ICD-10-PCS | Performed by: ORTHOPAEDIC SURGERY

## 2020-01-01 PROCEDURE — 82784 ASSAY IGA/IGD/IGG/IGM EACH: CPT

## 2020-01-01 PROCEDURE — 81003 URINALYSIS AUTO W/O SCOPE: CPT

## 2020-01-01 PROCEDURE — 11606 EXC TR-EXT MAL+MARG >4 CM: CPT

## 2020-01-01 PROCEDURE — 74011250637 HC RX REV CODE- 250/637: Performed by: FAMILY MEDICINE

## 2020-01-01 PROCEDURE — ? DRESSING CHANGE

## 2020-01-01 PROCEDURE — 51702 INSERT TEMP BLADDER CATH: CPT

## 2020-01-01 PROCEDURE — 73552 X-RAY EXAM OF FEMUR 2/>: CPT

## 2020-01-01 PROCEDURE — 83605 ASSAY OF LACTIC ACID: CPT

## 2020-01-01 PROCEDURE — 0T9B70Z DRAINAGE OF BLADDER WITH DRAINAGE DEVICE, VIA NATURAL OR ARTIFICIAL OPENING: ICD-10-PCS | Performed by: FAMILY MEDICINE

## 2020-01-01 PROCEDURE — 74011000258 HC RX REV CODE- 258: Performed by: ORTHOPAEDIC SURGERY

## 2020-01-01 PROCEDURE — 87040 BLOOD CULTURE FOR BACTERIA: CPT

## 2020-01-01 PROCEDURE — 76060000032 HC ANESTHESIA 0.5 TO 1 HR: Performed by: ORTHOPAEDIC SURGERY

## 2020-01-01 PROCEDURE — C1776 JOINT DEVICE (IMPLANTABLE): HCPCS | Performed by: ORTHOPAEDIC SURGERY

## 2020-01-01 PROCEDURE — 77030010509 HC AIRWY LMA MSK TELE -A: Performed by: ANESTHESIOLOGY

## 2020-01-01 PROCEDURE — 70450 CT HEAD/BRAIN W/O DYE: CPT

## 2020-01-01 PROCEDURE — 74011000255 HC RX REV CODE- 255: Performed by: INTERNAL MEDICINE

## 2020-01-01 PROCEDURE — 74011000258 HC RX REV CODE- 258: Performed by: FAMILY MEDICINE

## 2020-01-01 PROCEDURE — 92610 EVALUATE SWALLOWING FUNCTION: CPT

## 2020-01-01 DEVICE — HIP H4 HEMI UNI BIPLR -- IMPL CAPPED H4: Type: IMPLANTABLE DEVICE | Status: FUNCTIONAL

## 2020-01-01 DEVICE — HIP STEM
Type: IMPLANTABLE DEVICE | Site: HIP | Status: FUNCTIONAL
Brand: OMNIFIT

## 2020-01-01 DEVICE — LOW FRICTION ION TREATMENT
Type: IMPLANTABLE DEVICE | Site: HIP | Status: FUNCTIONAL
Brand: C-TAPER HEAD

## 2020-01-01 DEVICE — BIPOLAR COMPONENT
Type: IMPLANTABLE DEVICE | Site: HIP | Status: FUNCTIONAL
Brand: UHR

## 2020-01-01 RX ORDER — SODIUM CHLORIDE 0.9 % (FLUSH) 0.9 %
5-40 SYRINGE (ML) INJECTION EVERY 8 HOURS
Status: DISCONTINUED | OUTPATIENT
Start: 2020-01-01 | End: 2020-01-01 | Stop reason: SDUPTHER

## 2020-01-01 RX ORDER — LEVOTHYROXINE SODIUM 50 UG/1
50 TABLET ORAL
Status: DISCONTINUED | OUTPATIENT
Start: 2020-01-01 | End: 2020-01-01 | Stop reason: HOSPADM

## 2020-01-01 RX ORDER — AMLODIPINE BESYLATE 5 MG/1
5 TABLET ORAL DAILY
Status: DISCONTINUED | OUTPATIENT
Start: 2020-01-01 | End: 2020-01-01 | Stop reason: HOSPADM

## 2020-01-01 RX ORDER — ONDANSETRON 2 MG/ML
4 INJECTION INTRAMUSCULAR; INTRAVENOUS
Status: DISCONTINUED | OUTPATIENT
Start: 2020-01-01 | End: 2020-01-01 | Stop reason: SDUPTHER

## 2020-01-01 RX ORDER — ACETAMINOPHEN 650 MG/1
650 SUPPOSITORY RECTAL
Status: DISCONTINUED | OUTPATIENT
Start: 2020-01-01 | End: 2020-01-01 | Stop reason: HOSPADM

## 2020-01-01 RX ORDER — ONDANSETRON 2 MG/ML
4 INJECTION INTRAMUSCULAR; INTRAVENOUS
Status: COMPLETED | OUTPATIENT
Start: 2020-01-01 | End: 2020-01-01

## 2020-01-01 RX ORDER — TRAZODONE HYDROCHLORIDE 50 MG/1
100 TABLET ORAL
Status: DISCONTINUED | OUTPATIENT
Start: 2020-01-01 | End: 2020-01-01 | Stop reason: HOSPADM

## 2020-01-01 RX ORDER — FERROUS SULFATE, DRIED 160(50) MG
1 TABLET, EXTENDED RELEASE ORAL
Qty: 90 TAB | Refills: 1 | Status: SHIPPED
Start: 2020-01-01 | End: 2020-01-01

## 2020-01-01 RX ORDER — SODIUM CHLORIDE 0.9 % (FLUSH) 0.9 %
5-40 SYRINGE (ML) INJECTION AS NEEDED
Status: DISCONTINUED | OUTPATIENT
Start: 2020-01-01 | End: 2020-01-01 | Stop reason: SDUPTHER

## 2020-01-01 RX ORDER — FENTANYL CITRATE 50 UG/ML
100 INJECTION, SOLUTION INTRAMUSCULAR; INTRAVENOUS AS NEEDED
Status: DISCONTINUED | OUTPATIENT
Start: 2020-01-01 | End: 2020-01-01 | Stop reason: HOSPADM

## 2020-01-01 RX ORDER — MAG HYDROX/ALUMINUM HYD/SIMETH 200-200-20
30 SUSPENSION, ORAL (FINAL DOSE FORM) ORAL
Status: DISCONTINUED | OUTPATIENT
Start: 2020-01-01 | End: 2020-01-01 | Stop reason: HOSPADM

## 2020-01-01 RX ORDER — SODIUM CHLORIDE 0.9 % (FLUSH) 0.9 %
5-40 SYRINGE (ML) INJECTION AS NEEDED
Status: DISCONTINUED | OUTPATIENT
Start: 2020-01-01 | End: 2020-01-01 | Stop reason: HOSPADM

## 2020-01-01 RX ORDER — FUROSEMIDE 40 MG/1
40 TABLET ORAL DAILY
Status: DISCONTINUED | OUTPATIENT
Start: 2020-01-01 | End: 2020-01-01 | Stop reason: HOSPADM

## 2020-01-01 RX ORDER — POTASSIUM CHLORIDE 14.9 MG/ML
20 INJECTION INTRAVENOUS
Status: COMPLETED | OUTPATIENT
Start: 2020-01-01 | End: 2020-01-01

## 2020-01-01 RX ORDER — ACETAMINOPHEN 325 MG/1
650 TABLET ORAL
Status: DISCONTINUED | OUTPATIENT
Start: 2020-01-01 | End: 2020-01-01 | Stop reason: HOSPADM

## 2020-01-01 RX ORDER — HYDROMORPHONE HYDROCHLORIDE 1 MG/ML
0.5 INJECTION, SOLUTION INTRAMUSCULAR; INTRAVENOUS; SUBCUTANEOUS
Status: DISCONTINUED | OUTPATIENT
Start: 2020-01-01 | End: 2020-01-01

## 2020-01-01 RX ORDER — POLYETHYLENE GLYCOL 3350 17 G/17G
17 POWDER, FOR SOLUTION ORAL DAILY PRN
Status: DISCONTINUED | OUTPATIENT
Start: 2020-01-01 | End: 2020-01-01 | Stop reason: HOSPADM

## 2020-01-01 RX ORDER — ONDANSETRON 2 MG/ML
4 INJECTION INTRAMUSCULAR; INTRAVENOUS
Status: DISCONTINUED | OUTPATIENT
Start: 2020-01-01 | End: 2020-01-01 | Stop reason: HOSPADM

## 2020-01-01 RX ORDER — VANCOMYCIN/0.9 % SOD CHLORIDE 1.5G/250ML
1500 PLASTIC BAG, INJECTION (ML) INTRAVENOUS ONCE
Status: COMPLETED | OUTPATIENT
Start: 2020-01-01 | End: 2020-01-01

## 2020-01-01 RX ORDER — FUROSEMIDE 40 MG/1
40 TABLET ORAL DAILY
Qty: 30 TAB | Refills: 1 | Status: SHIPPED
Start: 2020-01-01

## 2020-01-01 RX ORDER — LIDOCAINE HYDROCHLORIDE 10 MG/ML
0.1 INJECTION INFILTRATION; PERINEURAL AS NEEDED
Status: DISCONTINUED | OUTPATIENT
Start: 2020-01-01 | End: 2020-01-01 | Stop reason: HOSPADM

## 2020-01-01 RX ORDER — SODIUM CHLORIDE, SODIUM LACTATE, POTASSIUM CHLORIDE, CALCIUM CHLORIDE 600; 310; 30; 20 MG/100ML; MG/100ML; MG/100ML; MG/100ML
75 INJECTION, SOLUTION INTRAVENOUS CONTINUOUS
Status: DISCONTINUED | OUTPATIENT
Start: 2020-01-01 | End: 2020-01-01 | Stop reason: HOSPADM

## 2020-01-01 RX ORDER — DEXTROSE MONOHYDRATE 50 MG/ML
75 INJECTION, SOLUTION INTRAVENOUS CONTINUOUS
Status: DISCONTINUED | OUTPATIENT
Start: 2020-01-01 | End: 2020-01-01 | Stop reason: HOSPADM

## 2020-01-01 RX ORDER — OXYCODONE HYDROCHLORIDE 5 MG/1
5 TABLET ORAL
Status: DISCONTINUED | OUTPATIENT
Start: 2020-01-01 | End: 2020-01-01 | Stop reason: HOSPADM

## 2020-01-01 RX ORDER — MIDAZOLAM HYDROCHLORIDE 1 MG/ML
2 INJECTION, SOLUTION INTRAMUSCULAR; INTRAVENOUS
Status: ACTIVE | OUTPATIENT
Start: 2020-01-01 | End: 2020-01-01

## 2020-01-01 RX ORDER — ONDANSETRON 2 MG/ML
INJECTION INTRAMUSCULAR; INTRAVENOUS AS NEEDED
Status: DISCONTINUED | OUTPATIENT
Start: 2020-01-01 | End: 2020-01-01

## 2020-01-01 RX ORDER — SODIUM CHLORIDE, SODIUM LACTATE, POTASSIUM CHLORIDE, CALCIUM CHLORIDE 600; 310; 30; 20 MG/100ML; MG/100ML; MG/100ML; MG/100ML
75 INJECTION, SOLUTION INTRAVENOUS CONTINUOUS
Status: DISCONTINUED | OUTPATIENT
Start: 2020-01-01 | End: 2020-01-01

## 2020-01-01 RX ORDER — PROMETHAZINE HYDROCHLORIDE 25 MG/1
12.5 TABLET ORAL
Status: DISCONTINUED | OUTPATIENT
Start: 2020-01-01 | End: 2020-01-01 | Stop reason: HOSPADM

## 2020-01-01 RX ORDER — NADOLOL 40 MG/1
80 TABLET ORAL DAILY
Status: DISCONTINUED | OUTPATIENT
Start: 2020-01-01 | End: 2020-01-01 | Stop reason: HOSPADM

## 2020-01-01 RX ORDER — MORPHINE SULFATE 2 MG/ML
2 INJECTION, SOLUTION INTRAMUSCULAR; INTRAVENOUS ONCE
Status: COMPLETED | OUTPATIENT
Start: 2020-01-01 | End: 2020-01-01

## 2020-01-01 RX ORDER — LIDOCAINE HYDROCHLORIDE 20 MG/ML
INJECTION, SOLUTION EPIDURAL; INFILTRATION; INTRACAUDAL; PERINEURAL AS NEEDED
Status: DISCONTINUED | OUTPATIENT
Start: 2020-01-01 | End: 2020-01-01 | Stop reason: HOSPADM

## 2020-01-01 RX ORDER — HYDROMORPHONE HYDROCHLORIDE 2 MG/ML
0.5 INJECTION, SOLUTION INTRAMUSCULAR; INTRAVENOUS; SUBCUTANEOUS
Status: DISCONTINUED | OUTPATIENT
Start: 2020-01-01 | End: 2020-01-01 | Stop reason: HOSPADM

## 2020-01-01 RX ORDER — PROPOFOL 10 MG/ML
INJECTION, EMULSION INTRAVENOUS AS NEEDED
Status: DISCONTINUED | OUTPATIENT
Start: 2020-01-01 | End: 2020-01-01 | Stop reason: HOSPADM

## 2020-01-01 RX ORDER — ASPIRIN 325 MG
325 TABLET ORAL DAILY
Status: DISCONTINUED | OUTPATIENT
Start: 2020-01-01 | End: 2020-01-01 | Stop reason: HOSPADM

## 2020-01-01 RX ORDER — HALOPERIDOL 5 MG/ML
2 INJECTION INTRAMUSCULAR ONCE
Status: COMPLETED | OUTPATIENT
Start: 2020-01-01 | End: 2020-01-01

## 2020-01-01 RX ORDER — LACTULOSE 20 G/20G
20 POWDER, FOR SOLUTION ORAL
Qty: 60 PACKET | Refills: 3 | Status: SHIPPED
Start: 2020-01-01

## 2020-01-01 RX ORDER — CLORAZEPATE DIPOTASSIUM 7.5 MG/1
7.5 TABLET ORAL 2 TIMES DAILY
Status: DISCONTINUED | OUTPATIENT
Start: 2020-01-01 | End: 2020-01-01 | Stop reason: HOSPADM

## 2020-01-01 RX ORDER — AMIODARONE HYDROCHLORIDE 200 MG/1
200 TABLET ORAL DAILY
Status: DISCONTINUED | OUTPATIENT
Start: 2020-01-01 | End: 2020-01-01 | Stop reason: HOSPADM

## 2020-01-01 RX ORDER — GUAIFENESIN 100 MG/5ML
81 LIQUID (ML) ORAL DAILY
Status: DISCONTINUED | OUTPATIENT
Start: 2020-01-01 | End: 2020-01-01

## 2020-01-01 RX ORDER — NALOXONE HYDROCHLORIDE 0.4 MG/ML
0.1 INJECTION, SOLUTION INTRAMUSCULAR; INTRAVENOUS; SUBCUTANEOUS AS NEEDED
Status: DISCONTINUED | OUTPATIENT
Start: 2020-01-01 | End: 2020-01-01 | Stop reason: HOSPADM

## 2020-01-01 RX ORDER — SODIUM CHLORIDE, SODIUM LACTATE, POTASSIUM CHLORIDE, CALCIUM CHLORIDE 600; 310; 30; 20 MG/100ML; MG/100ML; MG/100ML; MG/100ML
1000 INJECTION, SOLUTION INTRAVENOUS CONTINUOUS
Status: DISCONTINUED | OUTPATIENT
Start: 2020-01-01 | End: 2020-01-01

## 2020-01-01 RX ORDER — MIRTAZAPINE 15 MG/1
15 TABLET, FILM COATED ORAL
Status: DISCONTINUED | OUTPATIENT
Start: 2020-01-01 | End: 2020-01-01 | Stop reason: HOSPADM

## 2020-01-01 RX ORDER — HYDROCODONE BITARTRATE AND ACETAMINOPHEN 7.5; 325 MG/1; MG/1
1 TABLET ORAL
Status: DISCONTINUED | OUTPATIENT
Start: 2020-01-01 | End: 2020-01-01

## 2020-01-01 RX ORDER — FERROUS SULFATE, DRIED 160(50) MG
1 TABLET, EXTENDED RELEASE ORAL
Status: DISCONTINUED | OUTPATIENT
Start: 2020-01-01 | End: 2020-01-01 | Stop reason: HOSPADM

## 2020-01-01 RX ORDER — ASPIRIN 325 MG
325 TABLET ORAL DAILY
Qty: 4 TAB | Refills: 0 | Status: SHIPPED
Start: 2020-01-01 | End: 2020-01-01

## 2020-01-01 RX ORDER — FUROSEMIDE 10 MG/ML
40 INJECTION INTRAMUSCULAR; INTRAVENOUS ONCE
Status: COMPLETED | OUTPATIENT
Start: 2020-01-01 | End: 2020-01-01

## 2020-01-01 RX ORDER — TRAMADOL HYDROCHLORIDE 50 MG/1
50 TABLET ORAL
Status: DISCONTINUED | OUTPATIENT
Start: 2020-01-01 | End: 2020-01-01 | Stop reason: HOSPADM

## 2020-01-01 RX ORDER — SODIUM CHLORIDE 0.9 % (FLUSH) 0.9 %
5-40 SYRINGE (ML) INJECTION EVERY 8 HOURS
Status: DISCONTINUED | OUTPATIENT
Start: 2020-01-01 | End: 2020-01-01 | Stop reason: HOSPADM

## 2020-01-01 RX ORDER — FENTANYL CITRATE 50 UG/ML
INJECTION, SOLUTION INTRAMUSCULAR; INTRAVENOUS AS NEEDED
Status: DISCONTINUED | OUTPATIENT
Start: 2020-01-01 | End: 2020-01-01 | Stop reason: HOSPADM

## 2020-01-01 RX ORDER — EPHEDRINE SULFATE/0.9% NACL/PF 50 MG/5 ML
SYRINGE (ML) INTRAVENOUS AS NEEDED
Status: DISCONTINUED | OUTPATIENT
Start: 2020-01-01 | End: 2020-01-01 | Stop reason: HOSPADM

## 2020-01-01 RX ORDER — ONDANSETRON 2 MG/ML
4 INJECTION INTRAMUSCULAR; INTRAVENOUS ONCE
Status: DISCONTINUED | OUTPATIENT
Start: 2020-01-01 | End: 2020-01-01 | Stop reason: HOSPADM

## 2020-01-01 RX ORDER — DEXTROSE MONOHYDRATE 50 MG/ML
75 INJECTION, SOLUTION INTRAVENOUS CONTINUOUS
Status: DISCONTINUED | OUTPATIENT
Start: 2020-01-01 | End: 2020-01-01

## 2020-01-01 RX ORDER — OXYCODONE HYDROCHLORIDE 5 MG/1
10 TABLET ORAL
Status: DISCONTINUED | OUTPATIENT
Start: 2020-01-01 | End: 2020-01-01 | Stop reason: HOSPADM

## 2020-01-01 RX ORDER — CEFAZOLIN SODIUM/WATER 2 G/20 ML
2 SYRINGE (ML) INTRAVENOUS
Status: COMPLETED | OUTPATIENT
Start: 2020-01-01 | End: 2020-01-01

## 2020-01-01 RX ORDER — ONDANSETRON 2 MG/ML
INJECTION INTRAMUSCULAR; INTRAVENOUS AS NEEDED
Status: DISCONTINUED | OUTPATIENT
Start: 2020-01-01 | End: 2020-01-01 | Stop reason: HOSPADM

## 2020-01-01 RX ORDER — FERROUS SULFATE, DRIED 160(50) MG
1 TABLET, EXTENDED RELEASE ORAL
Qty: 90 TAB | Refills: 1 | Status: SHIPPED | OUTPATIENT
Start: 2020-01-01

## 2020-01-01 RX ORDER — DIPHENHYDRAMINE HYDROCHLORIDE 50 MG/ML
12.5 INJECTION, SOLUTION INTRAMUSCULAR; INTRAVENOUS ONCE
Status: DISCONTINUED | OUTPATIENT
Start: 2020-01-01 | End: 2020-01-01 | Stop reason: HOSPADM

## 2020-01-01 RX ADMIN — FENTANYL CITRATE 25 MCG: 50 INJECTION INTRAMUSCULAR; INTRAVENOUS at 08:12

## 2020-01-01 RX ADMIN — Medication 10 ML: at 06:12

## 2020-01-01 RX ADMIN — Medication 5 ML: at 01:56

## 2020-01-01 RX ADMIN — Medication 10 ML: at 14:45

## 2020-01-01 RX ADMIN — CALCIUM CARBONATE-VITAMIN D TAB 500 MG-200 UNIT 1 TABLET: 500-200 TAB at 16:42

## 2020-01-01 RX ADMIN — Medication 10 ML: at 14:00

## 2020-01-01 RX ADMIN — Medication 5 ML: at 05:13

## 2020-01-01 RX ADMIN — FUROSEMIDE 40 MG: 10 INJECTION, SOLUTION INTRAMUSCULAR; INTRAVENOUS at 06:06

## 2020-01-01 RX ADMIN — FUROSEMIDE 40 MG: 40 TABLET ORAL at 09:19

## 2020-01-01 RX ADMIN — TRAZODONE HYDROCHLORIDE 100 MG: 50 TABLET ORAL at 21:59

## 2020-01-01 RX ADMIN — AMIODARONE HYDROCHLORIDE 200 MG: 200 TABLET ORAL at 08:59

## 2020-01-01 RX ADMIN — MIRTAZAPINE 15 MG: 15 TABLET, FILM COATED ORAL at 21:59

## 2020-01-01 RX ADMIN — Medication 10 ML: at 21:20

## 2020-01-01 RX ADMIN — FENTANYL CITRATE 25 MCG: 50 INJECTION INTRAMUSCULAR; INTRAVENOUS at 08:15

## 2020-01-01 RX ADMIN — POTASSIUM CHLORIDE 20 MEQ: 14.9 INJECTION, SOLUTION INTRAVENOUS at 12:18

## 2020-01-01 RX ADMIN — CLORAZEPATE DIPOTASSIUM 7.5 MG: 7.5 TABLET ORAL at 16:58

## 2020-01-01 RX ADMIN — Medication 10 ML: at 17:28

## 2020-01-01 RX ADMIN — SODIUM CHLORIDE, SODIUM LACTATE, POTASSIUM CHLORIDE, AND CALCIUM CHLORIDE 1000 ML: 600; 310; 30; 20 INJECTION, SOLUTION INTRAVENOUS at 10:37

## 2020-01-01 RX ADMIN — NADOLOL 80 MG: 40 TABLET ORAL at 09:16

## 2020-01-01 RX ADMIN — HYDROCODONE BITARTRATE AND ACETAMINOPHEN 1 TABLET: 7.5; 325 TABLET ORAL at 21:19

## 2020-01-01 RX ADMIN — Medication 10 MG: at 08:33

## 2020-01-01 RX ADMIN — CEFTRIAXONE SODIUM 1 G: 1 INJECTION, POWDER, FOR SOLUTION INTRAMUSCULAR; INTRAVENOUS at 16:41

## 2020-01-01 RX ADMIN — LEVOTHYROXINE SODIUM 50 MCG: 0.05 TABLET ORAL at 05:11

## 2020-01-01 RX ADMIN — FENTANYL CITRATE 25 MCG: 50 INJECTION INTRAMUSCULAR; INTRAVENOUS at 08:11

## 2020-01-01 RX ADMIN — CALCIUM CARBONATE-VITAMIN D TAB 500 MG-200 UNIT 1 TABLET: 500-200 TAB at 16:44

## 2020-01-01 RX ADMIN — POTASSIUM CHLORIDE 20 MEQ: 14.9 INJECTION, SOLUTION INTRAVENOUS at 18:20

## 2020-01-01 RX ADMIN — SODIUM CHLORIDE, SODIUM LACTATE, POTASSIUM CHLORIDE, AND CALCIUM CHLORIDE 25 ML/HR: 600; 310; 30; 20 INJECTION, SOLUTION INTRAVENOUS at 06:47

## 2020-01-01 RX ADMIN — PROPOFOL 80 MG: 10 INJECTION, EMULSION INTRAVENOUS at 07:50

## 2020-01-01 RX ADMIN — DEXTROSE MONOHYDRATE 75 ML/HR: 5 INJECTION, SOLUTION INTRAVENOUS at 11:12

## 2020-01-01 RX ADMIN — DEXTROSE MONOHYDRATE 75 ML/HR: 5 INJECTION, SOLUTION INTRAVENOUS at 16:13

## 2020-01-01 RX ADMIN — MIRTAZAPINE 15 MG: 15 TABLET, FILM COATED ORAL at 22:05

## 2020-01-01 RX ADMIN — Medication 10 ML: at 21:31

## 2020-01-01 RX ADMIN — AMIODARONE HYDROCHLORIDE 200 MG: 200 TABLET ORAL at 09:19

## 2020-01-01 RX ADMIN — SODIUM CHLORIDE, SODIUM LACTATE, POTASSIUM CHLORIDE, AND CALCIUM CHLORIDE 75 ML/HR: 600; 310; 30; 20 INJECTION, SOLUTION INTRAVENOUS at 08:20

## 2020-01-01 RX ADMIN — PIPERACILLIN SODIUM AND TAZOBACTAM SODIUM 3.38 G: 3; .375 INJECTION, POWDER, LYOPHILIZED, FOR SOLUTION INTRAVENOUS at 15:18

## 2020-01-01 RX ADMIN — TRAMADOL HYDROCHLORIDE 50 MG: 50 TABLET, FILM COATED ORAL at 11:17

## 2020-01-01 RX ADMIN — CALCIUM CARBONATE-VITAMIN D TAB 500 MG-200 UNIT 1 TABLET: 500-200 TAB at 16:58

## 2020-01-01 RX ADMIN — Medication 10 ML: at 13:08

## 2020-01-01 RX ADMIN — CALCIUM CARBONATE-VITAMIN D TAB 500 MG-200 UNIT 1 TABLET: 500-200 TAB at 09:19

## 2020-01-01 RX ADMIN — Medication 5 ML: at 05:57

## 2020-01-01 RX ADMIN — LEVOTHYROXINE SODIUM 50 MCG: 0.05 TABLET ORAL at 05:57

## 2020-01-01 RX ADMIN — CALCIUM CARBONATE-VITAMIN D TAB 500 MG-200 UNIT 1 TABLET: 500-200 TAB at 08:59

## 2020-01-01 RX ADMIN — LEVOTHYROXINE SODIUM 50 MCG: 0.05 TABLET ORAL at 06:13

## 2020-01-01 RX ADMIN — Medication 1 EACH: at 03:43

## 2020-01-01 RX ADMIN — POTASSIUM CHLORIDE 20 MEQ: 14.9 INJECTION, SOLUTION INTRAVENOUS at 09:01

## 2020-01-01 RX ADMIN — NADOLOL 80 MG: 40 TABLET ORAL at 05:46

## 2020-01-01 RX ADMIN — CALCIUM CARBONATE-VITAMIN D TAB 500 MG-200 UNIT 1 TABLET: 500-200 TAB at 17:02

## 2020-01-01 RX ADMIN — ONDANSETRON 4 MG: 2 INJECTION INTRAMUSCULAR; INTRAVENOUS at 08:00

## 2020-01-01 RX ADMIN — CLORAZEPATE DIPOTASSIUM 7.5 MG: 7.5 TABLET ORAL at 17:23

## 2020-01-01 RX ADMIN — Medication 5 ML: at 22:01

## 2020-01-01 RX ADMIN — Medication 10 ML: at 14:40

## 2020-01-01 RX ADMIN — PIPERACILLIN SODIUM AND TAZOBACTAM SODIUM 3.38 G: 3; .375 INJECTION, POWDER, LYOPHILIZED, FOR SOLUTION INTRAVENOUS at 16:58

## 2020-01-01 RX ADMIN — TUBERCULIN PURIFIED PROTEIN DERIVATIVE 5 UNITS: 5 INJECTION, SOLUTION INTRADERMAL at 05:47

## 2020-01-01 RX ADMIN — Medication 5 ML: at 22:04

## 2020-01-01 RX ADMIN — TRAZODONE HYDROCHLORIDE 100 MG: 50 TABLET ORAL at 22:04

## 2020-01-01 RX ADMIN — PIPERACILLIN SODIUM AND TAZOBACTAM SODIUM 3.38 G: 3; .375 INJECTION, POWDER, LYOPHILIZED, FOR SOLUTION INTRAVENOUS at 04:11

## 2020-01-01 RX ADMIN — ACETAMINOPHEN 650 MG: 325 TABLET, FILM COATED ORAL at 05:57

## 2020-01-01 RX ADMIN — CALCIUM CARBONATE-VITAMIN D TAB 500 MG-200 UNIT 1 TABLET: 500-200 TAB at 12:18

## 2020-01-01 RX ADMIN — FUROSEMIDE 40 MG: 40 TABLET ORAL at 08:59

## 2020-01-01 RX ADMIN — CALCIUM CARBONATE-VITAMIN D TAB 500 MG-200 UNIT 1 TABLET: 500-200 TAB at 12:02

## 2020-01-01 RX ADMIN — NADOLOL 80 MG: 40 TABLET ORAL at 09:12

## 2020-01-01 RX ADMIN — BARIUM SULFATE 45 ML: 980 POWDER, FOR SUSPENSION ORAL at 09:05

## 2020-01-01 RX ADMIN — CEFTRIAXONE SODIUM 1 G: 1 INJECTION, POWDER, FOR SOLUTION INTRAMUSCULAR; INTRAVENOUS at 17:23

## 2020-01-01 RX ADMIN — BARIUM SULFATE 15 ML: 400 SUSPENSION ORAL at 09:06

## 2020-01-01 RX ADMIN — Medication 10 MG: at 08:02

## 2020-01-01 RX ADMIN — Medication 2 G: at 07:59

## 2020-01-01 RX ADMIN — Medication 10 ML: at 05:59

## 2020-01-01 RX ADMIN — TRAZODONE HYDROCHLORIDE 100 MG: 50 TABLET ORAL at 21:19

## 2020-01-01 RX ADMIN — HALOPERIDOL LACTATE 2 MG: 5 INJECTION INTRAMUSCULAR at 01:55

## 2020-01-01 RX ADMIN — ASPIRIN 325 MG ORAL TABLET 325 MG: 325 PILL ORAL at 09:12

## 2020-01-01 RX ADMIN — POTASSIUM CHLORIDE 20 MEQ: 200 INJECTION, SOLUTION INTRAVENOUS at 11:11

## 2020-01-01 RX ADMIN — PHENYLEPHRINE HYDROCHLORIDE 120 MCG: 10 INJECTION INTRAVENOUS at 08:23

## 2020-01-01 RX ADMIN — SODIUM CHLORIDE, SODIUM LACTATE, POTASSIUM CHLORIDE, AND CALCIUM CHLORIDE 75 ML/HR: 600; 310; 30; 20 INJECTION, SOLUTION INTRAVENOUS at 14:00

## 2020-01-01 RX ADMIN — AMIODARONE HYDROCHLORIDE 200 MG: 200 TABLET ORAL at 05:46

## 2020-01-01 RX ADMIN — ONDANSETRON 4 MG: 2 INJECTION INTRAMUSCULAR; INTRAVENOUS at 13:24

## 2020-01-01 RX ADMIN — CALCIUM CARBONATE-VITAMIN D TAB 500 MG-200 UNIT 1 TABLET: 500-200 TAB at 12:54

## 2020-01-01 RX ADMIN — POTASSIUM CHLORIDE 20 MEQ: 200 INJECTION, SOLUTION INTRAVENOUS at 14:06

## 2020-01-01 RX ADMIN — NADOLOL 80 MG: 40 TABLET ORAL at 09:19

## 2020-01-01 RX ADMIN — BARIUM SULFATE 15 ML: 400 SUSPENSION ORAL at 09:05

## 2020-01-01 RX ADMIN — MORPHINE SULFATE 2 MG: 2 INJECTION, SOLUTION INTRAMUSCULAR; INTRAVENOUS at 13:24

## 2020-01-01 RX ADMIN — Medication 10 ML: at 22:00

## 2020-01-01 RX ADMIN — CLORAZEPATE DIPOTASSIUM 7.5 MG: 7.5 TABLET ORAL at 08:59

## 2020-01-01 RX ADMIN — CEFTRIAXONE SODIUM 1 G: 1 INJECTION, POWDER, FOR SOLUTION INTRAMUSCULAR; INTRAVENOUS at 14:48

## 2020-01-01 RX ADMIN — ASPIRIN 81 MG: 81 TABLET, CHEWABLE ORAL at 05:46

## 2020-01-01 RX ADMIN — POTASSIUM CHLORIDE 20 MEQ: 14.9 INJECTION, SOLUTION INTRAVENOUS at 14:31

## 2020-01-01 RX ADMIN — AMLODIPINE BESYLATE 5 MG: 5 TABLET ORAL at 12:02

## 2020-01-01 RX ADMIN — Medication 5 ML: at 05:11

## 2020-01-01 RX ADMIN — CLORAZEPATE DIPOTASSIUM 7.5 MG: 7.5 TABLET ORAL at 09:19

## 2020-01-01 RX ADMIN — Medication 10 ML: at 15:24

## 2020-01-01 RX ADMIN — AMLODIPINE BESYLATE 5 MG: 5 TABLET ORAL at 08:59

## 2020-01-01 RX ADMIN — CEFTRIAXONE SODIUM 1 G: 1 INJECTION, POWDER, FOR SOLUTION INTRAMUSCULAR; INTRAVENOUS at 17:02

## 2020-01-01 RX ADMIN — TRAZODONE HYDROCHLORIDE 100 MG: 50 TABLET ORAL at 21:30

## 2020-01-01 RX ADMIN — DEXTROSE MONOHYDRATE 50 ML/HR: 5 INJECTION, SOLUTION INTRAVENOUS at 17:02

## 2020-01-01 RX ADMIN — PIPERACILLIN SODIUM AND TAZOBACTAM SODIUM 3.38 G: 3; .375 INJECTION, POWDER, LYOPHILIZED, FOR SOLUTION INTRAVENOUS at 06:07

## 2020-01-01 RX ADMIN — VANCOMYCIN HYDROCHLORIDE 1500 MG: 10 INJECTION, POWDER, LYOPHILIZED, FOR SOLUTION INTRAVENOUS at 06:07

## 2020-01-01 RX ADMIN — MIRTAZAPINE 15 MG: 15 TABLET, FILM COATED ORAL at 21:19

## 2020-01-01 RX ADMIN — CEFTRIAXONE SODIUM 1 G: 1 INJECTION, POWDER, FOR SOLUTION INTRAMUSCULAR; INTRAVENOUS at 16:13

## 2020-01-01 RX ADMIN — ASPIRIN 325 MG ORAL TABLET 325 MG: 325 PILL ORAL at 09:19

## 2020-01-01 RX ADMIN — POTASSIUM CHLORIDE 20 MEQ: 14.9 INJECTION, SOLUTION INTRAVENOUS at 20:20

## 2020-01-01 RX ADMIN — LEVOTHYROXINE SODIUM 50 MCG: 0.05 TABLET ORAL at 05:59

## 2020-01-01 RX ADMIN — CALCIUM CARBONATE-VITAMIN D TAB 500 MG-200 UNIT 1 TABLET: 500-200 TAB at 09:12

## 2020-01-01 RX ADMIN — MIRTAZAPINE 15 MG: 15 TABLET, FILM COATED ORAL at 21:30

## 2020-01-01 RX ADMIN — PHENYLEPHRINE HYDROCHLORIDE 120 MCG: 10 INJECTION INTRAVENOUS at 08:28

## 2020-01-01 RX ADMIN — DEXTROSE MONOHYDRATE 75 ML/HR: 5 INJECTION, SOLUTION INTRAVENOUS at 05:37

## 2020-01-01 RX ADMIN — LIDOCAINE HYDROCHLORIDE 60 MG: 20 INJECTION, SOLUTION EPIDURAL; INFILTRATION; INTRACAUDAL; PERINEURAL at 07:50

## 2020-01-01 RX ADMIN — BARIUM SULFATE 15 ML: 400 PASTE ORAL at 09:06

## 2020-01-01 RX ADMIN — LEVOTHYROXINE SODIUM 50 MCG: 0.05 TABLET ORAL at 05:13

## 2020-01-01 RX ADMIN — AMIODARONE HYDROCHLORIDE 200 MG: 200 TABLET ORAL at 09:12

## 2020-01-01 ASSESSMENT — LOCATION DETAILED DESCRIPTION DERM
LOCATION DETAILED: GLABELLA
LOCATION DETAILED: MIDDLE STERNUM
LOCATION DETAILED: SUPERIOR THORACIC SPINE
LOCATION DETAILED: RIGHT SUPERIOR MEDIAL UPPER BACK
LOCATION DETAILED: RIGHT INFERIOR LATERAL MALAR CHEEK
LOCATION DETAILED: LEFT MEDIAL SUPERIOR CHEST
LOCATION DETAILED: RIGHT DISTAL CALF
LOCATION DETAILED: RIGHT SUPERIOR FOREHEAD
LOCATION DETAILED: GLABELLA

## 2020-01-01 ASSESSMENT — LOCATION SIMPLE DESCRIPTION DERM
LOCATION SIMPLE: RIGHT CALF
LOCATION SIMPLE: RIGHT FOREHEAD
LOCATION SIMPLE: RIGHT CHEEK
LOCATION SIMPLE: CHEST
LOCATION SIMPLE: UPPER BACK
LOCATION SIMPLE: RIGHT UPPER BACK
LOCATION SIMPLE: GLABELLA
LOCATION SIMPLE: CHEST
LOCATION SIMPLE: GLABELLA

## 2020-01-01 ASSESSMENT — LOCATION ZONE DERM
LOCATION ZONE: TRUNK
LOCATION ZONE: FACE
LOCATION ZONE: TRUNK
LOCATION ZONE: FACE
LOCATION ZONE: LEG

## 2020-01-07 PROBLEM — R06.09 DOE (DYSPNEA ON EXERTION): Status: ACTIVE | Noted: 2020-01-01

## 2020-01-09 NOTE — PROGRESS NOTES
Pt was seen and labs reviewed by Dr. Jennifer Chapa.  Pt is doing well, no complaints today. Darrell Hernandez will continue her current medications.  Pt will return to clinic in 4 weeks. New pomalyst prescription written today. Advised to call with any further needs.

## 2020-01-16 NOTE — PROGRESS NOTES
ALBA RUCKER called pt's daughter to try and touch base about community resources. However, ALBA RUCKER got pt's daughter's voice mail and had to leave a message. PLAN: 
ALBA RUCKER will attempt to reach pt's daughter again tomorrow. This note will not be viewable in [address]

## 2020-01-17 NOTE — PROGRESS NOTES
ALBA RUCKER called to try and touch base with pt's daughter Clau Sanchez again. However, LABA RUCKER got Jennifer's voice mail and had to leave a message. PLAN: 
ALBA RUCKER will route this note to ALBA Meza Jamarcus who is connected to pt's PCP office for follow up next week. This note will not be viewable in [address]

## 2020-01-23 NOTE — PROGRESS NOTES
ALBA RUCKER in receipt of referral. 
Attempted phone outreach today. LM for call back on VM. Attempts made on 1/16 and 1/17 to reach pt's daughter as well. Messages left for call back. No response. ALBA RUCKER will attempt one more outreach. This note will not be viewable in [address]

## 2020-01-27 NOTE — PROGRESS NOTES
Four unsuccessful attempts have been made to reach pt's daughter. Messages left with no return call. No further outreaches planned. Dr. Kamlesh Duke updated. This note will not be viewable in [address]

## 2020-02-13 NOTE — PROGRESS NOTES
Pt was seen today by Dr. Ivy Solorzano. Labs reviewed. She is doing well on pomlayst 1 mg daily. Prescription was sent last week as she missed due to weather. She denies any other needs. She is eating and drinking well. Advised to call with any further needs before next visit.

## 2020-03-12 NOTE — PROGRESS NOTES
Pt was seen today by Dr. Bobbi Morelos labs reviewed/. She will proceed with her pomlayst. She is doing well. She will call with any further needs.

## 2020-04-30 NOTE — PROGRESS NOTES
4/30/20:  Patient in for follow-up with NP. Patient c/o headache and BP elevated. Patient reports not taking all of her medications today. Patient appears confused about medications so called patient's daughter who in turn said we need to confirm with patient's son. Daughter called patient's son but he was unavailable. Patient's daughter, Alvin Potts, thinks patient started pomalyst around 4/17. She has been taking it for 14 days on and 14 days off. She will continue with this plan. Patient to see Dr. Taisha Fernandez in one month.

## 2020-05-08 NOTE — DISCHARGE INSTRUCTIONS
Return with any fevers, vomiting, difficulty breathing, worsening symptoms, or additional concerns. Follow-up with your primary care doctor as needed.

## 2020-05-08 NOTE — ED NOTES
Patients daughter contacted and informed of patients discharge information. Patient to be transferred to home via [facility].

## 2020-05-08 NOTE — ED TRIAGE NOTES
Ems called to home for blood pressure abnormality. Pt has been checking bp at home. Started having left face numbness at 1200. Per ems numbness resolved in route. Pt still complaining of headache which has been happening all week. bp 158/74, hr 74 NS, no chest pain, no shob.  bgl 117, temp 98.7 oral.  Pt wearing mask upon arrival.  20g RAC. No interventions by EMS.

## 2020-05-08 NOTE — ED PROVIDER NOTES
59-year-old lady presents with concerns about a headache that has been present for about 1 week. She said that over the past week the headache has been coming and going and sometimes with a headache she has a high blood pressure and other she does not. Today she also noticed a sensation in the left half of her body that may have felt tingly or numb. She said it did not last very long and it was not associated with any other symptoms. No speech or balance problems through this time. She denies any fevers or chills and has had no nausea, vomiting, or diarrhea. No other associated symptoms. Elements of this note were created using speech recognition software. As such, errors of speech recognition may be present. Past Medical History:  
Diagnosis Date  Anemia, unspecified  Chest pain, unspecified 3/21/2016  Chronic anxiety 3/21/2016  Diastolic heart failure ([address] 3/21/2016  Essential hypertension, benign  Flatulence, eructation, and gas pain  Lump or mass in breast   
 Other and unspecified hyperlipidemia  Paroxysmal atrial fibrillation ([address] 3/21/2016  Paroxysmal tachycardia ([address] 3/21/2016  Pernicious anemia  Postmenopausal atrophic vaginitis  Unspecified deficiency anemia  Unspecified hypothyroidism Past Surgical History:  
Procedure Laterality Date  HX HYSTERECTOMY  HX ORTHOPAEDIC    
 heel spur  HX VEIN STRIPPING Family History:  
Problem Relation Age of Onset  No Known Problems Mother  Heart Disease Father Social History Socioeconomic History  Marital status:  Spouse name: Not on file  Number of children: Not on file  Years of education: Not on file  Highest education level: Not on file Occupational History  Not on file Social Needs  Financial resource strain: Not on file  Food insecurity Worry: Not on file Inability: Not on file  Transportation needs Medical: Not on file Non-medical: Not on file Tobacco Use  Smoking status: Never Smoker  Smokeless tobacco: Never Used Substance and Sexual Activity  Alcohol use: No  
  Alcohol/week: 0.0 standard drinks  Drug use: No  
 Sexual activity: Not on file Lifestyle  Physical activity Days per week: Not on file Minutes per session: Not on file  Stress: Not on file Relationships  Social connections Talks on phone: Not on file Gets together: Not on file Attends Taoist service: Not on file Active member of club or organization: Not on file Attends meetings of clubs or organizations: Not on file Relationship status: Not on file  Intimate partner violence Fear of current or ex partner: Not on file Emotionally abused: Not on file Physically abused: Not on file Forced sexual activity: Not on file Other Topics Concern  Not on file Social History Narrative , lives alone. 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 ALLERGIES: Patient has no known allergies. Review of Systems Constitutional: Negative for chills, diaphoresis and fever. HENT: Negative for congestion, rhinorrhea and sore throat. Eyes: Negative for redness and visual disturbance. Respiratory: Negative for cough, chest tightness, shortness of breath and wheezing. Cardiovascular: Negative for chest pain and palpitations. Gastrointestinal: Negative for abdominal pain, blood in stool, diarrhea, nausea and vomiting. Endocrine: Negative for polydipsia and polyuria. Genitourinary: Negative for dysuria and hematuria. Musculoskeletal: Negative for arthralgias, myalgias and neck stiffness. Skin: Negative for rash. Allergic/Immunologic: Negative for environmental allergies and food allergies. Neurological: Positive for headaches. Negative for dizziness and weakness. Hematological: Negative for adenopathy. Does not bruise/bleed easily. Psychiatric/Behavioral: Negative for confusion and sleep disturbance. The patient is not nervous/anxious. Vitals:  
 05/08/20 1320 BP: 155/88 Pulse: 64 Resp: 16 Temp: 98.7 °F (37.1 °C) SpO2: 97% Weight: 64 kg (141 lb) Height: 5' 3\" (1.6 m) Physical Exam 
Vitals signs and nursing note reviewed. Constitutional:   
   General: She is not in acute distress. Appearance: She is well-developed. She is not toxic-appearing. HENT:  
   Head: Normocephalic and atraumatic. Eyes:  
   General:     
   Right eye: No discharge. Left eye: No discharge. Conjunctiva/sclera: Conjunctivae normal.  
Neck: Musculoskeletal: Normal range of motion. No neck rigidity. Cardiovascular:  
   Rate and Rhythm: Normal rate and regular rhythm. Heart sounds: Normal heart sounds. Pulmonary:  
   Effort: Pulmonary effort is normal. No respiratory distress. Breath sounds: Normal breath sounds. No wheezing or rales. Chest:  
   Chest wall: No tenderness. Musculoskeletal: Normal range of motion. General: No tenderness. Lymphadenopathy:  
   Cervical: No cervical adenopathy. Skin: 
   General: Skin is warm and dry. Neurological:  
   General: No focal deficit present. Mental Status: She is alert and oriented to person, place, and time. Psychiatric:     
   Mood and Affect: Mood normal.     
   Behavior: Behavior normal.  
 
  
 
MDM Number of Diagnoses or Management Options Diagnosis management comments: Patient symptoms are not consistent with an acute ischemic event. She did mention that she may have fallen at some point from a sitting position but she was not certain. Therefore I will get a CT scan to rule out an occult intracranial abnormality. Procedures

## 2020-05-12 NOTE — PROGRESS NOTES
Left voice message with contact information requesting a call back. This note will not be viewable in [address]

## 2020-05-15 NOTE — PROGRESS NOTES
Unable to reach for COVID-19 education x 2 attempts. . No further outreach. . This note will not be viewable in [address]

## 2020-05-28 NOTE — PROGRESS NOTES
5/28/20 - Patient seen by Dr. Yolanda Renee. Vitamin D level added to labs already drawn. Patient will continue on pomalyst 14 days on and 14 days off. Patient has no other needs at this time. Navigation will sign off.

## 2020-08-12 PROBLEM — D48.5 NEOPLASM OF UNCERTAIN BEHAVIOR OF SKIN: Status: ACTIVE | Noted: 2020-01-01

## 2020-08-12 PROBLEM — F41.9 ANXIETY DISORDER, UNSPECIFIED: Status: ACTIVE | Noted: 2020-01-01

## 2020-08-12 PROBLEM — C44.399 OTHER SPECIFIED MALIGNANT NEOPLASM OF SKIN OF OTHER PARTS OF FACE: Status: ACTIVE | Noted: 2020-01-01

## 2020-08-12 PROBLEM — C44.722 SQUAMOUS CELL CARCINOMA OF SKIN OF RIGHT LOWER LIMB, INCLUDING HIP: Status: ACTIVE | Noted: 2020-01-01

## 2020-08-12 PROBLEM — L57.0 ACTINIC KERATOSIS: Status: ACTIVE | Noted: 2020-01-01

## 2020-08-12 NOTE — PROCEDURE: ADDITIONAL NOTES
Detail Level: Simple
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 Will need biopsies at next visit.
Additional Notes: Biopsy proven. Attempted to contact patient on multiple occasions until certified letter was sent. Documented in chart. Discussed importance of removal. Schedule for Mohs.

## 2020-08-12 NOTE — PROCEDURE: EXCISION
Epidermal Autograft Text: The defect edges were debeveled with a #15 scalpel blade.  Given the location of the defect, shape of the defect and the proximity to free margins an epidermal autograft was deemed most appropriate.  Using a sterile surgical marker, the primary defect shape was transferred to the donor site. The epidermal graft was then harvested.  The skin graft was then placed in the primary defect and oriented appropriately.
Show Primary And Secondary Defect Sizes Variable (Do Not Hide If You Perform Flaps Or Graft Closures): Yes
Ftsg Text: The defect edges were debeveled with a #15 scalpel blade.  Given the location of the defect, shape of the defect and the proximity to free margins a full thickness skin graft was deemed most appropriate.  Using a sterile surgical marker, the primary defect shape was transferred to the donor site. The area thus outlined was incised deep to adipose tissue with a #15 scalpel blade.  The harvested graft was then trimmed of adipose tissue until only dermis and epidermis was left.  The skin margins of the secondary defect were undermined to an appropriate distance in all directions utilizing iris scissors.  The secondary defect was closed with interrupted buried subcutaneous sutures.  The skin edges were then re-apposed with running  sutures.  The skin graft was then placed in the primary defect and oriented appropriately.
Island Pedicle Flap-Requiring Vessel Identification Text: The defect edges were debeveled with a #15 scalpel blade.  Given the location of the defect, shape of the defect and the proximity to free margins an island pedicle advancement flap was deemed most appropriate.  Using a sterile surgical marker, an appropriate advancement flap was drawn, based on the axial vessel mentioned above, incorporating the defect, outlining the appropriate donor tissue and placing the expected incisions within the relaxed skin tension lines where possible.    The area thus outlined was incised deep to adipose tissue with a #15 scalpel blade.  The skin margins were undermined to an appropriate distance in all directions around the primary defect and laterally outward around the island pedicle utilizing iris scissors.  There was minimal undermining beneath the pedicle flap.
Second Skin Substitute Units (Will Override Primary Defect Units If Greater Than 0): 0
Hemigard Intro: Due to skin fragility and wound tension, it was decided to use HEMIGARD adhesive retention suture devices to permit a linear closure. The skin was cleaned and dried for a 6cm distance away from the wound. Excessive hair, if present, was removed to allow for adhesion.
V-Y Plasty Text: The defect edges were debeveled with a #15 scalpel blade.  Given the location of the defect, shape of the defect and the proximity to free margins an V-Y advancement flap was deemed most appropriate.  Using a sterile surgical marker, an appropriate advancement flap was drawn incorporating the defect and placing the expected incisions within the relaxed skin tension lines where possible.    The area thus outlined was incised deep to adipose tissue with a #15 scalpel blade.  The skin margins were undermined to an appropriate distance in all directions utilizing iris scissors.
Complex Repair And Epidermal Autograft Text: The defect edges were debeveled with a #15 scalpel blade.  The primary defect was closed partially with a complex linear closure.  Given the location of the defect, shape of the defect and the proximity to free margins an epidermal autograft was deemed most appropriate to repair the remaining defect.  The graft was trimmed to fit the size of the remaining defect.  The graft was then placed in the primary defect, oriented appropriately, and sutured into place.
Advancement Flap (Double) Text: The defect edges were debeveled with a #15 scalpel blade.  Given the location of the defect and the proximity to free margins a double advancement flap was deemed most appropriate.  Using a sterile surgical marker, the appropriate advancement flaps were drawn incorporating the defect and placing the expected incisions within the relaxed skin tension lines where possible.    The area thus outlined was incised deep to adipose tissue with a #15 scalpel blade.  The skin margins were undermined to an appropriate distance in all directions utilizing iris scissors.
Cheek-To-Nose Interpolation Flap Text: A decision was made to reconstruct the defect utilizing an interpolation axial flap and a staged reconstruction.  A telfa template was made of the defect.  This telfa template was then used to outline the Cheek-To-Nose Interpolation flap.  The donor area for the pedicle flap was then injected with anesthesia.  The flap was excised through the skin and subcutaneous tissue down to the layer of the underlying musculature.  The interpolation flap was carefully excised within this deep plane to maintain its blood supply.  The edges of the donor site were undermined.   The donor site was closed in a primary fashion.  The pedicle was then rotated into position and sutured.  Once the tube was sutured into place, adequate blood supply was confirmed with blanching and refill.  The pedicle was then wrapped with xeroform gauze and dressed appropriately with a telfa and gauze bandage to ensure continued blood supply and protect the attached pedicle.
Complex Repair And Dorsal Nasal Flap Text: The defect edges were debeveled with a #15 scalpel blade.  The primary defect was closed partially with a complex linear closure.  Given the location of the remaining defect, shape of the defect and the proximity to free margins a dorsal nasal flap was deemed most appropriate for complete closure of the defect.  Using a sterile surgical marker, an appropriate flap was drawn incorporating the defect and placing the expected incisions within the relaxed skin tension lines where possible.    The area thus outlined was incised deep to adipose tissue with a #15 scalpel blade.  The skin margins were undermined to an appropriate distance in all directions utilizing iris scissors.
O-Z Flap Text: The defect edges were debeveled with a #15 scalpel blade.  Given the location of the defect, shape of the defect and the proximity to free margins an O-Z flap was deemed most appropriate.  Using a sterile surgical marker, an appropriate transposition flap was drawn incorporating the defect and placing the expected incisions within the relaxed skin tension lines where possible. The area thus outlined was incised deep to adipose tissue with a #15 scalpel blade.  The skin margins were undermined to an appropriate distance in all directions utilizing iris scissors.
Trilobed Flap Text: The defect edges were debeveled with a #15 scalpel blade.  Given the location of the defect and the proximity to free margins a trilobed flap was deemed most appropriate.  Using a sterile surgical marker, an appropriate trilobed flap drawn around the defect.    The area thus outlined was incised deep to adipose tissue with a #15 scalpel blade.  The skin margins were undermined to an appropriate distance in all directions utilizing iris scissors.
Complex Repair And Z Plasty Text: The defect edges were debeveled with a #15 scalpel blade.  The primary defect was closed partially with a complex linear closure.  Given the location of the remaining defect, shape of the defect and the proximity to free margins a Z plasty was deemed most appropriate for complete closure of the defect.  Using a sterile surgical marker, an appropriate advancement flap was drawn incorporating the defect and placing the expected incisions within the relaxed skin tension lines where possible.    The area thus outlined was incised deep to adipose tissue with a #15 scalpel blade.  The skin margins were undermined to an appropriate distance in all directions utilizing iris scissors.
Ear Star Wedge Flap Text: The defect edges were debeveled with a #15 blade scalpel.  Given the location of the defect and the proximity to free margins (helical rim) an ear star wedge flap was deemed most appropriate.  Using a sterile surgical marker, the appropriate flap was drawn incorporating the defect and placing the expected incisions between the helical rim and antihelix where possible.  The area thus outlined was incised through and through with a #15 scalpel blade.
Intermediate Repair Preamble Text (Leave Blank If You Do Not Want): Undermining was performed with blunt dissection.
Rhombic Flap Text: The defect edges were debeveled with a #15 scalpel blade.  Given the location of the defect and the proximity to free margins a rhombic flap was deemed most appropriate.  Using a sterile surgical marker, an appropriate rhombic flap was drawn incorporating the defect.    The area thus outlined was incised deep to adipose tissue with a #15 scalpel blade.  The skin margins were undermined to an appropriate distance in all directions utilizing iris scissors.
A-T Advancement Flap Text: The defect edges were debeveled with a #15 scalpel blade.  Given the location of the defect, shape of the defect and the proximity to free margins an A-T advancement flap was deemed most appropriate.  Using a sterile surgical marker, an appropriate advancement flap was drawn incorporating the defect and placing the expected incisions within the relaxed skin tension lines where possible.    The area thus outlined was incised deep to adipose tissue with a #15 scalpel blade.  The skin margins were undermined to an appropriate distance in all directions utilizing iris scissors.
Keystone Flap Text: The defect edges were debeveled with a #15 scalpel blade.  Given the location of the defect, shape of the defect a keystone flap was deemed most appropriate.  Using a sterile surgical marker, an appropriate keystone flap was drawn incorporating the defect, outlining the appropriate donor tissue and placing the expected incisions within the relaxed skin tension lines where possible. The area thus outlined was incised deep to adipose tissue with a #15 scalpel blade.  The skin margins were undermined to an appropriate distance in all directions around the primary defect and laterally outward around the flap utilizing iris scissors.
Validate That Anesthesia Volume Is Not Zero (If You Leave At 0 It Will Not Render In Note): No
Skin Substitute: EZ DERM
Dressing: dry sterile dressing
Length To Time In Minutes Device Was In Place: 10
Wound Care: Petrolatum
Retention Suture Text: Retention sutures were placed to support the closure and prevent dehiscence.
Excision Method: Round
Complex Repair And Modified Advancement Flap Text: The defect edges were debeveled with a #15 scalpel blade.  The primary defect was closed partially with a complex linear closure.  Given the location of the remaining defect, shape of the defect and the proximity to free margins a modified advancement flap was deemed most appropriate for complete closure of the defect.  Using a sterile surgical marker, an appropriate advancement flap was drawn incorporating the defect and placing the expected incisions within the relaxed skin tension lines where possible.    The area thus outlined was incised deep to adipose tissue with a #15 scalpel blade.  The skin margins were undermined to an appropriate distance in all directions utilizing iris scissors.
Modified Advancement Flap Text: The defect edges were debeveled with a #15 scalpel blade.  Given the location of the defect, shape of the defect and the proximity to free margins a modified advancement flap was deemed most appropriate.  Using a sterile surgical marker, an appropriate advancement flap was drawn incorporating the defect and placing the expected incisions within the relaxed skin tension lines where possible.    The area thus outlined was incised deep to adipose tissue with a #15 scalpel blade.  The skin margins were undermined to an appropriate distance in all directions utilizing iris scissors.
Purse String (Intermediate) Text: Given the location of the defect and the characteristics of the surrounding skin a purse string intermediate closure was deemed most appropriate.  Undermining was performed circumfirentially around the surgical defect.  A purse string suture was then placed and tightened.
Path Notes (To The Dermatopathologist): Please check margins.\\n\\nSTAT read\\n\\n2 sutures addie superior at 12\\n1 suture @3
Excision Depth: adipose tissue
O-T Advancement Flap Text: The defect edges were debeveled with a #15 scalpel blade.  Given the location of the defect, shape of the defect and the proximity to free margins an O-T advancement flap was deemed most appropriate.  Using a sterile surgical marker, an appropriate advancement flap was drawn incorporating the defect and placing the expected incisions within the relaxed skin tension lines where possible.    The area thus outlined was incised deep to adipose tissue with a #15 scalpel blade.  The skin margins were undermined to an appropriate distance in all directions utilizing iris scissors.
Complex Repair And Tissue Cultured Epidermal Autograft Text: The defect edges were debeveled with a #15 scalpel blade.  The primary defect was closed partially with a complex linear closure.  Given the location of the defect, shape of the defect and the proximity to free margins an tissue cultured epidermal autograft was deemed most appropriate to repair the remaining defect.  The graft was trimmed to fit the size of the remaining defect.  The graft was then placed in the primary defect, oriented appropriately, and sutured into place.
Complex Repair And W Plasty Text: The defect edges were debeveled with a #15 scalpel blade.  The primary defect was closed partially with a complex linear closure.  Given the location of the remaining defect, shape of the defect and the proximity to free margins a W plasty was deemed most appropriate for complete closure of the defect.  Using a sterile surgical marker, an appropriate advancement flap was drawn incorporating the defect and placing the expected incisions within the relaxed skin tension lines where possible.    The area thus outlined was incised deep to adipose tissue with a #15 scalpel blade.  The skin margins were undermined to an appropriate distance in all directions utilizing iris scissors.
Island Pedicle Flap With Canthal Suspension Text: The defect edges were debeveled with a #15 scalpel blade.  Given the location of the defect, shape of the defect and the proximity to free margins an island pedicle advancement flap was deemed most appropriate.  Using a sterile surgical marker, an appropriate advancement flap was drawn incorporating the defect, outlining the appropriate donor tissue and placing the expected incisions within the relaxed skin tension lines where possible. The area thus outlined was incised deep to adipose tissue with a #15 scalpel blade.  The skin margins were undermined to an appropriate distance in all directions around the primary defect and laterally outward around the island pedicle utilizing iris scissors.  There was minimal undermining beneath the pedicle flap. A suspension suture was placed in the canthal tendon to prevent tension and prevent ectropion.
Spiral Flap Text: The defect edges were debeveled with a #15 scalpel blade.  Given the location of the defect, shape of the defect and the proximity to free margins a spiral flap was deemed most appropriate.  Using a sterile surgical marker, an appropriate rotation flap was drawn incorporating the defect and placing the expected incisions within the relaxed skin tension lines where possible. The area thus outlined was incised deep to adipose tissue with a #15 scalpel blade.  The skin margins were undermined to an appropriate distance in all directions utilizing iris scissors.
Complex Repair And A-T Advancement Flap Text: The defect edges were debeveled with a #15 scalpel blade.  The primary defect was closed partially with a complex linear closure.  Given the location of the remaining defect, shape of the defect and the proximity to free margins an A-T advancement flap was deemed most appropriate for complete closure of the defect.  Using a sterile surgical marker, an appropriate advancement flap was drawn incorporating the defect and placing the expected incisions within the relaxed skin tension lines where possible.    The area thus outlined was incised deep to adipose tissue with a #15 scalpel blade.  The skin margins were undermined to an appropriate distance in all directions utilizing iris scissors.
Fusiform Excision Additional Text (Leave Blank If You Do Not Want): The margin was drawn around the clinically apparent lesion.  A fusiform shape was then drawn on the skin incorporating the lesion and margins.  Incisions were then made along these lines to the appropriate tissue plane and the lesion was extirpated.
Suture Removal: 5 days
Suturegard Intro: Intraoperative tissue expansion was performed, utilizing the SUTUREGARD device, in order to reduce wound tension.
Paramedian Forehead Flap Text: A decision was made to reconstruct the defect utilizing an interpolation axial flap and a staged reconstruction.  A telfa template was made of the defect.  This telfa template was then used to outline the paramedian forehead pedicle flap.  The donor area for the pedicle flap was then injected with anesthesia.  The flap was excised through the skin and subcutaneous tissue down to the layer of the underlying musculature.  The pedicle flap was carefully excised within this deep plane to maintain its blood supply.  The edges of the donor site were undermined.   The donor site was closed in a primary fashion.  The pedicle was then rotated into position and sutured.  Once the tube was sutured into place, adequate blood supply was confirmed with blanching and refill.  The pedicle was then wrapped with xeroform gauze and dressed appropriately with a telfa and gauze bandage to ensure continued blood supply and protect the attached pedicle.
Zygomaticofacial Flap Text: Given the location of the defect, shape of the defect and the proximity to free margins a zygomaticofacial flap was deemed most appropriate for repair.  Using a sterile surgical marker, the appropriate flap was drawn incorporating the defect and placing the expected incisions within the relaxed skin tension lines where possible. The area thus outlined was incised deep to adipose tissue with a #15 scalpel blade with preservation of a vascular pedicle.  The skin margins were undermined to an appropriate distance in all directions utilizing iris scissors.  The flap was then placed into the defect and anchored with interrupted buried subcutaneous sutures.
Complex Repair And Melolabial Flap Text: The defect edges were debeveled with a #15 scalpel blade.  The primary defect was closed partially with a complex linear closure.  Given the location of the remaining defect, shape of the defect and the proximity to free margins a melolabial flap was deemed most appropriate for complete closure of the defect.  Using a sterile surgical marker, an appropriate advancement flap was drawn incorporating the defect and placing the expected incisions within the relaxed skin tension lines where possible.    The area thus outlined was incised deep to adipose tissue with a #15 scalpel blade.  The skin margins were undermined to an appropriate distance in all directions utilizing iris scissors.
Complex Repair And Dermal Autograft Text: The defect edges were debeveled with a #15 scalpel blade.  The primary defect was closed partially with a complex linear closure.  Given the location of the defect, shape of the defect and the proximity to free margins an dermal autograft was deemed most appropriate to repair the remaining defect.  The graft was trimmed to fit the size of the remaining defect.  The graft was then placed in the primary defect, oriented appropriately, and sutured into place.
Complex Repair And Double M Plasty Text: The defect edges were debeveled with a #15 scalpel blade.  The primary defect was closed partially with a complex linear closure.  Given the location of the remaining defect, shape of the defect and the proximity to free margins a double M plasty was deemed most appropriate for complete closure of the defect.  Using a sterile surgical marker, an appropriate advancement flap was drawn incorporating the defect and placing the expected incisions within the relaxed skin tension lines where possible.    The area thus outlined was incised deep to adipose tissue with a #15 scalpel blade.  The skin margins were undermined to an appropriate distance in all directions utilizing iris scissors.
Banner Transposition Flap Text: The defect edges were debeveled with a #15 scalpel blade.  Given the location of the defect and the proximity to free margins a Banner transposition flap was deemed most appropriate.  Using a sterile surgical marker, an appropriate flap drawn around the defect. The area thus outlined was incised deep to adipose tissue with a #15 scalpel blade.  The skin margins were undermined to an appropriate distance in all directions utilizing iris scissors.
Complex Repair And M Plasty Text: The defect edges were debeveled with a #15 scalpel blade.  The primary defect was closed partially with a complex linear closure.  Given the location of the remaining defect, shape of the defect and the proximity to free margins an M plasty was deemed most appropriate for complete closure of the defect.  Using a sterile surgical marker, an appropriate advancement flap was drawn incorporating the defect and placing the expected incisions within the relaxed skin tension lines where possible.    The area thus outlined was incised deep to adipose tissue with a #15 scalpel blade.  The skin margins were undermined to an appropriate distance in all directions utilizing iris scissors.
Rhomboid Transposition Flap Text: The defect edges were debeveled with a #15 scalpel blade.  Given the location of the defect and the proximity to free margins a rhomboid transposition flap was deemed most appropriate.  Using a sterile surgical marker, an appropriate rhomboid flap was drawn incorporating the defect.    The area thus outlined was incised deep to adipose tissue with a #15 scalpel blade.  The skin margins were undermined to an appropriate distance in all directions utilizing iris scissors.
Pre-Excision Curettage Text (Leave Blank If You Do Not Want): Prior to drawing the surgical margin the visible lesion was removed with electrodesiccation and curettage to clearly define the lesion size.
Partial Purse String (Simple) Text: Given the location of the defect and the characteristics of the surrounding skin a simple purse string closure was deemed most appropriate.  Undermining was performed circumferentially around the surgical defect.  A purse string suture was then placed and tightened. Wound tension of the circular defect prevented complete closure of the wound.
Perilesional Excision Additional Text (Leave Blank If You Do Not Want): The margin was drawn around the clinically apparent lesion. Incisions were then made along these lines to the appropriate tissue plane and the lesion was extirpated.
Purse String (Simple) Text: Given the location of the defect and the characteristics of the surrounding skin a purse string simple closure was deemed most appropriate.  Undermining was performed circumferentially around the surgical defect.  A purse string suture was then placed and tightened.
Cartilage Graft Text: The defect edges were debeveled with a #15 scalpel blade.  Given the location of the defect, shape of the defect, the fact the defect involved a full thickness cartilage defect a cartilage graft was deemed most appropriate.  An appropriate donor site was identified, cleansed, and anesthetized. The cartilage graft was then harvested and transferred to the recipient site, oriented appropriately and then sutured into place.  The secondary defect was then repaired using a primary closure.
Mastoid Interpolation Flap Text: A decision was made to reconstruct the defect utilizing an interpolation axial flap and a staged reconstruction.  A telfa template was made of the defect.  This telfa template was then used to outline the mastoid interpolation flap.  The donor area for the pedicle flap was then injected with anesthesia.  The flap was excised through the skin and subcutaneous tissue down to the layer of the underlying musculature.  The pedicle flap was carefully excised within this deep plane to maintain its blood supply.  The edges of the donor site were undermined.   The donor site was closed in a primary fashion.  The pedicle was then rotated into position and sutured.  Once the tube was sutured into place, adequate blood supply was confirmed with blanching and refill.  The pedicle was then wrapped with xeroform gauze and dressed appropriately with a telfa and gauze bandage to ensure continued blood supply and protect the attached pedicle.
O-T Plasty Text: The defect edges were debeveled with a #15 scalpel blade.  Given the location of the defect, shape of the defect and the proximity to free margins an O-T plasty was deemed most appropriate.  Using a sterile surgical marker, an appropriate O-T plasty was drawn incorporating the defect and placing the expected incisions within the relaxed skin tension lines where possible.    The area thus outlined was incised deep to adipose tissue with a #15 scalpel blade.  The skin margins were undermined to an appropriate distance in all directions utilizing iris scissors.
Consent was obtained from the patient. The risks and benefits to therapy were discussed in detail. Specifically, the risks of infection, scarring, bleeding, prolonged wound healing, incomplete removal, allergy to anesthesia, nerve injury and recurrence were addressed. Prior to the procedure, the treatment site was clearly identified and confirmed by the patient. All components of Universal Protocol/PAUSE Rule completed.
Additional Anesthesia Volume In Cc: 6
Partial Purse String (Intermediate) Text: Given the location of the defect and the characteristics of the surrounding skin an intermediate purse string closure was deemed most appropriate.  Undermining was performed circumferentially around the surgical defect.  A purse string suture was then placed and tightened. Wound tension of the circular defect prevented complete closure of the wound.
Estimated Blood Loss (Cc): minimal
Double O-Z Flap Text: The defect edges were debeveled with a #15 scalpel blade.  Given the location of the defect, shape of the defect and the proximity to free margins a Double O-Z flap was deemed most appropriate.  Using a sterile surgical marker, an appropriate transposition flap was drawn incorporating the defect and placing the expected incisions within the relaxed skin tension lines where possible. The area thus outlined was incised deep to adipose tissue with a #15 scalpel blade.  The skin margins were undermined to an appropriate distance in all directions utilizing iris scissors.
Tissue Cultured Epidermal Autograft Text: The defect edges were debeveled with a #15 scalpel blade.  Given the location of the defect, shape of the defect and the proximity to free margins a tissue cultured epidermal autograft was deemed most appropriate.  The graft was then trimmed to fit the size of the defect.  The graft was then placed in the primary defect and oriented appropriately.
Scalpel Size: 15 blade
Complex Repair And Xenograft Text: The defect edges were debeveled with a #15 scalpel blade.  The primary defect was closed partially with a complex linear closure.  Given the location of the defect, shape of the defect and the proximity to free margins a xenograft was deemed most appropriate to repair the remaining defect.  The graft was trimmed to fit the size of the remaining defect.  The graft was then placed in the primary defect, oriented appropriately, and sutured into place.
Composite Graft Text: The defect edges were debeveled with a #15 scalpel blade.  Given the location of the defect, shape of the defect, the proximity to free margins and the fact the defect was full thickness a composite graft was deemed most appropriate.  The defect was outline and then transferred to the donor site.  A full thickness graft was then excised from the donor site. The graft was then placed in the primary defect, oriented appropriately and then sutured into place.  The secondary defect was then repaired using a primary closure.
Complex Repair And V-Y Plasty Text: The defect edges were debeveled with a #15 scalpel blade.  The primary defect was closed partially with a complex linear closure.  Given the location of the remaining defect, shape of the defect and the proximity to free margins a V-Y plasty was deemed most appropriate for complete closure of the defect.  Using a sterile surgical marker, an appropriate advancement flap was drawn incorporating the defect and placing the expected incisions within the relaxed skin tension lines where possible.    The area thus outlined was incised deep to adipose tissue with a #15 scalpel blade.  The skin margins were undermined to an appropriate distance in all directions utilizing iris scissors.
Advancement Flap (Single) Text: The defect edges were debeveled with a #15 scalpel blade.  Given the location of the defect and the proximity to free margins a single advancement flap was deemed most appropriate.  Using a sterile surgical marker, an appropriate advancement flap was drawn incorporating the defect and placing the expected incisions within the relaxed skin tension lines where possible.    The area thus outlined was incised deep to adipose tissue with a #15 scalpel blade.  The skin margins were undermined to an appropriate distance in all directions utilizing iris scissors.
Curvilinear Excision Additional Text (Leave Blank If You Do Not Want): The margin was drawn around the clinically apparent lesion.  A curvilinear shape was then drawn on the skin incorporating the lesion and margins.  Incisions were then made along these lines to the appropriate tissue plane and the lesion was extirpated.
H Plasty Text: Given the location of the defect, shape of the defect and the proximity to free margins a H-plasty was deemed most appropriate for repair.  Using a sterile surgical marker, the appropriate advancement arms of the H-plasty were drawn incorporating the defect and placing the expected incisions within the relaxed skin tension lines where possible. The area thus outlined was incised deep to adipose tissue with a #15 scalpel blade. The skin margins were undermined to an appropriate distance in all directions utilizing iris scissors.  The opposing advancement arms were then advanced into place in opposite direction and anchored with interrupted buried subcutaneous sutures.
Xenograft Text: The defect edges were debeveled with a #15 scalpel blade.  Given the location of the defect, shape of the defect and the proximity to free margins a xenograft was deemed most appropriate.  The graft was then trimmed to fit the size of the defect.  The graft was then placed in the primary defect and oriented appropriately.
Split-Thickness Skin Graft Text: The defect edges were debeveled with a #15 scalpel blade.  Given the location of the defect, shape of the defect and the proximity to free margins a split thickness skin graft was deemed most appropriate.  Using a sterile surgical marker, the primary defect shape was transferred to the donor site. The split thickness graft was then harvested.  The skin graft was then placed in the primary defect and oriented appropriately.
Primary Defect Length (In Cm): 5
Interpolation Flap Text: A decision was made to reconstruct the defect utilizing an interpolation axial flap and a staged reconstruction.  A telfa template was made of the defect.  This telfa template was then used to outline the interpolation flap.  The donor area for the pedicle flap was then injected with anesthesia.  The flap was excised through the skin and subcutaneous tissue down to the layer of the underlying musculature.  The interpolation flap was carefully excised within this deep plane to maintain its blood supply.  The edges of the donor site were undermined.   The donor site was closed in a primary fashion.  The pedicle was then rotated into position and sutured.  Once the tube was sutured into place, adequate blood supply was confirmed with blanching and refill.  The pedicle was then wrapped with xeroform gauze and dressed appropriately with a telfa and gauze bandage to ensure continued blood supply and protect the attached pedicle.
Excisional Biopsy Additional Text (Leave Blank If You Do Not Want): The margin was drawn around the clinically apparent lesion. An elliptical shape was then drawn on the skin incorporating the lesion and margins.  Incisions were then made along these lines to the appropriate tissue plane and the lesion was extirpated.
Bi-Rhombic Flap Text: The defect edges were debeveled with a #15 scalpel blade.  Given the location of the defect and the proximity to free margins a bi-rhombic flap was deemed most appropriate.  Using a sterile surgical marker, an appropriate rhombic flap was drawn incorporating the defect. The area thus outlined was incised deep to adipose tissue with a #15 scalpel blade.  The skin margins were undermined to an appropriate distance in all directions utilizing iris scissors.
Complex Repair And Rhombic Flap Text: The defect edges were debeveled with a #15 scalpel blade.  The primary defect was closed partially with a complex linear closure.  Given the location of the remaining defect, shape of the defect and the proximity to free margins a rhombic flap was deemed most appropriate for complete closure of the defect.  Using a sterile surgical marker, an appropriate advancement flap was drawn incorporating the defect and placing the expected incisions within the relaxed skin tension lines where possible.    The area thus outlined was incised deep to adipose tissue with a #15 scalpel blade.  The skin margins were undermined to an appropriate distance in all directions utilizing iris scissors.
Hemigard Retention Suture: 2-0 Nylon
Information: Selecting Yes will display possible errors in your note based on the variables you have selected. This validation is only offered as a suggestion for you. PLEASE NOTE THAT THE VALIDATION TEXT WILL BE REMOVED WHEN YOU FINALIZE YOUR NOTE. IF YOU WANT TO FAX A PRELIMINARY NOTE YOU WILL NEED TO TOGGLE THIS TO 'NO' IF YOU DO NOT WANT IT IN YOUR FAXED NOTE.
Complex Repair And Rotation Flap Text: The defect edges were debeveled with a #15 scalpel blade.  The primary defect was closed partially with a complex linear closure.  Given the location of the remaining defect, shape of the defect and the proximity to free margins a rotation flap was deemed most appropriate for complete closure of the defect.  Using a sterile surgical marker, an appropriate advancement flap was drawn incorporating the defect and placing the expected incisions within the relaxed skin tension lines where possible.    The area thus outlined was incised deep to adipose tissue with a #15 scalpel blade.  The skin margins were undermined to an appropriate distance in all directions utilizing iris scissors.
Slit Excision Additional Text (Leave Blank If You Do Not Want): A linear line was drawn on the skin overlying the lesion. An incision was made slowly until the lesion was visualized.  Once visualized, the lesion was removed with blunt dissection.
Star Wedge Flap Text: The defect edges were debeveled with a #15 scalpel blade.  Given the location of the defect, shape of the defect and the proximity to free margins a star wedge flap was deemed most appropriate.  Using a sterile surgical marker, an appropriate rotation flap was drawn incorporating the defect and placing the expected incisions within the relaxed skin tension lines where possible. The area thus outlined was incised deep to adipose tissue with a #15 scalpel blade.  The skin margins were undermined to an appropriate distance in all directions utilizing iris scissors.
Helical Rim Text: The closure involved the helical rim.
No Repair - Repaired With Adjacent Surgical Defect Text (Leave Blank If You Do Not Want): After the excision the defect was repaired concurrently with another surgical defect which was in close approximation.
Complex Repair And Ftsg Text: The defect edges were debeveled with a #15 scalpel blade.  The primary defect was closed partially with a complex linear closure.  Given the location of the defect, shape of the defect and the proximity to free margins a full thickness skin graft was deemed most appropriate to repair the remaining defect.  The graft was trimmed to fit the size of the remaining defect.  The graft was then placed in the primary defect, oriented appropriately, and sutured into place.
Number Of Hemigard Strips Per Side: 1
Helical Rim Advancement Flap Text: The defect edges were debeveled with a #15 blade scalpel.  Given the location of the defect and the proximity to free margins (helical rim) a double helical rim advancement flap was deemed most appropriate.  Using a sterile surgical marker, the appropriate advancement flaps were drawn incorporating the defect and placing the expected incisions between the helical rim and antihelix where possible.  The area thus outlined was incised through and through with a #15 scalpel blade.  With a skin hook and iris scissors, the flaps were gently and sharply undermined and freed up.
Complex Repair And Transposition Flap Text: The defect edges were debeveled with a #15 scalpel blade.  The primary defect was closed partially with a complex linear closure.  Given the location of the remaining defect, shape of the defect and the proximity to free margins a transposition flap was deemed most appropriate for complete closure of the defect.  Using a sterile surgical marker, an appropriate advancement flap was drawn incorporating the defect and placing the expected incisions within the relaxed skin tension lines where possible.    The area thus outlined was incised deep to adipose tissue with a #15 scalpel blade.  The skin margins were undermined to an appropriate distance in all directions utilizing iris scissors.
Debridement Text: The wound edges were debrided prior to proceeding with the closure to facilitate wound healing.
Complex Repair Preamble Text (Leave Blank If You Do Not Want): Extensive wide undermining was performed.
Transposition Flap Text: The defect edges were debeveled with a #15 scalpel blade.  Given the location of the defect and the proximity to free margins a transposition flap was deemed most appropriate.  Using a sterile surgical marker, an appropriate transposition flap was drawn incorporating the defect.    The area thus outlined was incised deep to adipose tissue with a #15 scalpel blade.  The skin margins were undermined to an appropriate distance in all directions utilizing iris scissors.
Post-Care Instructions: I reviewed with the patient in detail post-care instructions. Patient is not to engage in any heavy lifting, exercise, or swimming for the next 14 days. Should the patient develop any fevers, chills, bleeding, severe pain patient will contact the office immediately.
Advancement-Rotation Flap Text: The defect edges were debeveled with a #15 scalpel blade.  Given the location of the defect, shape of the defect and the proximity to free margins an advancement-rotation flap was deemed most appropriate.  Using a sterile surgical marker, an appropriate flap was drawn incorporating the defect and placing the expected incisions within the relaxed skin tension lines where possible. The area thus outlined was incised deep to adipose tissue with a #15 scalpel blade.  The skin margins were undermined to an appropriate distance in all directions utilizing iris scissors.
Suturegard Body: The suture ends were repeatedly re-tightened and re-clamped to achieve the desired tissue expansion.
Saucerization Excision Additional Text (Leave Blank If You Do Not Want): The margin was drawn around the clinically apparent lesion.  Incisions were then made along these lines, in a tangential fashion, to the appropriate tissue plane and the lesion was extirpated.
Mucosal Advancement Flap Text: Given the location of the defect, shape of the defect and the proximity to free margins a mucosal advancement flap was deemed most appropriate. Incisions were made with a 15 blade scalpel in the appropriate fashion along the cutaneous vermilion border and the mucosal lip. The remaining actinically damaged mucosal tissue was excised.  The mucosal advancement flap was then elevated to the gingival sulcus with care taken to preserve the neurovascular structures and advanced into the primary defect. Care was taken to ensure that precise realignment of the vermilion border was achieved.
Positioning (Leave Blank If You Do Not Want): The patient was placed in a comfortable position exposing the surgical site.
Complex Repair And Split-Thickness Skin Graft Text: The defect edges were debeveled with a #15 scalpel blade.  The primary defect was closed partially with a complex linear closure.  Given the location of the defect, shape of the defect and the proximity to free margins a split thickness skin graft was deemed most appropriate to repair the remaining defect.  The graft was trimmed to fit the size of the remaining defect.  The graft was then placed in the primary defect, oriented appropriately, and sutured into place.
Skin Substitute Text: The defect edges were debeveled with a #15 scalpel blade.  Given the location of the defect, shape of the defect and the proximity to free margins a skin substitute graft was deemed most appropriate.  The graft material was trimmed to fit the size of the defect. The graft was then placed in the primary defect and oriented appropriately.
Complex Repair And Burow's Graft Text: The defect edges were debeveled with a #15 scalpel blade.  The primary defect was closed partially with a complex linear closure.  Given the location of the defect, shape of the defect, the proximity to free margins and the presence of a standing cone deformity a Burow's graft was deemed most appropriate to repair the remaining defect.  The graft was trimmed to fit the size of the remaining defect.  The graft was then placed in the primary defect, oriented appropriately, and sutured into place.
O-L Flap Text: The defect edges were debeveled with a #15 scalpel blade.  Given the location of the defect, shape of the defect and the proximity to free margins an O-L flap was deemed most appropriate.  Using a sterile surgical marker, an appropriate advancement flap was drawn incorporating the defect and placing the expected incisions within the relaxed skin tension lines where possible.    The area thus outlined was incised deep to adipose tissue with a #15 scalpel blade.  The skin margins were undermined to an appropriate distance in all directions utilizing iris scissors.
Alar Island Pedicle Flap Text: The defect edges were debeveled with a #15 scalpel blade.  Given the location of the defect, shape of the defect and the proximity to the alar rim an island pedicle advancement flap was deemed most appropriate.  Using a sterile surgical marker, an appropriate advancement flap was drawn incorporating the defect, outlining the appropriate donor tissue and placing the expected incisions within the nasal ala running parallel to the alar rim. The area thus outlined was incised with a #15 scalpel blade.  The skin margins were undermined minimally to an appropriate distance in all directions around the primary defect and laterally outward around the island pedicle utilizing iris scissors.  There was minimal undermining beneath the pedicle flap.
Chonodrocutaneous Helical Advancement Flap Text: The defect edges were debeveled with a #15 scalpel blade.  Given the location of the defect and the proximity to free margins a chondrocutaneous helical advancement flap was deemed most appropriate.  Using a sterile surgical marker, the appropriate advancement flap was drawn incorporating the defect and placing the expected incisions within the relaxed skin tension lines where possible.    The area thus outlined was incised deep to adipose tissue with a #15 scalpel blade.  The skin margins were undermined to an appropriate distance in all directions utilizing iris scissors.
Anesthesia Type: 1% lidocaine with epinephrine
Dorsal Nasal Flap Text: The defect edges were debeveled with a #15 scalpel blade.  Given the location of the defect and the proximity to free margins a dorsal nasal flap was deemed most appropriate.  Using a sterile surgical marker, an appropriate dorsal nasal flap was drawn around the defect.    The area thus outlined was incised deep to adipose tissue with a #15 scalpel blade.  The skin margins were undermined to an appropriate distance in all directions utilizing iris scissors.
Double Island Pedicle Flap Text: The defect edges were debeveled with a #15 scalpel blade.  Given the location of the defect, shape of the defect and the proximity to free margins a double island pedicle advancement flap was deemed most appropriate.  Using a sterile surgical marker, an appropriate advancement flap was drawn incorporating the defect, outlining the appropriate donor tissue and placing the expected incisions within the relaxed skin tension lines where possible.    The area thus outlined was incised deep to adipose tissue with a #15 scalpel blade.  The skin margins were undermined to an appropriate distance in all directions around the primary defect and laterally outward around the island pedicle utilizing iris scissors.  There was minimal undermining beneath the pedicle flap.
Crescentic Advancement Flap Text: The defect edges were debeveled with a #15 scalpel blade.  Given the location of the defect and the proximity to free margins a crescentic advancement flap was deemed most appropriate.  Using a sterile surgical marker, the appropriate advancement flap was drawn incorporating the defect and placing the expected incisions within the relaxed skin tension lines where possible.    The area thus outlined was incised deep to adipose tissue with a #15 scalpel blade.  The skin margins were undermined to an appropriate distance in all directions utilizing iris scissors.
Lip Wedge Excision Repair Text: Given the location of the defect and the proximity to free margins a full thickness wedge repair was deemed most appropriate.  Using a sterile surgical marker, the appropriate repair was drawn incorporating the defect and placing the expected incisions perpendicular to the vermilion border.  The vermilion border was also meticulously outlined to ensure appropriate reapproximation during the repair.  The area thus outlined was incised through and through with a #15 scalpel blade.  The muscularis and dermis were reaproximated with deep sutures following hemostasis. Care was taken to realign the vermilion border before proceeding with the superficial closure.  Once the vermilion was realigned the superfical and mucosal closure was finished.
Retention Suture Bite Size: 3 mm
Cheek Interpolation Flap Text: A decision was made to reconstruct the defect utilizing an interpolation axial flap and a staged reconstruction.  A telfa template was made of the defect.  This telfa template was then used to outline the Cheek Interpolation flap.  The donor area for the pedicle flap was then injected with anesthesia.  The flap was excised through the skin and subcutaneous tissue down to the layer of the underlying musculature.  The interpolation flap was carefully excised within this deep plane to maintain its blood supply.  The edges of the donor site were undermined.   The donor site was closed in a primary fashion.  The pedicle was then rotated into position and sutured.  Once the tube was sutured into place, adequate blood supply was confirmed with blanching and refill.  The pedicle was then wrapped with xeroform gauze and dressed appropriately with a telfa and gauze bandage to ensure continued blood supply and protect the attached pedicle.
Graft Donor Site Bandage (Optional-Leave Blank If You Don't Want In Note): Steri-strips and a pressure bandage were applied to the donor site.
Complex Repair And O-T Advancement Flap Text: The defect edges were debeveled with a #15 scalpel blade.  The primary defect was closed partially with a complex linear closure.  Given the location of the remaining defect, shape of the defect and the proximity to free margins an O-T advancement flap was deemed most appropriate for complete closure of the defect.  Using a sterile surgical marker, an appropriate advancement flap was drawn incorporating the defect and placing the expected incisions within the relaxed skin tension lines where possible.    The area thus outlined was incised deep to adipose tissue with a #15 scalpel blade.  The skin margins were undermined to an appropriate distance in all directions utilizing iris scissors.
Size Of Margin In Cm: 0.6
Island Pedicle Flap Text: The defect edges were debeveled with a #15 scalpel blade.  Given the location of the defect, shape of the defect and the proximity to free margins an island pedicle advancement flap was deemed most appropriate.  Using a sterile surgical marker, an appropriate advancement flap was drawn incorporating the defect, outlining the appropriate donor tissue and placing the expected incisions within the relaxed skin tension lines where possible.    The area thus outlined was incised deep to adipose tissue with a #15 scalpel blade.  The skin margins were undermined to an appropriate distance in all directions around the primary defect and laterally outward around the island pedicle utilizing iris scissors.  There was minimal undermining beneath the pedicle flap.
Primary Defect Width (In Cm): 4.5
Billing Type: Third-Party Bill
Burow's Advancement Flap Text: The defect edges were debeveled with a #15 scalpel blade.  Given the location of the defect and the proximity to free margins a Burow's advancement flap was deemed most appropriate.  Using a sterile surgical marker, the appropriate advancement flap was drawn incorporating the defect and placing the expected incisions within the relaxed skin tension lines where possible.    The area thus outlined was incised deep to adipose tissue with a #15 scalpel blade.  The skin margins were undermined to an appropriate distance in all directions utilizing iris scissors.
Nostril Rim Text: The closure involved the nostril rim.
Muscle Hinge Flap Text: The defect edges were debeveled with a #15 scalpel blade.  Given the size, depth and location of the defect and the proximity to free margins a muscle hinge flap was deemed most appropriate.  Using a sterile surgical marker, an appropriate hinge flap was drawn incorporating the defect. The area thus outlined was incised with a #15 scalpel blade.  The skin margins were undermined to an appropriate distance in all directions utilizing iris scissors.
Complex Repair And Double Advancement Flap Text: The defect edges were debeveled with a #15 scalpel blade.  The primary defect was closed partially with a complex linear closure.  Given the location of the remaining defect, shape of the defect and the proximity to free margins a double advancement flap was deemed most appropriate for complete closure of the defect.  Using a sterile surgical marker, an appropriate advancement flap was drawn incorporating the defect and placing the expected incisions within the relaxed skin tension lines where possible.    The area thus outlined was incised deep to adipose tissue with a #15 scalpel blade.  The skin margins were undermined to an appropriate distance in all directions utilizing iris scissors.
Complex Repair And O-L Flap Text: The defect edges were debeveled with a #15 scalpel blade.  The primary defect was closed partially with a complex linear closure.  Given the location of the remaining defect, shape of the defect and the proximity to free margins an O-L flap was deemed most appropriate for complete closure of the defect.  Using a sterile surgical marker, an appropriate flap was drawn incorporating the defect and placing the expected incisions within the relaxed skin tension lines where possible.    The area thus outlined was incised deep to adipose tissue with a #15 scalpel blade.  The skin margins were undermined to an appropriate distance in all directions utilizing iris scissors.
Vermilion Border Text: The closure involved the vermilion border.
Complex Repair And Single Advancement Flap Text: The defect edges were debeveled with a #15 scalpel blade.  The primary defect was closed partially with a complex linear closure.  Given the location of the remaining defect, shape of the defect and the proximity to free margins a single advancement flap was deemed most appropriate for complete closure of the defect.  Using a sterile surgical marker, an appropriate advancement flap was drawn incorporating the defect and placing the expected incisions within the relaxed skin tension lines where possible.    The area thus outlined was incised deep to adipose tissue with a #15 scalpel blade.  The skin margins were undermined to an appropriate distance in all directions utilizing iris scissors.
Where Do You Want The Question To Include Opioid Counseling Located?: Case Summary Tab
W Plasty Text: The lesion was extirpated to the level of the fat with a #15 scalpel blade.  Given the location of the defect, shape of the defect and the proximity to free margins a W-plasty was deemed most appropriate for repair.  Using a sterile surgical marker, the appropriate transposition arms of the W-plasty were drawn incorporating the defect and placing the expected incisions within the relaxed skin tension lines where possible.    The area thus outlined was incised deep to adipose tissue with a #15 scalpel blade.  The skin margins were undermined to an appropriate distance in all directions utilizing iris scissors.  The opposing transposition arms were then transposed into place in opposite direction and anchored with interrupted buried subcutaneous sutures.
Bilateral Helical Rim Advancement Flap Text: The defect edges were debeveled with a #15 blade scalpel.  Given the location of the defect and the proximity to free margins (helical rim) a bilateral helical rim advancement flap was deemed most appropriate.  Using a sterile surgical marker, the appropriate advancement flaps were drawn incorporating the defect and placing the expected incisions between the helical rim and antihelix where possible.  The area thus outlined was incised through and through with a #15 scalpel blade.  With a skin hook and iris scissors, the flaps were gently and sharply undermined and freed up.
Size Of Lesion In Cm: 3.5
Double O-Z Plasty Text: The defect edges were debeveled with a #15 scalpel blade.  Given the location of the defect, shape of the defect and the proximity to free margins a Double O-Z plasty (double transposition flap) was deemed most appropriate.  Using a sterile surgical marker, the appropriate transposition flaps were drawn incorporating the defect and placing the expected incisions within the relaxed skin tension lines where possible. The area thus outlined was incised deep to adipose tissue with a #15 scalpel blade.  The skin margins were undermined to an appropriate distance in all directions utilizing iris scissors.  Hemostasis was achieved with electrocautery.  The flaps were then transposed into place, one clockwise and the other counterclockwise, and anchored with interrupted buried subcutaneous sutures.
Hemigard Postcare Instructions: The HEMIGARD strips are to remain completely dry for at least 5-7 days.
Complex Repair And Skin Substitute Graft Text: The defect edges were debeveled with a #15 scalpel blade.  The primary defect was closed partially with a complex linear closure.  Given the location of the remaining defect, shape of the defect and the proximity to free margins a skin substitute graft was deemed most appropriate to repair the remaining defect.  The graft was trimmed to fit the size of the remaining defect.  The graft was then placed in the primary defect, oriented appropriately, and sutured into place.
Mercedes Flap Text: The defect edges were debeveled with a #15 scalpel blade.  Given the location of the defect, shape of the defect and the proximity to free margins a Mercedes flap was deemed most appropriate.  Using a sterile surgical marker, an appropriate advancement flap was drawn incorporating the defect and placing the expected incisions within the relaxed skin tension lines where possible. The area thus outlined was incised deep to adipose tissue with a #15 scalpel blade.  The skin margins were undermined to an appropriate distance in all directions utilizing iris scissors.
O-Z Plasty Text: The defect edges were debeveled with a #15 scalpel blade.  Given the location of the defect, shape of the defect and the proximity to free margins an O-Z plasty (double transposition flap) was deemed most appropriate.  Using a sterile surgical marker, the appropriate transposition flaps were drawn incorporating the defect and placing the expected incisions within the relaxed skin tension lines where possible.    The area thus outlined was incised deep to adipose tissue with a #15 scalpel blade.  The skin margins were undermined to an appropriate distance in all directions utilizing iris scissors.  Hemostasis was achieved with electrocautery.  The flaps were then transposed into place, one clockwise and the other counterclockwise, and anchored with interrupted buried subcutaneous sutures.
Posterior Auricular Interpolation Flap Text: A decision was made to reconstruct the defect utilizing an interpolation axial flap and a staged reconstruction.  A telfa template was made of the defect.  This telfa template was then used to outline the posterior auricular interpolation flap.  The donor area for the pedicle flap was then injected with anesthesia.  The flap was excised through the skin and subcutaneous tissue down to the layer of the underlying musculature.  The pedicle flap was carefully excised within this deep plane to maintain its blood supply.  The edges of the donor site were undermined.   The donor site was closed in a primary fashion.  The pedicle was then rotated into position and sutured.  Once the tube was sutured into place, adequate blood supply was confirmed with blanching and refill.  The pedicle was then wrapped with xeroform gauze and dressed appropriately with a telfa and gauze bandage to ensure continued blood supply and protect the attached pedicle.
Graft Type: Xenograft
Peng Advancement Flap Text: The defect edges were debeveled with a #15 scalpel blade.  Given the location of the defect, shape of the defect and the proximity to free margins a Peng advancement flap was deemed most appropriate.  Using a sterile surgical marker, an appropriate advancement flap was drawn incorporating the defect and placing the expected incisions within the relaxed skin tension lines where possible. The area thus outlined was incised deep to adipose tissue with a #15 scalpel blade.  The skin margins were undermined to an appropriate distance in all directions utilizing iris scissors.
Epidermal Closure: simple interrupted
V-Y Flap Text: The defect edges were debeveled with a #15 scalpel blade.  Given the location of the defect, shape of the defect and the proximity to free margins a V-Y flap was deemed most appropriate.  Using a sterile surgical marker, an appropriate advancement flap was drawn incorporating the defect and placing the expected incisions within the relaxed skin tension lines where possible.    The area thus outlined was incised deep to adipose tissue with a #15 scalpel blade.  The skin margins were undermined to an appropriate distance in all directions utilizing iris scissors.
Accession #: [id number]
Undermining Type: Entire Wound
Repair Performed By Another Provider Text (Leave Blank If You Do Not Want): After the tissue was excised the defect was repaired by another provider.
Repair Type: Graft
Burow's Graft Text: The defect edges were debeveled with a #15 scalpel blade.  Given the location of the defect, shape of the defect, the proximity to free margins and the presence of a standing cone deformity a Burow's skin graft was deemed most appropriate. The standing cone was removed and this tissue was then trimmed to the shape of the primary defect. The adipose tissue was also removed until only dermis and epidermis were left.  The skin margins of the secondary defect were undermined to an appropriate distance in all directions utilizing iris scissors.  The secondary defect was closed with interrupted buried subcutaneous sutures.  The skin edges were then re-apposed with running  sutures.  The skin graft was then placed in the primary defect and oriented appropriately.
Melolabial Interpolation Flap Text: A decision was made to reconstruct the defect utilizing an interpolation axial flap and a staged reconstruction.  A telfa template was made of the defect.  This telfa template was then used to outline the melolabial interpolation flap.  The donor area for the pedicle flap was then injected with anesthesia.  The flap was excised through the skin and subcutaneous tissue down to the layer of the underlying musculature.  The pedicle flap was carefully excised within this deep plane to maintain its blood supply.  The edges of the donor site were undermined.   The donor site was closed in a primary fashion.  The pedicle was then rotated into position and sutured.  Once the tube was sutured into place, adequate blood supply was confirmed with blanching and refill.  The pedicle was then wrapped with xeroform gauze and dressed appropriately with a telfa and gauze bandage to ensure continued blood supply and protect the attached pedicle.
Bilobed Flap Text: The defect edges were debeveled with a #15 scalpel blade.  Given the location of the defect and the proximity to free margins a bilobe flap was deemed most appropriate.  Using a sterile surgical marker, an appropriate bilobe flap drawn around the defect.    The area thus outlined was incised deep to adipose tissue with a #15 scalpel blade.  The skin margins were undermined to an appropriate distance in all directions utilizing iris scissors.
Elliptical Excision Additional Text (Leave Blank If You Do Not Want): The margin was drawn around the clinically apparent lesion.  An elliptical shape was then drawn on the skin incorporating the lesion and margins.  Incisions were then made along these lines to the appropriate tissue plane and the lesion was extirpated.
Complex Repair And Bilobe Flap Text: The defect edges were debeveled with a #15 scalpel blade.  The primary defect was closed partially with a complex linear closure.  Given the location of the remaining defect, shape of the defect and the proximity to free margins a bilobe flap was deemed most appropriate for complete closure of the defect.  Using a sterile surgical marker, an appropriate advancement flap was drawn incorporating the defect and placing the expected incisions within the relaxed skin tension lines where possible.    The area thus outlined was incised deep to adipose tissue with a #15 scalpel blade.  The skin margins were undermined to an appropriate distance in all directions utilizing iris scissors.
Hatchet Flap Text: The defect edges were debeveled with a #15 scalpel blade.  Given the location of the defect, shape of the defect and the proximity to free margins a hatchet flap was deemed most appropriate.  Using a sterile surgical marker, an appropriate hatchet flap was drawn incorporating the defect and placing the expected incisions within the relaxed skin tension lines where possible.    The area thus outlined was incised deep to adipose tissue with a #15 scalpel blade.  The skin margins were undermined to an appropriate distance in all directions utilizing iris scissors.
Dermal Autograft Text: The defect edges were debeveled with a #15 scalpel blade.  Given the location of the defect, shape of the defect and the proximity to free margins a dermal autograft was deemed most appropriate.  Using a sterile surgical marker, the primary defect shape was transferred to the donor site. The area thus outlined was incised deep to adipose tissue with a #15 scalpel blade.  The harvested graft was then trimmed of adipose and epidermal tissue until only dermis was left.  The skin graft was then placed in the primary defect and oriented appropriately.
Melolabial Transposition Flap Text: The defect edges were debeveled with a #15 scalpel blade.  Given the location of the defect and the proximity to free margins a melolabial flap was deemed most appropriate.  Using a sterile surgical marker, an appropriate melolabial transposition flap was drawn incorporating the defect.    The area thus outlined was incised deep to adipose tissue with a #15 scalpel blade.  The skin margins were undermined to an appropriate distance in all directions utilizing iris scissors.
Rotation Flap Text: The defect edges were debeveled with a #15 scalpel blade.  Given the location of the defect, shape of the defect and the proximity to free margins a rotation flap was deemed most appropriate.  Using a sterile surgical marker, an appropriate rotation flap was drawn incorporating the defect and placing the expected incisions within the relaxed skin tension lines where possible.    The area thus outlined was incised deep to adipose tissue with a #15 scalpel blade.  The skin margins were undermined to an appropriate distance in all directions utilizing iris scissors.
Detail Level: Detailed
Epidermal Sutures: 5-0 Plain Gut
Home Suture Removal Text: Patient was provided a home suture removal kit and will remove their sutures at home.  If they have any questions or difficulties they will call the office.
Hemostasis: Electrocautery
Z Plasty Text: The lesion was extirpated to the level of the fat with a #15 scalpel blade.  Given the location of the defect, shape of the defect and the proximity to free margins a Z-plasty was deemed most appropriate for repair.  Using a sterile surgical marker, the appropriate transposition arms of the Z-plasty were drawn incorporating the defect and placing the expected incisions within the relaxed skin tension lines where possible.    The area thus outlined was incised deep to adipose tissue with a #15 scalpel blade.  The skin margins were undermined to an appropriate distance in all directions utilizing iris scissors.  The opposing transposition arms were then transposed into place in opposite direction and anchored with interrupted buried subcutaneous sutures.
Bilobed Transposition Flap Text: The defect edges were debeveled with a #15 scalpel blade.  Given the location of the defect and the proximity to free margins a bilobed transposition flap was deemed most appropriate.  Using a sterile surgical marker, an appropriate bilobe flap drawn around the defect.    The area thus outlined was incised deep to adipose tissue with a #15 scalpel blade.  The skin margins were undermined to an appropriate distance in all directions utilizing iris scissors.

## 2020-08-17 PROBLEM — Z48.817 ENCOUNTER FOR SURGICAL AFTERCARE FOLLOWING SURGERY ON THE SKIN AND SUBCUTANEOUS TISSUE: Status: ACTIVE | Noted: 2020-01-01

## 2020-08-17 PROBLEM — Z85.828 PERSONAL HISTORY OF OTHER MALIGNANT NEOPLASM OF SKIN: Status: ACTIVE | Noted: 2020-01-01

## 2020-08-17 PROBLEM — Z48.01 ENCOUNTER FOR CHANGE OR REMOVAL OF SURGICAL WOUND DRESSING: Status: ACTIVE | Noted: 2020-01-01

## 2020-08-17 PROBLEM — D48.5 NEOPLASM OF UNCERTAIN BEHAVIOR OF SKIN: Status: ACTIVE | Noted: 2020-01-01

## 2020-08-17 NOTE — PROCEDURE: BIOPSY BY SHAVE METHOD
Destruction After The Procedure: No
Anesthesia Type: 1% lidocaine with epinephrine
Post-Care Instructions: I reviewed with the patient in detail post-care instructions. Patient is to keep the biopsy site dry overnight, and then apply bacitracin twice daily until healed. Patient may apply hydrogen peroxide soaks to remove any crusting.
Wound Care: Vaseline
Depth Of Biopsy: dermis
Accession #: [id number]
Anesthesia Volume In Cc: 0.5
Information: Selecting Yes will display possible errors in your note based on the variables you have selected. This validation is only offered as a suggestion for you. PLEASE NOTE THAT THE VALIDATION TEXT WILL BE REMOVED WHEN YOU FINALIZE YOUR NOTE. IF YOU WANT TO FAX A PRELIMINARY NOTE YOU WILL NEED TO TOGGLE THIS TO 'NO' IF YOU DO NOT WANT IT IN YOUR FAXED NOTE.
Type Of Destruction Used: Curettage
Path Notes (To The Dermatopathologist): .
Biopsy Type: H and E
Additional Anesthesia Volume In Cc (Will Not Render If 0): 0
Electrodesiccation And Curettage Text: The wound bed was treated with electrodesiccation and curettage after the biopsy was performed.
Curettage Text: The wound bed was treated with curettage after the biopsy was performed.
Silver Nitrate Text: The wound bed was treated with silver nitrate after the biopsy was performed.
Consent: Written consent was obtained and risks were reviewed including but not limited to scarring, infection, bleeding, scabbing, incomplete removal, nerve damage and allergy to anesthesia.
Cryotherapy Text: The wound bed was treated with cryotherapy after the biopsy was performed.
Billing Type: Third-Party Bill
Notification Instructions: Patient will be notified of biopsy results. However, patient instructed to call the office if not contacted within 2 weeks.
Dressing: bandage
Detail Level: Detailed
Biopsy Method: Dermablade
Electrodesiccation Text: The wound bed was treated with electrodesiccation after the biopsy was performed.
Body Location Override (Optional - Billing Will Still Be Based On Selected Body Map Location If Applicable): superior  mid sternum
Was A Bandage Applied: Yes
Hemostasis: Aluminum Chloride
Body Location Override (Optional - Billing Will Still Be Based On Selected Body Map Location If Applicable): inferior mid sternum

## 2020-08-17 NOTE — PROCEDURE: SUTURE REMOVAL (GLOBAL PERIOD)
Add 89877 Cpt? (Important Note: In 2017 The Use Of 18264 Is Being Tracked By Cms To Determine Future Global Period Reimbursement For Global Periods): yes
Detail Level: Detailed

## 2020-08-17 NOTE — PROCEDURE: DRESSING CHANGE
Add 59875 Cpt? (Important Note: In 2017 The Use Of 00728 Is Being Tracked By Cms To Determine Future Global Period Reimbursement For Global Periods): no
Detail Level: Detailed
Wound Evaluated By: Kevin Man

## 2020-08-21 PROBLEM — S72.002A CLOSED LEFT HIP FRACTURE (HCC): Status: ACTIVE | Noted: 2020-01-01

## 2020-08-21 PROBLEM — N30.00 ACUTE CYSTITIS WITHOUT HEMATURIA: Status: ACTIVE | Noted: 2020-01-01

## 2020-08-21 NOTE — CONSULTS
Consult Patient: Raquel Casiano MRN: [id number]  SSN: [id number] YOB: 1930  Age: 80 y.o. Sex: female Subjective: Raquel Casiano is a 80 y.o. female who fell from a standing height and injured her left hip. She is very hard of hearing so is rather difficult to get a detailed history from her but she seems to deny any problems besides her left hip. She normally lives alone and walks with an occasional walker but most commonly with a cane. She says she really ambulates around her home and does not really leave her home very much and does not ambulate in the community very much. Nathalia Charles Past Medical History:  
Diagnosis Date  Anemia, unspecified  Chest pain, unspecified 3/21/2016  Chronic anxiety 3/21/2016  Diastolic heart failure ([address] 3/21/2016  Essential hypertension, benign  Flatulence, eructation, and gas pain  Lump or mass in breast   
 Other and unspecified hyperlipidemia  Paroxysmal atrial fibrillation ([address] 3/21/2016  Paroxysmal tachycardia ([address] 3/21/2016  Pernicious anemia  Postmenopausal atrophic vaginitis  Unspecified deficiency anemia  Unspecified hypothyroidism  Vitamin D deficiency Past Surgical History:  
Procedure Laterality Date  HX HYSTERECTOMY  HX ORTHOPAEDIC    
 heel spur  HX VEIN STRIPPING    
  
FAMHX -No history of inflammatory arthritis Social History Tobacco Use  Smoking status: Never Smoker  Smokeless tobacco: Never Used Substance Use Topics  Alcohol use: No  
  Alcohol/week: 0.0 standard drinks Current Facility-Administered Medications Medication Dose Route Frequency Provider Last Rate Last Dose  sodium chloride (NS) flush 5-40 mL  5-40 mL IntraVENous Q8H Kelvin Limon MD      
 sodium chloride (NS) flush 5-40 mL  5-40 mL IntraVENous PRN Kelvin Limon MD      
 sodium chloride (NS) flush 5-40 mL  5-40 mL IntraVENous Q8H Gordon Alie Cisse MD      
 sodium chloride (NS) flush 5-40 mL  5-40 mL IntraVENous PRN Ariana Coronado MD      
 acetaminophen (TYLENOL) tablet 650 mg  650 mg Oral Q6H PRN Ariana Coronado MD      
 Or  
 acetaminophen (TYLENOL) suppository 650 mg  650 mg Rectal Q6H PRN Ariana Coronado MD      
 polyethylene glycol (MIRALAX) packet 17 g  17 g Oral DAILY PRN Ariana Coronado MD      
 promethazine (PHENERGAN) tablet 12.5 mg  12.5 mg Oral Q6H PRN Ariana Coronado MD      
 Or  
 ondansetron (ZOFRAN) injection 4 mg  4 mg IntraVENous Q6H PRN Ariana Coronado MD      
 
Current Outpatient Medications Medication Sig Dispense Refill  pomalidomide (Pomalyst) 1 mg cap Take 1 Cap by mouth daily. Take 1 capsule daily for 14 days and then she will be off 14 days 14 Cap 0  
 levothyroxine (SYNTHROID) 50 mcg tablet TAKE 1 TABLET BY MOUTH DAILY BEFORE BREAKFAST 30 Tab 0  
 amiodarone (CORDARONE) 200 mg tablet TAKE 1 TABLET BY MOUTH DAILY 180 Tab 3  potassium chloride SR (KLOR-CON 10) 10 mEq tablet TAKE 2 TABLETS BY MOUTH DAILY. 60 Tab 0  
 ergocalciferol (ERGOCALCIFEROL) 1,250 mcg (50,000 unit) capsule Take 1 Cap by mouth every seven (7) days. 4 Cap 1  clorazepate (TRANXENE) 7.5 mg tablet TAKE 1 TABLET BY MOUTH TWICE A DAY. 60 Tab 2  
 ipratropium (ATROVENT) 42 mcg (0.06 %) nasal spray 1 [place] by Both Nostrils route three (3) times daily. 15 mL 0  
 traZODone (DESYREL) 100 mg tablet Take 1 Tab by mouth nightly. 30 Tab 5  
 atorvastatin (LIPITOR) 80 mg tablet TAKE 1 TABLET BY MOUTH DAILY 60 Tab 11  
 ferrous sulfate 325 mg (65 mg iron) tablet TAKE 1 TABLET BY MOUTH DAILY. 30 Tab 11  
 docusate sodium (STOOL SOFTENER) 100 mg capsule Take 1 Cap by mouth two (2) times a day. 180 Cap 3  
 amLODIPine (NORVASC) 2.5 mg tablet Take 1 Tab by mouth daily. 90 Tab 3  
 estradiol (ESTRACE) 1 mg tablet Take 1 Tab by mouth daily. 90 Tab 3  furosemide (LASIX) 40 mg tablet Take 1 Tab by mouth daily.  Taking 2 po qd (Patient taking differently: Take 40 mg by mouth daily. ) 180 Tab 1  
 mupirocin (BACTROBAN) 2 % ointment Apply  to affected area daily. 22 g 0  
 mirtazapine (REMERON) 15 mg tablet nightly.  DISABLED PLACARD (DISABLED PLACARD) DMV Apply to car. 1 Each 0  
 nadolol (CORGARD) 80 mg tablet Take 1 Tab by mouth daily. 30 Tab 11  
 lactulose (KRISTALOSE) 20 gram packet Take 1 Packet by mouth three (3) times daily. 60 Packet 3  
 OXYGEN-AIR DELIVERY SYSTEMS 3 L by Does Not Apply route nightly.  Aspirin, Buffered 81 mg tab Take  by mouth daily. No Known Allergies Review of Systems: A comprehensive review of systems was negative except for that written in the History of Present Illness. Objective:  
 
Vitals:  
 08/21/20 0012 08/21/20 2365 BP: 107/53 Pulse: 64 Resp: 16 Temp: 99 °F (37.2 °C) SpO2: (!) 84% 98% Weight: 62.6 kg (138 lb) Height: 5' 3\" (1.6 m) Physical Exam: 
Physical Exam: 
General:  Alert, cooperative, no distress, appears stated age. Orientation she is alert and oriented to person place and situation Eyes:  Conjunctivae/corneas clear. PERRL, EOMs intact. Fundi benign Ears:  Normal TMs and external ear canals both ears. Nose: Nares normal. Septum midline. Mucosa normal. No drainage or sinus tenderness. Mouth/Throat: Lips, mucosa, and tongue normal. Teeth and gums normal.  
Neck: Supple, symmetrical, trachea midline, no adenopathy, thyroid: no enlargment/tenderness/nodules, no carotid bruit and no JVD. Back:   Symmetric, no curvature. ROM normal. No CVA tenderness. Lungs:   Clear to auscultation bilaterally. Heart:  Regular rate and rhythm, S1, S2 normal, no murmur, click, rub or gallop. Abdomen:   Soft, non-tender. Bowel sounds normal. No masses,  No organomegaly. No lymphadenopathy in all 4 extremities Alignment- pt has some obvious deformity of the left hip Range of motion- with any range of motion leftt hip Vascular-distal pulses palpable in left lower extremity Sensory/motor-deep tendon reflexes normal left lower extremity. Motor and sensory function intact. Stability- is difficult to assess stability because of the presence of the fracture Tenderness to palpation over the left greater trochanter area Skin- no rashes ulcerations or open wounds left lower extremity Gait- cannot put any weight on the left lower extremity Assessment:  
 
Hospital Problems  Date Reviewed: 4/30/2020 Codes Class Noted POA Closed left hip fracture ([facility] [address] ICD-10-CM: F00.464N ICD-9-CM: 820.8  8/21/2020 Unknown Closed displaced left femoral neck fracture Xrays and or studies: 
 
AP and lateral views the left hip shows a closed displaced left femoral neck fracture Plan:  
 
She does have a history that is significant for the fact that she is had multiple myeloma and it does appear that she has been successfully treated for this. She also appears to have a chronically elevated creatinine but this is a little higher than normal today. I would anticipate the medicine team seeing her and assuming they clear her for surgery hope we can treat her with left hip hemiarthroplasty as soon as they think it is reasonable. The tentative plan will be to proceed with this tomorrow which again would be left hip hemiarthroplasty for fracture Signed By: Mane Blackman MD

## 2020-08-21 NOTE — PROGRESS NOTES
08/21/20 1702 Dual Skin Pressure Injury Assessment Dual Skin Pressure Injury Assessment X Second Care Provider (Based on [address] [place] [address]) Merlin Piccolo, RN Sacrum  Mid Ankle  Right Skin Integumentary Skin Integumentary (WDL) X Pressure  Injury Documentation Pressure Injury Noted-See Wound LDA to Document Skin Color Appropriate for ethnicity Skin Condition/Temp Dry;Flaky;Fragile; Cool Skin Integrity Lesion (comment); Wound (add Wound LDA) Turgor Atrophic Wound Prevention and Protection Methods Orientation of Wound Prevention Posterior Location of Wound Prevention Buttocks; Sacrum/Coccyx; Tibial  
Dressing Present  Yes Dressing Status Changed Wound Offloading (Prevention Methods) Bed, pressure reduction mattress;Pillows; Heel boots Multiple lesions to BLE, BUE, face, trunk. Surgical removal o the cancerous lesion to mid chest, forehead. Wound to RLE, blanchable redness to sacrum

## 2020-08-21 NOTE — PROGRESS NOTES
Consult Received:  
 
Left Hip displaced femoral neck fracture. Patient will require left hip hemiarthroplasty. Dr. Pollo Self has been notified. Hold blood thinners. Hospitalist to admit.

## 2020-08-21 NOTE — H&P
Hospitalist H&P Note Admit Date:  2020  9:06 AM  
Name:  Edilson Vo  Age: 80 y.o.  : 1930 MRN:  [id number] PCP:  Antonio Madera MD 
 
HPI/Subjective: Edilson Vo is a 80 y.o. female with medical history significant for anxiety, chronic diastolic heart failure, essential hypertension, atrial fibrillation s/p PPM,  multiple myeloma, hypothyroidism and mild dementia who presented from home after falling out of her recliner. Patient is very hard of hearing and therefore difficult to get detailed history. Patient denies any head trauma, presyncopal symptoms prior to falling. As per daughter, patient's has been having some worsening dementia lately and slight confusions similar to when she had UTI in the past.  Patient reports she was in her usual health prior to the fall and daughter reports that patient has not complaint about any issues except for occasional shortness of breath on exertions. Denies fever, chills, sob at rest, chest pains. Reports using 2L O2 at bedtime but has not required O2 during day time or on exertion. In the ED, labs were notable for hemoglobin 10.6, hematocrit 32.5, creatinine 1.96, BUN 28. Urinalysis shows 4+ bacteria with more than 100 WBC. X-ray shows left basicervical femoral neck fracture. Hospitalist service was contacted for admission. Ortho surgery notified. Does not have POA or living will. 10 systems reviewed and negative except as noted in HPI. Past Medical History:  
Diagnosis Date  Anemia, unspecified  Chest pain, unspecified 3/21/2016  Chronic anxiety 3/21/2016  Diastolic heart failure ([address] 3/21/2016  Essential hypertension, benign  Flatulence, eructation, and gas pain  Lump or mass in breast   
 Other and unspecified hyperlipidemia  Paroxysmal atrial fibrillation ([address] 3/21/2016  Paroxysmal tachycardia ([address] 3/21/2016  Pernicious anemia  Postmenopausal atrophic vaginitis  Unspecified deficiency anemia  Unspecified hypothyroidism  Vitamin D deficiency Past Surgical History:  
Procedure Laterality Date  HX HYSTERECTOMY  HX ORTHOPAEDIC    
 heel spur  HX VEIN STRIPPING No Known Allergies Social History Tobacco Use  Smoking status: Never Smoker  Smokeless tobacco: Never Used Substance Use Topics  Alcohol use: No  
  Alcohol/week: 0.0 standard drinks Family History Problem Relation Age of Onset  No Known Problems Mother  Heart Disease Father Immunization History Administered Date(s) Administered  TB Skin Test (PPD) Intradermal 04/07/2016, 06/13/2016, 12/30/2018 PTA Medications: 
Prior to Admission Medications Prescriptions Last Dose Informant Patient Reported? Taking? Aspirin, Buffered 81 mg tab   Yes No  
Sig: Take  by mouth daily. DISABLED PLACARD (DISABLED PLACARD) DMV   No No  
Sig: Apply to car. OXYGEN-AIR DELIVERY SYSTEMS   Yes No  
Sig: 3 L by Does Not Apply route nightly. amLODIPine (NORVASC) 2.5 mg tablet   No No  
Sig: Take 1 Tab by mouth daily. amiodarone (CORDARONE) 200 mg tablet   No No  
Sig: TAKE 1 TABLET BY MOUTH DAILY  
atorvastatin (LIPITOR) 80 mg tablet   No No  
Sig: TAKE 1 TABLET BY MOUTH DAILY  
clorazepate (TRANXENE) 7.5 mg tablet   No No  
Sig: TAKE 1 TABLET BY MOUTH TWICE A DAY. docusate sodium (STOOL SOFTENER) 100 mg capsule   No No  
Sig: Take 1 Cap by mouth two (2) times a day. ergocalciferol (ERGOCALCIFEROL) 1,250 mcg (50,000 unit) capsule   No No  
Sig: Take 1 Cap by mouth every seven (7) days. estradiol (ESTRACE) 1 mg tablet   No No  
Sig: Take 1 Tab by mouth daily. ferrous sulfate 325 mg (65 mg iron) tablet   No No  
Sig: TAKE 1 TABLET BY MOUTH DAILY. furosemide (LASIX) 40 mg tablet   No No  
Sig: Take 1 Tab by mouth daily. Taking 2 po qd Patient taking differently: Take 40 mg by mouth daily.   
ipratropium (ATROVENT) 42 mcg (0.06 %) nasal spray   No No  
 [place] by Both Nostrils route three (3) times daily. lactulose (KRISTALOSE) 20 gram packet   No No  
Sig: Take 1 Packet by mouth three (3) times daily. levothyroxine (SYNTHROID) 50 mcg tablet   No No  
Sig: TAKE 1 TABLET BY MOUTH DAILY BEFORE BREAKFAST  
mirtazapine (REMERON) 15 mg tablet   Yes No  
Sig: nightly. mupirocin (BACTROBAN) 2 % ointment   No No  
Sig: Apply  to affected area daily. nadolol (CORGARD) 80 mg tablet   No No  
Sig: Take 1 Tab by mouth daily. pomalidomide (Pomalyst) 1 mg cap   No No  
Sig: Take 1 Cap by mouth daily. Take 1 capsule daily for 14 days and then she will be off 14 days  
potassium chloride SR (KLOR-CON 10) 10 mEq tablet   No No  
Sig: TAKE 2 TABLETS BY MOUTH DAILY. traZODone (DESYREL) 100 mg tablet   No No  
Sig: Take 1 Tab by mouth nightly. Facility-Administered Medications: None Objective:  
 
Patient Vitals for the past 24 hrs: 
 Temp Pulse Resp BP SpO2  
20     98 % 20 0910 99 °F (37.2 °C) 64 16 107/53 (!) 84 % Oxygen Therapy O2 Sat (%): 98 % (20) Pulse via Oximetry: 65 beats per minute (20 0910) O2 Device: Nasal cannula (20) O2 Flow Rate (L/min): 2 l/min (20) Estimated body mass index is 24.45 kg/m² as calculated from the following: 
  Height as of this encounter: 5' 3\" (1.6 m). Weight as of this encounter: 62.6 kg (138 lb). No intake or output data in the 24 hours ending 20 1537 *Note that automatically entered I/Os may not be accurate; dependent on patient compliance with collection and accurate  by assistants. Physical Exam: 
General:     Elderly female. alert, awake, no acute distress. Head:   normocephalic, atraumatic Eyes, Ears, nose: PERRL, EOMI. Normal Conjunctiva. Neck:    supple, non-tender. Trachea midline. Lungs:   CTAB, no wheezing, rhonchi, rales Cardiac:   RRR, Normal S1 and S2.   
 Abdomen:   Soft, non distended, nontender, +BS, no guarding/rebound Extremities:   Warm, dry. No edema. Left leg externally rotated and shortened. Skin:   No rashes, no jaundice Neuro:  AAOx 2 . No gross focal neurological deficit Psychiatric:  No anxiety, calm, cooperative Data Review:  
Recent Results (from the past 24 hour(s)) EKG, 12 LEAD, INITIAL Collection Time: 08/21/20 12:03 PM  
Result Value Ref Range Ventricular Rate 60 BPM  
 Atrial Rate 174 BPM  
 QRS Duration 76 ms  
 Q-T Interval 414 ms QTC Calculation (Bezet) 414 ms Calculated R Axis 44 degrees Calculated T Axis 90 degrees Diagnosis    
  !! AGE AND GENDER SPECIFIC ECG ANALYSIS !! Atrial-paced rhythm Nonspecific ST and T wave abnormality Abnormal ECG When compared with ECG of 08-MAY-2020 13:29, 
Junctional rhythm has replaced Electronic atrial pacemaker Criteria for Septal infarct are no longer Present T wave inversion no longer evident in Inferior leads Nonspecific T wave abnormality no longer evident in Anterior leads Confirmed by Agustín Wilkerson MD (), Dulce Le ([id number]) on 8/21/2020 3:21:26 PM 
  
URINE MICROSCOPIC Collection Time: 08/21/20 12:08 PM  
Result Value Ref Range WBC >100 (H) 0 /hpf  
 RBC 20-50 0 /hpf Epithelial cells 0 0 /hpf Bacteria 4+ (H) 0 /hpf Casts 0-3 0 /lpf  
CBC WITH AUTOMATED DIFF Collection Time: 08/21/20 12:09 PM  
Result Value Ref Range WBC 8.4 4.3 - 11.1 K/uL  
 RBC 3.37 (L) 4.05 - 5.2 M/uL  
 HGB 10.6 (L) 11.7 - 15.4 g/dL HCT 32.5 (L) 35.8 - 46.3 % MCV 96.4 79.6 - 97.8 FL  
 MCH 31.5 26.1 - 32.9 PG  
 MCHC 32.6 31.4 - 35.0 g/dL  
 RDW 14.7 (H) 11.9 - 14.6 % PLATELET 027 (L) 042 - 450 K/uL MPV 11.1 9.4 - 12.3 FL ABSOLUTE NRBC 0.00 0.0 - 0.2 K/uL  
 DF AUTOMATED NEUTROPHILS 78 43 - 78 % LYMPHOCYTES 11 (L) 13 - 44 % MONOCYTES 9 4.0 - 12.0 % EOSINOPHILS 0 (L) 0.5 - 7.8 % BASOPHILS 1 0.0 - 2.0 % IMMATURE GRANULOCYTES 2 0.0 - 5.0 % ABS. NEUTROPHILS 6.5 1.7 - 8.2 K/UL  
 ABS. LYMPHOCYTES 1.0 0.5 - 4.6 K/UL  
 ABS. MONOCYTES 0.7 0.1 - 1.3 K/UL  
 ABS. EOSINOPHILS 0.0 0.0 - 0.8 K/UL  
 ABS. BASOPHILS 0.1 0.0 - 0.2 K/UL  
 ABS. IMM. GRANS. 0.1 0.0 - 0.5 K/UL METABOLIC PANEL, COMPREHENSIVE Collection Time: 08/21/20 12:09 PM  
Result Value Ref Range Sodium 138 136 - 145 mmol/L Potassium 4.2 3.5 - 5.1 mmol/L Chloride 102 98 - 107 mmol/L  
 CO2 31 21 - 32 mmol/L Anion gap 5 (L) 7 - 16 mmol/L Glucose 133 (H) 65 - 100 mg/dL BUN 28 (H) 8 - 23 MG/DL Creatinine 1.96 (H) 0.6 - 1.0 MG/DL  
 GFR est AA 31 (L) >60 ml/min/1.73m2 GFR est non-AA 25 (L) >60 ml/min/1.73m2 Calcium 8.9 8.3 - 10.4 MG/DL Bilirubin, total 1.1 0.2 - 1.1 MG/DL  
 ALT (SGPT) 20 12 - 65 U/L  
 AST (SGOT) 24 15 - 37 U/L Alk. phosphatase 75 50 - 136 U/L Protein, total 6.4 6.3 - 8.2 g/dL Albumin 2.9 (L) 3.2 - 4.6 g/dL Globulin 3.5 2.3 - 3.5 g/dL A-G Ratio 0.8 (L) 1.2 - 3.5 PROTHROMBIN TIME + INR Collection Time: 08/21/20 12:09 PM  
Result Value Ref Range Prothrombin time 13.6 12.0 - 14.7 sec INR 1.0 TYPE & SCREEN Collection Time: 08/21/20 12:09 PM  
Result Value Ref Range Crossmatch Expiration 08/24/2020 ABO/Rh(D) O POSITIVE Antibody screen NEG All Micro Results None Current Facility-Administered Medications Medication Dose Route Frequency  sodium chloride (NS) flush 5-40 mL  5-40 mL IntraVENous Q8H  
 sodium chloride (NS) flush 5-40 mL  5-40 mL IntraVENous PRN  
 sodium chloride (NS) flush 5-40 mL  5-40 mL IntraVENous Q8H  
 sodium chloride (NS) flush 5-40 mL  5-40 mL IntraVENous PRN  
 acetaminophen (TYLENOL) tablet 650 mg  650 mg Oral Q6H PRN Or  
 acetaminophen (TYLENOL) suppository 650 mg  650 mg Rectal Q6H PRN  polyethylene glycol (MIRALAX) packet 17 g  17 g Oral DAILY PRN  promethazine (PHENERGAN) tablet 12.5 mg  12.5 mg Oral Q6H PRN  Or  
  ondansetron (ZOFRAN) injection 4 mg  4 mg IntraVENous Q6H PRN  
 [START ON 8/22/2020] ceFAZolin (ANCEF) 2 g/20 mL in sterile water IV syringe  2 g IntraVENous ON CALL TO OR  
 
Current Outpatient Medications Medication Sig  pomalidomide (Pomalyst) 1 mg cap Take 1 Cap by mouth daily. Take 1 capsule daily for 14 days and then she will be off 14 days  levothyroxine (SYNTHROID) 50 mcg tablet TAKE 1 TABLET BY MOUTH DAILY BEFORE BREAKFAST  amiodarone (CORDARONE) 200 mg tablet TAKE 1 TABLET BY MOUTH DAILY  potassium chloride SR (KLOR-CON 10) 10 mEq tablet TAKE 2 TABLETS BY MOUTH DAILY.  ergocalciferol (ERGOCALCIFEROL) 1,250 mcg (50,000 unit) capsule Take 1 Cap by mouth every seven (7) days.  clorazepate (TRANXENE) 7.5 mg tablet TAKE 1 TABLET BY MOUTH TWICE A DAY.  ipratropium (ATROVENT) 42 mcg (0.06 %) nasal spray 1 [place] by Both Nostrils route three (3) times daily.  traZODone (DESYREL) 100 mg tablet Take 1 Tab by mouth nightly.  atorvastatin (LIPITOR) 80 mg tablet TAKE 1 TABLET BY MOUTH DAILY  ferrous sulfate 325 mg (65 mg iron) tablet TAKE 1 TABLET BY MOUTH DAILY.  docusate sodium (STOOL SOFTENER) 100 mg capsule Take 1 Cap by mouth two (2) times a day.  amLODIPine (NORVASC) 2.5 mg tablet Take 1 Tab by mouth daily.  estradiol (ESTRACE) 1 mg tablet Take 1 Tab by mouth daily.  furosemide (LASIX) 40 mg tablet Take 1 Tab by mouth daily. Taking 2 po qd (Patient taking differently: Take 40 mg by mouth daily.)  mupirocin (BACTROBAN) 2 % ointment Apply  to affected area daily.  mirtazapine (REMERON) 15 mg tablet nightly.  DISABLED PLACARD (DISABLED PLACARD) DMV Apply to car.  nadolol (CORGARD) 80 mg tablet Take 1 Tab by mouth daily.  lactulose (KRISTALOSE) 20 gram packet Take 1 Packet by mouth three (3) times daily.  OXYGEN-AIR DELIVERY SYSTEMS 3 L by Does Not Apply route nightly.  Aspirin, Buffered 81 mg tab Take  by mouth daily. Other Studies: Xr Chest Sngl V Result Date: 8/21/2020 PELVIS AND LEFT HIP, 3 views. HISTORY: Hip pain following fall. TECHNIQUE: AP view of the pelvis and coned down AP and frogleg lateral of the hip. FINDINGS: -Bony pelvic ring: Appears intact. -SI joints: Appear symmetric. -Pubic rami: Appear intact. -Femoral head:  Has a round smooth contour. -Femoral neck and proximal femoral shaft:  Bases cervical fracture of the femoral neck with varus deformity. -Lower lumbar spine: Mild DJD. IMPRESSION:  Basicervical femoral neck fracture. LEFT FEMUR 2 view(s). HISTORY: Pain following fall TECHNIQUE: AP and lateral views. COMPARISON: None. FINDINGS: Femoral neck fracture is present. Remainder the femoral shaft is intact. 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 Arterial calcifications are identified. IMPRESSION: Femoral neck fracture. Remainder of the femoral shaft is intact. CHEST X-RAY, one view. HISTORY:  Proper evaluation for femoral neck fracture repair  November 2019 TECHNIQUE:  AP supine view. COMPARISON: November 2019 FINDINGS: Lungs: are clear. Costophrenic angles: are sharp. Heart size: Borderline. Pulmonary vasculature: is unremarkable. Aorta: Calcifications with normal caliber. Included portion of the upper abdomen: is unremarkable. Bones: No gross bony lesions. Other: Dual lead left-sided cardiac pacemaker is present. IMPRESSION:  Borderline cardiomegaly and aortic atherosclerosis and cardiac pacemaker. Sheyla Pete Xr Hip Lt W Or Wo Pelv 2-3 Vws Result Date: 8/21/2020 PELVIS AND LEFT HIP, 3 views. HISTORY: Hip pain following fall. TECHNIQUE: AP view of the pelvis and coned down AP and frogleg lateral of the hip. FINDINGS: -Bony pelvic ring: Appears intact. -SI joints: Appear symmetric. -Pubic rami: Appear intact. -Femoral head:  Has a round smooth contour. -Femoral neck and proximal femoral shaft:  Bases cervical fracture of the femoral neck with varus deformity. -Lower lumbar spine: Mild DJD. IMPRESSION:  Basicervical femoral neck fracture. LEFT FEMUR 2 view(s). HISTORY: Pain following fall TECHNIQUE: AP and lateral views. COMPARISON: None. FINDINGS: Femoral neck fracture is present. Remainder the femoral shaft is intact. 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 Arterial calcifications are identified. IMPRESSION: Femoral neck fracture. Remainder of the femoral shaft is intact. CHEST X-RAY, one view. HISTORY:  Proper evaluation for femoral neck fracture repair  November 2019 TECHNIQUE:  AP supine view. COMPARISON: November 2019 FINDINGS: Lungs: are clear. Costophrenic angles: are sharp. Heart size: Borderline. Pulmonary vasculature: is unremarkable. Aorta: Calcifications with normal caliber. Included portion of the upper abdomen: is unremarkable. Bones: No gross bony lesions. Other: Dual lead left-sided cardiac pacemaker is present. IMPRESSION:  Borderline cardiomegaly and aortic atherosclerosis and cardiac pacemaker. [employer] Xr Femur Lt 2 V Result Date: 8/21/2020 PELVIS AND LEFT HIP, 3 views. HISTORY: Hip pain following fall. TECHNIQUE: AP view of the pelvis and coned down AP and frogleg lateral of the hip. FINDINGS: -Bony pelvic ring: Appears intact. -SI joints: Appear symmetric. -Pubic rami: Appear intact. -Femoral head:  Has a round smooth contour. -Femoral neck and proximal femoral shaft:  Bases cervical fracture of the femoral neck with varus deformity. -Lower lumbar spine: Mild DJD. IMPRESSION:  Basicervical femoral neck fracture. LEFT FEMUR 2 view(s). HISTORY: Pain following fall TECHNIQUE: AP and lateral views. COMPARISON: None. FINDINGS: Femoral neck fracture is present. Remainder the femoral shaft is intact. 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 Arterial calcifications are identified. IMPRESSION: Femoral neck fracture. Remainder of the femoral shaft is intact. CHEST X-RAY, one view.  HISTORY:  Proper evaluation for femoral neck fracture repair  November 2019 TECHNIQUE:  AP supine view. COMPARISON: November 2019 FINDINGS: Lungs: are clear. Costophrenic angles: are sharp. Heart size: Borderline. Pulmonary vasculature: is unremarkable. Aorta: Calcifications with normal caliber. Included portion of the upper abdomen: is unremarkable. Bones: No gross bony lesions. Other: Dual lead left-sided cardiac pacemaker is present. IMPRESSION:  Borderline cardiomegaly and aortic atherosclerosis and cardiac pacemaker. Alec Ace Assessment:  
 
Active Hospital Problems Diagnosis Date Noted  Closed left hip fracture ([address] 08/21/2020  Acute cystitis without hematuria 08/21/2020 Plan:  
 
Left Hip fracture, traumatic 
- Orthopedics planned for hemiarthroplasty 8/22 
- PT/OT once cleared by ortho 
- NPO after midnight 
- pain control Acute Cystitis UA with >100 WBC and 4+ bacteria 
- follow up UCx 
- ceftriaxone (8/21) Mild FRANK on CKD stage 3 Cr of 1.96 on admission. Baseline 1.70-1.40. 
- IVF and monitor 
- avoid nephrotoxic meds HTN // Afib s/p PPM // chronic HFpEF 
- continue with home medications Hypothyroidism: continue with synthroid Multiple Myeloma: seeing Omer Ashley. On Pomalyst.   
 
Diet:  DIET REGULAR 
DIET NPO 
DVT PPx: SCDs Code: Full Code Estimated LOS:  Greater than 2 midnights Labs/Imaging Reviewed. Patient is moderate risk due to current condition and comorbid conditions. Plan discussed with staff, patient/family and are in agreement. [facility]. Answered all questions/concerns. Time Spent: Greater than 45 minutes was spent in reviewing charts, physical exam, discussion with patient, and answered any questions.  
 
 
Signed: 
Usman Browning MD

## 2020-08-21 NOTE — ED PROVIDER NOTES
The history is provided by the patient and the EMS personnel. Fall The accident occurred less than 1 hour ago. Fall occurred: Getting out of her recliner. She fell from a height of ground level. She landed on carpet. There was no blood loss. The point of impact was the left hip. The pain is present in the left hip. The pain is moderate. She was not ambulatory at the scene. There was no entrapment after the fall. There was no drug use involved in the accident. There was no alcohol use involved in the accident. Pertinent negatives include no visual change, no fever, no numbness, no abdominal pain, no bowel incontinence, no nausea, no vomiting, no hematuria, no headaches, no extremity weakness, no hearing loss, no loss of consciousness, no tingling and no laceration. The risk factors include being elderly. The symptoms are aggravated by pressure on injury and use of injured limb. She has tried rest for the symptoms. The treatment provided mild relief. It is unknown when the patient last had a tetanus shot. Past Medical History:  
Diagnosis Date  Anemia, unspecified  Chest pain, unspecified 3/21/2016  Chronic anxiety 3/21/2016  Diastolic heart failure ([address] 3/21/2016  Essential hypertension, benign  Flatulence, eructation, and gas pain  Lump or mass in breast   
 Other and unspecified hyperlipidemia  Paroxysmal atrial fibrillation ([address] 3/21/2016  Paroxysmal tachycardia ([address] 3/21/2016  Pernicious anemia  Postmenopausal atrophic vaginitis  Unspecified deficiency anemia  Unspecified hypothyroidism  Vitamin D deficiency Past Surgical History:  
Procedure Laterality Date  HX HYSTERECTOMY  HX ORTHOPAEDIC    
 heel spur  HX VEIN STRIPPING Family History:  
Problem Relation Age of Onset  No Known Problems Mother  Heart Disease Father Social History Socioeconomic History  Marital status:   
 Spouse name: Not on file  Number of children: Not on file  Years of education: Not on file  Highest education level: Not on file Occupational History  Not on file Social Needs  Financial resource strain: Not on file  Food insecurity Worry: Not on file Inability: Not on file  Transportation needs Medical: Not on file Non-medical: Not on file Tobacco Use  Smoking status: Never Smoker  Smokeless tobacco: Never Used Substance and Sexual Activity  Alcohol use: No  
  Alcohol/week: 0.0 standard drinks  Drug use: No  
 Sexual activity: Not on file Lifestyle  Physical activity Days per week: Not on file Minutes per session: Not on file  Stress: Not on file Relationships  Social connections Talks on phone: Not on file Gets together: Not on file Attends Mosque service: Not on file Active member of club or organization: Not on file Attends meetings of clubs or organizations: Not on file Relationship status: Not on file  Intimate partner violence Fear of current or ex partner: Not on file Emotionally abused: Not on file Physically abused: Not on file Forced sexual activity: Not on file Other Topics Concern  Not on file Social History Narrative , lives alone. 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 ALLERGIES: Patient has no known allergies. Review of Systems Constitutional: Negative for fever. Gastrointestinal: Negative for abdominal pain, bowel incontinence, nausea and vomiting. Genitourinary: Negative for hematuria. Musculoskeletal: Positive for arthralgias. Negative for back pain, extremity weakness, neck pain and neck stiffness. Neurological: Negative for tingling, loss of consciousness, numbness and headaches. All other systems reviewed and are negative. Vitals:  
 08/21/20 2987 08/21/20 9094 BP: 107/53 Pulse: 64 Resp: 16   
 Temp: 99 °F (37.2 °C) SpO2: (!) 84% 98% Weight: 62.6 kg (138 lb) Height: 5' 3\" (1.6 m) Physical Exam 
Vitals signs and nursing note reviewed. Constitutional:   
   General: She is not in acute distress. Appearance: She is well-developed. HENT:  
   Head: Normocephalic and atraumatic. Right Ear: External ear normal.  
   Left Ear: External ear normal.  
Eyes:  
   Conjunctiva/sclera: Conjunctivae normal.  
   Pupils: Pupils are equal, round, and reactive to light. Neck: Musculoskeletal: Normal range of motion and neck supple. Cardiovascular:  
   Rate and Rhythm: Normal rate and regular rhythm. Heart sounds: Normal heart sounds. Pulmonary:  
   Effort: Pulmonary effort is normal.  
   Breath sounds: Normal breath sounds. Abdominal:  
   General: Bowel sounds are normal.  
   Palpations: Abdomen is soft. Tenderness: There is no abdominal tenderness. Musculoskeletal:  
   Left hip: She exhibits decreased range of motion, tenderness, bony tenderness and deformity (Externally rotated and shortened). Skin: 
   General: Skin is warm and dry. Capillary Refill: Capillary refill takes less than 2 seconds. Findings: No laceration. Neurological:  
   General: No focal deficit present. Mental Status: She is alert and oriented to person, place, and time. Cranial Nerves: Cranial nerves are intact. Sensory: Sensation is intact. Motor: Motor function is intact. Coordination: Coordination is intact. Psychiatric:     
   Mood and Affect: Mood normal.     
   Speech: Speech normal.     
   Behavior: Behavior normal.     
   Cognition and Memory: Cognition and memory normal.  
 
  
 
MDM Number of Diagnoses or Management Options Closed fracture of left hip, initial encounter [facility]): new and requires workup Amount and/or Complexity of Data Reviewed Clinical lab tests: ordered and reviewed Tests in the radiology section of CPT®: ordered and reviewed Review and summarize past medical records: yes Discuss the patient with other providers: yes (Case discussed with hospitalist as well as orthopedist.) Independent visualization of images, tracings, or specimens: yes Risk of Complications, Morbidity, and/or Mortality Presenting problems: high Diagnostic procedures: moderate Management options: moderate Patient Progress Patient progress: stable Procedures Results Reviewed: XR HIP LT W OR WO PELV 2-3 VWS Final Result IMPRESSION:  Basicervical femoral neck fracture. LEFT FEMUR 2 view(s). HISTORY: Pain following fall TECHNIQUE: AP and lateral views. COMPARISON: None. FINDINGS: Femoral neck fracture is present. Remainder the femoral shaft is  
intact. 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 Arterial calcifications are  
identified. IMPRESSION: Femoral neck fracture. Remainder of the femoral shaft is intact. CHEST X-RAY, one view. HISTORY:  Proper evaluation for femoral neck fracture repair  November 2019 TECHNIQUE:  AP supine view. COMPARISON: November 2019 FINDINGS:   
Lungs: are clear. Costophrenic angles: are sharp. Heart size: Borderline. Pulmonary vasculature: is unremarkable. Aorta: Calcifications with normal caliber. Included portion of the upper abdomen: is unremarkable. Bones: No gross bony lesions. Other: Dual lead left-sided cardiac pacemaker is present. IMPRESSION:  Borderline cardiomegaly and aortic atherosclerosis and cardiac  
pacemaker. [employer] XR FEMUR LT 2 V Final Result IMPRESSION:  Basicervical femoral neck fracture. LEFT FEMUR 2 view(s). HISTORY: Pain following fall TECHNIQUE: AP and lateral views. COMPARISON: None. FINDINGS: Femoral neck fracture is present. Remainder the femoral shaft is intact. 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 Arterial calcifications are  
identified. IMPRESSION: Femoral neck fracture. Remainder of the femoral shaft is intact. CHEST X-RAY, one view. HISTORY:  Proper evaluation for femoral neck fracture repair  November 2019 TECHNIQUE:  AP supine view. COMPARISON: November 2019 FINDINGS:   
Lungs: are clear. Costophrenic angles: are sharp. Heart size: Borderline. Pulmonary vasculature: is unremarkable. Aorta: Calcifications with normal caliber. Included portion of the upper abdomen: is unremarkable. Bones: No gross bony lesions. Other: Dual lead left-sided cardiac pacemaker is present. IMPRESSION:  Borderline cardiomegaly and aortic atherosclerosis and cardiac  
pacemaker. Lela Bulls XR CHEST SNGL V Final Result IMPRESSION:  Basicervical femoral neck fracture. LEFT FEMUR 2 view(s). HISTORY: Pain following fall TECHNIQUE: AP and lateral views. COMPARISON: None. FINDINGS: Femoral neck fracture is present. Remainder the femoral shaft is  
intact. 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 Arterial calcifications are  
identified. IMPRESSION: Femoral neck fracture. Remainder of the femoral shaft is intact. CHEST X-RAY, one view. HISTORY:  Proper evaluation for femoral neck fracture repair  November 2019 TECHNIQUE:  AP supine view. COMPARISON: November 2019 FINDINGS:   
Lungs: are clear. Costophrenic angles: are sharp. Heart size: Borderline. Pulmonary vasculature: is unremarkable. Aorta: Calcifications with normal caliber. Included portion of the upper abdomen: is unremarkable. Bones: No gross bony lesions. Other: Dual lead left-sided cardiac pacemaker is present. IMPRESSION:  Borderline cardiomegaly and aortic atherosclerosis and cardiac  
pacemaker. [employer]

## 2020-08-21 NOTE — ED TRIAGE NOTES
Patient arrived via EMS from private home. Patient got out of bed this AM and walked into living room and tripped over shoes and fell. Landed on left hip. EMS noted slight shortening of left leg and unable to bear weight on left leg. Granddaughter heard patient fall but fall was unwitnessed. Patient A+O x4. /50, HR 64bpm, temp 97.8, o2 sat 93% on RA but uses PRN supplement oxygen. Denies hitting head and denies LOC. Unsure if patient is on a blood thinner at this time.

## 2020-08-21 NOTE — ROUTINE PROCESS
TRANSFER - OUT REPORT: 
 
Verbal report given to IRMA Corrigan on [employer]  being transferred to 7th floor for routine progression of care Report consisted of patients Situation, Background, Assessment and  
Recommendations(SBAR). Information from the following report(s) ED Summary was reviewed with the receiving nurse. Lines:  
Peripheral IV 08/21/20 Right Antecubital (Active) Site Assessment Clean, dry, & intact 08/21/20 1601 Phlebitis Assessment 0 08/21/20 1601 Infiltration Assessment 0 08/21/20 1601 Dressing Status Clean, dry, & intact 08/21/20 1601 Hub Color/Line Status Blue 08/21/20 1601 Alcohol Cap Used Yes 08/21/20 1601 Opportunity for questions and clarification was provided. Patient transported with: 
 O2 @ 2 liters

## 2020-08-21 NOTE — H&P (VIEW-ONLY)
Consult Patient: Jordon Bradshaw MRN: [id number]  SSN: [id number] YOB: 1930  Age: 80 y.o. Sex: female Subjective: Jordon Bradshaw is a 80 y.o. female who fell from a standing height and injured her left hip. She is very hard of hearing so is rather difficult to get a detailed history from her but she seems to deny any problems besides her left hip. She normally lives alone and walks with an occasional walker but most commonly with a cane. She says she really ambulates around her home and does not really leave her home very much and does not ambulate in the community very much. Chio Walters Past Medical History:  
Diagnosis Date  Anemia, unspecified  Chest pain, unspecified 3/21/2016  Chronic anxiety 3/21/2016  Diastolic heart failure ([address] 3/21/2016  Essential hypertension, benign  Flatulence, eructation, and gas pain  Lump or mass in breast   
 Other and unspecified hyperlipidemia  Paroxysmal atrial fibrillation ([address] 3/21/2016  Paroxysmal tachycardia ([address] 3/21/2016  Pernicious anemia  Postmenopausal atrophic vaginitis  Unspecified deficiency anemia  Unspecified hypothyroidism  Vitamin D deficiency Past Surgical History:  
Procedure Laterality Date  HX HYSTERECTOMY  HX ORTHOPAEDIC    
 heel spur  HX VEIN STRIPPING    
  
FAMHX -No history of inflammatory arthritis Social History Tobacco Use  Smoking status: Never Smoker  Smokeless tobacco: Never Used Substance Use Topics  Alcohol use: No  
  Alcohol/week: 0.0 standard drinks Current Facility-Administered Medications Medication Dose Route Frequency Provider Last Rate Last Dose  sodium chloride (NS) flush 5-40 mL  5-40 mL IntraVENous Q8H Kelvin Limon MD      
 sodium chloride (NS) flush 5-40 mL  5-40 mL IntraVENous PRN Kelvin Limon MD      
 sodium chloride (NS) flush 5-40 mL  5-40 mL IntraVENous Q8H Gordon Kasey Chino MD      
 sodium chloride (NS) flush 5-40 mL  5-40 mL IntraVENous PRN Bijal Buckner MD      
 acetaminophen (TYLENOL) tablet 650 mg  650 mg Oral Q6H PRN Bijal Buckner MD      
 Or  
 acetaminophen (TYLENOL) suppository 650 mg  650 mg Rectal Q6H PRN Bijal Buckner MD      
 polyethylene glycol (MIRALAX) packet 17 g  17 g Oral DAILY PRN Bijal Buckner MD      
 promethazine (PHENERGAN) tablet 12.5 mg  12.5 mg Oral Q6H PRN Bijal Buckner MD      
 Or  
 ondansetron (ZOFRAN) injection 4 mg  4 mg IntraVENous Q6H PRN Bijal Buckner MD      
 
Current Outpatient Medications Medication Sig Dispense Refill  pomalidomide (Pomalyst) 1 mg cap Take 1 Cap by mouth daily. Take 1 capsule daily for 14 days and then she will be off 14 days 14 Cap 0  
 levothyroxine (SYNTHROID) 50 mcg tablet TAKE 1 TABLET BY MOUTH DAILY BEFORE BREAKFAST 30 Tab 0  
 amiodarone (CORDARONE) 200 mg tablet TAKE 1 TABLET BY MOUTH DAILY 180 Tab 3  potassium chloride SR (KLOR-CON 10) 10 mEq tablet TAKE 2 TABLETS BY MOUTH DAILY. 60 Tab 0  
 ergocalciferol (ERGOCALCIFEROL) 1,250 mcg (50,000 unit) capsule Take 1 Cap by mouth every seven (7) days. 4 Cap 1  clorazepate (TRANXENE) 7.5 mg tablet TAKE 1 TABLET BY MOUTH TWICE A DAY. 60 Tab 2  
 ipratropium (ATROVENT) 42 mcg (0.06 %) nasal spray 1 Tillman by Both Nostrils route three (3) times daily. 15 mL 0  
 traZODone (DESYREL) 100 mg tablet Take 1 Tab by mouth nightly. 30 Tab 5  
 atorvastatin (LIPITOR) 80 mg tablet TAKE 1 TABLET BY MOUTH DAILY 60 Tab 11  
 ferrous sulfate 325 mg (65 mg iron) tablet TAKE 1 TABLET BY MOUTH DAILY. 30 Tab 11  
 docusate sodium (STOOL SOFTENER) 100 mg capsule Take 1 Cap by mouth two (2) times a day. 180 Cap 3  
 amLODIPine (NORVASC) 2.5 mg tablet Take 1 Tab by mouth daily. 90 Tab 3  
 estradiol (ESTRACE) 1 mg tablet Take 1 Tab by mouth daily. 90 Tab 3  furosemide (LASIX) 40 mg tablet Take 1 Tab by mouth daily.  Taking 2 po qd (Patient taking differently: Take 40 mg by mouth daily. ) 180 Tab 1  
 mupirocin (BACTROBAN) 2 % ointment Apply  to affected area daily. 22 g 0  
 mirtazapine (REMERON) 15 mg tablet nightly.  DISABLED PLACARD (DISABLED PLACARD) DMV Apply to car. 1 Each 0  
 nadolol (CORGARD) 80 mg tablet Take 1 Tab by mouth daily. 30 Tab 11  
 lactulose (KRISTALOSE) 20 gram packet Take 1 Packet by mouth three (3) times daily. 60 Packet 3  
 OXYGEN-AIR DELIVERY SYSTEMS 3 L by Does Not Apply route nightly.  Aspirin, Buffered 81 mg tab Take  by mouth daily. No Known Allergies Review of Systems: A comprehensive review of systems was negative except for that written in the History of Present Illness. Objective:  
 
Vitals:  
 08/21/20 3139 08/21/20 4851 BP: 107/53 Pulse: 64 Resp: 16 Temp: 99 °F (37.2 °C) SpO2: (!) 84% 98% Weight: 62.6 kg (138 lb) Height: 5' 3\" (1.6 m) Physical Exam: 
Physical Exam: 
General:  Alert, cooperative, no distress, appears stated age. Orientation she is alert and oriented to person place and situation Eyes:  Conjunctivae/corneas clear. PERRL, EOMs intact. Fundi benign Ears:  Normal TMs and external ear canals both ears. Nose: Nares normal. Septum midline. Mucosa normal. No drainage or sinus tenderness. Mouth/Throat: Lips, mucosa, and tongue normal. Teeth and gums normal.  
Neck: Supple, symmetrical, trachea midline, no adenopathy, thyroid: no enlargment/tenderness/nodules, no carotid bruit and no JVD. Back:   Symmetric, no curvature. ROM normal. No CVA tenderness. Lungs:   Clear to auscultation bilaterally. Heart:  Regular rate and rhythm, S1, S2 normal, no murmur, click, rub or gallop. Abdomen:   Soft, non-tender. Bowel sounds normal. No masses,  No organomegaly. No lymphadenopathy in all 4 extremities Alignment- pt has some obvious deformity of the left hip Range of motion- with any range of motion leftt hip Vascular-distal pulses palpable in left lower extremity Sensory/motor-deep tendon reflexes normal left lower extremity. Motor and sensory function intact. Stability- is difficult to assess stability because of the presence of the fracture Tenderness to palpation over the left greater trochanter area Skin- no rashes ulcerations or open wounds left lower extremity Gait- cannot put any weight on the left lower extremity Assessment:  
 
Hospital Problems  Date Reviewed: 4/30/2020 Codes Class Noted POA Closed left hip fracture ([facility] [address] ICD-10-CM: T85.832V ICD-9-CM: 820.8  8/21/2020 Unknown Closed displaced left femoral neck fracture Xrays and or studies: 
 
AP and lateral views the left hip shows a closed displaced left femoral neck fracture Plan:  
 
She does have a history that is significant for the fact that she is had multiple myeloma and it does appear that she has been successfully treated for this. She also appears to have a chronically elevated creatinine but this is a little higher than normal today. I would anticipate the medicine team seeing her and assuming they clear her for surgery hope we can treat her with left hip hemiarthroplasty as soon as they think it is reasonable. The tentative plan will be to proceed with this tomorrow which again would be left hip hemiarthroplasty for fracture Signed By: Rashid Calderon MD

## 2020-08-21 NOTE — PROGRESS NOTES
Problem: Pressure Injury - Risk of 
Goal: *Prevention of pressure injury Description: Document José Manuel Scale and appropriate interventions in the flowsheet. Outcome: Progressing Towards Goal 
Note: Pressure Injury Interventions: 
Sensory Interventions: Assess changes in LOC Moisture Interventions: Absorbent underpads Activity Interventions: Chair cushion, PT/OT evaluation, Pressure redistribution bed/mattress(bed type) Mobility Interventions: PT/OT evaluation, Pressure redistribution bed/mattress (bed type) Nutrition Interventions: Offer support with meals,snacks and hydration Friction and Shear Interventions: HOB 30 degrees or less Problem: Falls - Risk of 
Goal: *Absence of Falls Description: Document Cora Patches Fall Risk and appropriate interventions in the flowsheet. Outcome: Progressing Towards Goal 
Note: Fall Risk Interventions: 
  
 
Mentation Interventions: Adequate sleep, hydration, pain control, Bed/chair exit alarm, Door open when patient unattended Medication Interventions: Bed/chair exit alarm, Teach patient to arise slowly Elimination Interventions: Call light in reach, Toilet paper/wipes in reach, Toileting schedule/hourly rounds

## 2020-08-22 NOTE — ANESTHESIA PREPROCEDURE EVALUATION
Anesthetic History No history of anesthetic complications Review of Systems / Medical History Patient summary reviewed, nursing notes reviewed and pertinent labs reviewed Pulmonary Neuro/Psych Dementia (Responsive but not Oriented) Cardiovascular Hypertension Dysrhythmias (Paroxysmal) : atrial fibrillation Pacemaker (DDDR) and hyperlipidemia Exercise tolerance: <4 METS Comments: Diastolic heart failure Echo 2019: Preserved EF, No sig valvular abnormality GI/Hepatic/Renal 
Within defined limits Endo/Other Hypothyroidism: well controlled Other Findings Physical Exam 
 
Airway Mallampati: II 
TM Distance: 4 - 6 cm Neck ROM: decreased range of motion Mouth opening: Normal 
 
 Cardiovascular Rhythm: irregular Rate: normal 
 
 
 
 Dental 
 
 
  
Pulmonary Breath sounds clear to auscultation Abdominal 
GI exam deferred Other Findings Anesthetic Plan ASA: 4 Anesthesia type: general 
 
 
 
 
 
Anesthetic plan and risks discussed with: Patient and Son / Daughter

## 2020-08-22 NOTE — INTERVAL H&P NOTE
Update History & Physical 
The Patient's History and Physical of 8/21/2020 was reviewed with the patient and I examined the patient. There was no change. The surgical site was confirmed by the patient and me. Plan:  The risk, benefits, expected outcome, and alternative to the recommended procedure have been discussed with the patient. Patient understands and wants to proceed with left hip hemiarthroplasty for femoral neck fracture.  
 
Electronically signed by May Litten, MD on 8/22/2020 at 7:29 AM

## 2020-08-22 NOTE — PROGRESS NOTES
Problem: Pressure Injury - Risk of 
Goal: *Prevention of pressure injury Description: Document José Manuel Scale and appropriate interventions in the flowsheet. Outcome: Progressing Towards Goal 
Note: Pressure Injury Interventions: 
Sensory Interventions: Assess changes in LOC Moisture Interventions: Assess need for specialty bed, Apply protective barrier, creams and emollients, Moisture barrier, Offer toileting Q_hr Activity Interventions: Increase time out of bed, PT/OT evaluation Mobility Interventions: PT/OT evaluation, Pressure redistribution bed/mattress (bed type), HOB 30 degrees or less Nutrition Interventions: Offer support with meals,snacks and hydration Friction and Shear Interventions: Apply protective barrier, creams and emollients Problem: Hip Fracture:Day of Admission Pre-op Goal: Diagnostic Test/Procedures Outcome: Progressing Towards Goal

## 2020-08-22 NOTE — PERIOP NOTES
TRANSFER - IN REPORT: 
 
Verbal report received from Lady Rhea RN on Tran Adas being received from [id number] for ordered procedure Report consisted of patients Situation, Background, Assessment and Recommendations(SBAR). Information from the following report(s) SBAR, Kardex, ED Summary, MAR, Recent Results and Procedure Verification was reviewed with the receiving nurse. Opportunity for questions and clarification was provided. Assessment completed upon patients arrival to unit and care assumed.

## 2020-08-22 NOTE — PERIOP NOTES
Upper dentures, 1(Right) hearing aid and life alert necklace placed in pt belongings bag. RN notified.

## 2020-08-22 NOTE — PROGRESS NOTES
Hospitalist Progress Note 2020 Admit Date: 2020  9:06 AM  
NAME: Alyce Dutton :  1930 MRN:  [id number] Attending: Jeff Nascimento MD 
PCP:  Tanya Yo MD 
 
SUBJECTIVE:  
Patient is a 81yo F with hx dementia, MM, HFpEF, HTN, and AF who presented with fall and L femoral neck fracture and UTI. 
 
: 
POD #0 L hip hemiarthroplasty Well tolerated. Pt drowsy after surgery still but wakes up to voice and denies complaints. Very [employer]. Review of Systems negative with exception of pertinent positives noted above PHYSICAL EXAM  
 
Visit Vitals /68 Pulse 60 Temp 97.7 °F (36.5 °C) Resp 16 Ht 5' 3\" (1.6 m) Wt 62.6 kg (138 lb) SpO2 98% BMI 24.45 kg/m² Temp (24hrs), Av.2 °F (36.8 °C), Min:97.5 °F (36.4 °C), Max:98.9 °F (37.2 °C) Oxygen Therapy O2 Sat (%): 98 % (20 1033) Pulse via Oximetry: 60 beats per minute (20 0929) O2 Device: Nasal cannula (20 09) O2 Flow Rate (L/min): 3 l/min (20 0918) Intake/Output Summary (Last 24 hours) at 2020 1228 Last data filed at 2020 1560 Gross per 24 hour Intake 600 ml Output 625 ml Net -25 ml General: Elderly, NAD, slightly lethargic after surgery Lungs:  CTA Bilaterally. Heart:  Regular rate and rhythm,  No murmur, rub, or gallop Abdomen: Soft, Non distended, Non tender, Positive bowel sounds Extremities: L hip postop, dressing c/d/i Neurologic:  No focal deficits XR HIP LT W OR WO PELV 2-3 VWS Final Result Impression: A total left hip prosthesis placement. XR HIP LT W OR WO PELV 2-3 VWS Final Result IMPRESSION:  Basicervical femoral neck fracture. LEFT FEMUR 2 view(s). HISTORY: Pain following fall TECHNIQUE: AP and lateral views. COMPARISON: None. FINDINGS: Femoral neck fracture is present. Remainder the femoral shaft is  
intact. 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  Arterial calcifications are  
 identified. IMPRESSION: Femoral neck fracture. Remainder of the femoral shaft is intact. CHEST X-RAY, one view. HISTORY:  Proper evaluation for femoral neck fracture repair  November 2019 TECHNIQUE:  AP supine view. COMPARISON: November 2019 FINDINGS:   
Lungs: are clear. Costophrenic angles: are sharp. Heart size: Borderline. Pulmonary vasculature: is unremarkable. Aorta: Calcifications with normal caliber. Included portion of the upper abdomen: is unremarkable. Bones: No gross bony lesions. Other: Dual lead left-sided cardiac pacemaker is present. IMPRESSION:  Borderline cardiomegaly and aortic atherosclerosis and cardiac  
pacemaker. Lucio Octave XR FEMUR LT 2 V Final Result IMPRESSION:  Basicervical femoral neck fracture. LEFT FEMUR 2 view(s). HISTORY: Pain following fall TECHNIQUE: AP and lateral views. COMPARISON: None. FINDINGS: Femoral neck fracture is present. Remainder the femoral shaft is  
intact. 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 Arterial calcifications are  
identified. IMPRESSION: Femoral neck fracture. Remainder of the femoral shaft is intact. CHEST X-RAY, one view. HISTORY:  Proper evaluation for femoral neck fracture repair  November 2019 TECHNIQUE:  AP supine view. COMPARISON: November 2019 FINDINGS:   
Lungs: are clear. Costophrenic angles: are sharp. Heart size: Borderline. Pulmonary vasculature: is unremarkable. Aorta: Calcifications with normal caliber. Included portion of the upper abdomen: is unremarkable. Bones: No gross bony lesions. Other: Dual lead left-sided cardiac pacemaker is present. IMPRESSION:  Borderline cardiomegaly and aortic atherosclerosis and cardiac  
pacemaker. Lucio Octave XR CHEST SNGL V Final Result IMPRESSION:  Basicervical femoral neck fracture. LEFT FEMUR 2 view(s). HISTORY: Pain following fall TECHNIQUE: AP and lateral views. COMPARISON: None. FINDINGS: Femoral neck fracture is present. Remainder the femoral shaft is  
intact. 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 Arterial calcifications are  
identified. IMPRESSION: Femoral neck fracture. Remainder of the femoral shaft is intact. CHEST X-RAY, one view. HISTORY:  Proper evaluation for femoral neck fracture repair  November 2019 TECHNIQUE:  AP supine view. COMPARISON: November 2019 FINDINGS:   
Lungs: are clear. Costophrenic angles: are sharp. Heart size: Borderline. Pulmonary vasculature: is unremarkable. Aorta: Calcifications with normal caliber. Included portion of the upper abdomen: is unremarkable. Bones: No gross bony lesions. Other: Dual lead left-sided cardiac pacemaker is present. IMPRESSION:  Borderline cardiomegaly and aortic atherosclerosis and cardiac  
pacemaker. Arben Saunders ASSESSMENT Active Hospital Problems Diagnosis Date Noted  Closed left hip fracture ([address] 08/21/2020  Acute cystitis without hematuria 08/21/2020  Multiple myeloma ([address] 11/20/2019  Chronic diastolic heart failure ([address] 11/16/2016  Acquired hypothyroidism 07/09/2015  Essential hypertension, benign 07/09/2015 Plan: 
 
L hip fracture Hemiarthroplasty 8/22 Pain control PT/OT 
ASA 
STR placement UTI Rocephin 3d FRANK Improving on gentle IV fluids - continue. Can stop when taking diet better postop Caution excess fluids in setting HFpEF 
 
MM Sees Dr. Dawayne Lefort. pomalyst 
 
Hypothyroid Home synthroid AF Home amiodarone Signed By: Nelli Foster MD   
 August 22, 2020

## 2020-08-22 NOTE — OP NOTES
Operative Report Patient: Laya Morton MRN: [id number]  SSN: [id number] YOB: 1930  Age: 80 y.o. Sex: female Date of Surgery: 8/22/2020 History: Laya Morton is a 80 y.o. female who fell from a standing height and injured her left hip. She was seen in the emergency room and found to have a left femoral neck fracture. We did talk to her about the fact that this was completely displaced and we recommended left hip hemiarthroplasty. I explained exactly what this would involve. Natanael Butt I talked to the patient and/or their representative and explained the exact nature the procedure. I also went through a detailed list of the material risks associated with  the procedure which included risk of bleeding, infection, injury to nearby structures, worsening the situation, as well as the risks associate with anesthesia and finally death. Also talked with him regarding the benefits and alternatives to the procedure. Preoperative Diagnosis: CLOSED LEFT HIP FRACTURE Postoperative Diagnosis: Closed displaced left femoral neck fracture Surgeon(s) and Role: * Bhakti Lynne MD - Primary Anesthesia: General  
 
Procedure: Procedure(s): 
Left hip hemiarthroplasty for femoral neck fracture/open treatment of left femoral neck fracture with prosthetic replacement Procedure in Detail: After successful induction of general anesthesia patient was placed in the right lateral decubitus position the left hip was prepped draped usual sterile fashion I then utilized a standard anterolateral approach to the femoral neck. The femoral neck was exposed and I made a provisional cut with an oscillating saw I then used a power corkscrew to remove the femoral head from the acetabulum. After this was done we sized this for a 46 mm femoral head.   I then began the process of preparing the proximal femur using first a box osteotome then a canal claudia and then starting with a #5 broach and ending with a #8 broach. The #8 broach appeared to fill the canal very well. I then remove the broach and irrigated with a copious amount of normal saline for both the proximal femur as well as the acetabulum and then placed the actual stem corresponding with the size of the final broach. Once the stem was in place I placed a 28 mm head which was a +0 head as well as a 46 mm bipolar component and then reduced the hip and took the hip through full range of motion making sure that it was very stable. Once I was assured of this I then irrigated once more and reattached the gluteus minimus and medius tendons using #1 Vicryl in figure-of-eight fashion to suture the tendons directly back to the area of the proximal femur near the area of the greater trochanter. After this was done I closed the deep fascia with #2 strata fix suture and then the subcutaneous tissue with 2.0 strata fix suture and the skin was closed with staples. Dressings were applied. The patient was awakened and taken to PACU in stable condition Estimated Blood Loss: 250 cc Tourniquet Time: * No tourniquets in log * Implants:  
Implant Name Type Inv. Item Serial No.  Lot No. LRB No. Used Action HEAD FEM C-TAPR 28MM 0 NK -- LOW FRIC COCR - [id number]  HEAD FEM C-TAPR 28MM 0 NK -- LOW FRIC COCR  [employer] HOW [id number] Left 1 Implanted HEAD FEM BPLR UNIV UHR [id number] --  - [id number]  HEAD FEM BPLR [facility] UHR [id number] --   [employer] HOW [id number] Left 1 Implanted STEM FEM RADHA OSTENC [facility] SZ 8 --  - [id number]  STEM FEM RADHA OSTENC [facility] SZ 8 --   [employer] HOW [id number] Left 1 Implanted Specimens: * No specimens in log * Drains: None Complications: None Counts: Sponge and needle counts were correct times two. Signed By:  Mayda Collado MD   
 August 22, 2020

## 2020-08-22 NOTE — PROGRESS NOTES
Per family, pt supposed to see dermatologist ( Dr Brandt Son 08/24/2020 about the wound to right shin. Per family the dressing should not be changed, but nobody from family notified us about it and dressing was changed by admission to clean. Family asked to consult Dermatologist. MD notified.

## 2020-08-22 NOTE — PERIOP NOTES
TRANSFER - OUT REPORT: 
 
Verbal report given to Jorge Omer RN on [employer]  being transferred to [address] [place] [address] for routine post - op Report consisted of patients Situation, Background, Assessment and  
Recommendations(SBAR). Information from the following report(s) OR Summary, Procedure Summary, Intake/Output and MAR was reviewed with the receiving nurse. Lines:  
Peripheral IV 08/21/20 Right Antecubital (Active) Site Assessment Clean, dry, & intact 08/22/20 0344 Phlebitis Assessment 0 08/22/20 0851 Infiltration Assessment 0 08/22/20 0851 Dressing Status Clean, dry, & intact 08/22/20 5134 Dressing Type Tape;Transparent 08/22/20 6874 Hub Color/Line Status Patent;Pink 08/22/20 9403 Alcohol Cap Used No 08/22/20 0851 Opportunity for questions and clarification was provided. Patient transported with: 
 O2 @ 3 liters VTE prophylaxis orders have been written for [employer]. Patient and family given floor number and nurses name. Family updated re: pt status after security code verified.

## 2020-08-22 NOTE — PROGRESS NOTES
TRANSFER - OUT REPORT: 
 
Verbal report given to Yesy Quintanilla RN (name) on [facility]  being transferred to Pre-Op (unit) for ordered procedure Report consisted of patients Situation, Background, Assessment and  
Recommendations(SBAR). Information from the following report(s) SBAR, Kardex, Intake/Output, MAR and Recent Results was reviewed with the receiving nurse. Lines:  
Peripheral IV 08/21/20 Right Antecubital (Active) Site Assessment Clean, dry, & intact 08/21/20 2035 Phlebitis Assessment 0 08/21/20 2035 Infiltration Assessment 0 08/21/20 2035 Dressing Status Clean, dry, & intact 08/21/20 2035 Dressing Type Transparent;Tape 08/21/20 2035 Hub Color/Line Status Capped 08/21/20 2035 Alcohol Cap Used Yes 08/21/20 1601 Opportunity for questions and clarification was provided.

## 2020-08-22 NOTE — ANESTHESIA POSTPROCEDURE EVALUATION
Procedure(s): HIP HEMIARTHROPLASTY. general 
 
Anesthesia Post Evaluation Multimodal analgesia: multimodal analgesia used between 6 hours prior to anesthesia start to PACU discharge Patient location during evaluation: bedside Patient participation: complete - patient participated Level of consciousness: responsive to verbal stimuli Pain management: adequate Airway patency: patent Anesthetic complications: no 
Cardiovascular status: hemodynamically stable Respiratory status: spontaneous ventilation Hydration status: stable Comments: Hemodynamically stable. Drowsy but at preoperative baseline INITIAL Post-op Vital signs:  
Vitals Value Taken Time /55 8/22/2020  9:29 AM  
Temp 36.4 °C (97.5 °F) 8/22/2020  8:46 AM  
Pulse 60 8/22/2020  9:31 AM  
Resp 14 8/22/2020  9:31 AM  
SpO2 99 % 8/22/2020  9:31 AM  
Vitals shown include unvalidated device data.

## 2020-08-22 NOTE — PROGRESS NOTES
Problem: Pressure Injury - Risk of 
Goal: *Prevention of pressure injury Description: Document José Manuel Scale and appropriate interventions in the flowsheet. Outcome: Progressing Towards Goal 
Note: Pressure Injury Interventions: 
Sensory Interventions: Assess changes in LOC, Assess need for specialty bed, Discuss PT/OT consult with provider, Check visual cues for pain, Keep linens dry and wrinkle-free, Pressure redistribution bed/mattress (bed type), Pad between skin to skin Moisture Interventions: Absorbent underpads, Apply protective barrier, creams and emollients, Limit adult briefs, Internal/External urinary devices Activity Interventions: Increase time out of bed, Pressure redistribution bed/mattress(bed type), PT/OT evaluation Mobility Interventions: HOB 30 degrees or less, Pressure redistribution bed/mattress (bed type), PT/OT evaluation Nutrition Interventions: Document food/fluid/supplement intake, Offer support with meals,snacks and hydration Friction and Shear Interventions: Apply protective barrier, creams and emollients, Foam dressings/transparent film/skin sealants Problem: Patient Education: Go to Patient Education Activity Goal: Patient/Family Education Outcome: Progressing Towards Goal 
  
Problem: Falls - Risk of 
Goal: *Absence of Falls Description: Document Scott Ted Fall Risk and appropriate interventions in the flowsheet. Outcome: Progressing Towards Goal 
Note: Fall Risk Interventions: 
  
 
Mentation Interventions: Bed/chair exit alarm, Adequate sleep, hydration, pain control, Door open when patient unattended, Increase mobility, More frequent rounding, Reorient patient, Update white board Medication Interventions: Bed/chair exit alarm, Teach patient to arise slowly, Patient to call before getting OOB, Evaluate medications/consider consulting pharmacy Elimination Interventions: Call light in reach, Bed/chair exit alarm, Patient to call for help with toileting needs, Stay With Me (per policy) History of Falls Interventions: Bed/chair exit alarm, Door open when patient unattended, Investigate reason for fall Problem: Patient Education: Go to Patient Education Activity Goal: Patient/Family Education Outcome: Progressing Towards Goal 
  
Problem: Patient Education: Go to Patient Education Activity Goal: Patient/Family Education Outcome: Progressing Towards Goal 
  
Problem: Hip Fracture:Day of Admission Pre-op Goal: Activity/Safety Outcome: Progressing Towards Goal 
Goal: Consults, if ordered Outcome: Progressing Towards Goal 
Goal: Diagnostic Test/Procedures Outcome: Progressing Towards Goal 
Goal: Nutrition/Diet Outcome: Progressing Towards Goal 
Goal: Medications Outcome: Progressing Towards Goal 
Goal: Respiratory Outcome: Progressing Towards Goal 
Goal: Treatments/Interventions/Procedures Outcome: Progressing Towards Goal 
Goal: Psychosocial 
Outcome: Progressing Towards Goal 
Goal: *Labs/Tests Within Defined Limits in Preparation for Surgery Outcome: Progressing Towards Goal 
Goal: *Optimal pain control at patient's stated goal 
Outcome: Progressing Towards Goal 
Goal: *Hemodynamically stable Outcome: Progressing Towards Goal

## 2020-08-23 NOTE — PROGRESS NOTES
08/23/20 9869 Oxygen Therapy O2 Sat (%) 99 % Pulse via Oximetry 60 beats per minute O2 Device Heated; Hi flow nasal cannula O2 Flow Rate (L/min) 60 l/min O2 Temperature 87.8 °F (31 °C) FIO2 (%) 100 % (Weaned to 80%)

## 2020-08-23 NOTE — PROGRESS NOTES
ORTH FRACTURE PROGRESS NOTE 2020 Admit Date:  
2020 Post Op day: 1 Day Post-Op Subjective: Grazyna Fruit PT/OT:  
Gait:    
            
 
Vital Signs:   
Patient Vitals for the past 8 hrs: 
 BP Temp Pulse Resp SpO2  
20 0748 125/52 98.6 °F (37 °C) 64 20 99 % 20 0742     99 % 20 0510     98 % 20 0420     92 % 20 0348 137/77 97.6 °F (36.4 °C) 65 20 (!) 80 % Temp (24hrs), Av.3 °F (36.8 °C), Min:97.5 °F (36.4 °C), Max:100.1 °F (37.8 °C) Pain Control:  
Pain Assessment Pain Scale 1: FLACC Pain Intensity 1: 0 Meds: 
 
Current Facility-Administered Medications Medication Dose Route Frequency  piperacillin-tazobactam (ZOSYN) 3.375 g in 0.9% sodium chloride (MBP/ADV) 100 mL  3.375 g IntraVENous Q8H  
 lidocaine (XYLOCAINE) 10 mg/mL (1 %) injection 0.1 mL  0.1 mL SubCUTAneous PRN  
 fentaNYL citrate (PF) injection 100 mcg  100 mcg IntraVENous PRN  
 sodium chloride (NS) flush 5-40 mL  5-40 mL IntraVENous Q8H  
 sodium chloride (NS) flush 5-40 mL  5-40 mL IntraVENous PRN  
 alum-mag hydroxide-simeth (MYLANTA) oral suspension 30 mL  30 mL Oral Q4H PRN  
 calcium-vitamin D (OS-KEEGAN) 500 mg-200 unit tablet  1 Tab Oral TID WITH MEALS  
 HYDROcodone-acetaminophen (NORCO) 7.5-325 mg per tablet 1 Tab  1 Tab Oral Q6H PRN  
 HYDROmorphone (PF) (DILAUDID) injection 0.5 mg  0.5 mg IntraVENous Q3H PRN  
 aspirin tablet 325 mg  325 mg Oral DAILY  sodium chloride (NS) flush 5-40 mL  5-40 mL IntraVENous Q8H  
 sodium chloride (NS) flush 5-40 mL  5-40 mL IntraVENous PRN  
 sodium chloride (NS) flush 5-40 mL  5-40 mL IntraVENous Q8H  
 sodium chloride (NS) flush 5-40 mL  5-40 mL IntraVENous PRN  
 acetaminophen (TYLENOL) tablet 650 mg  650 mg Oral Q6H PRN Or  
 acetaminophen (TYLENOL) suppository 650 mg  650 mg Rectal Q6H PRN  polyethylene glycol (MIRALAX) packet 17 g  17 g Oral DAILY PRN  
  promethazine (PHENERGAN) tablet 12.5 mg  12.5 mg Oral Q6H PRN Or  
 ondansetron (ZOFRAN) injection 4 mg  4 mg IntraVENous Q6H PRN  
 amiodarone (CORDARONE) tablet 200 mg  200 mg Oral DAILY  clorazepate (TRANXENE) tablet 7.5 mg  7.5 mg Oral BID  [Held by provider] furosemide (LASIX) tablet 40 mg  40 mg Oral DAILY  levothyroxine (SYNTHROID) tablet 50 mcg  50 mcg Oral 6am  
 mirtazapine (REMERON) tablet 15 mg  15 mg Oral QHS  nadoloL (CORGARD) tablet 80 mg  80 mg Oral DAILY  traZODone (DESYREL) tablet 100 mg  100 mg Oral QHS  
 
 
LAB:   
Recent Labs  
  08/23/20 
0609  08/21/20 
1209 HCT 32.0*   < > 32.5* HGB 9.8*   < > 10.6* INR  --   --  1.0  
 < > = values in this interval not displayed. 24 Hour Assessment Issues:   
Oriented Discharge Planning: SNF Transfuse PRBC's:   
 
Assessment & Physician's Comment: 
Neurovascular checks within normal limits Principal Problem: 
  Closed left hip fracture ([address] (8/21/2020) Active Problems: 
  Essential hypertension, benign (7/9/2015) Acquired hypothyroidism (7/9/2015) Chronic diastolic heart failure ([address] (11/16/2016) Multiple myeloma ([address] (11/20/2019) Acute cystitis without hematuria (8/21/2020) Plan: 
PT/OT as tolerated Sejal Dior MD

## 2020-08-23 NOTE — PROGRESS NOTES
Physical Therapy Note: 
 
Spoke with hospitalist in regards to Physical therapy evaluation. Asked PT to hold until Respiratory could come and attempt to wean pt from [address]. Will Attempt Physical Therapy Evaluation again as schedule allows.  
 
Thank you,  
Valerie Grant, PT, DPT

## 2020-08-23 NOTE — PROGRESS NOTES
08/23/20 1200 Oxygen Therapy O2 Sat (%) 94 % Pulse via Oximetry 64 beats per minute O2 Device Heated; Hi flow nasal cannula O2 Flow Rate (L/min) 40 l/min O2 Temperature 87.8 °F (31 °C) FIO2 (%) 40 % Will continue to wean as pt tolerates.

## 2020-08-23 NOTE — PROGRESS NOTES
Problem: Mobility Impaired (Adult and Pediatric) Goal: *Acute Goals and Plan of Care (Insert Text) Outcome: Progressing Towards Goal 
Note: STG: 
(1.)Ms. Ronald Roth will move from supine to sit and sit to supine , scoot up and down, and roll side to side with CONTACT GUARD ASSIST within 3 treatment day(s). (2.)Ms. Ronald Roth will transfer from bed to chair and chair to bed with CONTACT GUARD ASSIST using the least restrictive device within 3 treatment day(s). (3.)Ms. Ronald Roth will ambulate with MINIMAL ASSIST for 75 feet with the least restrictive device within 3 treatment day(s). LTG: 
(1.)Ms. Ronald Roth will move from supine to sit and sit to supine , scoot up and down, and roll side to side in bed with SUPERVISION within 7 treatment day(s). (2.)Ms. Ronald Roth will transfer from bed to chair and chair to bed with SUPERVISION using the least restrictive device within 7 treatment day(s). (3.)Ms. Ronald Roth will ambulate with STAND BY ASSIST for 250 feet with the least restrictive device within 7 treatment day(s). _________________________________________________________________________________________ PHYSICAL THERAPY: Initial Assessment, Daily Note and PM 8/23/2020 INPATIENT: PT Visit Days : 1 [place] / Product Type: Medicare /   
  
NAME/AGE/GENDER: Starr Diaz is a 80 y.o. female PRIMARY DIAGNOSIS: Closed left hip fracture ([facility] [address] [S72.002A] Closed left hip fracture (HCC) Closed left hip fracture ([facility] [address] Procedure(s) (LRB): HIP HEMIARTHROPLASTY (Left) 1 Day Post-Op ICD-10: Treatment Diagnosis:  
 · Generalized Muscle Weakness (M62.81) · Other lack of cordination (R27.8) · Difficulty in walking, Not elsewhere classified (R26.2) · Other abnormalities of gait and mobility (R26.89) · History of falling (Z91.81) Precaution/Allergies: 
Patient has no known allergies.   
  
ASSESSMENT:  
 
 Ms. Karen Negrete  is a 80year old female who has been admitted to hospital on 08/21/20 s/p LT hip hemiarthroplasty. Prior to hospital admission pt lives alone in a 1 story home with 2 step(s) to enter with LT side railing. Pt endorses 1 falls in past 6 months. Prior to admission MOD I for ADLs and mobility. 08/23/20: Upon entering, pt supine in bed, agreeable to therapy. A&Ox3. 40L O2 AirVo. She reports no pain at rest. Mod A for rolling side to side and performing bed mobility. Supine>Sit with Mod A and additional time. BLE strength is fair. Sitting EOB with fair sitting balance control. Seated balance control training and edge of bed. Mod A to scoot in bed ,Sit <> stand Mod A and RW. Attempted steps, but unable. Stand pivot transfer to chair. Pt presents as functioning below her baseline, with deficits in mobility including transfers, gait, balance, and activity tolerance. Treatment initiated in session to address balance, strength, functional mobility and balance. Pt will benefit from skilled therapy services to address stated deficits to promote return to highest level of function, independence, and safety. PM note: Patient is sitting in the recliner agreeable to therapy as she wants to get back to bed. OT present for co-treat. Patient requires a lot of assist to scoot forward int he chair. Sit to stand to the walker with moderate assist x 2. Patient is able to take a few steps over to the bed with moderate verbal and tactile cues. Patient is wanting to sit prematurely and would have without verbal cues. Patient is sitting edge of bed with a little support. Sit to supine with max assist.  Patient requires total assist with positioning in the bed. Patient is left with needs within reach and alarm intact. Patient is WBAT LLE. Son at bedside. Patient is on Airvo. No progress demonstrated this pm.  Will continue PT efforts. This section established at most recent assessment PROBLEM LIST (Impairments causing functional limitations): 1. Decreased Strength 2. Decreased ADL/Functional Activities 3. Decreased Transfer Abilities 4. Decreased Ambulation Ability/Technique 5. Decreased Balance 6. Increased Pain 7. Decreased Flexibility/Joint Mobility 8. Decreased [place] with Home Exercise Program 
 INTERVENTIONS PLANNED: (Benefits and precautions of physical therapy have been discussed with the patient.) 1. Balance Exercise 2. Bed Mobility 3. Family Education 4. Gait Training 5. Group Therapy 6. Home Exercise Program (HEP) 7. Manual Therapy 8. Neuromuscular Re-education/Strengthening 9. Range of Motion (ROM) 10. Therapeutic Activites 11. Therapeutic Exercise/Strengthening 12. Transfer Training TREATMENT PLAN: Frequency/Duration: twice daily for duration of hospital stay Rehabilitation Potential For Stated Goals: Good REHAB RECOMMENDATIONS (at time of discharge pending progress):   
Placement: It is my opinion, based on this patient's performance to date, that Ms. Vanda Young may benefit from participating in 1-2 additional therapy sessions in order to continue to assess for rehab potential and then make recommendation for disposition at discharge. Equipment:  
? None at this time HISTORY:  
History of Present Injury/Illness (Reason for Referral): 
Per  Physician Note: Meg Mohs is a 80 y.o. female with medical history significant for anxiety, chronic diastolic heart failure, essential hypertension, atrial fibrillation s/p PPM,  multiple myeloma, hypothyroidism and mild dementia who presented from home after falling out of her recliner. Patient is very hard of hearing and therefore difficult to get detailed history. Patient denies any head trauma, presyncopal symptoms prior to falling.   
As per daughter, patient's has been having some worsening dementia lately and slight confusions similar to when she had UTI in the past.  Patient reports she was in her usual health prior to the fall and daughter reports that patient has not complaint about any issues except for occasional shortness of breath on exertions. Denies fever, chills, sob at rest, chest pains. Reports using 2L O2 at bedtime but has not required O2 during day time or on exertion. Past Medical History/Comorbidities:  
Ms. Raudel Dodson  has a past medical history of Anemia, unspecified, Chest pain, unspecified (3/21/2016), Chronic anxiety (5/05/6708), Diastolic heart failure ([facility] [address] (3/21/2016), Essential hypertension, benign, Flatulence, eructation, and gas pain, Lump or mass in breast, Other and unspecified hyperlipidemia, Paroxysmal atrial fibrillation ([facility] [address] (3/21/2016), [facility] (3/21/2016), Pernicious anemia, Postmenopausal atrophic vaginitis, Unspecified deficiency anemia, Unspecified hypothyroidism, and Vitamin D deficiency. Ms. Raudel Dodson  has a past surgical history that includes hx hysterectomy; hx orthopaedic; and hx vein stripping. Social History/Living Environment:  
Home Environment: Private residence # Steps to Enter: 2 Rails to Enter: Yes Hand Rails : Left One/Two Story Residence: One story Living Alone: Yes Support Systems: Family member(s) Patient Expects to be Discharged to[de-identified] Rehabilitation facility Current DME Used/Available at Home: Walker, rolling, Shower chair, Juan Dills, straight Tub or Shower Type: Tub/Shower combination Prior Level of Function/Work/Activity: Mod I with ADLs and mobility Number of Personal Factors/Comorbidities that affect the Plan of Care: 3+: HIGH COMPLEXITY EXAMINATION:  
Most Recent Physical Functioning:  
Gross Assessment: 
  
         
  
Posture: 
  
Balance: 
Sitting: Impaired Sitting - Static: Fair (occasional) Sitting - Dynamic: Fair (occasional) Standing: Impaired Standing - Static: Constant support;Poor Standing - Dynamic : Constant support;Poor Bed Mobility: 
Rolling: Maximum assistance;Assist x2 
 Sit to Supine: Maximum assistance;Assist x2 Scooting: Maximum assistance;Assist x2 Wheelchair Mobility: 
  
Transfers: 
Sit to Stand: Moderate assistance;Assist x2 Stand to Sit: Moderate assistance;Assist x2 Bed to Chair: Moderate assistance; Additional time;Assist x2 Gait: 
Left Side Weight Bearing: As tolerated Body Structures Involved: 1. Nerves 2. Bones 3. Joints 4. Muscles 5. Ligaments Body Functions Affected: 1. Sensory/Pain 2. Movement Related Activities and Participation Affected: 1. Mobility 2. Self Care 3. Domestic Life 4. Interpersonal Interactions and Relationships 5. Community, Social and [place] Number of elements that affect the Plan of Care: 4+: HIGH COMPLEXITY CLINICAL PRESENTATION:  
Presentation: Stable and uncomplicated: LOW COMPLEXITY CLINICAL DECISION MAKIN[address] [place] [id number] AM-PAC 6 Clicks Basic Mobility Inpatient Short Form How much difficulty does the patient currently have. .. Unable A Lot A Little None 1. Turning over in bed (including adjusting bedclothes, sheets and blankets)? [] 1   [] 2   [x] 3   [] 4  
2. Sitting down on and standing up from a chair with arms ( e.g., wheelchair, bedside commode, etc.)   [] 1   [x] 2   [] 3   [] 4  
3. Moving from lying on back to sitting on the side of the bed? [] 1   [] 2   [x] 3   [] 4 How much help from another person does the patient currently need. .. Total A Lot A Little None 4. Moving to and from a bed to a chair (including a wheelchair)? [] 1   [x] 2   [] 3   [] 4  
5. Need to walk in hospital room? [] 1   [x] 2   [] 3   [] 4  
6. Climbing 3-5 steps with a railing? [x] 1   [] 2   [] 3   [] 4  
© , Trustees of [address] [place] [address], under license to [employer]. All rights reserved Score:  Initial: 13 Most Recent: X (Date: -- ) Interpretation of Tool:  Represents activities that are increasingly more difficult (i.e. Bed mobility, Transfers, Gait). Medical Necessity: · Patient is expected to demonstrate progress in  
· strength, range of motion, balance, coordination, and functional technique ·  to  
· increase independence with functional mobility and ambulation · . Reason for Services/Other Comments: 
· Patient continues to require skilled intervention due to · LLE weakness, pain, decreased ROM and increased fall risk · . Use of outcome tool(s) and clinical judgement create a POC that gives a: Questionable prediction of patient's progress: MODERATE COMPLEXITY  
  
 
 
 
TREATMENT:  
(In addition to Assessment/Re-Assessment sessions the following treatments were rendered) Pre-treatment Symptoms/Complaints:  \"OK\" Pain: Initial: 0/10 Pain Intensity 1: (no number given)  Post Session:  No number given Therapeutic Activity: (    14min):  Therapeutic activities including Bed transfers, Chair transfers, Ambulation on level ground to improve mobility, strength, balance, and coordination. Required moderate assist with visual, verbal and tactile cues to promote static and dynamic balance in standing and promote coordination of bilateral, lower extremity(s). Braces/Orthotics/Lines/Etc:  
· IV 
· geronimo catheter · O2 Device: Heated, Hi flow nasal cannula Treatment/Session Assessment:   
· Response to Treatment:  More fatigued this pm but trying to participate, needs a lot of cues · Interdisciplinary Collaboration:  
o Physical Therapy Assistant 
o Occupational Therapist 
o Registered Nurse · After treatment position/precautions:  
o Supine in bed 
o Bed alarm/tab alert on 
o Bed/Chair-wheels locked 
o Bed in low position 
o Call light within reach 
o RN notified 
o Family at bedside · Compliance with Program/Exercises: Will assess as treatment progresses · Recommendations/Intent for next treatment session: \"Next visit will focus on advancements to more challenging activities\". Total Treatment Duration: PT Patient Time In/Time Out Time In: 4797 Time Out: 0252 Kimberly Barrett, PTA, PT, DPT

## 2020-08-23 NOTE — PROGRESS NOTES
Problem: Self Care Deficits Care Plan (Adult) Goal: *Acute Goals and Plan of Care (Insert Text) Outcome: Progressing Towards Goal 
Note: 1. Pt will toilet with min A 2. Pt will complete functional mobility for ADLs with min A 3. Pt will complete lower body dressing with min A using AE as needed 4. Pt will complete grooming and hygiene with set up 5. Pt will demonstrate independence with HEP to promote increased BUE strength and functional use for ADLs 6. Pt will tolerate 23 minutes functional activity with min or fewer rest breaks to promote increased endurance for ADLs 7. Pt will complete bed mobility with min A in prep for ADLs Timeframe: 7 days OCCUPATIONAL THERAPY: Initial Assessment 8/23/2020 INPATIENT:   
[place] / Product Type: Medicare /  
  
NAME/AGE/GENDER: Ghassan Schultz is a 80 y.o. female PRIMARY DIAGNOSIS:  Closed left hip fracture ([address] [S72.002A] Closed left hip fracture (HCC) Closed left hip fracture ([address] Procedure(s) (LRB): HIP HEMIARTHROPLASTY (Left) 1 Day Post-Op ICD-10: Treatment Diagnosis:  
 · History of falling (Z91.81) Precautions/Allergies: 
  falls, hip, WBAT  Patient has no known allergies. ASSESSMENT:  
 
Ms. Prince Paget presents with L hip fx d/t falling at home, now s/p L MAURICE. Pt lives alone and is independent with ADLs with the exception of bathing; pt's children live nearby and take turns staying with pt and assisting with bathing. Pt uses a RW for mobility. This session, pt presented with deficits in mobility, balance, endurance, strength, and cognition impacting ADLs. Pt confused, demonstrated decreased memory and had some difficulty following commands, required mod cues for carryover and safety. Pt required mod X2 to stand, mod-max X2 to transfer from chair back to bed and to return to supine. Pt on airvo, fatigued quickly with exertion.  Pt is below her functional baseline and would benefit from skilled OT services to address deficits. Recommend d/c to SNF. This section established at most recent assessment PROBLEM LIST (Impairments causing functional limitations): 1. Decreased Strength 2. Decreased ADL/Functional Activities 3. Decreased Transfer Abilities 4. Decreased Balance 5. Decreased Activity Tolerance 6. Increased Fatigue 7. Increased Shortness of Breath 8. Decreased Knowledge of Precautions 9. Decreased Cognition INTERVENTIONS PLANNED: (Benefits and precautions of occupational therapy have been discussed with the patient.) 1. Activities of daily living training 2. Adaptive equipment training 3. Balance training 4. Therapeutic activity 5. Therapeutic exercise TREATMENT PLAN: Frequency/Duration: Follow patient 3 times/ week to address above goals. Rehabilitation Potential For Stated Goals: Good REHAB RECOMMENDATIONS (at time of discharge pending progress):   
Placement: It is my opinion, based on this patient's performance to date, that Ms. Savita Gonzalez may benefit from intensive therapy at a [address] [place] [address] after discharge due to the functional deficits listed above that are likely to improve with skilled rehabilitation and concerns that he/she may be unsafe to be unsupervised at home due to fall risk,  inability to complete ADLs . Equipment:  
? None at this time OCCUPATIONAL PROFILE AND HISTORY:  
History of Present Injury/Illness (Reason for Referral): 
See H&P Past Medical History/Comorbidities:  
Ms. Savita Gonzalez  has a past medical history of Anemia, unspecified, Chest pain, unspecified (3/21/2016), Chronic anxiety (1/56/4819), Diastolic heart failure ([address] (3/21/2016), Essential hypertension, benign, Flatulence, eructation, and gas pain, Lump or mass in breast, Other and unspecified hyperlipidemia, Paroxysmal atrial fibrillation ([address] (3/21/2016), Paroxysmal tachycardia ([facility] [address] (3/21/2016), Pernicious anemia, Postmenopausal atrophic vaginitis, Unspecified deficiency anemia, Unspecified hypothyroidism, and Vitamin D deficiency. Ms. Fiona Mehta  has a past surgical history that includes hx hysterectomy; hx orthopaedic; and hx vein stripping. Social History/Living Environment:  
Home Environment: Private residence # Steps to Enter: 2 Rails to Enter: Yes Hand Rails : Left One/Two Story Residence: One story Living Alone: Yes Support Systems: Family member(s) Patient Expects to be Discharged to[de-identified] Rehabilitation facility Current DME Used/Available at Home: Walker, rolling, Shower chair, Elena Italia, straight Tub or Shower Type: Tub/Shower combination Prior Level of Function/Work/Activity: 
See assessment Number of Personal Factors/Comorbidities that affect the Plan of Care: Expanded review of therapy/medical records (1-2):  MODERATE COMPLEXITY ASSESSMENT OF OCCUPATIONAL PERFORMANCE[de-identified]  
Activities of Daily Living:  
Basic ADLs (From Assessment) Complex ADLs (From Assessment) Feeding: Setup, Stand-by assistance Oral Facial Hygiene/Grooming: Minimum assistance Bathing: Moderate assistance Upper Body Dressing: Minimum assistance Lower Body Dressing: Maximum assistance Toileting: Maximum assistance Instrumental ADL Meal Preparation: Total assistance Homemaking: Total assistance Grooming/Bathing/Dressing Activities of Daily Living Cognitive Retraining Safety/Judgement: Decreased awareness of need for safety;Decreased awareness of need for assistance Bed/Mat Mobility Rolling: Moderate assistance Supine to Sit: Moderate assistance Sit to Supine: Moderate assistance;Maximum assistance;Assist x2 Sit to Stand: Moderate assistance;Assist x2 Stand to Sit: Moderate assistance;Assist x2 Bed to Chair: Moderate assistance;Maximum assistance;Assist x2 Scooting: Moderate assistance Most Recent Physical Functioning: Gross Assessment: 
AROM: Generally decreased, functional 
Strength: Generally decreased, functional 
Coordination: Generally decreased, functional 
         
  
Posture: 
Posture (WDL): Exceptions to [facility] Posture Assessment: Trunk flexion, Increased, Rounded shoulders Balance: 
Sitting: Impaired Sitting - Static: Fair (occasional) Sitting - Dynamic: Fair (occasional) Standing: Impaired Standing - Static: Poor Standing - Dynamic : Poor Bed Mobility: 
Rolling: Moderate assistance Supine to Sit: Moderate assistance Sit to Supine: Moderate assistance;Maximum assistance;Assist x2 Scooting: Moderate assistance Wheelchair Mobility: 
  
Transfers: 
Sit to Stand: Moderate assistance;Assist x2 Stand to Sit: Moderate assistance;Assist x2 Bed to Chair: Moderate assistance;Maximum assistance;Assist x2 Patient Vitals for the past 6 hrs: 
 BP BP Patient Position SpO2 O2 Flow Rate (L/min) Pulse 08/23/20 1134   99 % 60 l/min   
08/23/20 1135    50 l/min   
08/23/20 1141 104/54 At rest 100 %  64  
08/23/20 1200   94 % 40 l/min   
08/23/20 1224   94 % 40 l/min  Mental Status Neurologic State: Alert Orientation Level: Oriented to person, Oriented to place Cognition: Decreased attention/concentration, Follows commands, Decreased command following Perception: Appears intact Perseveration: No perseveration noted Safety/Judgement: Decreased awareness of need for safety, Decreased awareness of need for assistance Physical Skills Involved: 
1. Balance 2. Strength 3. Activity Tolerance Cognitive Skills Affected (resulting in the inability to perform in a timely and safe manner): 1. Executive Function 2. Sustained Attention 3. Divided Attention 4. Comprehension Psychosocial Skills Affected: 1. Habits/Routines 2. Environmental Adaptation 3. Self-Awareness Number of elements that affect the Plan of Care: 5+:  HIGH COMPLEXITY CLINICAL DECISION MAKING:  
 [facility] Daily [facility] Inpatient Short Form How much help from another person does the patient currently need. .. Total A Lot A Little None 1. Putting on and taking off regular lower body clothing? [] 1   [x] 2   [] 3   [] 4  
2. Bathing (including washing, rinsing, drying)? [] 1   [x] 2   [] 3   [] 4  
3. Toileting, which includes using toilet, bedpan or urinal?   [] 1   [x] 2   [] 3   [] 4  
4. Putting on and taking off regular upper body clothing? [] 1   [] 2   [x] 3   [] 4  
5. Taking care of personal grooming such as brushing teeth? [] 1   [] 2   [x] 3   [] 4  
6. Eating meals? [] 1   [] 2   [] 3   [x] 4  
© 2007, Trustees of [facility] [employer], under license to [employer]. All rights reserved Score:  Initial: 16 Most Recent: X (Date: -- ) Interpretation of Tool:  Represents activities that are increasingly more difficult (i.e. Bed mobility, Transfers, Gait). Medical Necessity:    
· Patient demonstrates · fair ·  rehab potential due to higher previous functional level. Reason for Services/Other Comments: 
· Patient · continues to require present interventions due to patient's inability to complete ADLs · . Use of outcome tool(s) and clinical judgement create a POC that gives a: MODERATE COMPLEXITY  
 
 
 
TREATMENT:  
(In addition to Assessment/Re-Assessment sessions the following treatments were rendered) Pre-treatment Symptoms/Complaints:   
Pain: Initial:  
Pain Intensity 1: 0  Post Session:  5 (hip, with movement. Repositioned) Assessment/Reassessment only, no treatment provided today Braces/Orthotics/Lines/Etc:  
· O2 Device: Heated, Hi flow nasal cannula Treatment/Session Assessment:   
· Response to Treatment:  no adverse reaction · Interdisciplinary Collaboration:  
o Occupational Therapist 
o Registered Nurse · After treatment position/precautions:  
o Supine in bed 
o Bed alarm/tab alert on 
 o Bed/Chair-wheels locked 
o Bed in low position 
o Call light within reach 
o RN notified 
o Family at bedside · Compliance with Program/Exercises: Will assess as treatment progresses. · Recommendations/Intent for next treatment session: \"Next visit will focus on advancements to more challenging activities and reduction in assistance provided\". Total Treatment Duration: OT Patient Time In/Time Out Time In: 1350 Time Out: 9960 Miguelina Sanches, OT

## 2020-08-23 NOTE — PROGRESS NOTES
08/23/20 1554 Oxygen Therapy O2 Sat (%) 94 % Pulse via Oximetry 63 beats per minute O2 Device Heated; Hi flow nasal cannula O2 Flow Rate (L/min) 40 l/min O2 Temperature 87.8 °F (31 °C) FIO2 (%) 35 %

## 2020-08-23 NOTE — PROGRESS NOTES
Problem: Pressure Injury - Risk of 
Goal: *Prevention of pressure injury Description: Document José Manuel Scale and appropriate interventions in the flowsheet. Outcome: Progressing Towards Goal 
Note: Pressure Injury Interventions: 
Sensory Interventions: Assess changes in LOC, Assess need for specialty bed, Check visual cues for pain, Discuss PT/OT consult with provider, Keep linens dry and wrinkle-free, Pad between skin to skin, Pressure redistribution bed/mattress (bed type) Moisture Interventions: Absorbent underpads, Apply protective barrier, creams and emollients, Limit adult briefs, Check for incontinence Q2 hours and as needed, Internal/External urinary devices Activity Interventions: Increase time out of bed, Pressure redistribution bed/mattress(bed type), PT/OT evaluation Mobility Interventions: HOB 30 degrees or less, Pressure redistribution bed/mattress (bed type), PT/OT evaluation, Suspension boots Nutrition Interventions: Document food/fluid/supplement intake, Offer support with meals,snacks and hydration Friction and Shear Interventions: Apply protective barrier, creams and emollients, Foam dressings/transparent film/skin sealants Problem: Patient Education: Go to Patient Education Activity Goal: Patient/Family Education Outcome: Progressing Towards Goal 
  
Problem: Falls - Risk of 
Goal: *Absence of Falls Description: Document Mile Dear Fall Risk and appropriate interventions in the flowsheet. Outcome: Progressing Towards Goal 
Note: Fall Risk Interventions: 
Mobility Interventions: Bed/chair exit alarm, OT consult for ADLs, Patient to call before getting OOB, PT Consult for mobility concerns Mentation Interventions: Bed/chair exit alarm, Adequate sleep, hydration, pain control, Door open when patient unattended, More frequent rounding, Reorient patient, Update white board Medication Interventions: Bed/chair exit alarm, Evaluate medications/consider consulting pharmacy, Patient to call before getting OOB, Teach patient to arise slowly Elimination Interventions: Call light in reach, Bed/chair exit alarm, Patient to call for help with toileting needs, Stay With Me (per policy) History of Falls Interventions: Bed/chair exit alarm, Door open when patient unattended, Investigate reason for fall Problem: Patient Education: Go to Patient Education Activity Goal: Patient/Family Education Outcome: Progressing Towards Goal 
  
Problem: Hip Fracture: Day of Surgery Post-op Care Goal: Activity/Safety Outcome: Progressing Towards Goal 
Goal: Consults, if ordered Outcome: Progressing Towards Goal 
Goal: Diagnostic Test/Procedures Outcome: Progressing Towards Goal 
Goal: Nutrition/Diet Outcome: Progressing Towards Goal 
Goal: Medications Outcome: Progressing Towards Goal 
Goal: Respiratory Outcome: Progressing Towards Goal 
Goal: Treatments/Interventions/Procedures Outcome: Progressing Towards Goal 
Goal: Psychosocial 
Outcome: Progressing Towards Goal 
Goal: *Absence of skin breakdown Outcome: Progressing Towards Goal 
Goal: *Optimal pain control at patient's stated goal 
Outcome: Progressing Towards Goal 
Goal: *Hemodynamically stable Outcome: Progressing Towards Goal

## 2020-08-23 NOTE — PROGRESS NOTES
Problem: Pressure Injury - Risk of 
Goal: *Prevention of pressure injury Description: Document José Manuel Scale and appropriate interventions in the flowsheet. Outcome: Progressing Towards Goal 
Note: Pressure Injury Interventions: 
Sensory Interventions: Assess changes in LOC Moisture Interventions: Absorbent underpads, Apply protective barrier, creams and emollients, Assess need for specialty bed Activity Interventions: PT/OT evaluation, Pressure redistribution bed/mattress(bed type) Mobility Interventions: PT/OT evaluation, Pressure redistribution bed/mattress (bed type), HOB 30 degrees or less Nutrition Interventions: Offer support with meals,snacks and hydration, Discuss nutritional consult with provider Friction and Shear Interventions: Apply protective barrier, creams and emollients, Feet elevated on foot rest, Foam dressings/transparent film/skin sealants Problem: Patient Education: Go to Patient Education Activity Goal: Patient/Family Education Outcome: Progressing Towards Goal 
  
Problem: Falls - Risk of 
Goal: *Absence of Falls Description: Document Jodi Gurrola Fall Risk and appropriate interventions in the flowsheet. Outcome: Progressing Towards Goal 
Note: Fall Risk Interventions: 
Mobility Interventions: Assess mobility with egress test, Bed/chair exit alarm Mentation Interventions: Adequate sleep, hydration, pain control, Bed/chair exit alarm, Door open when patient unattended Medication Interventions: Bed/chair exit alarm, Evaluate medications/consider consulting pharmacy, Teach patient to arise slowly Elimination Interventions: Call light in reach, Patient to call for help with toileting needs History of Falls Interventions: Bed/chair exit alarm, Consult care management for discharge planning, Door open when patient unattended

## 2020-08-23 NOTE — PROGRESS NOTES
Hospitalist Progress Note 2020 Admit Date: 2020  9:06 AM  
NAME: Brittani Dunn :  1930 MRN:  [id number] Attending: Eran Mejias MD 
PCP:  Shayan Bhat MD 
 
SUBJECTIVE:  
Patient is a 79yo F with hx dementia, MM, HFpEF, HTN, and AF who presented with fall and L femoral neck fracture and UTI. : 
POD #1 L hip hemiarthroplasty Became hypoxic overnight, CXR with LLL infiltrate, given lasix and abx expanded to HAP coverage No fever, no leukocytosis 
procalcitonin 9 She was put on Airvo, now spO2 99%. ~700ml urine in geronimo bag currently Resting in room. Wakes up to voice, more alert than yesterday. Denies cough or dyspnea or fever or chest pain. Review of Systems negative with exception of pertinent positives noted above PHYSICAL EXAM  
 
Visit Vitals /52 (BP 1 Location: Left arm, BP Patient Position: At rest) Pulse 64 Temp 98.6 °F (37 °C) Resp 20 Ht 5' 3\" (1.6 m) Wt 62.6 kg (138 lb) SpO2 99% BMI 24.45 kg/m² Temp (24hrs), Av.3 °F (36.8 °C), Min:97.5 °F (36.4 °C), Max:100.1 °F (37.8 °C) Oxygen Therapy O2 Sat (%): 99 % (2048) Pulse via Oximetry: 60 beats per minute (20) O2 Device: Heated; Hi flow nasal cannula (20) O2 Flow Rate (L/min): 60 l/min (20) O2 Temperature: 87.8 °F (31 °C) (20) FIO2 (%): 80 % (2043) Intake/Output Summary (Last 24 hours) at 2020 4610 Last data filed at 2020 8531 Gross per 24 hour Intake 300 ml Output 750 ml Net -450 ml General: Elderly, NAD Lungs:  Crackles bases, airvo in place Heart:  Regular rate and rhythm,  No murmur, rub, or gallop Abdomen: Soft, Non distended, Non tender, Positive bowel sounds Extremities: L hip postop, dressing c/d/i Neurologic:  No focal deficits XR CHEST SNGL V Final Result XR HIP LT W OR WO PELV 2-3 VWS Final Result Impression: A total left hip prosthesis placement. XR HIP LT W OR WO PELV 2-3 VWS Final Result IMPRESSION:  Basicervical femoral neck fracture. LEFT FEMUR 2 view(s). HISTORY: Pain following fall TECHNIQUE: AP and lateral views. COMPARISON: None. FINDINGS: Femoral neck fracture is present. Remainder the femoral shaft is  
intact. 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 Arterial calcifications are  
identified. IMPRESSION: Femoral neck fracture. Remainder of the femoral shaft is intact. CHEST X-RAY, one view. HISTORY:  Proper evaluation for femoral neck fracture repair  November 2019 TECHNIQUE:  AP supine view. COMPARISON: November 2019 FINDINGS:   
Lungs: are clear. Costophrenic angles: are sharp. Heart size: Borderline. Pulmonary vasculature: is unremarkable. Aorta: Calcifications with normal caliber. Included portion of the upper abdomen: is unremarkable. Bones: No gross bony lesions. Other: Dual lead left-sided cardiac pacemaker is present. IMPRESSION:  Borderline cardiomegaly and aortic atherosclerosis and cardiac  
pacemaker. [facility] XR FEMUR LT 2 V Final Result IMPRESSION:  Basicervical femoral neck fracture. LEFT FEMUR 2 view(s). HISTORY: Pain following fall TECHNIQUE: AP and lateral views. COMPARISON: None. FINDINGS: Femoral neck fracture is present. Remainder the femoral shaft is  
intact. 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 Arterial calcifications are  
identified. IMPRESSION: Femoral neck fracture. Remainder of the femoral shaft is intact. CHEST X-RAY, one view. HISTORY:  Proper evaluation for femoral neck fracture repair  November 2019 TECHNIQUE:  AP supine view. COMPARISON: November 2019 FINDINGS:   
Lungs: are clear. Costophrenic angles: are sharp. Heart size: Borderline. Pulmonary vasculature: is unremarkable. Aorta: Calcifications with normal caliber. Included portion of the upper abdomen: is unremarkable. Bones: No gross bony lesions. Other: Dual lead left-sided cardiac pacemaker is present. IMPRESSION:  Borderline cardiomegaly and aortic atherosclerosis and cardiac  
pacemaker. Trude Roers XR CHEST SNGL V Final Result IMPRESSION:  Basicervical femoral neck fracture. LEFT FEMUR 2 view(s). HISTORY: Pain following fall TECHNIQUE: AP and lateral views. COMPARISON: None. FINDINGS: Femoral neck fracture is present. Remainder the femoral shaft is  
intact. 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 Arterial calcifications are  
identified. IMPRESSION: Femoral neck fracture. Remainder of the femoral shaft is intact. CHEST X-RAY, one view. HISTORY:  Proper evaluation for femoral neck fracture repair  November 2019 TECHNIQUE:  AP supine view. COMPARISON: November 2019 FINDINGS:   
Lungs: are clear. Costophrenic angles: are sharp. Heart size: Borderline. Pulmonary vasculature: is unremarkable. Aorta: Calcifications with normal caliber. Included portion of the upper abdomen: is unremarkable. Bones: No gross bony lesions. Other: Dual lead left-sided cardiac pacemaker is present. IMPRESSION:  Borderline cardiomegaly and aortic atherosclerosis and cardiac  
pacemaker. Trude Roers ASSESSMENT Active Hospital Problems Diagnosis Date Noted  Closed left hip fracture ([address] 08/21/2020  Acute cystitis without hematuria 08/21/2020  Multiple myeloma ([address] 11/20/2019  Chronic diastolic heart failure ([address] 11/16/2016  Acquired hypothyroidism 07/09/2015  Essential hypertension, benign 07/09/2015 Plan: 
 
L hip fracture Hemiarthroplasty 8/22 Pain control PT/OT 
ASA 
STR placement Acute hypoxic respiratory failure HAP v overload Received less than a liter postop fluids Lasix, vanc/zosyn MRSA nasal swab Monitor output Wean supplemental oxygen UTI 
Covered as above FRANK Resolved with fluids initially Trend after getting lasix MM Sees Dr. Dawayne Lefort. pomalyst 
 
Hypothyroid Home synthroid AF Home amiodarone Signed By: Nelli Foster MD   
 August 23, 2020

## 2020-08-23 NOTE — PROGRESS NOTES
08/23/20 7439 Vital Signs Temp 97.6 °F (36.4 °C) Temp Source Axillary Pulse (Heart Rate) 65 Heart Rate Source Monitor Resp Rate 20  
O2 Sat (%) (!) 80 % (nurse present at the bedside) Level of Consciousness Alert /77 MAP (Calculated) 97 BP 1 Method Automatic  
BP 1 Location Left arm BP Patient Position At rest  
MEWS Score 1 Pt's O2 78% on 4L NC. Pt awake and talking with no signs of distress. Pt placed on 15L non-rebreather. Pt slowly increased to low 90s. RT to bedside and suggesting to place pt on Airvo. Hospitalist notified. Orders received for stat CXR and stop IVF. Pt on airvo with O2 at 94%. Will continue to monitor.

## 2020-08-23 NOTE — PROGRESS NOTES
Pt with decline in medical/ respiratory status and is now on airvo. Per PT, MD requesting that therapy hold until respiratory status improves. Will follow up as schedule/ pt's status allows.  
 
Kiah Olvera, OT

## 2020-08-23 NOTE — PROGRESS NOTES
Problem: Mobility Impaired (Adult and Pediatric) Goal: *Acute Goals and Plan of Care (Insert Text) Outcome: Progressing Towards Goal 
Note: STG: 
(1.)Ms. Vanda Young will move from supine to sit and sit to supine , scoot up and down, and roll side to side with CONTACT GUARD ASSIST within 3 treatment day(s). (2.)Ms. Vanda Young will transfer from bed to chair and chair to bed with CONTACT GUARD ASSIST using the least restrictive device within 3 treatment day(s). (3.)Ms. Vanda Young will ambulate with MINIMAL ASSIST for 75 feet with the least restrictive device within 3 treatment day(s). LTG: 
(1.)Ms. Vanda Young will move from supine to sit and sit to supine , scoot up and down, and roll side to side in bed with SUPERVISION within 7 treatment day(s). (2.)Ms. Vanda Young will transfer from bed to chair and chair to bed with SUPERVISION using the least restrictive device within 7 treatment day(s). (3.)Ms. Vanda Young will ambulate with STAND BY ASSIST for 250 feet with the least restrictive device within 7 treatment day(s). _________________________________________________________________________________________ PHYSICAL THERAPY: Initial Assessment, Daily Note, and AM 8/23/2020 INPATIENT: PT Visit Days : 1 [place] / [place] B / Product Type: Medicare /   
  
NAME/AGE/GENDER: Meg Mohs is a [address] y.o. female PRIMARY DIAGNOSIS: Closed left hip fracture ([facility] [S72.002A] Closed left hip fracture (HCC) Closed left hip fracture ([facility] Procedure(s) (LRB): HIP HEMIARTHROPLASTY (Left) 1 Day Post-Op ICD-10: Treatment Diagnosis:  
 Generalized Muscle Weakness (M62.81) Other lack of cordination (R27.8) Difficulty in walking, Not elsewhere classified (R26.2) Other abnormalities of gait and mobility (R26.89) History of falling (Z91.81) Precaution/Allergies: 
Patient has no known allergies.   
  
ASSESSMENT:  
 
 Ms. Loretta Zeng  is a 80year old female who has been admitted to hospital on 08/21/20 s/p LT hip hemiarthroplasty. Prior to hospital admission pt lives alone in a 1 story home with 2 step(s) to enter with LT side railing. Pt endorses 1 falls in past 6 months. Prior to admission MOD I for ADLs and mobility. 08/23/20: Upon entering, pt supine in bed, agreeable to therapy. A&Ox3. 40L O2 AirVo. She reports no pain at rest. Mod A for rolling side to side and performing bed mobility. Supine>Sit with Mod A and additional time. BLE strength is fair. Sitting EOB with fair sitting balance control. Seated balance control training and edge of bed. Mod A to scoot in bed ,Sit <> stand Mod A and RW. Attempted steps, but unable. Stand pivot transfer to chair. Pt presents as functioning below her baseline, with deficits in mobility including transfers, gait, balance, and activity tolerance. Treatment initiated in session to address balance, strength, functional mobility and balance. Pt will benefit from skilled therapy services to address stated deficits to promote return to highest level of function, independence, and safety. This section established at most recent assessment PROBLEM LIST (Impairments causing functional limitations): 
Decreased Strength Decreased ADL/Functional Activities Decreased Transfer Abilities Decreased Ambulation Ability/Technique Decreased Balance Increased Pain Decreased Flexibility/Joint Mobility Decreased Craig with Home Exercise Program 
 INTERVENTIONS PLANNED: (Benefits and precautions of physical therapy have been discussed with the patient.) Balance Exercise Bed Mobility Family Education Gait Training Group Therapy Home Exercise Program (HEP) Manual Therapy Neuromuscular Re-education/Strengthening Range of Motion (ROM) Therapeutic Activites Therapeutic Exercise/Strengthening Transfer Training TREATMENT PLAN: Frequency/Duration: twice daily for duration of hospital stay Rehabilitation Potential For Stated Goals: Good REHAB RECOMMENDATIONS (at time of discharge pending progress):   
Placement: It is my opinion, based on this patient's performance to date, that Ms. Fabiola Briceno may benefit from participating in 1-2 additional therapy sessions in order to continue to assess for rehab potential and then make recommendation for disposition at discharge. Equipment:  
None at this time HISTORY:  
History of Present Injury/Illness (Reason for Referral): 
Per  Physician Note: Danny Perez is a 80 y.o. female with medical history significant for anxiety, chronic diastolic heart failure, essential hypertension, atrial fibrillation s/p PPM,  multiple myeloma, hypothyroidism and mild dementia who presented from home after falling out of her recliner. Patient is very hard of hearing and therefore difficult to get detailed history. Patient denies any head trauma, presyncopal symptoms prior to falling. As per daughter, patient's has been having some worsening dementia lately and slight confusions similar to when she had UTI in the past.  Patient reports she was in her usual health prior to the fall and daughter reports that patient has not complaint about any issues except for occasional shortness of breath on exertions. Denies fever, chills, sob at rest, chest pains. Reports using 2L O2 at bedtime but has not required O2 during day time or on exertion.   
Past Medical History/Comorbidities:  
Ms. Fabiola Briceno  has a past medical history of Anemia, unspecified, Chest pain, unspecified (3/21/2016), Chronic anxiety (1/41/8944), Diastolic heart failure ([address] (3/21/2016), Essential hypertension, benign, Flatulence, eructation, and gas pain, Lump or mass in breast, Other and unspecified hyperlipidemia, Paroxysmal atrial fibrillation ([address] (3/21/2016), Paroxysmal tachycardia ([facility] (3/21/2016), Pernicious anemia, Postmenopausal atrophic vaginitis, Unspecified deficiency anemia, Unspecified hypothyroidism, and Vitamin D deficiency. Ms. Christina Corrales  has a past surgical history that includes hx hysterectomy; hx orthopaedic; and hx vein stripping. Social History/Living Environment:  
Home Environment: Private residence # Steps to Enter: 2 Rails to Enter: Yes Hand Rails : Left One/Two Story Residence: One story Living Alone: Yes Support Systems: Family member(s), Friends \ neighbors Patient Expects to be Discharged to[de-identified] Unknown Current DME Used/Available at Home: Walker, rolling Prior Level of Function/Work/Activity: Mod I with ADLs and mobility Number of Personal Factors/Comorbidities that affect the Plan of Care: 3+: HIGH COMPLEXITY EXAMINATION:  
Most Recent Physical Functioning:  
Gross Assessment: 
AROM: Generally decreased, functional 
Strength: Generally decreased, functional 
Coordination: Generally decreased, functional 
Tone: Normal 
         
  
Posture: 
Posture (WDL): Exceptions to [facility] Posture Assessment: Trunk flexion, Increased, Rounded shoulders Balance: 
Sitting: Impaired Sitting - Static: Good (unsupported) Sitting - Dynamic: Fair (occasional) Standing: Impaired Standing - Static: Constant support; Fair 
Standing - Dynamic : Constant support;Poor Bed Mobility: 
Rolling: Moderate assistance Supine to Sit: Moderate assistance Scooting: Moderate assistance Wheelchair Mobility: 
  
Transfers: 
Sit to Stand: Moderate assistance Stand to Sit: Minimum assistance Gait: 
Left Side Weight Bearing: As tolerated Body Structures Involved: 
Nerves Bones Joints Muscles Ligaments Body Functions Affected: 
Sensory/Pain Movement Related Activities and Participation Affected: Mobility Self Care Domestic Life Interpersonal Interactions and Relationships Community, Social and [place] Number of elements that affect the Plan of Care: 4+: HIGH COMPLEXITY CLINICAL PRESENTATION:  
Presentation: Stable and uncomplicated: LOW COMPLEXITY CLINICAL DECISION MAKIN [facility] [address] [address] AM-PAC 6 Clicks Basic Mobility Inpatient Short Form How much difficulty does the patient currently have. .. Unable A Lot A Little None 1. Turning over in bed (including adjusting bedclothes, sheets and blankets)? [] 1   [] 2   [x] 3   [] 4  
2. Sitting down on and standing up from a chair with arms ( e.g., wheelchair, bedside commode, etc.)   [] 1   [x] 2   [] 3   [] 4  
3. Moving from lying on back to sitting on the side of the bed? [] 1   [] 2   [x] 3   [] 4 How much help from another person does the patient currently need. .. Total A Lot A Little None 4. Moving to and from a bed to a chair (including a wheelchair)? [] 1   [x] 2   [] 3   [] 4  
5. Need to walk in hospital room? [] 1   [x] 2   [] 3   [] 4  
6. Climbing 3-5 steps with a railing? [x] 1   [] 2   [] 3   [] 4  
© , Trustees of [address] [facility] [address] [address], under license to [employer]. All rights reserved Score:  Initial: 13 Most Recent: X (Date: -- ) Interpretation of Tool:  Represents activities that are increasingly more difficult (i.e. Bed mobility, Transfers, Gait). Medical Necessity:    
Patient is expected to demonstrate progress in  
strength, range of motion, balance, coordination, and functional technique 
 to  
increase independence with functional mobility and ambulation Deinsse Ramirez Reason for Services/Other Comments: 
Patient continues to require skilled intervention due to  
LLE weakness, pain, decreased ROM and increased fall risk Denisse Ramirez Use of outcome tool(s) and clinical judgement create a POC that gives a: Questionable prediction of patient's progress: MODERATE COMPLEXITY  
  
 
 
 
TREATMENT:  
(In addition to Assessment/Re-Assessment sessions the following treatments were rendered) Pre-treatment Symptoms/Complaints:  \"I walk all the time\" Pain: Initial: 0/10 Post Session:  7/10 Therapeutic Activity: (    10min):  Therapeutic activities including Bed transfers, Chair transfers, Ambulation on level ground to improve mobility, strength, balance, and coordination. Required moderate visual, verbal and tactile cues to promote static and dynamic balance in standing and promote coordination of bilateral, lower extremity(s). Braces/Orthotics/Lines/Etc:  
IV 
geronimo catheter O2 Device: Heated, Hi flow nasal cannula Treatment/Session Assessment:   
Response to Treatment:  See Above Interdisciplinary Collaboration:  
Physical Therapist 
Registered Nurse Respiratory Therapist 
After treatment position/precautions:  
Up in chair Bed alarm/tab alert on Bed/Chair-wheels locked Call light within reach RN notified Compliance with Program/Exercises: Will assess as treatment progresses Recommendations/Intent for next treatment session: \"Next visit will focus on advancements to more challenging activities\". Total Treatment Duration: PT Patient Time In/Time Out Time In: 1140 Time Out: 1200 Cade Ornelas, PT, DPT

## 2020-08-23 NOTE — PROGRESS NOTES
Pharmacokinetic Consult to Pharmacist 
 
Meg Mohs is a [address] y.o. female being treated for HAP with vancomycin. Height: 5' 3\" (160 cm)  Weight: 62.6 kg (138 lb) Lab Results Component Value Date/Time BUN 31 (H) 08/22/2020 05:56 AM  
 Creatinine 1.78 (H) 08/22/2020 05:56 AM  
 WBC 7.7 08/22/2020 09:37 AM  
 Procalcitonin <0.1 02/09/2019 02:08 PM  
 Lactic acid 1.7 06/13/2016 02:24 PM  
 Lactic acid 2.3 (H) 06/13/2016 08:51 AM  
  
Estimated Creatinine Clearance: 17.4 mL/min (A) (based on SCr of 1.78 mg/dL (H)). CULTURES: 
Results Procedure Component Value Units Date/Time CULTURE, BLOOD [[id number]] Order Status:  Sent Specimen:  Blood CULTURE, BLOOD [[id number]] Order Status:  Sent Specimen:  Blood Day 1 of vancomycin. Goal trough is 15-20. Vancomycin dose initiated at 1,500 mg load, followed by intermittent therapy due to renal function. Will continue to follow patient and order levels when clinically indicated. Thank you, Keyonna Castorena, PharmD

## 2020-08-23 NOTE — PROGRESS NOTES
08/23/20 1134 Oxygen Therapy O2 Sat (%) 99 % Pulse via Oximetry 60 beats per minute O2 Device Heated; Hi flow nasal cannula O2 Flow Rate (L/min) 60 l/min (Decreased to 50L) O2 Temperature 87.8 °F (31 °C) FIO2 (%) 80 % (Decreased to 60%)

## 2020-08-23 NOTE — PROGRESS NOTES
Night shift note: 
 
Called by RN due to worsening hypoxia, patient transitioned from 3L to Airvo. STAT CXR showing new left-sided infiltrate. Unclear if this is hypervolemia from known history of heart failure or early sepsis from pneumonia. Will stop fluids, give Lasix 40 mg IV once. Ordered LA, procalcitonin. Stop Rocephin and broaden to empiric vancomycin and Zosyn. Wean supplemental oxygen as tolerated.

## 2020-08-23 NOTE — PROGRESS NOTES
Problem: Dysphagia (Adult) Goal: *Speech Goal: (INSERT TEXT) Outcome: Progressing Towards Goal 
Note: LTG: Patient will tolerate least restrictive diet without overt signs or symptoms of airway compromise by discharge. STG: Patient will tolerate PO trial with SLP only for ongoing swallowing assessment without overt signs or symptoms of airway compromise. STG: Patient will participate in modified barium swallow study as clinically indicated. SPEECH LANGUAGE PATHOLOGY: DYSPHAGIA- Initial Assessment NAME/AGE/GENDER: Angy Cai is a [address] y.o. female DATE: 8/23/2020 PRIMARY DIAGNOSIS: Closed left hip fracture ([facility] [address] Landon Covarrubias Procedure(s) (LRB): HIP HEMIARTHROPLASTY (Left) 1 Day Post-Op ICD-10: Treatment Diagnosis: R13.12 Dysphagia, Oropharyngeal Phase RECOMMENDATIONS  
DIET:  
? NPO except meds MEDICATIONS: Crushed in puree ASPIRATION PRECAUTIONS · Slow rate of intake · Small bites/sips · Upright at 90 degrees during meal 
  
COMPENSATORY STRATEGIES/MODIFICATIONS · Aggressive oral care EDUCATION: 
Recommendations discussed with · Nursing · Patient CONTINUATION OF SKILLED SERVICES/MEDICAL NECESSITY: 
? Patient is expected to demonstrate progress in  swallow strength, swallow timeliness, swallow function, diet tolerance and swallow safety in order to  improve swallow safety, work toward diet advancement and decrease aspiration risk. ? Patient continues to require skilled intervention due to dysphagia. RECOMMENDATIONS for CONTINUED SPEECH THERAPY:  
YES: Anticipate need for ongoing speech therapy during this hospitalization and at next level of care. ASSESSMENT Patient presents with moderate-severe dysphagia as evidenced by strong persistent cough when swallowing thin liquids via straw, delayed cough with trials of thin and nectar liquids by cup. Mild throat clear with trials of honey-thick liquids by cup.  No overt signs or symptoms of aspiration with trials of applesauce, but patient's swallows became progressively more delayed with increased trials. She declined trials of mixed consistency and cracker. Recommend NPO at this time. Patient would benefit from further objective assessment of swallow function via Modified Barium Swallow study, but this will likely need to wait until patient no longer on Airvo. Crush meds in applesauce. REHABILITATION POTENTIAL FOR STATED GOALS: Fair PLAN   
FREQUENCY/DURATION: Continue to follow patient 3 times a week for duration of hospital stay to address above goals. - Recommendations for next treatment session: Next treatment will address PO trials SUBJECTIVE Patient wakens to voice. She does not consistently answer questions correctly - unsure if cognitive or hearing related. She does endorse swallowing difficulty, but does not elaborate or answer specific questions regarding swallowing ability. History of Present Injury/Illness: Ms. Lisa Talamantes  has a past medical history of Anemia, unspecified, Chest pain, unspecified (3/21/2016), Chronic anxiety (4/61/0983), Diastolic heart failure ([address] (3/21/2016), Essential hypertension, benign, Flatulence, eructation, and gas pain, Lump or mass in breast, Other and unspecified hyperlipidemia, Paroxysmal atrial fibrillation ([address] (3/21/2016), Paroxysmal tachycardia ([address] (3/21/2016), Pernicious anemia, Postmenopausal atrophic vaginitis, Unspecified deficiency anemia, Unspecified hypothyroidism, and Vitamin D deficiency. . She also  has a past surgical history that includes hx hysterectomy; hx orthopaedic; and hx vein stripping. Problem List:  (Impairments causing functional limitations): 1. dysphagia Previous Dysphagia: YES per patient report but she does not elaborate. No prior dysphagia therapy documented. Noted CXR from this morning reports:  New left lung base atelectasis or infiltrate. Diet Prior to Evaluation: regular textures, thin liquids Orientation:  
Person Pain: Pain Scale 1: Visual 
Pain Intensity 1: 0 Cognitive-Linguistic Screen: 
? Speech Production:  
o Needs further assessment ? Expressive Language: 
o Needs further assessment ? Receptive Language: 
o Needs further assessment ? Cognition:  
o Needs further assessment OBJECTIVE Oral Motor:  
· Labial: Patient did not follow commands for oral motor assessment · Dentition: Edentulous and patient reports she has dentures at home · Oral Hygiene: Adequate · Lingual: Patient did not follow commands for oral motor assessment Swallow evaluation: 
 Patient consumed trials of thin, nectar and honey liquids and applesauce. She declined trials of mixed consistency and cracker. INTERDISCIPLINARY COLLABORATION: Registered Nurse PRECAUTIONS/ALLERGIES: Patient has no known allergies. Tool Used: Dysphagia Outcome and Severity Scale (HUMBERTO) Score Comments Normal Diet  [] 7 With no strategies or extra time needed Functional Swallow  [] 6 May have mild oral or pharyngeal delay Mild Dysphagia  [] 5 Which may require one diet consistency restricted Mild-Moderate Dysphagia  [] 4 With 1-2 diet consistencies restricted Moderate Dysphagia  [] 3 With 2 or more diet consistencies restricted Moderate-Severe Dysphagia  [] 2 With partial PO strategies (trials with ST only) Severe Dysphagia  [] 1 With inability to tolerate any PO safely Score:  Initial: 2 Most Recent: 2 (Date 08/23/20 ) Interpretation of Tool: The Dysphagia Outcome and Severity Scale (HUMBERTO) is a simple, easy-to-use, 7-point scale developed to systematically rate the functional severity of dysphagia based on objective assessment and make recommendations for diet level, independence level, and type of nutrition. Current Medications: No current facility-administered medications on file prior to encounter. Current Outpatient Medications on File Prior to Encounter Medication Sig Dispense Refill  pomalidomide (Pomalyst) 1 mg cap Take 1 Cap by mouth daily. Take 1 capsule daily for 14 days and then she will be off 14 days 14 Cap 0  
 levothyroxine (SYNTHROID) 50 mcg tablet TAKE 1 TABLET BY MOUTH DAILY BEFORE BREAKFAST 30 Tab 0  
 amiodarone (CORDARONE) 200 mg tablet TAKE 1 TABLET BY MOUTH DAILY 180 Tab 3  potassium chloride SR (KLOR-CON 10) 10 mEq tablet TAKE 2 TABLETS BY MOUTH DAILY. 60 Tab 0  
 ergocalciferol (ERGOCALCIFEROL) 1,250 mcg (50,000 unit) capsule Take 1 Cap by mouth every seven (7) days. 4 Cap 1  clorazepate (TRANXENE) 7.5 mg tablet TAKE 1 TABLET BY MOUTH TWICE A DAY. 60 Tab 2  
 ipratropium (ATROVENT) 42 mcg (0.06 %) nasal spray 1 [place] by Both Nostrils route three (3) times daily. 15 mL 0  
 traZODone (DESYREL) 100 mg tablet Take 1 Tab by mouth nightly. 30 Tab 5  
 atorvastatin (LIPITOR) 80 mg tablet TAKE 1 TABLET BY MOUTH DAILY 60 Tab 11  
 ferrous sulfate 325 mg (65 mg iron) tablet TAKE 1 TABLET BY MOUTH DAILY. 30 Tab 11  
 docusate sodium (STOOL SOFTENER) 100 mg capsule Take 1 Cap by mouth two (2) times a day. 180 Cap 3  
 amLODIPine (NORVASC) 2.5 mg tablet Take 1 Tab by mouth daily. 90 Tab 3  
 estradiol (ESTRACE) 1 mg tablet Take 1 Tab by mouth daily. 90 Tab 3  furosemide (LASIX) 40 mg tablet Take 1 Tab by mouth daily. Taking 2 po qd (Patient taking differently: Take 40 mg by mouth daily. ) 180 Tab 1  
 mupirocin (BACTROBAN) 2 % ointment Apply  to affected area daily. 22 g 0  
 mirtazapine (REMERON) 15 mg tablet nightly.  DISABLED PLACARD (DISABLED PLACARD) DMV Apply to car. 1 Each 0  
 nadolol (CORGARD) 80 mg tablet Take 1 Tab by mouth daily. 30 Tab 11  
 lactulose (KRISTALOSE) 20 gram packet Take 1 Packet by mouth three (3) times daily. 60 Packet 3  
 OXYGEN-AIR DELIVERY SYSTEMS 3 L by Does Not Apply route nightly.  Aspirin, Buffered 81 mg tab Take  by mouth daily. After treatment position/precautions: · Upright in bed · RN notified Total Treatment Duration:  
Time In: 0930 Time Out: 7076 Meagan Rosas MA, CCC-SLP

## 2020-08-24 NOTE — PROGRESS NOTES
Hospitalist Progress Note 2020 Admit Date: 2020  9:06 AM  
NAME: [place] :  1930 MRN:  [id number] Attending: Sherry Tony MD 
PCP:  Rolly Christensen MD 
 
SUBJECTIVE:  
Patient is a 81yo F with hx dementia, MM, HFpEF, HTN, and AF who presented with fall and L femoral neck fracture and UTI. Complicated by some iatrogenic fluid overload in postop period, now resolved after lasix. : 
POD #2 L hip hemiarthroplasty On 2L with O2 98%, can likely come off. No fever, no leukocytosis Alert, complaining of pain L hip, but was able to work productively with PT yesterday. Denies cough or dyspnea or fever or chest pain. Review of Systems negative with exception of pertinent positives noted above PHYSICAL EXAM  
 
Visit Vitals /67 (BP 1 Location: Right arm, BP Patient Position: At rest) Pulse 61 Temp 97.6 °F (36.4 °C) Resp 18 Ht 5' 3\" (1.6 m) Wt 62.6 kg (138 lb) SpO2 98% BMI 24.45 kg/m² Temp (24hrs), Av.1 °F (36.7 °C), Min:97.6 °F (36.4 °C), Max:98.7 °F (37.1 °C) Oxygen Therapy O2 Sat (%): 98 % (20 0743) Pulse via Oximetry: 60 beats per minute (20) O2 Device: Nasal cannula (20) O2 Flow Rate (L/min): 2 l/min (20) O2 Temperature: 87.8 °F (31 °C) (20 1554) FIO2 (%): 35 % (20 155) Intake/Output Summary (Last 24 hours) at 2020 3802 Last data filed at 2020 1845 Gross per 24 hour Intake  Output 1200 ml Net -1200 ml General: Elderly, NAD Lungs:  Clear, no distress on 2L Heart:  Regular rate and rhythm,  No murmur, rub, or gallop Abdomen: Soft, Non distended, Non tender, Positive bowel sounds Extremities: L hip postop, dressing c/d/i, neurovascularly intact Neurologic:  No focal deficits XR CHEST SNGL V Final Result XR HIP LT W OR WO PELV 2-3 VWS Final Result Impression: A total left hip prosthesis placement. XR HIP LT W OR WO PELV 2-3 VWS Final Result IMPRESSION:  Basicervical femoral neck fracture. LEFT FEMUR 2 view(s). HISTORY: Pain following fall TECHNIQUE: AP and lateral views. COMPARISON: None. FINDINGS: Femoral neck fracture is present. Remainder the femoral shaft is  
intact. 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 Arterial calcifications are  
identified. IMPRESSION: Femoral neck fracture. Remainder of the femoral shaft is intact. CHEST X-RAY, one view. HISTORY:  Proper evaluation for femoral neck fracture repair  November 2019 TECHNIQUE:  AP supine view. COMPARISON: November 2019 FINDINGS:   
Lungs: are clear. Costophrenic angles: are sharp. Heart size: Borderline. Pulmonary vasculature: is unremarkable. Aorta: Calcifications with normal caliber. Included portion of the upper abdomen: is unremarkable. Bones: No gross bony lesions. Other: Dual lead left-sided cardiac pacemaker is present. IMPRESSION:  Borderline cardiomegaly and aortic atherosclerosis and cardiac  
pacemaker. Chio Walters XR FEMUR LT 2 V Final Result IMPRESSION:  Basicervical femoral neck fracture. LEFT FEMUR 2 view(s). HISTORY: Pain following fall TECHNIQUE: AP and lateral views. COMPARISON: None. FINDINGS: Femoral neck fracture is present. Remainder the femoral shaft is  
intact. 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 Arterial calcifications are  
identified. IMPRESSION: Femoral neck fracture. Remainder of the femoral shaft is intact. CHEST X-RAY, one view. HISTORY:  Proper evaluation for femoral neck fracture repair  November 2019 TECHNIQUE:  AP supine view. COMPARISON: November 2019 FINDINGS:   
Lungs: are clear. Costophrenic angles: are sharp. Heart size: Borderline. Pulmonary vasculature: is unremarkable. Aorta: Calcifications with normal caliber. Included portion of the upper abdomen: is unremarkable. Bones: No gross bony lesions. Other: Dual lead left-sided cardiac pacemaker is present. IMPRESSION:  Borderline cardiomegaly and aortic atherosclerosis and cardiac  
pacemaker. Valerie Fung XR CHEST SNGL V Final Result IMPRESSION:  Basicervical femoral neck fracture. LEFT FEMUR 2 view(s). HISTORY: Pain following fall TECHNIQUE: AP and lateral views. COMPARISON: None. FINDINGS: Femoral neck fracture is present. Remainder the femoral shaft is  
intact. 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 Arterial calcifications are  
identified. IMPRESSION: Femoral neck fracture. Remainder of the femoral shaft is intact. CHEST X-RAY, one view. HISTORY:  Proper evaluation for femoral neck fracture repair  November 2019 TECHNIQUE:  AP supine view. COMPARISON: November 2019 FINDINGS:   
Lungs: are clear. Costophrenic angles: are sharp. Heart size: Borderline. Pulmonary vasculature: is unremarkable. Aorta: Calcifications with normal caliber. Included portion of the upper abdomen: is unremarkable. Bones: No gross bony lesions. Other: Dual lead left-sided cardiac pacemaker is present. IMPRESSION:  Borderline cardiomegaly and aortic atherosclerosis and cardiac  
pacemaker. Valerie Fung ASSESSMENT Active Hospital Problems Diagnosis Date Noted  Closed left hip fracture ([address] 08/21/2020  Acute cystitis without hematuria 08/21/2020  Multiple myeloma ([address] 11/20/2019  Chronic diastolic heart failure ([address] 11/16/2016  Acquired hypothyroidism 07/09/2015  Essential hypertension, benign 07/09/2015 Plan: 
 
L hip fracture Hemiarthroplasty 8/22 Pain control PT/OT 
ASA 
STR placement Acute hypoxic respiratory failure Iatrogenic volume overload Received less than a liter postop fluids and had home lasix held Resolved with lasix UTI abx were broadened to HAP coverage. MRSA swab negative. HAP now ruled out - pulm sx due to overload and infectious markers due to UTI, now treated. Stop antibiotics. UTI Covered as above FRANK Monitor Cr, restarting home lasix dose MM Sees Dr. Viola Fletcher. Taking home pomalyst 
 
Hypothyroid Home synthroid AF Home amiodarone Dispo: work with PT, STR placement pending Signed By: Chrissy Aldrich MD   
 August 24, 2020

## 2020-08-24 NOTE — PROGRESS NOTES
Problem: Mobility Impaired (Adult and Pediatric) Goal: *Acute Goals and Plan of Care (Insert Text) Outcome: Progressing Towards Goal 
Note: STG: 
(1.)Ms. Toñito Vazquez will move from supine to sit and sit to supine , scoot up and down, and roll side to side with CONTACT GUARD ASSIST within 3 treatment day(s). (2.)Ms. Toñito Vazquez will transfer from bed to chair and chair to bed with CONTACT GUARD ASSIST using the least restrictive device within 3 treatment day(s). (3.)Ms. Toñito Vazquez will ambulate with MINIMAL ASSIST for 75 feet with the least restrictive device within 3 treatment day(s). LTG: 
(1.)Ms. Toñito Vazquez will move from supine to sit and sit to supine , scoot up and down, and roll side to side in bed with SUPERVISION within 7 treatment day(s). (2.)Ms. Toñito Vazquez will transfer from bed to chair and chair to bed with SUPERVISION using the least restrictive device within 7 treatment day(s). (3.)Ms. Toñito Vazquez will ambulate with STAND BY ASSIST for 250 feet with the least restrictive device within 7 treatment day(s). _________________________________________________________________________________________ PHYSICAL THERAPY: Daily Note and AM 8/24/2020 INPATIENT: PT Visit Days : 2 [place] / [place] B / Product Type: Medicare /   
  
NAME/AGE/GENDER: Marek Moreno is a 80 y.o. female PRIMARY DIAGNOSIS: Closed left hip fracture ([facility] [address] [S72.002A] Closed left hip fracture (HCC) Closed left hip fracture ([facility] [address] Procedure(s) (LRB): HIP HEMIARTHROPLASTY (Left) 2 Days Post-Op ICD-10: Treatment Diagnosis:  
 · Generalized Muscle Weakness (M62.81) · Other lack of cordination (R27.8) · Difficulty in walking, Not elsewhere classified (R26.2) · Other abnormalities of gait and mobility (R26.89) · History of falling (Z91.81) Precaution/Allergies: 
Patient has no known allergies.   
  
ASSESSMENT:  
 
Ms. Toñito Vazquez  is a 80year old female who has been admitted to hospital on 08/21/20 s/p LT hip hemiarthroplasty. Prior to hospital admission pt lives alone in a 1 story home with 2 step(s) to enter with LT side railing. Pt endorses 1 falls in past 6 months. Prior to admission MOD I for ADLs and mobility. 08/24/20: On contact, pt supine in bed and agreeable to therapy. Supine>Sit with Mod A x 2 and additional time. Worked on balance at edge of bed. Mod A to scoot in bed. Pt stood with mod x 2 to RW. She was able to slowly take several steps to sit in chair with mod a x 1 + 1. Pt presents as functioning below her baseline, with deficits in mobility including transfers, gait, balance, and activity tolerance. Pt will benefit from skilled therapy services to address stated deficits to promote return to highest level of function, independence, and safety. This section established at most recent assessment PROBLEM LIST (Impairments causing functional limitations): 1. Decreased Strength 2. Decreased ADL/Functional Activities 3. Decreased Transfer Abilities 4. Decreased Ambulation Ability/Technique 5. Decreased Balance 6. Increased Pain 7. Decreased Flexibility/Joint Mobility 8. Decreased [place] with Home Exercise Program 
 INTERVENTIONS PLANNED: (Benefits and precautions of physical therapy have been discussed with the patient.) 1. Balance Exercise 2. Bed Mobility 3. Family Education 4. Gait Training 5. Group Therapy 6. Home Exercise Program (HEP) 7. Manual Therapy 8. Neuromuscular Re-education/Strengthening 9. Range of Motion (ROM) 10. Therapeutic Activites 11. Therapeutic Exercise/Strengthening 12. Transfer Training TREATMENT PLAN: Frequency/Duration: twice daily for duration of hospital stay Rehabilitation Potential For Stated Goals: Good REHAB RECOMMENDATIONS (at time of discharge pending progress):   
Placement:  
It is my opinion, based on this patient's performance to date, that Ms. Daren Husbands may benefit from participating in 1-2 additional therapy sessions in order to continue to assess for rehab potential and then make recommendation for disposition at discharge. Equipment:  
? None at this time HISTORY:  
History of Present Injury/Illness (Reason for Referral): 
Per  Physician Note: Yina Sosa is a 80 y.o. female with medical history significant for anxiety, chronic diastolic heart failure, essential hypertension, atrial fibrillation s/p PPM,  multiple myeloma, hypothyroidism and mild dementia who presented from home after falling out of her recliner. Patient is very hard of hearing and therefore difficult to get detailed history. Patient denies any head trauma, presyncopal symptoms prior to falling. As per daughter, patient's has been having some worsening dementia lately and slight confusions similar to when she had UTI in the past.  Patient reports she was in her usual health prior to the fall and daughter reports that patient has not complaint about any issues except for occasional shortness of breath on exertions. Denies fever, chills, sob at rest, chest pains. Reports using 2L O2 at bedtime but has not required O2 during day time or on exertion. Past Medical History/Comorbidities:  
Ms. Mercedes Husbands  has a past medical history of Anemia, unspecified, Chest pain, unspecified (3/21/2016), Chronic anxiety (1/65/5542), Diastolic heart failure ([address] (3/21/2016), Essential hypertension, benign, Flatulence, eructation, and gas pain, Lump or mass in breast, Other and unspecified hyperlipidemia, Paroxysmal atrial fibrillation ([address] (3/21/2016), Paroxysmal tachycardia ([address] (3/21/2016), Pernicious anemia, Postmenopausal atrophic vaginitis, Unspecified deficiency anemia, Unspecified hypothyroidism, and Vitamin D deficiency. Ms. Mercedes Husbands  has a past surgical history that includes hx hysterectomy; hx orthopaedic; and hx vein stripping. Social History/Living Environment: Home Environment: Private residence # Steps to Enter: 2 Rails to Enter: Yes Hand Rails : Left One/Two Story Residence: One story Living Alone: Yes Support Systems: Family member(s) Patient Expects to be Discharged to[de-identified] Rehabilitation facility Current DME Used/Available at Home: Walker, rolling, Shower chair, [place], straight Tub or Shower Type: Tub/Shower combination Prior Level of Function/Work/Activity: Mod I with ADLs and mobility Number of Personal Factors/Comorbidities that affect the Plan of Care: 3+: HIGH COMPLEXITY EXAMINATION:  
Most Recent Physical Functioning:  
Gross Assessment: 
  
         
  
Posture: 
  
Balance: 
Sitting - Static: Fair (occasional) Sitting - Dynamic: Fair (occasional) Standing - Static: Poor Standing - Dynamic : Poor Bed Mobility: 
Supine to Sit: Moderate assistance; Additional time Scooting: Moderate assistance Wheelchair Mobility: 
  
Transfers: 
Sit to Stand: Moderate assistance;Assist x1;Assist x2 Stand to Sit: Moderate assistance;Assist x1;Assist x2 Gait: 
  
Speed/Giana: Slow Step Length: Left shortened;Right shortened Gait Abnormalities: Decreased step clearance Distance (ft): 2 Feet (ft) Assistive Device: Walker, rolling Ambulation - Level of Assistance: Moderate assistance; Additional time;Assist x2 Body Structures Involved: 1. Nerves 2. Bones 3. Joints 4. Muscles 5. Ligaments Body Functions Affected: 1. Sensory/Pain 2. Movement Related Activities and Participation Affected: 1. Mobility 2. Self Care 3. Domestic Life 4. Interpersonal Interactions and Relationships 5. Community, Social and [place] Number of elements that affect the Plan of Care: 4+: HIGH COMPLEXITY CLINICAL PRESENTATION:  
Presentation: Stable and uncomplicated: LOW COMPLEXITY CLINICAL DECISION MAKIN [facility] [address] [id number] AM-PAC 6 Clicks Basic Mobility Inpatient Short Form How much difficulty does the patient currently have. ..  Unable A Lot A Little None 1. Turning over in bed (including adjusting bedclothes, sheets and blankets)? [] 1   [] 2   [x] 3   [] 4  
2. Sitting down on and standing up from a chair with arms ( e.g., wheelchair, bedside commode, etc.)   [] 1   [x] 2   [] 3   [] 4  
3. Moving from lying on back to sitting on the side of the bed? [] 1   [] 2   [x] 3   [] 4 How much help from another person does the patient currently need. .. Total A Lot A Little None 4. Moving to and from a bed to a chair (including a wheelchair)? [] 1   [x] 2   [] 3   [] 4  
5. Need to walk in hospital room? [] 1   [x] 2   [] 3   [] 4  
6. Climbing 3-5 steps with a railing? [x] 1   [] 2   [] 3   [] 4  
© 2007, Trustees of [address] [place] [address], under license to [employer]. All rights reserved Score:  Initial: 13 Most Recent: X (Date: -- ) Interpretation of Tool:  Represents activities that are increasingly more difficult (i.e. Bed mobility, Transfers, Gait). Medical Necessity:    
· Patient is expected to demonstrate progress in  
· strength, range of motion, balance, coordination, and functional technique ·  to  
· increase independence with functional mobility and ambulation · . Reason for Services/Other Comments: 
· Patient continues to require skilled intervention due to · LLE weakness, pain, decreased ROM and increased fall risk · . Use of outcome tool(s) and clinical judgement create a POC that gives a: Questionable prediction of patient's progress: MODERATE COMPLEXITY  
  
 
 
 
TREATMENT:  
(In addition to Assessment/Re-Assessment sessions the following treatments were rendered) Pre-treatment Symptoms/Complaints: \"I just can't believe I've done this. \" 
Pain: Initial: 0/10 Post Session:    
 
Therapeutic Activity: (    10min):  Therapeutic activities including Bed transfers, Chair transfers, Ambulation on level ground to improve mobility, strength, balance, and coordination.   Required moderate visual, verbal and tactile cues to promote static and dynamic balance in standing and promote coordination of bilateral, lower extremity(s). Braces/Orthotics/Lines/Etc:  
· IV 
· geronimo catheter · O2 via Nasal cannula Treatment/Session Assessment:   
· Response to Treatment:  See Above · Interdisciplinary Collaboration:  
o Physical Therapy Assistant 
o Certified Occupational Therapy Assistant 
o Registered Nurse · After treatment position/precautions:  
o Up in chair 
o Bed alarm/tab alert on 
o Bed/Chair-wheels locked 
o Call light within reach 
o RN notified · Compliance with Program/Exercises: Will assess as treatment progresses · Recommendations/Intent for next treatment session: \"Next visit will focus on advancements to more challenging activities\". Total Treatment Duration: PT Patient Time In/Time Out Time In: 3565 Time Out: 1156 Raegan Salazar, PTA

## 2020-08-24 NOTE — PROGRESS NOTES
Problem: Dysphagia (Adult) Goal: *Speech Goal: (INSERT TEXT) Outcome: Progressing Towards Goal 
Note: LTG: Patient will tolerate least restrictive diet without overt signs or symptoms of airway compromise by discharge. STG: Patient will tolerate PO trial with SLP only for ongoing swallowing assessment without overt signs or symptoms of airway compromise. STG: Patient will participate in modified barium swallow study as clinically indicated. SPEECH LANGUAGE PATHOLOGY: DYSPHAGIA- Daily Note 1 NAME/AGE/GENDER: Janet Dodd is a 80 y.o. female DATE: 8/24/2020 PRIMARY DIAGNOSIS: Closed left hip fracture ([address] Lionel Click Procedure(s) (LRB): HIP HEMIARTHROPLASTY (Left) 2 Days Post-Op ICD-10: Treatment Diagnosis: R13.12 Dysphagia, Oropharyngeal Phase RECOMMENDATIONS  
DIET:  
? NPO except meds MEDICATIONS: Critical meds crushed in puree ASPIRATION PRECAUTIONS · Slow rate of intake · Small bites/sips · Upright at 90 degrees during meal 
  
COMPENSATORY STRATEGIES/MODIFICATIONS · Aggressive oral care EDUCATION: 
Recommendations discussed with Hospitalist 
· Nursing · Patient CONTINUATION OF SKILLED SERVICES/MEDICAL NECESSITY: 
? Patient is expected to demonstrate progress in  swallow strength, swallow timeliness, swallow function, diet tolerance and swallow safety in order to  improve swallow safety, work toward diet advancement and decrease aspiration risk. ? Patient continues to require skilled intervention due to dysphagia. RECOMMENDATIONS for CONTINUED SPEECH THERAPY:  
YES: Anticipate need for ongoing speech therapy during this hospitalization and at next level of care. ASSESSMENT Patient presents with ongoing oropharyngeal dysphagia characterized by immediate weak cough post swallow with thin by cup and throat clearing post swallow with nectar by cup.  Double swallows upon palpation with nectar and puree trials intermittently concerning for reduced pharyngeal clearance. Recommend NPO with critical meds crushed in puree. 2-3 ice chips per hour for pleasure after oral care. Modified barium swallow study tomorrow to objectively assess oropharyngeal swallow function. REHABILITATION POTENTIAL FOR STATED GOALS: Fair PLAN   
FREQUENCY/DURATION: Continue to follow patient 3 times a week for duration of hospital stay to address above goals. - Recommendations for next treatment session: Next treatment will address PO trials SUBJECTIVE Patient alert upright in bed for session. Weak vocal quality. Patient perseverative on left hearing aid being lost. Right hearing aid and upper dentures in place. History of Present Injury/Illness: Ms. Jarad Robison  has a past medical history of Anemia, unspecified, Chest pain, unspecified (3/21/2016), Chronic anxiety (5/05/7521), Diastolic heart failure ([address] (3/21/2016), Essential hypertension, benign, Flatulence, eructation, and gas pain, Lump or mass in breast, Other and unspecified hyperlipidemia, Paroxysmal atrial fibrillation ([address] (3/21/2016), Paroxysmal tachycardia ([address] (3/21/2016), Pernicious anemia, Postmenopausal atrophic vaginitis, Unspecified deficiency anemia, Unspecified hypothyroidism, and Vitamin D deficiency. . She also  has a past surgical history that includes hx hysterectomy; hx orthopaedic; and hx vein stripping. Problem List:  (Impairments causing functional limitations): 1. dysphagia Orientation:  
Person Place Pain: Pain Scale 1: Numeric (0 - 10) Pain Intensity 1: 0 OBJECTIVE Swallow treatment: 
 Patient consumed trials of ice chips, thin by teaspoon/cup, nectar by teaspoon/cup, and puree. Immediate weak cough post swallow with thin by cup on 1 out of 3 trials and throat clearing post swallow on 1 out of 4 trials puree.  Inconsistent double swallow upon palpation with nectar and puree trials concerning for poor pharyngeal clearance. Further trials deferred due to concerns for aspiration. INTERDISCIPLINARY COLLABORATION: Registered Nurse PRECAUTIONS/ALLERGIES: Patient has no known allergies. Tool Used: Dysphagia Outcome and Severity Scale (HUMBERTO) Score Comments Normal Diet  [] 7 With no strategies or extra time needed Functional Swallow  [] 6 May have mild oral or pharyngeal delay Mild Dysphagia  [] 5 Which may require one diet consistency restricted Mild-Moderate Dysphagia  [] 4 With 1-2 diet consistencies restricted Moderate Dysphagia  [] 3 With 2 or more diet consistencies restricted Moderate-Severe Dysphagia  [] 2 With partial PO strategies (trials with ST only) Severe Dysphagia  [] 1 With inability to tolerate any PO safely Score:  Initial: 2 Most Recent: 2 (Date 08/24/20 ) Interpretation of Tool: The Dysphagia Outcome and Severity Scale (HUMBERTO) is a simple, easy-to-use, 7-point scale developed to systematically rate the functional severity of dysphagia based on objective assessment and make recommendations for diet level, independence level, and type of nutrition. Current Medications: No current facility-administered medications on file prior to encounter. Current Outpatient Medications on File Prior to Encounter Medication Sig Dispense Refill  pomalidomide (Pomalyst) 1 mg cap Take 1 Cap by mouth daily. Take 1 capsule daily for 14 days and then she will be off 14 days 14 Cap 0  
 levothyroxine (SYNTHROID) 50 mcg tablet TAKE 1 TABLET BY MOUTH DAILY BEFORE BREAKFAST 30 Tab 0  
 amiodarone (CORDARONE) 200 mg tablet TAKE 1 TABLET BY MOUTH DAILY 180 Tab 3  potassium chloride SR (KLOR-CON 10) 10 mEq tablet TAKE 2 TABLETS BY MOUTH DAILY. 60 Tab 0  
 ergocalciferol (ERGOCALCIFEROL) 1,250 mcg (50,000 unit) capsule Take 1 Cap by mouth every seven (7) days. 4 Cap 1  clorazepate (TRANXENE) 7.5 mg tablet TAKE 1 TABLET BY MOUTH TWICE A DAY. 60 Tab 2  
 ipratropium (ATROVENT) 42 mcg (0.06 %) nasal spray 1 [place] by Both Nostrils route three (3) times daily. 15 mL 0  
 traZODone (DESYREL) 100 mg tablet Take 1 Tab by mouth nightly. 30 Tab 5  
 atorvastatin (LIPITOR) 80 mg tablet TAKE 1 TABLET BY MOUTH DAILY 60 Tab 11  
 ferrous sulfate 325 mg (65 mg iron) tablet TAKE 1 TABLET BY MOUTH DAILY. 30 Tab 11  
 docusate sodium (STOOL SOFTENER) 100 mg capsule Take 1 Cap by mouth two (2) times a day. 180 Cap 3  
 amLODIPine (NORVASC) 2.5 mg tablet Take 1 Tab by mouth daily. 90 Tab 3  
 estradiol (ESTRACE) 1 mg tablet Take 1 Tab by mouth daily. 90 Tab 3  furosemide (LASIX) 40 mg tablet Take 1 Tab by mouth daily. Taking 2 po qd (Patient taking differently: Take 40 mg by mouth daily. ) 180 Tab 1  
 mupirocin (BACTROBAN) 2 % ointment Apply  to affected area daily. 22 g 0  
 mirtazapine (REMERON) 15 mg tablet nightly.  DISABLED PLACARD (DISABLED PLACARD) DMV Apply to car. 1 Each 0  
 nadolol (CORGARD) 80 mg tablet Take 1 Tab by mouth daily. 30 Tab 11  
 lactulose (KRISTALOSE) 20 gram packet Take 1 Packet by mouth three (3) times daily. 60 Packet 3  
 OXYGEN-AIR DELIVERY SYSTEMS 3 L by Does Not Apply route nightly.  Aspirin, Buffered 81 mg tab Take  by mouth daily. After treatment position/precautions: · Upright in bed · RN notified Total Treatment Duration:  
Time In: 1034 Time Out: 8978 Jamie Andre MS, CCC-SLP

## 2020-08-24 NOTE — PROGRESS NOTES
Problem: Self Care Deficits Care Plan (Adult) Goal: *Acute Goals and Plan of Care (Insert Text) Outcome: Progressing Towards Goal 
Note: 1. Pt will toilet with min A 2. Pt will complete functional mobility for ADLs with min A 3. Pt will complete lower body dressing with min A using AE as needed 4. Pt will complete grooming and hygiene with set up 5. Pt will demonstrate independence with HEP to promote increased BUE strength and functional use for ADLs 6. Pt will tolerate 23 minutes functional activity with min or fewer rest breaks to promote increased endurance for ADLs 7. Pt will complete bed mobility with min A in prep for ADLs Timeframe: 7 days OCCUPATIONAL THERAPY: Daily Note and AM 8/24/2020 INPATIENT: OT Visit Days: 2 [place] / [place] B / Product Type: Medicare /  
  
NAME/AGE/GENDER: Raquel Casiano is a 80 y.o. female PRIMARY DIAGNOSIS:  Closed left hip fracture ([address] [S72.002A] Closed left hip fracture (HCC) Closed left hip fracture ([address] Procedure(s) (LRB): HIP HEMIARTHROPLASTY (Left) 2 Days Post-Op ICD-10: Treatment Diagnosis:  
 · History of falling (Z91.81) Precautions/Allergies: 
  falls, hip, WBAT  Patient has no known allergies. ASSESSMENT:  
 
Ms. Antoni Degroot presents with L hip fx d/t falling at home, now s/p L MAURICE. Pt lives alone and is independent with ADLs with the exception of bathing; pt's children live nearby and take turns staying with pt and assisting with bathing. Pt uses a RW for mobility. Pt presents sitting up in chair upon arrival. Pt alert and cooperative for therapy. Pt was assisted with self care tasks (washing/combing hair). Pt did well with following commands this session. Pt required min assist to complete tasks. Pt very appreciative for the assistance today. Pt left with all needs in reach and alarm in chair. Pt making some progress with goals above. Will continue to benefit from skilled OT during stay. This section established at most recent assessment PROBLEM LIST (Impairments causing functional limitations): 1. Decreased Strength 2. Decreased ADL/Functional Activities 3. Decreased Transfer Abilities 4. Decreased Balance 5. Decreased Activity Tolerance 6. Increased Fatigue 7. Increased Shortness of Breath 8. Decreased Knowledge of Precautions 9. Decreased Cognition INTERVENTIONS PLANNED: (Benefits and precautions of occupational therapy have been discussed with the patient.) 1. Activities of daily living training 2. Adaptive equipment training 3. Balance training 4. Therapeutic activity 5. Therapeutic exercise TREATMENT PLAN: Frequency/Duration: Follow patient 3 times/ week to address above goals. Rehabilitation Potential For Stated Goals: Good REHAB RECOMMENDATIONS (at time of discharge pending progress):   
Placement: It is my opinion, based on this patient's performance to date, that Ms. Kelley Lindsey may benefit from intensive therapy at a [address] [place] [address] after discharge due to the functional deficits listed above that are likely to improve with skilled rehabilitation and concerns that he/she may be unsafe to be unsupervised at home due to fall risk,  inability to complete ADLs . Equipment:  
? None at this time OCCUPATIONAL PROFILE AND HISTORY:  
History of Present Injury/Illness (Reason for Referral): 
See H&P Past Medical History/Comorbidities:  
Ms. Kelley Lindsey  has a past medical history of Anemia, unspecified, Chest pain, unspecified (3/21/2016), Chronic anxiety (1/18/4586), Diastolic heart failure ([address] (3/21/2016), Essential hypertension, benign, Flatulence, eructation, and gas pain, Lump or mass in breast, Other and unspecified hyperlipidemia, Paroxysmal atrial fibrillation ([address] (3/21/2016), Paroxysmal tachycardia ([address] (3/21/2016), Pernicious anemia, Postmenopausal atrophic vaginitis, Unspecified deficiency anemia, Unspecified hypothyroidism, and Vitamin D deficiency. Ms. Mercedes Husbands  has a past surgical history that includes hx hysterectomy; hx orthopaedic; and hx vein stripping. Social History/Living Environment:  
Home Environment: Private residence # Steps to Enter: 2 Rails to Enter: Yes Hand Rails : Left One/Two Story Residence: One story Living Alone: Yes Support Systems: Family member(s) Patient Expects to be Discharged to[de-identified] Rehabilitation facility Current DME Used/Available at Home: Walker, rolling, Shower chair, Florette Party, straight Tub or Shower Type: Tub/Shower combination Prior Level of Function/Work/Activity: 
See assessment Number of Personal Factors/Comorbidities that affect the Plan of Care: Expanded review of therapy/medical records (1-2):  MODERATE COMPLEXITY ASSESSMENT OF OCCUPATIONAL PERFORMANCE[de-identified]  
Activities of Daily Living:  
Basic ADLs (From Assessment) Complex ADLs (From Assessment) Feeding: Setup, Stand-by assistance Oral Facial Hygiene/Grooming: Minimum assistance Bathing: Moderate assistance Upper Body Dressing: Minimum assistance Lower Body Dressing: Maximum assistance Toileting: Maximum assistance Instrumental ADL Meal Preparation: Total assistance Homemaking: Total assistance Grooming/Bathing/Dressing Activities of Daily Living Grooming Brushing/Combing Hair: Minimum assistance(washed hair with shampoo cap with min assist) Cognitive Retraining Safety/Judgement: Awareness of environment Bed/Mat Mobility Supine to Sit: Moderate assistance; Additional time Sit to Stand: Moderate assistance;Assist x1;Assist x2 Stand to Sit: Moderate assistance;Assist x1;Assist x2 Scooting: Moderate assistance Most Recent Physical Functioning:  
Gross Assessment: 
  
         
  
Posture: 
Posture (WDL): Exceptions to [facility] Posture Assessment: Trunk flexion, Increased, Rounded shoulders Balance: 
Sitting: Impaired Sitting - Static: Fair (occasional) Sitting - Dynamic: Fair (occasional) Standing - Static: Poor Standing - Dynamic : Poor Bed Mobility: 
Supine to Sit: Moderate assistance; Additional time Scooting: Moderate assistance Wheelchair Mobility: 
  
Transfers: 
Sit to Stand: Moderate assistance;Assist x1;Assist x2 Stand to Sit: Moderate assistance;Assist x1;Assist x2 Patient Vitals for the past 6 hrs: 
 BP BP Patient Position SpO2 Pulse 20 1146 90/52 At rest 97 % 60  
20 1557 92/52 At rest 98 % 60 Mental Status Neurologic State: Alert, Appropriate for age Orientation Level: Oriented to person, Oriented to place Cognition: Follows commands Perception: Appears intact Perseveration: No perseveration noted Safety/Judgement: Awareness of environment Physical Skills Involved: 
1. Balance 2. Strength 3. Activity Tolerance Cognitive Skills Affected (resulting in the inability to perform in a timely and safe manner): 1. Executive Function 2. Sustained Attention 3. Divided Attention 4. Comprehension Psychosocial Skills Affected: 1. Habits/Routines 2. Environmental Adaptation 3. Self-Awareness Number of elements that affect the Plan of Care: 5+:  HIGH COMPLEXITY CLINICAL DECISION MAKIN [facility] [address] [address] AM-PAC 6 Clicks Daily Activity Inpatient Short Form How much help from another person does the patient currently need. .. Total A Lot A Little None 1. Putting on and taking off regular lower body clothing? [] 1   [x] 2   [] 3   [] 4  
2. Bathing (including washing, rinsing, drying)? [] 1   [x] 2   [] 3   [] 4  
3. Toileting, which includes using toilet, bedpan or urinal?   [] 1   [x] 2   [] 3   [] 4  
4. Putting on and taking off regular upper body clothing? [] 1   [] 2   [x] 3   [] 4  
5. Taking care of personal grooming such as brushing teeth? [] 1   [] 2   [x] 3   [] 4  
6. Eating meals? [] 1   [] 2   [] 3   [x] 4 © 2007, Trustees of [address] [place] [address] [address] [address], under license to [employer]. All rights reserved Score:  Initial: 16 Most Recent: X (Date: -- ) Interpretation of Tool:  Represents activities that are increasingly more difficult (i.e. Bed mobility, Transfers, Gait). Medical Necessity:    
· Patient demonstrates · fair ·  rehab potential due to higher previous functional level. Reason for Services/Other Comments: 
· Patient · continues to require present interventions due to patient's inability to complete ADLs · . Use of outcome tool(s) and clinical judgement create a POC that gives a: MODERATE COMPLEXITY  
 
 
 
TREATMENT:  
(In addition to Assessment/Re-Assessment sessions the following treatments were rendered) Pre-treatment Symptoms/Complaints:   
Pain: Initial:  
Pain Intensity 1: 0  Post Session:  5 (hip, with movement. Repositioned) Self Care: (19 minutes): Procedure(s) (per grid) utilized to improve and/or restore self-care/home management as related to grooming. Required minimal verbal and tactile cueing to facilitate activities of daily living skills. Braces/Orthotics/Lines/Etc:  
· O2 Device: Heated, Hi flow nasal cannula Treatment/Session Assessment:   
· Response to Treatment:  no adverse reaction · Interdisciplinary Collaboration:  
o Certified Occupational Therapy Assistant 
o Registered Nurse · After treatment position/precautions:  
o Up in chair 
o Bed alarm/tab alert on 
o Bed/Chair-wheels locked 
o Call light within reach · Compliance with Program/Exercises: Will assess as treatment progresses. · Recommendations/Intent for next treatment session: \"Next visit will focus on advancements to more challenging activities and reduction in assistance provided\". Total Treatment Duration: OT Patient Time In/Time Out Time In: 1470 Time Out: 8667 Shante Reeves

## 2020-08-24 NOTE — PROGRESS NOTES
CM acknowledged consult and in to see patient. Patient is alert and oriented. Reviewed demographics, insurance and PCP. Therapy recommendations reviewed and patient is agreeable to [facility] therapy or SNF. Patient would like CM to speak with Blane soares. Daughter is agreeable for patient to go to SNF and would like referral sent to [facility] as patient had a positive experience there previously. Referral faxed and liaison notified. Covid ordered and PPD is completed. CM will continue to follow for additional discharge planning. Care Management Interventions PCP Verified by CM: Hines Duane) Mode of Transport at Discharge: S Transition of Care Consult (CM Consult): SNF Partner SNF: No 
Reason Why Partner SNF Not Chosen: Location, Positive previous encounter Discharge Durable Medical Equipment: No 
Physical Therapy Consult: Yes Occupational Therapy Consult: Yes Speech Therapy Consult: Yes Current Support Network: Own Home, Lives with Caregiver Confirm Follow Up Transport: Family The Plan for Transition of Care is Related to the Following Treatment Goals : Patient will partctipate in SNF therapies in order to return to baseline independence in ADLs. The Patient and/or Patient Representative was Provided with a Choice of Provider and Agrees with the Discharge Plan?: Yes Name of the Patient Representative Who was Provided with a Choice of Provider and Agrees with the Discharge Plan: Blane Soares [place] of Choice List was Provided with Basic Dialogue that Supports the Patient's Individualized Plan of Care/Goals, Treatment Preferences and Shares the Quality Data Associated with the Providers?: Yes Discharge Location Discharge Placement: Skilled nursing facility

## 2020-08-24 NOTE — WOUND CARE
Patient seen for right lower leg wound. Patient stated she had skin cancer and it was removed by dermatologist. She stated \"he has us change the dressing once a week\". She is unsure if it is a special dressing or just non- adherent. Wound bed clean and pink measuring 5.0x2.5x0.4 cm. Used dermal wound cleanser to clean, loose packing of alginate then silicone foam over top with stretch caridad roll to secure. Will plan 3 time per week dressings while in acute care to monitor. Answered patient questions. No family present.

## 2020-08-24 NOTE — PROGRESS NOTES
August 24, 2020 Post Op day: 2 Days Post-Op Procedure(s) (LRB): HIP HEMIARTHROPLASTY (Left) Admit Date: 8/21/2020 Admit Diagnosis: Closed left hip fracture ([facility] Junaid Marion Principle Problem: Closed left hip fracture ([facility] Subjective: Doing ok, complains of her throat being dry. No SOB, No Chest Pain, No Nausea or Vomiting Objective:  
Vital Signs are Stable, No Acute Distress, Alert,  Dressing is Dry,  Neurovascular exam is normal.  
 
Assessment / Plan : 
Patient Active Problem List  
Diagnosis Code  Essential hypertension, benign I10  
 Other and unspecified hyperlipidemia E78.5  Acquired hypothyroidism E03.9  Postmenopausal atrophic vaginitis N95.2  Fatigue R53.83  
 Presence of cardiac pacemaker Z95.0  Persistent atrial fibrillation (HCC) I48.19  Chronic anxiety F41.9  Acute respiratory failure with hypoxemia ([facility]) J96.01  
 Hypoxemia R09.02  
 Pleural effusion on left J90  
 Pleural effusion on right J90  
 Iron deficiency anemia D50.9  Debility R53.81  
 Melena K92.1  Mixed hyperlipidemia E78.2  Episodic atrial fibrillation (HCC) I48.0  Dementia without behavioral disturbance ([facility]) F03.90  Chronic diastolic heart failure ([facility] I50.32  
 Pacemaker Z95.0  Macrocytic anemia D53.9  Essential hypertension with goal blood pressure less than 130/85 I10  
 Anxiety F41.9  Dyspnea R06.00  
 Multiple myeloma in remission ([facility]) C90.01  
 On amiodarone therapy Z79.899  Stage 3 chronic kidney disease ([facility]) N18.3  Anemia due to chronic renal failure treated with erythropoietin, unspecified stage N18.9, D63.1  Multiple myeloma not having achieved remission ([facility]) C90.00  Leg swelling M79.89  Bilateral pneumonia J18.9  Pleural effusion, bilateral J90  
 Acute hypokalemia E87.6  Dyspnea on effort R06.00  
 Multiple myeloma ([facility]) C90.00  
 HANSEN (dyspnea on exertion) R06.00  
  Closed left hip fracture ([facility] S72.002A  Acute cystitis without hematuria N30.00 Patient Vitals for the past 8 hrs: 
 BP Temp Pulse Resp SpO2  
20 0743 115/67 97.6 °F (36.4 °C) 61 18 98 % 20 0513 138/68 97.9 °F (36.6 °C) 60 18 98 % 20 0223    16   
20 0022 107/65 98.2 °F (36.8 °C) 60 18 96 % Temp (24hrs), Av.1 °F (36.7 °C), Min:97.6 °F (36.4 °C), Max:98.7 °F (37.1 °C) Body mass index is 24.45 kg/m². Lab Results Component Value Date/Time HGB 8.4 (L) 2020 06:12 AM  
  
 
S/P Left hip samantha  Medical Mgmt per hospitalist 
Anticoagulation plan: ASA 325mg daily Continue PT Fall Precautions DC disp: rehab placement Signed By: CADEN Kuo 
2020,  8:20 AM

## 2020-08-24 NOTE — PROGRESS NOTES
Problem: Mobility Impaired (Adult and Pediatric) Goal: *Acute Goals and Plan of Care (Insert Text) Outcome: Progressing Towards Goal 
Note: STG: 
(1.)Ms. Loretta Zeng will move from supine to sit and sit to supine , scoot up and down, and roll side to side with CONTACT GUARD ASSIST within 3 treatment day(s). (2.)Ms. Lroetta Zeng will transfer from bed to chair and chair to bed with CONTACT GUARD ASSIST using the least restrictive device within 3 treatment day(s). (3.)Ms. Loretta Zeng will ambulate with MINIMAL ASSIST for 75 feet with the least restrictive device within 3 treatment day(s). LTG: 
(1.)Ms. Loretta Zeng will move from supine to sit and sit to supine , scoot up and down, and roll side to side in bed with SUPERVISION within 7 treatment day(s). (2.)Ms. Loretta Zeng will transfer from bed to chair and chair to bed with SUPERVISION using the least restrictive device within 7 treatment day(s). (3.)Ms. Loretta Zeng will ambulate with STAND BY ASSIST for 250 feet with the least restrictive device within 7 treatment day(s). _________________________________________________________________________________________ PHYSICAL THERAPY: Daily Note and PM 8/24/2020 INPATIENT: PT Visit Days : 2 [place] / Product Type: Medicare /   
  
NAME/AGE/GENDER: Jaspreet Mitchell is a 80 y.o. female PRIMARY DIAGNOSIS: Closed left hip fracture ([facility] [S72.002A] Closed left hip fracture (HCC) Closed left hip fracture ([facility] Procedure(s) (LRB): HIP HEMIARTHROPLASTY (Left) 2 Days Post-Op ICD-10: Treatment Diagnosis:  
 · Generalized Muscle Weakness (M62.81) · Other lack of cordination (R27.8) · Difficulty in walking, Not elsewhere classified (R26.2) · Other abnormalities of gait and mobility (R26.89) · History of falling (Z91.81) Precaution/Allergies: 
Patient has no known allergies.   
  
ASSESSMENT:  
 
Ms. Loretta Zeng  is a 80year old female who has been admitted to hospital on 08/21/20 s/p LT hip hemiarthroplasty. Prior to hospital admission pt lives alone in a 1 story home with 2 step(s) to enter with LT side railing. Pt endorses 1 falls in past 6 months. Prior to admission MOD I for ADLs and mobility. 08/24/20: PM  On contact, pt in chair from AM treatment and agreeable to therapy. Scooted out in chair and stood with Mod A x 2 to RW and additional time. She was able to slowly take steps to sit on EOB with mod/max x 2. EOB to supine with max a. Pt presents as functioning below her baseline, with deficits in mobility including transfers, gait, balance, and activity tolerance. Pt will benefit from skilled therapy services to address stated deficits to promote return to highest level of function, independence, and safety. This section established at most recent assessment PROBLEM LIST (Impairments causing functional limitations): 1. Decreased Strength 2. Decreased ADL/Functional Activities 3. Decreased Transfer Abilities 4. Decreased Ambulation Ability/Technique 5. Decreased Balance 6. Increased Pain 7. Decreased Flexibility/Joint Mobility 8. Decreased Tyrrell with Home Exercise Program 
 INTERVENTIONS PLANNED: (Benefits and precautions of physical therapy have been discussed with the patient.) 1. Balance Exercise 2. Bed Mobility 3. Family Education 4. Gait Training 5. Group Therapy 6. Home Exercise Program (HEP) 7. Manual Therapy 8. Neuromuscular Re-education/Strengthening 9. Range of Motion (ROM) 10. Therapeutic Activites 11. Therapeutic Exercise/Strengthening 12. Transfer Training TREATMENT PLAN: Frequency/Duration: twice daily for duration of hospital stay Rehabilitation Potential For Stated Goals: Good REHAB RECOMMENDATIONS (at time of discharge pending progress):   
Placement: It is my opinion, based on this patient's performance to date, that Ms. Amanda Suarez may benefit from participating in 1-2 additional therapy sessions in order to continue to assess for rehab potential and then make recommendation for disposition at discharge. Equipment:  
? None at this time HISTORY:  
History of Present Injury/Illness (Reason for Referral): 
Per  Physician Note: July Nina is a 80 y.o. female with medical history significant for anxiety, chronic diastolic heart failure, essential hypertension, atrial fibrillation s/p PPM,  multiple myeloma, hypothyroidism and mild dementia who presented from home after falling out of her recliner. Patient is very hard of hearing and therefore difficult to get detailed history. Patient denies any head trauma, presyncopal symptoms prior to falling. As per daughter, patient's has been having some worsening dementia lately and slight confusions similar to when she had UTI in the past.  Patient reports she was in her usual health prior to the fall and daughter reports that patient has not complaint about any issues except for occasional shortness of breath on exertions. Denies fever, chills, sob at rest, chest pains. Reports using 2L O2 at bedtime but has not required O2 during day time or on exertion. Past Medical History/Comorbidities:  
Ms. Jake Rowell  has a past medical history of Anemia, unspecified, Chest pain, unspecified (3/21/2016), Chronic anxiety (3/56/9150), Diastolic heart failure ([address] (3/21/2016), Essential hypertension, benign, Flatulence, eructation, and gas pain, Lump or mass in breast, Other and unspecified hyperlipidemia, Paroxysmal atrial fibrillation ([address] (3/21/2016), Paroxysmal tachycardia ([address] (3/21/2016), Pernicious anemia, Postmenopausal atrophic vaginitis, Unspecified deficiency anemia, Unspecified hypothyroidism, and Vitamin D deficiency. Ms. Jake Rowell  has a past surgical history that includes hx hysterectomy; hx orthopaedic; and hx vein stripping. Social History/Living Environment:  
Home Environment: Private residence # Steps to Enter: 2 Rails to Enter: Yes Hand Rails : Left One/Two Story Residence: One story Living Alone: Yes Support Systems: Family member(s) Patient Expects to be Discharged to[de-identified] Rehabilitation facility Current DME Used/Available at Home: Walker, rolling, Shower chair, Dharmesh Cater, straight Tub or Shower Type: Tub/Shower combination Prior Level of Function/Work/Activity: Mod I with ADLs and mobility Number of Personal Factors/Comorbidities that affect the Plan of Care: 3+: HIGH COMPLEXITY EXAMINATION:  
Most Recent Physical Functioning:  
Gross Assessment: 
  
         
  
Posture: 
  
Balance: 
Sitting - Static: Fair (occasional) Sitting - Dynamic: Fair (occasional) Standing - Static: Poor Standing - Dynamic : Poor Bed Mobility: 
Supine to Sit: Moderate assistance; Additional time Sit to Supine: Maximum assistance;Assist x2 Scooting: Moderate assistance Wheelchair Mobility: 
  
Transfers: 
Sit to Stand: Moderate assistance;Assist x1;Assist x2 Stand to Sit: Moderate assistance;Assist x1;Assist x2 Gait: 
  
Speed/Giana: Slow Step Length: Left shortened;Right shortened Gait Abnormalities: Decreased step clearance Distance (ft): 4 Feet (ft) Assistive Device: Walker, rolling Ambulation - Level of Assistance: Maximum assistance Body Structures Involved: 1. Nerves 2. Bones 3. Joints 4. Muscles 5. Ligaments Body Functions Affected: 1. Sensory/Pain 2. Movement Related Activities and Participation Affected: 1. Mobility 2. Self Care 3. Domestic Life 4. Interpersonal Interactions and Relationships 5. Community, Social and [place] Number of elements that affect the Plan of Care: 4+: HIGH COMPLEXITY CLINICAL PRESENTATION:  
Presentation: Stable and uncomplicated: LOW COMPLEXITY CLINICAL DECISION MAKIN [facility] [address] [id number] AM-PAC 6 Clicks Basic Mobility Inpatient Short Form How much difficulty does the patient currently have. .. Unable A Lot A Little None 1.  Turning over in bed (including adjusting bedclothes, sheets and blankets)? [] 1   [] 2   [x] 3   [] 4  
2. Sitting down on and standing up from a chair with arms ( e.g., wheelchair, bedside commode, etc.)   [] 1   [x] 2   [] 3   [] 4  
3. Moving from lying on back to sitting on the side of the bed? [] 1   [] 2   [x] 3   [] 4 How much help from another person does the patient currently need. .. Total A Lot A Little None 4. Moving to and from a bed to a chair (including a wheelchair)? [] 1   [x] 2   [] 3   [] 4  
5. Need to walk in hospital room? [] 1   [x] 2   [] 3   [] 4  
6. Climbing 3-5 steps with a railing? [x] 1   [] 2   [] 3   [] 4  
© 2007, Trustees of [address] [place] [address], under license to [employer]. All rights reserved Score:  Initial: 13 Most Recent: X (Date: -- ) Interpretation of Tool:  Represents activities that are increasingly more difficult (i.e. Bed mobility, Transfers, Gait). Medical Necessity:    
· Patient is expected to demonstrate progress in  
· strength, range of motion, balance, coordination, and functional technique ·  to  
· increase independence with functional mobility and ambulation · . Reason for Services/Other Comments: 
· Patient continues to require skilled intervention due to · LLE weakness, pain, decreased ROM and increased fall risk · . Use of outcome tool(s) and clinical judgement create a POC that gives a: Questionable prediction of patient's progress: MODERATE COMPLEXITY  
  
 
 
 
TREATMENT:  
(In addition to Assessment/Re-Assessment sessions the following treatments were rendered) Pre-treatment Symptoms/Complaints: \"Can I take a shower? \" 
Pain: Initial: RN notified for pain med request.  
   Post Session:    
 
Therapeutic Activity: (    15 min): Therapeutic activities including Bed mobility, Chair transfers, Ambulation on level ground to improve mobility, strength, balance, and coordination.   Required moderate visual, verbal and tactile cues to promote static and dynamic balance in standing and promote coordination of bilateral, lower extremity(s). Braces/Orthotics/Lines/Etc:  
· IV 
· geronimo catheter · O2 via Nasal cannula Treatment/Session Assessment:   
· Response to Treatment:  See Above · Interdisciplinary Collaboration:  
o Physical Therapy Assistant 
o Registered Nurse · After treatment position/precautions:  
o Up in chair 
o Bed alarm/tab alert on 
o Bed/Chair-wheels locked 
o Call light within reach 
o RN notified · Compliance with Program/Exercises: Will assess as treatment progresses · Recommendations/Intent for next treatment session: \"Next visit will focus on advancements to more challenging activities\". Total Treatment Duration: PT Patient Time In/Time Out Time In: 5169 Time Out: 1428 Rigo Loya, PTA

## 2020-08-25 PROBLEM — R13.12 OROPHARYNGEAL DYSPHAGIA: Status: ACTIVE | Noted: 2020-01-01

## 2020-08-25 PROBLEM — C90.00 MULTIPLE MYELOMA (HCC): Chronic | Status: ACTIVE | Noted: 2019-01-01

## 2020-08-25 NOTE — PROGRESS NOTES
[facility] can accept patient as long as family is able to provide the Pomalyst medication. Still awaiting resolution to NPO status r/t dysphagia. CM will continue to follow for discharge planning.

## 2020-08-25 NOTE — PROGRESS NOTES
Problem: Dysphagia (Adult) Goal: *Speech Goal: (INSERT TEXT) Outcome: Progressing Towards Goal 
Note: LTG: Patient will tolerate least restrictive diet without overt signs or symptoms of airway compromise by discharge. STG: Patient will tolerate PO trial with SLP only for ongoing swallowing assessment without overt signs or symptoms of airway compromise. Discontinue 8/25/20. STG: Patient will participate in modified barium swallow study as clinically indicated. MET 8/25/20. SPEECH LANGUAGE PATHOLOGY: MODIFIED BARIUM SWALLOW STUDY Initial Assessment NAME/AGE/GENDER: Brown Bars is a 80 y.o. female DATE: 8/25/2020 PRIMARY DIAGNOSIS: Closed left hip fracture ([address] Melvin Duverney Procedure(s) (LRB): HIP HEMIARTHROPLASTY (Left) 3 Days Post-Op ICD-10: Treatment Diagnosis: Oropharyngeal dysphagia (R13.12) INTERDISCIPLINARY COLLABORATION: Radiologist, Dr. Griselda Dock PRECAUTIONS/ALLERGIES: Patient has no known allergies. RECOMMENDATIONS/PLAN  
DIET:  
NPO until goals of care are discussed MEDICATIONS: Non-oral 
  
COMPENSATORY STRATEGIES/MODIFICATIONS INCLUDING: 
· Meticulous oral care · 1-2 ice chips per hour for pleasure after oral care OTHER RECOMMENDATIONS (including follow up treatment recommendations):  
· Patient/family education Recommendations for next treatment session: Next treatment will address -WIll determine after goals of care are discussed with family. ASSESSMENT Ms. Reese Dahl presents with moderate oral and severe pharyngeal dysphagia. Poor oral containment, delayed swallow initiation, and reduced hyolaryngeal excursion with reduced laryngeal closure resulted in aspiration of thin by teaspoon with ineffective cough response, non-clearing penetration with nectar by cup/straw, silent aspiration of honey by teaspoon, and nonclearing penetration with pudding.  Reduced tongue base retraction and decreased epiglottic inversion resulted in mild- mod vallecular residue post swallow with nectar, honey, and pudding trials. Following trial of honey by teaspoon, vallecular residue spilled over epiglottic rim and was silently aspirated after the swallow. Recommend NPO with non-oral medications until goals of care are discussed. Can have 1-2 ice chips for pleasure after oral care. Patient would benefit from palliative care consult, per MD discretion, to discuss patient/family wishes and goals of care. Poor candidate for long term alternate means of nutrition due to advanced age and dementia. If family accepts risk for aspiration resume baseline oral diet. SUBJECTIVE [facility]. Confused stating she is at school. Follows basic 1 step commands. Required frequent redirections to tasks. History of Present Injury/Illness: Ms. Alejandro Bustillo  has a past medical history of Anemia, unspecified, Chest pain, unspecified (3/21/2016), Chronic anxiety (9/37/8687), Diastolic heart failure ([address] (3/21/2016), Essential hypertension, benign, Flatulence, eructation, and gas pain, Lump or mass in breast, Other and unspecified hyperlipidemia, Paroxysmal atrial fibrillation ([address] (3/21/2016), Paroxysmal tachycardia ([address] (3/21/2016), Pernicious anemia, Postmenopausal atrophic vaginitis, Unspecified deficiency anemia, Unspecified hypothyroidism, and Vitamin D deficiency. . She also  has a past surgical history that includes hx hysterectomy; hx orthopaedic; and hx vein stripping. Pain: 
Pain Intensity 1: 0 Current dietary status prior to evaluation today:  NPO Previous Modified Barium Swallow studies: N/A Current Medications: No current facility-administered medications on file prior to encounter. Current Outpatient Medications on File Prior to Encounter Medication Sig Dispense Refill  pomalidomide (Pomalyst) 1 mg cap Take 1 Cap by mouth daily.  Take 1 capsule daily for 14 days and then she will be off 14 days 14 Cap 0  
 levothyroxine (SYNTHROID) 50 mcg tablet TAKE 1 TABLET BY MOUTH DAILY BEFORE BREAKFAST 30 Tab 0  
 amiodarone (CORDARONE) 200 mg tablet TAKE 1 TABLET BY MOUTH DAILY 180 Tab 3  potassium chloride SR (KLOR-CON 10) 10 mEq tablet TAKE 2 TABLETS BY MOUTH DAILY. 60 Tab 0  
 ergocalciferol (ERGOCALCIFEROL) 1,250 mcg (50,000 unit) capsule Take 1 Cap by mouth every seven (7) days. 4 Cap 1  clorazepate (TRANXENE) 7.5 mg tablet TAKE 1 TABLET BY MOUTH TWICE A DAY. 60 Tab 2  
 ipratropium (ATROVENT) 42 mcg (0.06 %) nasal spray 1 [place] by Both Nostrils route three (3) times daily. 15 mL 0  
 traZODone (DESYREL) 100 mg tablet Take 1 Tab by mouth nightly. 30 Tab 5  
 atorvastatin (LIPITOR) 80 mg tablet TAKE 1 TABLET BY MOUTH DAILY 60 Tab 11  
 ferrous sulfate 325 mg (65 mg iron) tablet TAKE 1 TABLET BY MOUTH DAILY. 30 Tab 11  
 docusate sodium (STOOL SOFTENER) 100 mg capsule Take 1 Cap by mouth two (2) times a day. 180 Cap 3  
 amLODIPine (NORVASC) 2.5 mg tablet Take 1 Tab by mouth daily. 90 Tab 3  
 estradiol (ESTRACE) 1 mg tablet Take 1 Tab by mouth daily. 90 Tab 3  furosemide (LASIX) 40 mg tablet Take 1 Tab by mouth daily. Taking 2 po qd (Patient taking differently: Take 40 mg by mouth daily. ) 180 Tab 1  
 mupirocin (BACTROBAN) 2 % ointment Apply  to affected area daily. 22 g 0  
 mirtazapine (REMERON) 15 mg tablet nightly.  DISABLED PLACARD (DISABLED PLACARD) DMV Apply to car. 1 Each 0  
 nadolol (CORGARD) 80 mg tablet Take 1 Tab by mouth daily. 30 Tab 11  
 lactulose (KRISTALOSE) 20 gram packet Take 1 Packet by mouth three (3) times daily. 60 Packet 3  
 OXYGEN-AIR DELIVERY SYSTEMS 3 L by Does Not Apply route nightly.  Aspirin, Buffered 81 mg tab Take  by mouth daily. OBJECTIVE Orientation:  
? Person Vocal Quality: [facility] Modified barium swallow study was performed in the radiology suite with Ms. Eligio Tyson in the lateral plane seated upright 90° in the [facility] chair. To evaluate her swallow function, barium coated liquid and food was administered in the form of thin liquids (by spoon), nectar-thick liquids (by spoon, cup sip and straw sip), honey-thick liquids (by spoon and cup sip) and pudding. Oral phase of swallow:  
? reduced oral containment 
? reduced posterior tongue elevation ? premature spillage to pharynx pre-swallow Pharyngeal phase of swallow: ? Thin by teaspoon- spilled to pharynx pre-swallow. Trace aspiration before the swallow with weak ineffective cough response. ? Nectar by teaspoon- triggered at pyriforms. Transient penetration during the swallow. Mild vallecular residue post swallow. ? Nectar by cup/straw- Triggered at vallecula on initial trial nectar by cup with no aspiration/penetration. Liquid spilled to pharynx/laryngeal vestibule pre-swallow resulting in deep penetration before the swallow, which does not clear, with nectar by straw and with following trial nectar by cup. Mild-mod vallecular residue post swallow. ? Honey by teaspoon triggered at vallecula with no aspiration/penetration before or during swallow; however, moderate vallecular residue post swallow spilled over epiglottic rim and was then silently aspirated. ? Pudding- swallow triggered at posterior epiglottis. Penetration during the swallow, which does not clear. Moderate vallecular residue post swallow. Pharyngeal characteristics: 
? delayed pharyngeal swallow initiation 
? delayed and reduced retraction of base of tongue 
? delayed and decreased hyolaryngeal elevation/excursion 
? delayed and decreased epiglottic inversion 
? adequate constriction of posterior pharyngeal wall 
? delayed and decreased laryngeal closure Attempted strategies:  
? none Effective strategies:  
? none Aspiration/Penetration Scale: 7 (Aspiration. Contrast passes below the folds/glottis, but is not cleared despite attempt.) Cervical esophageal phase of swallow:  
? a limited view. Therefore, unable to rule out an esophageal component of dysphagia **Distal esophagus not assessed due to limitations of MBS study. Assessment/Reassessment only, no treatment provided today. Tool Used: Dysphagia Outcome and Severity Scale (HUMBERTO) Comments Normal Diet With no strategies or extra time needed Functional Swallow May have mild oral or pharyngeal delay Mild Dysphagia Which may require one diet consistency restricted Mild-Moderate Dysphagia With 1-2 diet consistencies restricted Moderate Dysphagia With 2 or more diet consistencies restricted Moderately Severe Dysphagia With partial PO strategies (trials with ST only) Severe Dysphagia With inability to tolerate any PO safely Score:  Initial: 1 Most Recent: x (Date:8/25/2020) Interpretation of Tool: The Dysphagia Outcome and Severity Scale (HUMBERTO) is a simple, easy-to-use, 7-point scale developed to systematically rate the functional severity of dysphagia based on objective assessment and make recommendations for diet level, independence level, and type of nutrition. [place] / [place] / Product Type: Medicare /  
 
Education: · Recommendations discussed with RN, Alberto Sanders, and Hospitalist, Dr. Jef Flynn. Safety: After treatment position/precautions: 
· Patient waiting in radiology holding bay for transport back to room. Total Treatment Duration: 
Time In: 0900 Time Out: 0930 Oracio Sanders MS, CCC-SLP

## 2020-08-25 NOTE — PROGRESS NOTES
08/25/20 1545 Wound Leg Left Date First Assessed/Time First Assessed: 08/22/20 0812   Primary Wound Type: Incision  Location: Leg  Wound Location Orientation: Left Dressing Status Clean, dry, and intact; Changed per order; Intact Dressing Type ABD pad;Special tape (comment); Staples Incision Site Well Approximated Yes Dressing Changed Changed/New Dressing Type Applied 4 x 4;Transparent film Number of Staples Removed 0 Procedure Tolerated Well Left Hip dressing changed per order using sterile technique. Patient tolerated well. 
 
 08/25/20 5553 Wound Leg Left Date First Assessed/Time First Assessed: 08/22/20 0812   Primary Wound Type: Incision  Location: Leg  Wound Location Orientation: Left Dressing Status Clean, dry, and intact; Changed per order; Intact Dressing Type ABD pad;Special tape (comment); Staples Incision Site Well Approximated Yes Dressing Changed Changed/New Dressing Type Applied 4 x 4;Transparent film Number of Staples Removed 0 Procedure Tolerated Well

## 2020-08-25 NOTE — PROGRESS NOTES
Problem: Mobility Impaired (Adult and Pediatric) Goal: *Acute Goals and Plan of Care (Insert Text) Outcome: Progressing Towards Goal 
Note: STG: 
(1.)Ms. Ignacia Gamez will move from supine to sit and sit to supine , scoot up and down, and roll side to side with CONTACT GUARD ASSIST within 3 treatment day(s). (2.)Ms. Ignacia Gamez will transfer from bed to chair and chair to bed with CONTACT GUARD ASSIST using the least restrictive device within 3 treatment day(s). (3.)Ms. Ignacia Gamez will ambulate with MINIMAL ASSIST for 75 feet with the least restrictive device within 3 treatment day(s). LTG: 
(1.)Ms. Ignacia Gamez will move from supine to sit and sit to supine , scoot up and down, and roll side to side in bed with SUPERVISION within 7 treatment day(s). (2.)Ms. Ignacia Gamez will transfer from bed to chair and chair to bed with SUPERVISION using the least restrictive device within 7 treatment day(s). (3.)Ms. Ignacia Gamez will ambulate with STAND BY ASSIST for 250 feet with the least restrictive device within 7 treatment day(s). _________________________________________________________________________________________ PHYSICAL THERAPY: Daily Note and PM 8/25/2020 INPATIENT: PT Visit Days : 3 [place] / [place] B / Product Type: Medicare /   
  
NAME/AGE/GENDER: Rand Parmar is a [address] y.o. female PRIMARY DIAGNOSIS: Closed left hip fracture ([facility] [address] [S72.002A] Closed left hip fracture (HCC) Closed left hip fracture ([facility] [address] Procedure(s) (LRB): HIP HEMIARTHROPLASTY (Left) 3 Days Post-Op ICD-10: Treatment Diagnosis:  
 · Generalized Muscle Weakness (M62.81) · Other lack of cordination (R27.8) · Difficulty in walking, Not elsewhere classified (R26.2) · Other abnormalities of gait and mobility (R26.89) · History of falling (Z91.81) Precaution/Allergies: 
Patient has no known allergies.   
  
ASSESSMENT:  
 
Ms. Ignacia Gamez  is a [address]year old female who has been admitted to hospital on 08/21/20 s/p LT hip hemiarthroplasty. Prior to hospital admission pt lives alone in a 1 story home with 2 step(s) to enter with LT side railing. Pt endorses 1 falls in past 6 months. Prior to admission MOD I for ADLs and mobility. This afternoon, pt up in chair and agrees to therapy. Pt stood with mod assist x 2 and was able to ambulate 15' using rolling walker and mod assist x 1 and min assist x 1. Pt does require verbal and manual cues fro walker management, guidance and gait safety. Pt returned to bed supine with mod assist x 2. Pt was left with needs in reach and alarm on. Pt making progress towards goals with increased ambulation this afternoon. Will continue with POC. This section established at most recent assessment PROBLEM LIST (Impairments causing functional limitations): 1. Decreased Strength 2. Decreased ADL/Functional Activities 3. Decreased Transfer Abilities 4. Decreased Ambulation Ability/Technique 5. Decreased Balance 6. Increased Pain 7. Decreased Flexibility/Joint Mobility 8. Decreased Lucas with Home Exercise Program 
 INTERVENTIONS PLANNED: (Benefits and precautions of physical therapy have been discussed with the patient.) 1. Balance Exercise 2. Bed Mobility 3. Family Education 4. Gait Training 5. Group Therapy 6. Home Exercise Program (HEP) 7. Manual Therapy 8. Neuromuscular Re-education/Strengthening 9. Range of Motion (ROM) 10. Therapeutic Activites 11. Therapeutic Exercise/Strengthening 12. Transfer Training TREATMENT PLAN: Frequency/Duration: twice daily for duration of hospital stay Rehabilitation Potential For Stated Goals: Good REHAB RECOMMENDATIONS (at time of discharge pending progress):   
Placement: It is my opinion, based on this patient's performance to date, that Ms. Sara Lozano may benefit from participating in 1-2 additional therapy sessions in order to continue to assess for rehab potential and then make recommendation for disposition at discharge. Equipment:  
? None at this time HISTORY:  
History of Present Injury/Illness (Reason for Referral): 
Per  Physician Note: Sergio Gudino is a 80 y.o. female with medical history significant for anxiety, chronic diastolic heart failure, essential hypertension, atrial fibrillation s/p PPM,  multiple myeloma, hypothyroidism and mild dementia who presented from home after falling out of her recliner. Patient is very hard of hearing and therefore difficult to get detailed history. Patient denies any head trauma, presyncopal symptoms prior to falling. As per daughter, patient's has been having some worsening dementia lately and slight confusions similar to when she had UTI in the past.  Patient reports she was in her usual health prior to the fall and daughter reports that patient has not complaint about any issues except for occasional shortness of breath on exertions. Denies fever, chills, sob at rest, chest pains. Reports using 2L O2 at bedtime but has not required O2 during day time or on exertion. Past Medical History/Comorbidities:  
Ms. Fiona Mehta  has a past medical history of Anemia, unspecified, Chest pain, unspecified (3/21/2016), Chronic anxiety (5/55/1603), Diastolic heart failure ([address] (3/21/2016), Essential hypertension, benign, Flatulence, eructation, and gas pain, Lump or mass in breast, Other and unspecified hyperlipidemia, Paroxysmal atrial fibrillation ([address] (3/21/2016), Paroxysmal tachycardia ([address] (3/21/2016), Pernicious anemia, Postmenopausal atrophic vaginitis, Unspecified deficiency anemia, Unspecified hypothyroidism, and Vitamin D deficiency. Ms. Fiona Mehta  has a past surgical history that includes hx hysterectomy; hx orthopaedic; and hx vein stripping. Social History/Living Environment:  
Home Environment: Private residence # Steps to Enter: 2 Rails to Enter: Yes Hand Rails : Left One/Two Story Residence: One story Living Alone: Yes Support Systems: Family member(s) Patient Expects to be Discharged to[de-identified] Rehabilitation facility Current DME Used/Available at Home: Walker, rolling, Shower chair, Radha Aleman, straight Tub or Shower Type: Tub/Shower combination Prior Level of Function/Work/Activity: Mod I with ADLs and mobility Number of Personal Factors/Comorbidities that affect the Plan of Care: 3+: HIGH COMPLEXITY EXAMINATION:  
Most Recent Physical Functioning:  
Gross Assessment: 
  
         
  
Posture: 
  
Balance: 
Sitting - Static: Fair (occasional) Sitting - Dynamic: Fair (occasional) Standing - Static: Poor Standing - Dynamic : Poor Bed Mobility: 
  
Wheelchair Mobility: 
  
Transfers: 
Sit to Stand: Moderate assistance;Maximum assistance Stand to Sit: Moderate assistance Gait: 
Left Side Weight Bearing: As tolerated Speed/Giana: Fluctuations; Slow Step Length: Right shortened;Left shortened Gait Abnormalities: Decreased step clearance; Step to gait;Trunk sway increased Distance (ft): 15 Feet (ft) Assistive Device: Walker, rolling Ambulation - Level of Assistance: Moderate assistance(+ min x 1) Body Structures Involved: 1. Nerves 2. Bones 3. Joints 4. Muscles 5. Ligaments Body Functions Affected: 1. Sensory/Pain 2. Movement Related Activities and Participation Affected: 1. Mobility 2. Self Care 3. Domestic Life 4. Interpersonal Interactions and Relationships 5. Community, Social and [place] Number of elements that affect the Plan of Care: 4+: HIGH COMPLEXITY CLINICAL PRESENTATION:  
Presentation: Stable and uncomplicated: LOW COMPLEXITY CLINICAL DECISION MAKIN [facility] [address] [id number] AM-PAC 6 Clicks Basic Mobility Inpatient Short Form How much difficulty does the patient currently have. .. Unable A Lot A Little None 1. Turning over in bed (including adjusting bedclothes, sheets and blankets)?    [] 1   [] 2   [x] 3   [] 4  
 2.  Sitting down on and standing up from a chair with arms ( e.g., wheelchair, bedside commode, etc.)   [] 1   [x] 2   [] 3   [] 4  
3. Moving from lying on back to sitting on the side of the bed? [] 1   [] 2   [x] 3   [] 4 How much help from another person does the patient currently need. .. Total A Lot A Little None 4. Moving to and from a bed to a chair (including a wheelchair)? [] 1   [x] 2   [] 3   [] 4  
5. Need to walk in hospital room? [] 1   [x] 2   [] 3   [] 4  
6. Climbing 3-5 steps with a railing? [x] 1   [] 2   [] 3   [] 4  
© 2007, Trustees of [address] [place] [address] [address] [address], under license to [employer]. All rights reserved Score:  Initial: 13 Most Recent: X (Date: -- ) Interpretation of Tool:  Represents activities that are increasingly more difficult (i.e. Bed mobility, Transfers, Gait). Medical Necessity:    
· Patient is expected to demonstrate progress in  
· strength, range of motion, balance, coordination, and functional technique ·  to  
· increase independence with functional mobility and ambulation · . Reason for Services/Other Comments: 
· Patient continues to require skilled intervention due to · LLE weakness, pain, decreased ROM and increased fall risk · . Use of outcome tool(s) and clinical judgement create a POC that gives a: Questionable prediction of patient's progress: MODERATE COMPLEXITY  
  
 
 
 
TREATMENT:  
(In addition to Assessment/Re-Assessment sessions the following treatments were rendered) Pre-treatment Symptoms/Complaints: \"Can I take a shower? \" 
Pain: Initial: RN notified for pain med request.  
Pain Intensity 1: 0  Post Session:    
 
Therapeutic Activity: (    15 minutes): Therapeutic activities including Bed mobility, Chair transfers, Ambulation on level ground to improve mobility, strength, balance, and coordination.   Required moderate visual, verbal and tactile cues to promote static and dynamic balance in standing and promote coordination of bilateral, lower extremity(s). Therapeutic Exercise: (  0 minutes):  Exercises per grid below to improve mobility, strength, balance and coordination. Required moderate visual, verbal and manual cues to promote proper body posture and promote proper body mechanics. Progressed range and repetitions as indicated. Date: 
8/25/20 Date: 
 Date: 
  
ACTIVITY/EXERCISE AM PM AM PM AM PM  
Ambulation:           Distance Device Duration Seated Heel Raises X 15 B AA Seated Toe Raises X 15 B AA Seated Long Arc Quads X 10 L AA Seated Marching X 10 L AA Seated Hip Abduction X 10 L AA       
        
B = bilateral; AA = active assistive; A = active; P = passive Braces/Orthotics/Lines/Etc:  
· IV 
· geronimo catheter · O2 via Nasal cannula Treatment/Session Assessment:   
· Response to Treatment:  See Above · Interdisciplinary Collaboration:  
o Physical Therapy Assistant 
o Registered Nurse · After treatment position/precautions:  
o Supine in bed 
o Bed alarm/tab alert on 
o Bed/Chair-wheels locked 
o Call light within reach 
o RN notified · Compliance with Program/Exercises: Will assess as treatment progresses · Recommendations/Intent for next treatment session: \"Next visit will focus on advancements to more challenging activities\". Total Treatment Duration: PT Patient Time In/Time Out Time In: 9062 Time Out: 1530 Yaritza Falk PTA

## 2020-08-25 NOTE — PROGRESS NOTES
SPEECH PATHOLOGY NOTE: 
 
Modified barium swallow study complete. Patient presents with moderate oral and severe pharyngeal dysphagia. Recommend NPO with non-oral medications. Full report to follow.  
 
 
Tash Grijalva MS, CCC-SLP

## 2020-08-25 NOTE — PROGRESS NOTES
August 25, 2020 Post Op day: 3 Days Post-Op Procedure(s) (LRB): HIP HEMIARTHROPLASTY (Left) Admit Date: 8/21/2020 Admit Diagnosis: Closed left hip fracture ([facility] [address] Rand Burns Principle Problem: [facility] Subjective: Doing well, No complaints, No SOB, No Chest Pain, No Nausea or Vomiting Objective:  
Vital Signs are Stable, No Acute Distress, Alert,  Dressing is Dry,  Neurovascular exam is normal.  
 
Assessment / Plan : 
Patient Active Problem List  
Diagnosis Code  Essential hypertension, benign I10  
 Other and unspecified hyperlipidemia E78.5  Acquired hypothyroidism E03.9  Postmenopausal atrophic vaginitis N95.2  Fatigue R53.83  
 Presence of cardiac pacemaker Z95.0  Persistent atrial fibrillation (HCC) I48.19  Chronic anxiety F41.9  Acute respiratory failure with hypoxemia ([facility]) J96.01  
 Hypoxemia R09.02  
 Pleural effusion on left J90  
 Pleural effusion on right J90  
 Iron deficiency anemia D50.9  Debility R53.81  
 Melena K92.1  Mixed hyperlipidemia E78.2  Episodic atrial fibrillation (HCC) I48.0  Dementia without behavioral disturbance ([facility]) F03.90  Chronic diastolic heart failure ([facility] [address] I50.32  
 Pacemaker Z95.0  Macrocytic anemia D53.9  Essential hypertension with goal blood pressure less than 130/85 I10  
 Anxiety F41.9  Dyspnea R06.00  
 Multiple myeloma in remission ([facility]) C90.01  
 On amiodarone therapy Z79.899  Stage 3 chronic kidney disease ([facility]) N18.3  Anemia due to chronic renal failure treated with erythropoietin, unspecified stage N18.9, D63.1  Multiple myeloma not having achieved remission ([facility]) C90.00  Leg swelling M79.89  Bilateral pneumonia J18.9  Pleural effusion, bilateral J90  
 Acute hypokalemia E87.6  Dyspnea on effort R06.00  
 Multiple myeloma ([facility]) C90.00  
 HANSEN (dyspnea on exertion) R06.00  Cl[facility] S72.002A  Acute cystitis without hematuria N30.00 Patient Vitals for the past 8 hrs: 
 BP Temp Pulse Resp SpO2  
20 0741 151/66 97.5 °F (36.4 °C) 75 17 95 % 20 0352 147/68 98.1 °F (36.7 °C) 68 18 97 % Temp (24hrs), Av.2 °F (36.8 °C), Min:97.5 °F (36.4 °C), Max:98.7 °F (37.1 °C) Body mass index is 24.45 kg/m². Lab Results Component Value Date/Time HGB 8.6 (L) 2020 06:36 AM  
  
 
 
S/P Left hip samantha  Minimize narcotics Medical Mgmt per hospitalist 
Anticoagulation plan: ASA 325mg daily Continue PT Fall Precautions DC disp: rehab placement Signed By: CADEN Martinez 
2020,  8:11 AM

## 2020-08-25 NOTE — PROGRESS NOTES
Hospitalist Progress Note 2020 Admit Date: 2020  9:06 AM  
NAME: Laya Morton :  1930 MRN:  [id number] Attending: Nini Orozco MD 
PCP:  Nimisha Aburto MD 
 
SUBJECTIVE:  
Patient is a 81yo F with hx dementia, MM, HFpEF, HTN, and AF who presented with fall and L femoral neck fracture and UTI. Complicated by some iatrogenic fluid overload in postop period, now resolved after lasix. Failed SLP eval so was made NPO 
 
 - pt confused, not able to get meaningful history. C/o leg bruising. No fevers, CP, SOB. On RA now. PHYSICAL EXAM  
 
Patient Vitals for the past 24 hrs: 
 Temp Pulse Resp BP SpO2  
20 1149 97.9 °F (36.6 °C) 67 18 120/55 98 % 20 0741 97.5 °F (36.4 °C) 75 17 151/66 95 % 20 0352 98.1 °F (36.7 °C) 68 18 147/68 97 % 20 2300 98.6 °F (37 °C) 68 18 131/69 99 % 20 2200     98 % 20 1913 98.7 °F (37.1 °C) 66 18 118/66 99 % 20 1557 98.3 °F (36.8 °C) 60 18 92/52 98 % Oxygen Therapy O2 Sat (%): 98 % (20 1149) Pulse via Oximetry: 68 beats per minute (20 2200) O2 Device: Nasal cannula (20 0806) O2 Flow Rate (L/min): 2 l/min (20 0806) O2 Temperature: 87.8 °F (31 °C) (20 1554) FIO2 (%): 35 % (20 1554) Intake/Output Summary (Last 24 hours) at 2020 1327 Last data filed at 2020 1512 Gross per 24 hour Intake  Output 800 ml Net -800 ml General: Elderly, NAD Lungs:  Clear, no distress. Room air Heart:  Regular rate and rhythm,  No murmur, rub, or gallop Abdomen: Soft, Non distended, Non tender Extremities: L hip postop, dressing c/d/i, neurovascularly intact Neurologic:  No focal deficits XR SWALLOW FUNC VIDEO Final Result IMPRESSION: Aspiration and laryngeal penetration with multiple consistencies of  
barium. See above description. XR CHEST SNGL V Final Result XR HIP LT W OR WO PELV 2-3 VWS Final Result Impression: A total left hip prosthesis placement. XR HIP LT W OR WO PELV 2-3 VWS Final Result IMPRESSION:  Basicervical femoral neck fracture. LEFT FEMUR 2 view(s). HISTORY: Pain following fall TECHNIQUE: AP and lateral views. COMPARISON: None. FINDINGS: Femoral neck fracture is present. Remainder the femoral shaft is  
intact. 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 Arterial calcifications are  
identified. IMPRESSION: Femoral neck fracture. Remainder of the femoral shaft is intact. CHEST X-RAY, one view. HISTORY:  Proper evaluation for femoral neck fracture repair  November 2019 TECHNIQUE:  AP supine view. COMPARISON: November 2019 FINDINGS:   
Lungs: are clear. Costophrenic angles: are sharp. Heart size: Borderline. Pulmonary vasculature: is unremarkable. Aorta: Calcifications with normal caliber. Included portion of the upper abdomen: is unremarkable. Bones: No gross bony lesions. Other: Dual lead left-sided cardiac pacemaker is present. IMPRESSION:  Borderline cardiomegaly and aortic atherosclerosis and cardiac  
pacemaker. Joshua Paez XR FEMUR LT 2 V Final Result IMPRESSION:  Basicervical femoral neck fracture. LEFT FEMUR 2 view(s). HISTORY: Pain following fall TECHNIQUE: AP and lateral views. COMPARISON: None. FINDINGS: Femoral neck fracture is present. Remainder the femoral shaft is  
intact. 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 Arterial calcifications are  
identified. IMPRESSION: Femoral neck fracture. Remainder of the femoral shaft is intact. CHEST X-RAY, one view. HISTORY:  Proper evaluation for femoral neck fracture repair  November 2019 TECHNIQUE:  AP supine view. COMPARISON: November 2019 FINDINGS:   
Lungs: are clear. Costophrenic angles: are sharp. Heart size: Borderline. Pulmonary vasculature: is unremarkable. Aorta: Calcifications with normal caliber. Included portion of the upper abdomen: is unremarkable. Bones: No gross bony lesions. Other: Dual lead left-sided cardiac pacemaker is present. IMPRESSION:  Borderline cardiomegaly and aortic atherosclerosis and cardiac  
pacemaker. Micheline Mckeon XR CHEST SNGL V Final Result IMPRESSION:  Basicervical femoral neck fracture. LEFT FEMUR 2 view(s). HISTORY: Pain following fall TECHNIQUE: AP and lateral views. COMPARISON: None. FINDINGS: Femoral neck fracture is present. Remainder the femoral shaft is  
intact. 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 Arterial calcifications are  
identified. IMPRESSION: Femoral neck fracture. Remainder of the femoral shaft is intact. CHEST X-RAY, one view. HISTORY:  Proper evaluation for femoral neck fracture repair  November 2019 TECHNIQUE:  AP supine view. COMPARISON: November 2019 FINDINGS:   
Lungs: are clear. Costophrenic angles: are sharp. Heart size: Borderline. Pulmonary vasculature: is unremarkable. Aorta: Calcifications with normal caliber. Included portion of the upper abdomen: is unremarkable. Bones: No gross bony lesions. Other: Dual lead left-sided cardiac pacemaker is present. IMPRESSION:  Borderline cardiomegaly and aortic atherosclerosis and cardiac  
pacemaker. Micheline Mckeon ASSESSMENT Active Hospital Problems Diagnosis Date Noted  Oropharyngeal dysphagia 08/25/2020  Closed left hip fracture ([address] 08/21/2020  Acute cystitis without hematuria 08/21/2020  Multiple myeloma ([address] 11/20/2019  Stage 3 chronic kidney disease ([address] 11/27/2017  Essential hypertension with goal blood pressure less than 130/85 12/22/2016  Chronic diastolic heart failure ([address] 11/16/2016  Dementia without behavioral disturbance ([address] 08/16/2016  Persistent atrial fibrillation ([address] 06/18/2016  Iron deficiency anemia 06/18/2016  Debility 06/18/2016  Presence of cardiac pacemaker 03/21/2016  Chronic anxiety 03/21/2016  Acquired hypothyroidism 07/09/2015  Essential hypertension, benign 07/09/2015 Plan: 
 
L hip fracture Hemiarthroplasty 8/22 Pain control PT/OT 
ASA 
STR placement Dysphagia SLP recommending NPO, PC consult. I doubt pt is a candidate for PEG due to dementia IVF while NPO 
SLP to follow and report if any improvement Acute hypoxic respiratory failure from iatrogenic volume overload Resolved with lasix. Cont home lasix dose UTI Rocephin for a few more days. Got zosyn earlier in stay FRANK Cont home lasix. monitor Cr 
 
MM Sees Dr. Amaya May. Taking home pomalyst 
 
Hypothyroid Home synthroid AF Home amiodarone Dispo: SNF vs hospice Signed By: Vikram Reese MD   
 August 25, 2020

## 2020-08-25 NOTE — PROGRESS NOTES
Message sent to [facility] to follow up on referral. 
 
Noted that patient is still NPO. Will need plan for feeding tube versus PEG.

## 2020-08-25 NOTE — PROGRESS NOTES
Problem: Self Care Deficits Care Plan (Adult) Goal: *Acute Goals and Plan of Care (Insert Text) Outcome: Progressing Towards Goal 
Note: 1. Pt will toilet with min A 2. Pt will complete functional mobility for ADLs with min A 3. Pt will complete lower body dressing with min A using AE as needed 4. Pt will complete grooming and hygiene with set up 5. Pt will demonstrate independence with HEP to promote increased BUE strength and functional use for ADLs 6. Pt will tolerate 23 minutes functional activity with min or fewer rest breaks to promote increased endurance for ADLs 7. Pt will complete bed mobility with min A in prep for ADLs Timeframe: 7 days OCCUPATIONAL THERAPY: Daily Note and AM 8/25/2020 INPATIENT: OT Visit Days: 3 [place] / Product Type: Medicare /  
  
NAME/AGE/GENDER: Paz Rogers is a 80 y.o. female PRIMARY DIAGNOSIS:  Closed left hip fracture ([address] [S72.002A] Closed left hip fracture (HCC) Closed left hip fracture ([address] Procedure(s) (LRB): HIP HEMIARTHROPLASTY (Left) 3 Days Post-Op ICD-10: Treatment Diagnosis:  
 · History of falling (Z91.81) Precautions/Allergies: 
  falls, hip, WBAT  Patient has no known allergies. ASSESSMENT:  
 
Ms. Amanda Foy presents with L hip fx d/t falling at home, now s/p L MAURICE. Pt lives alone and is independent with ADLs with the exception of bathing; pt's children live nearby and take turns staying with pt and assisting with bathing. Pt uses a RW for mobility. Pt presents supine in bed upon arrival. Pt alert and cooperative for therapy. Pt required mod/max assist for supine to sit transfer this session. Pt demonstrates fair sitting balance at edge of bed. Pt required mod assist for sit to stand transfer. Pt was assisted with self care tasks (toileting). Pt did fair with following commands this session. Pt required mod assist to complete bowel hygiene.  Pt appreciative for the assistance today. Pt transferred over to the chair and left with all needs in reach and alarm in chair. Pt making some progress with goals above. Will continue to benefit from skilled OT during stay. This section established at most recent assessment PROBLEM LIST (Impairments causing functional limitations): 1. Decreased Strength 2. Decreased ADL/Functional Activities 3. Decreased Transfer Abilities 4. Decreased Balance 5. Decreased Activity Tolerance 6. Increased Fatigue 7. Increased Shortness of Breath 8. Decreased Knowledge of Precautions 9. Decreased Cognition INTERVENTIONS PLANNED: (Benefits and precautions of occupational therapy have been discussed with the patient.) 1. Activities of daily living training 2. Adaptive equipment training 3. Balance training 4. Therapeutic activity 5. Therapeutic exercise TREATMENT PLAN: Frequency/Duration: Follow patient 3 times/ week to address above goals. Rehabilitation Potential For Stated Goals: Good REHAB RECOMMENDATIONS (at time of discharge pending progress):   
Placement: It is my opinion, based on this patient's performance to date, that Ms. Jarad Robison may benefit from intensive therapy at a [address] [place] [address] after discharge due to the functional deficits listed above that are likely to improve with skilled rehabilitation and concerns that he/she may be unsafe to be unsupervised at home due to fall risk,  inability to complete ADLs . Equipment:  
? None at this time OCCUPATIONAL PROFILE AND HISTORY:  
History of Present Injury/Illness (Reason for Referral): 
See H&P Past Medical History/Comorbidities:  
Ms. Jarad Robison  has a past medical history of Anemia, unspecified, Chest pain, unspecified (3/21/2016), Chronic anxiety (7/59/9902), Diastolic heart failure ([facility] (3/21/2016), Essential hypertension, benign, Flatulence, eructation, and gas pain, Lump or mass in breast, Other and unspecified hyperlipidemia, Paroxysmal atrial fibrillation (HCC) (3/21/2016), Paroxysmal tachycardia ([address] (3/21/2016), Pernicious anemia, Postmenopausal atrophic vaginitis, Unspecified deficiency anemia, Unspecified hypothyroidism, and Vitamin D deficiency. Ms. Jefferson Oreilly  has a past surgical history that includes hx hysterectomy; hx orthopaedic; and hx vein stripping. Social History/Living Environment:  
Home Environment: Private residence # Steps to Enter: 2 Rails to Enter: Yes Hand Rails : Left One/Two Story Residence: One story Living Alone: Yes Support Systems: Family member(s) Patient Expects to be Discharged to[de-identified] Rehabilitation facility Current DME Used/Available at Home: Walker, rolling, Shower chair, Jada Pouch, straight Tub or Shower Type: Tub/Shower combination Prior Level of Function/Work/Activity: 
See assessment Number of Personal Factors/Comorbidities that affect the Plan of Care: Expanded review of therapy/medical records (1-2):  MODERATE COMPLEXITY ASSESSMENT OF OCCUPATIONAL PERFORMANCE[de-identified]  
Activities of Daily Living:  
Basic ADLs (From Assessment) Complex ADLs (From Assessment) Feeding: Setup, Stand-by assistance Oral Facial Hygiene/Grooming: Minimum assistance Bathing: Moderate assistance Upper Body Dressing: Minimum assistance Lower Body Dressing: Maximum assistance Toileting: Maximum assistance Instrumental ADL Meal Preparation: Total assistance Homemaking: Total assistance Grooming/Bathing/Dressing Activities of Daily Living Cognitive Retraining Safety/Judgement: Decreased awareness of environment; Fall prevention Toileting Bladder Hygiene: Contact guard assistance Bowel Hygiene: Moderate assistance(for thoroughness) Clothing Management: Total assistance (dependent) Cues: Physical assistance for pants down;Verbal cues provided Functional Transfers Toilet Transfer : Minimum assistance Bed/Mat Mobility Supine to Sit: Moderate assistance;Maximum assistance Sit to Stand: Moderate assistance;Maximum assistance Stand to Sit: Moderate assistance Most Recent Physical Functioning:  
Gross Assessment: 
  
         
  
Posture: 
Posture (WDL): Exceptions to [facility] Posture Assessment: Trunk flexion, Increased, Rounded shoulders Balance: 
Sitting: Impaired Sitting - Static: Fair (occasional) Sitting - Dynamic: Fair (occasional) Standing: Impaired Standing - Static: Poor Standing - Dynamic : Poor Bed Mobility: 
Supine to Sit: Moderate assistance;Maximum assistance Wheelchair Mobility: 
  
Transfers: 
Sit to Stand: Moderate assistance;Maximum assistance Stand to Sit: Moderate assistance Patient Vitals for the past 6 hrs: 
 BP BP Patient Position SpO2 O2 Flow Rate (L/min) Pulse 20 0741 151/66 At rest 95 %  75  
20 0806    2 l/min   
20 1149 120/55 Sitting 98 %  67 Mental Status Neurologic State: Alert, Confused Orientation Level: Oriented to person Cognition: Follows commands Perception: Appears intact Perseveration: No perseveration noted Safety/Judgement: Decreased awareness of environment, Fall prevention Physical Skills Involved: 
1. Balance 2. Strength 3. Activity Tolerance Cognitive Skills Affected (resulting in the inability to perform in a timely and safe manner): 1. Executive Function 2. Sustained Attention 3. Divided Attention 4. Comprehension Psychosocial Skills Affected: 1. Habits/Routines 2. Environmental Adaptation 3. Self-Awareness Number of elements that affect the Plan of Care: 5+:  HIGH COMPLEXITY CLINICAL DECISION MAKIN [facility] [address] [address] AM-PAC 6 Clicks Daily Activity Inpatient Short Form How much help from another person does the patient currently need. .. Total A Lot A Little None 1. Putting on and taking off regular lower body clothing? [] 1   [x] 2   [] 3   [] 4  
2. Bathing (including washing, rinsing, drying)?    [] 1   [x] 2   [] 3   [] 4  
 3.  Toileting, which includes using toilet, bedpan or urinal?   [] 1   [x] 2   [] 3   [] 4  
4. Putting on and taking off regular upper body clothing? [] 1   [] 2   [x] 3   [] 4  
5. Taking care of personal grooming such as brushing teeth? [] 1   [] 2   [x] 3   [] 4  
6. Eating meals? [] 1   [] 2   [] 3   [x] 4  
© 2007, Trustees of [facility] [employer], under license to [employer]. All rights reserved Score:  Initial: 16 Most Recent: X (Date: -- ) Interpretation of Tool:  Represents activities that are increasingly more difficult (i.e. Bed mobility, Transfers, Gait). Medical Necessity:    
· Patient demonstrates · fair ·  rehab potential due to higher previous functional level. Reason for Services/Other Comments: 
· Patient · continues to require present interventions due to patient's inability to complete ADLs · . Use of outcome tool(s) and clinical judgement create a POC that gives a: MODERATE COMPLEXITY  
 
 
 
TREATMENT:  
(In addition to Assessment/Re-Assessment sessions the following treatments were rendered) Pre-treatment Symptoms/Complaints:   
Pain: Initial:  
Pain Intensity 1: 0  Post Session:  5 (hip, with movement. Repositioned) Self Care: (15 minutes): Procedure(s) (per grid) utilized to improve and/or restore self-care/home management as related to toileting. Required moderate verbal and tactile cueing to facilitate activities of daily living skills. Therapeutic Activity: (10 minutes): Therapeutic activities including Bed transfers, Chair transfers and Toilet transfers to improve mobility, strength and balance. Required minimal assistance to promote static and dynamic balance in standing. Braces/Orthotics/Lines/Etc:  
· O2 Device: Heated, Hi flow nasal cannula Treatment/Session Assessment:   
· Response to Treatment:  no adverse reaction · Interdisciplinary Collaboration:  
o Certified Occupational Therapy Assistant 
o Registered Nurse · After treatment position/precautions:  
o Up in chair 
o Bed alarm/tab alert on 
o Bed/Chair-wheels locked 
o Call light within reach 
o RN notified · Compliance with Program/Exercises: Will assess as treatment progresses. · Recommendations/Intent for next treatment session: \"Next visit will focus on advancements to more challenging activities and reduction in assistance provided\". Total Treatment Duration: OT Patient Time In/Time Out Time In: 6640 Time Out: 1020 Shante Garcia

## 2020-08-26 PROBLEM — Z66 DNR (DO NOT RESUSCITATE): Chronic | Status: ACTIVE | Noted: 2020-01-01

## 2020-08-26 NOTE — PROGRESS NOTES
Problem: Mobility Impaired (Adult and Pediatric) Goal: *Acute Goals and Plan of Care (Insert Text) Outcome: Progressing Towards Goal 
Note: STG: 
(1.)Ms. Fabiola Briceno will move from supine to sit and sit to supine , scoot up and down, and roll side to side with CONTACT GUARD ASSIST within 3 treatment day(s). (2.)Ms. Fabiola Briceno will transfer from bed to chair and chair to bed with CONTACT GUARD ASSIST using the least restrictive device within 3 treatment day(s). (3.)Ms. Fabiola Briceno will ambulate with MINIMAL ASSIST for 75 feet with the least restrictive device within 3 treatment day(s). LTG: 
(1.)Ms. Fabiola Briceno will move from supine to sit and sit to supine , scoot up and down, and roll side to side in bed with SUPERVISION within 7 treatment day(s). (2.)Ms. Fabiola Briceno will transfer from bed to chair and chair to bed with SUPERVISION using the least restrictive device within 7 treatment day(s). (3.)Ms. Fabiola Briceno will ambulate with STAND BY ASSIST for 250 feet with the least restrictive device within 7 treatment day(s). _________________________________________________________________________________________ PHYSICAL THERAPY: Daily Note and PM 8/26/2020 INPATIENT: PT Visit Days : 4 [place] / Product Type: Medicare /   
  
NAME/AGE/GENDER: Danny Perez is a 80 y.o. female PRIMARY DIAGNOSIS: Closed left hip fracture ([facility] [S72.002A] Closed left hip fracture (HCC) Closed left hip fracture ([facility] Procedure(s) (LRB): HIP HEMIARTHROPLASTY (Left) 4 Days Post-Op ICD-10: Treatment Diagnosis:  
 · Generalized Muscle Weakness (M62.81) · Other lack of cordination (R27.8) · Difficulty in walking, Not elsewhere classified (R26.2) · Other abnormalities of gait and mobility (R26.89) · History of falling (Z91.81) Precaution/Allergies: 
Patient has no known allergies.   
  
ASSESSMENT:  
 
Ms. Fabiola Briceno  is a 80year old female who has been admitted to hospital on 08/21/20 s/p LT hip hemiarthroplasty. Prior to hospital admission pt lives alone in a 1 story home with 2 step(s) to enter with LT side railing. Pt endorses 1 falls in past 6 months. Prior to admission MOD I for ADLs and mobility. This afternoon, pt presents up in chair and requesting to use BSC. Pt stood with mod assist x 2 and transferred to [facility] using rolling walker and min/mod assist x 2. Pt voided, stood to be cleaned, and then ambulated 30' in funk using rolling walker and mod assist + CGA x 1. Pt taking better steps this afternoon but continues to require cues for walker management, looking forward, safety awareness, and guidance. Pt will try to sit too soon before she gets close enough to the chair and requires cues for guidance. Pt was left up in chair with son attending and needs in reach, alarm on. Good progress today with increased ambulation distance. Will continue with POC. This section established at most recent assessment PROBLEM LIST (Impairments causing functional limitations): 1. Decreased Strength 2. Decreased ADL/Functional Activities 3. Decreased Transfer Abilities 4. Decreased Ambulation Ability/Technique 5. Decreased Balance 6. Increased Pain 7. Decreased Flexibility/Joint Mobility 8. Decreased [place] with Home Exercise Program 
 INTERVENTIONS PLANNED: (Benefits and precautions of physical therapy have been discussed with the patient.) 1. Balance Exercise 2. Bed Mobility 3. Family Education 4. Gait Training 5. Group Therapy 6. Home Exercise Program (HEP) 7. Manual Therapy 8. Neuromuscular Re-education/Strengthening 9. Range of Motion (ROM) 10. Therapeutic Activites 11. Therapeutic Exercise/Strengthening 12. Transfer Training TREATMENT PLAN: Frequency/Duration: twice daily for duration of hospital stay Rehabilitation Potential For Stated Goals: Good REHAB RECOMMENDATIONS (at time of discharge pending progress):   
Placement: It is my opinion, based on this patient's performance to date, that Ms. Wesley Martinez may benefit from participating in 1-2 additional therapy sessions in order to continue to assess for rehab potential and then make recommendation for disposition at discharge. Equipment:  
? None at this time HISTORY:  
History of Present Injury/Illness (Reason for Referral): 
Per  Physician Note: Yumiko Novak is a 80 y.o. female with medical history significant for anxiety, chronic diastolic heart failure, essential hypertension, atrial fibrillation s/p PPM,  multiple myeloma, hypothyroidism and mild dementia who presented from home after falling out of her recliner. Patient is very hard of hearing and therefore difficult to get detailed history. Patient denies any head trauma, presyncopal symptoms prior to falling. As per daughter, patient's has been having some worsening dementia lately and slight confusions similar to when she had UTI in the past.  Patient reports she was in her usual health prior to the fall and daughter reports that patient has not complaint about any issues except for occasional shortness of breath on exertions. Denies fever, chills, sob at rest, chest pains. Reports using 2L O2 at bedtime but has not required O2 during day time or on exertion. Past Medical History/Comorbidities:  
Ms. Wesley Martinez  has a past medical history of Anemia, unspecified, Chest pain, unspecified (3/21/2016), Chronic anxiety (4/22/5101), Diastolic heart failure ([address] (3/21/2016), Essential hypertension, benign, Flatulence, eructation, and gas pain, Lump or mass in breast, Other and unspecified hyperlipidemia, Paroxysmal atrial fibrillation ([address] (3/21/2016), Paroxysmal tachycardia ([address] (3/21/2016), Pernicious anemia, Postmenopausal atrophic vaginitis, Unspecified deficiency anemia, Unspecified hypothyroidism, and Vitamin D deficiency.   Ms. Wesley Martinez  has a past surgical history that includes hx hysterectomy; hx orthopaedic; and hx vein stripping. Social History/Living Environment:  
Home Environment: Private residence # Steps to Enter: 2 Rails to Enter: Yes Hand Rails : Left One/Two Story Residence: One story Living Alone: Yes Support Systems: Family member(s) Patient Expects to be Discharged to[de-identified] Rehabilitation facility Current DME Used/Available at Home: Walker, rolling, Shower chair, Dale Espinoza, straight Tub or Shower Type: Tub/Shower combination Prior Level of Function/Work/Activity: Mod I with ADLs and mobility Number of Personal Factors/Comorbidities that affect the Plan of Care: 3+: HIGH COMPLEXITY EXAMINATION:  
Most Recent Physical Functioning:  
Gross Assessment: 
  
         
  
Posture: 
  
Balance: 
Sitting - Static: Fair (occasional) Sitting - Dynamic: Fair (occasional) Standing - Static: Fair Standing - Dynamic : Poor Bed Mobility: 
Rolling: Maximum assistance Supine to Sit: Moderate assistance;Maximum assistance;Assist x2 Scooting: Maximum assistance Wheelchair Mobility: 
  
Transfers: 
Sit to Stand: Moderate assistance;Assist x2 Stand to Sit: Moderate assistance Gait: 
Left Side Weight Bearing: As tolerated Speed/Giana: Fluctuations; Slow Step Length: Left shortened;Right shortened Gait Abnormalities: Decreased step clearance; Step to gait;Trunk sway increased Distance (ft): 30 Feet (ft) Assistive Device: Walker, rolling Ambulation - Level of Assistance: Moderate assistance Body Structures Involved: 1. Nerves 2. Bones 3. Joints 4. Muscles 5. Ligaments Body Functions Affected: 1. Sensory/Pain 2. Movement Related Activities and Participation Affected: 1. Mobility 2. Self Care 3. Domestic Life 4. Interpersonal Interactions and Relationships 5. Community, Social and [place] Number of elements that affect the Plan of Care: 4+: HIGH COMPLEXITY CLINICAL PRESENTATION:  
 Presentation: Stable and uncomplicated: LOW COMPLEXITY CLINICAL DECISION MAKIN[address] [place] [address] AM-PAC 6 Clicks Basic Mobility Inpatient Short Form How much difficulty does the patient currently have. .. Unable A Lot A Little None 1. Turning over in bed (including adjusting bedclothes, sheets and blankets)? [] 1   [] 2   [x] 3   [] 4  
2. Sitting down on and standing up from a chair with arms ( e.g., wheelchair, bedside commode, etc.)   [] 1   [x] 2   [] 3   [] 4  
3. Moving from lying on back to sitting on the side of the bed? [] 1   [] 2   [x] 3   [] 4 How much help from another person does the patient currently need. .. Total A Lot A Little None 4. Moving to and from a bed to a chair (including a wheelchair)? [] 1   [x] 2   [] 3   [] 4  
5. Need to walk in hospital room? [] 1   [x] 2   [] 3   [] 4  
6. Climbing 3-5 steps with a railing? [x] 1   [] 2   [] 3   [] 4  
© , Trustees of [address] [place] [address], under license to [employer]. All rights reserved Score:  Initial: 13 Most Recent: X (Date: -- ) Interpretation of Tool:  Represents activities that are increasingly more difficult (i.e. Bed mobility, Transfers, Gait). Medical Necessity:    
· Patient is expected to demonstrate progress in  
· strength, range of motion, balance, coordination, and functional technique ·  to  
· increase independence with functional mobility and ambulation · . Reason for Services/Other Comments: 
· Patient continues to require skilled intervention due to · LLE weakness, pain, decreased ROM and increased fall risk · . Use of outcome tool(s) and clinical judgement create a POC that gives a: Questionable prediction of patient's progress: MODERATE COMPLEXITY  
  
 
 
 
TREATMENT:  
(In addition to Assessment/Re-Assessment sessions the following treatments were rendered) Pre-treatment Symptoms/Complaints: \"Can I take a shower? \" 
Pain: Initial: RN notified for pain med request.  
 Pain Intensity 1: 0  Post Session:    
 
Therapeutic Activity: (    23 minutes): Therapeutic activities including Bed mobility, Chair transfers, Ambulation on level ground to improve mobility, strength, balance, and coordination. Required moderate visual, verbal and tactile cues to promote static and dynamic balance in standing and promote coordination of bilateral, lower extremity(s). Therapeutic Exercise: ( 0 minutes):  Exercises per grid below to improve mobility, strength, balance and coordination. Required moderate visual, verbal and manual cues to promote proper body posture and promote proper body mechanics. Progressed range and repetitions as indicated. Date: 
8/25/20 Date: 
8/26/20 Date: 
  
ACTIVITY/EXERCISE AM PM AM PM AM PM  
Ambulation:           Distance Device Duration Seated Heel Raises X 15 B AA  X 15 B Seated Toe Raises X 15 B AA  X 15 B Seated Long Arc Quads X 10 L AA  X 15 B Seated Marching X 10 L AA Seated Hip Abduction X 10 L AA  X 10 L AA     
        
B = bilateral; AA = active assistive; A = active; P = passive Braces/Orthotics/Lines/Etc:  
· IV 
· geronimo catheter · O2 via Nasal cannula Treatment/Session Assessment:   
· Response to Treatment:  See Above · Interdisciplinary Collaboration:  
o Physical Therapy Assistant 
o Registered Nurse 
o Rehabilitation Attendant · After treatment position/precautions:  
o Up in chair 
o Bed alarm/tab alert on 
o Bed/Chair-wheels locked 
o Call light within reach 
o RN notified 
o Family at bedside · Compliance with Program/Exercises: Will assess as treatment progresses · Recommendations/Intent for next treatment session: \"Next visit will focus on advancements to more challenging activities\". Total Treatment Duration: PT Patient Time In/Time Out Time In: 9683 Time Out: 6465 Guicho Saini, PTA

## 2020-08-26 NOTE — CONSULTS
Palliative Care Patient: Ar Negrete MRN: [id number]  SSN: [id number] YOB: 1930  Age: 80 y.o. Sex: female Date of Request: 8/26/2020 Date of Consult:  8/26/2020 Reason for Consult:  family support and education and goals of care Requesting Physician: Dr. Rhonda Espino Assessment/Plan:  
 
Principal Diagnosis: Dysphagia  R13.10 Additional Diagnoses: · Altered Mental Status R41.82 
· Debility, Unspecified  R53.81 
· Frailty  R54 · Counseling, Encounter for Medical Advice  Z71.9 
· Encounter for Palliative Care  Z51.5 Palliative Performance Scale (PPS): 
 30% Medical Decision Making:  
Reviewed and summarized labs and imaging. Met with pt at bedside. She is pleasantly confused and not able to follow conversation. I spoke with pt daughter via phone and updated on current condition and concerns regarding aspiration. Reviewed PEG procedure and explained risks. Daughter feels pt has been declining over past few months and pursuing a PEG would not be beneficial.  I reviewed hospice philosophies and comfort feeding. It is not clear if family would be able to care for pt at home currently with hospice support. I discussed code status and daughter agrees with DNR. Order placed. I attempted to call pt son as well but got a voice mail on both lines. Will discuss findings with members of the interdisciplinary team.   
 
Thank you for this referral.    
 
 
Subjective:  
 
History obtained from:  Family and Chart Chief Complaint: dysphagia History of Present Illness:  a 81yo F with hx dementia, MM, HFpEF, HTN, and AF who presented with fall and L femoral neck fracture and UTI. Complicated by some iatrogenic fluid overload in postop period, now resolved after lasix. Failed SLP eval so was made NPO. Family does not wish to pursue PEG tube.    
 
Advance Directive: No      
Code Status:  DNR           
 Health Care Power of : No - Patient does not have a [facility]. Pt children would be decision makers Past Medical History:  
Diagnosis Date  Anemia, unspecified  Chest pain, unspecified 3/21/2016  Chronic anxiety 3/21/2016  Diastolic heart failure ([facility] [address] 3/21/2016  Essential hypertension, benign  Flatulence, eructation, and gas pain  Lump or mass in breast   
 Other and unspecified hyperlipidemia  Paroxysmal atrial fibrillation ([facility] [address] 3/21/2016  Paroxysmal tachycardia ([facility] [address] 3/21/2016  Pernicious anemia  Postmenopausal atrophic vaginitis  Unspecified deficiency anemia  Unspecified hypothyroidism  Vitamin D deficiency Past Surgical History:  
Procedure Laterality Date  HX HYSTERECTOMY  HX ORTHOPAEDIC    
 heel spur  HX VEIN STRIPPING Family History Problem Relation Age of Onset  No Known Problems Mother  Heart Disease Father Social History Tobacco Use  Smoking status: Never Smoker  Smokeless tobacco: Never Used Substance Use Topics  Alcohol use: No  
  Alcohol/week: 0.0 standard drinks Prior to Admission medications Medication Sig Start Date End Date Taking? Authorizing Provider  
pomalidomide (Pomalyst) 1 mg cap Take 1 Cap by mouth daily. Take 1 capsule daily for 14 days and then she will be off 14 days 8/17/20   Olesya Funez MD  
levothyroxine (SYNTHROID) 50 mcg tablet TAKE 1 TABLET BY MOUTH DAILY BEFORE BREAKFAST 8/11/20   Suellen Bishop MD  
amiodarone (CORDARONE) 200 mg tablet TAKE 1 TABLET BY MOUTH DAILY 8/7/20   Heavenly Medina MD  
potassium chloride SR (KLOR-CON 10) 10 mEq tablet TAKE 2 TABLETS BY MOUTH DAILY. 7/30/20   Heavenly Medina MD  
ergocalciferol (ERGOCALCIFEROL) 1,250 mcg (50,000 unit) capsule Take 1 Cap by mouth every seven (7) days. 6/4/20   Olesya Funez MD  
clorazepate (TRANXENE) 7.5 mg tablet TAKE 1 TABLET BY MOUTH TWICE A DAY. 3/23/20   Marvin Phillips MD  
ipratropium (ATROVENT) 42 mcg (0.06 %) nasal spray 1 [place] by Both Nostrils route three (3) times daily. 12/3/19   Georgiana Monsalve NP  
traZODone (DESYREL) 100 mg tablet Take 1 Tab by mouth nightly. 11/20/19   Marvin Phillips MD  
atorvastatin (LIPITOR) 80 mg tablet TAKE 1 TABLET BY MOUTH DAILY 9/9/19   Marvin Phillips MD  
ferrous sulfate 325 mg (65 mg iron) tablet TAKE 1 TABLET BY MOUTH DAILY. 9/9/19   Marvin Phillips MD  
docusate sodium (STOOL SOFTENER) 100 mg capsule Take 1 Cap by mouth two (2) times a day. 6/25/19   Marvin Phillips MD  
amLODIPine (NORVASC) 2.5 mg tablet Take 1 Tab by mouth daily. 6/25/19   Marvin Phillips MD  
estradiol (ESTRACE) 1 mg tablet Take 1 Tab by mouth daily. 6/25/19   Marvin Phillips MD  
furosemide (LASIX) 40 mg tablet Take 1 Tab by mouth daily. Taking 2 po qd Patient taking differently: Take 40 mg by mouth daily. 10/25/18   Claribel rCawford MD  
mupirocin Wava Kotyk) 2 % ointment Apply  to affected area daily. 5/21/18   Marvin Phillips MD  
mirtazapine (REMERON) 15 mg tablet nightly. 1/12/18   Provider, Historical  
DISABLED PLACARD (DISABLED PLACARD) DMV Apply to car. 3/14/17   Marvin Phillips MD  
nadolol (CORGARD) 80 mg tablet Take 1 Tab by mouth daily. 2/27/17   Susan Mason MD  
lactulose (KRISTALOSE) 20 gram packet Take 1 Packet by mouth three (3) times daily. 11/15/16   Marvin Phillips MD  
OXYGEN-AIR DELIVERY SYSTEMS 3 L by Does Not Apply route nightly. Jitendra Castro RN Aspirin, Buffered 81 mg tab Take  by mouth daily. Provider, Historical  
 
 
No Known Allergies Review of systems negative with exception of noted above Objective:  
 
Visit Vitals /75 (BP 1 Location: Right arm, BP Patient Position: At rest) Pulse 62 Temp 98.1 °F (36.7 °C) Resp 17 Ht 5' 3\" (1.6 m) Wt 138 lb (62.6 kg) SpO2 93% BMI 24.45 kg/m² Physical Exam: 
 
General:  No acute distress. frail Eyes:  Conjunctivae/corneas clear Nose: Nares normal. Septum midline. Neck: Supple, symmetrical, trachea midline, no JVD Lungs:   Clear to auscultation bilaterally, unlabored Heart:  Regular rate and rhythm, no murmur Abdomen:   Soft, non-tender, non-distended. Positive bowel sounds Extremities: Normal, atraumatic, no cyanosis or edema Skin: Skin color, texture, turgor normal. No rash or lesions. Neurologic: Nonfocal  
Psych: Alert but confused Assessment:  
 
Hospital Problems  Date Reviewed: 4/30/2020 Codes Class Noted POA Oropharyngeal dysphagia ICD-10-CM: R13.12 
ICD-9-CM: 787.22  8/25/2020 Yes * (Principal) Closed left hip fracture ([facility] [address] ICD-10-CM: C64.726B ICD-9-CM: 820.8  8/21/2020 Yes Acute cystitis without hematuria ICD-10-CM: N30.00 ICD-9-CM: 595.0  8/21/2020 Yes Multiple myeloma (HCC) (Chronic) ICD-10-CM: C90.00 ICD-9-CM: 203.00  11/20/2019 Yes Stage 3 chronic kidney disease (HCC) (Chronic) ICD-10-CM: N18.3 ICD-9-CM: 585.3  11/27/2017 Yes Essential hypertension with goal blood pressure less than 130/85 (Chronic) ICD-10-CM: I10 
ICD-9-CM: 401.9  12/22/2016 Yes Chronic diastolic heart failure (HCC) (Chronic) ICD-10-CM: I50.32 
ICD-9-CM: 428.32  11/16/2016 Yes Dementia without behavioral disturbance (HCC) (Chronic) ICD-10-CM: F03.90 ICD-9-CM: 294.20  8/16/2016 Yes Persistent atrial fibrillation (HCC) (Chronic) ICD-10-CM: I48.19 ICD-9-CM: 427.31  6/18/2016 Yes Iron deficiency anemia (Chronic) ICD-10-CM: D50.9 ICD-9-CM: 280.9  6/18/2016 Yes Debility (Chronic) ICD-10-CM: R53.81 ICD-9-CM: 799.3  6/18/2016 Yes Presence of cardiac pacemaker (Chronic) ICD-10-CM: Z95.0 ICD-9-CM: V45.01  3/21/2016 Yes Chronic anxiety (Chronic) ICD-10-CM: F41.9 ICD-9-CM: 300.00  3/21/2016 Yes Essential hypertension, benign (Chronic) ICD-10-CM: I10 
ICD-9-CM: 401.1  7/9/2015 Yes Acquired hypothyroidism (Chronic) ICD-10-CM: E03.9 ICD-9-CM: 244.9  7/9/2015 Yes Signed By: Thomas Calle MD   
 August 26, 2020

## 2020-08-26 NOTE — DISCHARGE INSTRUCTIONS
[address]  IF YOU HAVE ANY PROBLEMS ONCE YOU ARE AT HOME CALL THE FOLLOWING NUMBERS:   Main office number: [phone] ask for Cosme Paul (medical assistant with Dr. Macy Soares)      Patient Discharge Instructions    Mallory Tom / [id number] : 1930    Admitted 2020 Discharged: 2020         To be given to [address] on Admission         Weight bearing status: As tolerated with walker and assistance    Activity  · Continue Physical Therapy and Occupational Therapy   · Fall precautions     Wound Care   Dry dressing changes using sterile technique every other day or more frequently if needed    Staples are to be left in and removed in our office 2 weeks postop    Diet  · Resume regular or diabetic diet      Medications    · Patient medications are listed on the medication reconciliation sheet. Follow up    Follow up in our office in 2 weeks postop    All patients are to be transported via stretcher unless they are able to independently get out of a chair and stand without assistance. Information obtained by :  I understand that if any problems occur once I am at home I am to contact my physician. I understand and acknowledge receipt of the instructions indicated above.                                                                                                                                            Physician's or R.N.'s Signature                                                                  Date/Time                                                                                                                                              Patient or Representative Signature                                                          Date/Time

## 2020-08-26 NOTE — PROGRESS NOTES
Problem: Pressure Injury - Risk of 
Goal: *Prevention of pressure injury Description: Document José Manuel Scale and appropriate interventions in the flowsheet. Outcome: Progressing Towards Goal 
Note: Pressure Injury Interventions: 
Sensory Interventions: Assess changes in LOC Moisture Interventions: Apply protective barrier, creams and emollients, Absorbent underpads Activity Interventions: Pressure redistribution bed/mattress(bed type), Increase time out of bed Mobility Interventions: Pressure redistribution bed/mattress (bed type) Nutrition Interventions: Document food/fluid/supplement intake Friction and Shear Interventions: Apply protective barrier, creams and emollients Problem: Patient Education: Go to Patient Education Activity Goal: Patient/Family Education Outcome: Progressing Towards Goal 
  
Problem: Falls - Risk of 
Goal: *Absence of Falls Description: Document Carmen Ruts Fall Risk and appropriate interventions in the flowsheet. Outcome: Progressing Towards Goal 
Note: Fall Risk Interventions: 
Mobility Interventions: Bed/chair exit alarm Mentation Interventions: Bed/chair exit alarm, Door open when patient unattended Medication Interventions: Bed/chair exit alarm Elimination Interventions: Bed/chair exit alarm History of Falls Interventions: Bed/chair exit alarm, Door open when patient unattended Problem: Patient Education: Go to Patient Education Activity Goal: Patient/Family Education Outcome: Progressing Towards Goal 
  
Problem: Patient Education: Go to Patient Education Activity Goal: Patient/Family Education Outcome: Progressing Towards Goal 
  
Problem: Hip Fracture: Post-Op Day 4 Goal: Off Pathway (Use only if patient is Off Pathway) Outcome: Progressing Towards Goal 
Goal: Activity/Safety Outcome: Progressing Towards Goal 
Goal: Diagnostic Test/Procedures Outcome: Progressing Towards Goal 
Goal: Nutrition/Diet Outcome: Progressing Towards Goal 
Goal: Medications Outcome: Progressing Towards Goal 
Goal: Respiratory Outcome: Progressing Towards Goal 
Goal: Treatments/Interventions/Procedures Outcome: Progressing Towards Goal 
Goal: Psychosocial 
Outcome: Progressing Towards Goal 
Goal: Discharge Planning Outcome: Progressing Towards Goal 
Goal: *Met physical therapy criteria for discharge to next level of care Outcome: Progressing Towards Goal 
Goal: *Optimal pain control at patient's stated goal 
Outcome: Progressing Towards Goal 
Goal: *Hemodynamically stable Outcome: Progressing Towards Goal 
Goal: *Tolerating diet Outcome: Progressing Towards Goal 
Goal: *Active bowel function Outcome: Progressing Towards Goal 
Goal: *Adequate urinary output Outcome: Progressing Towards Goal 
Goal: *Absence of skin breakdown Outcome: Progressing Towards Goal 
Goal: *Patient verbalizes understanding of discharge instructions Outcome: Progressing Towards Goal 
  
Problem: Dysphagia (Adult) Goal: *Speech Goal: (INSERT TEXT) Outcome: Progressing Towards Goal 
  
Problem: Patient Education: Go to Patient Education Activity Goal: Patient/Family Education Outcome: Progressing Towards Goal 
  
Problem: Patient Education: Go to Patient Education Activity Goal: Patient/Family Education Outcome: Progressing Towards Goal 
  
Problem: Patient Education: Go to Patient Education Activity Goal: Patient/Family Education Outcome: Progressing Towards Goal

## 2020-08-26 NOTE — PROGRESS NOTES
Patient is restless, trying to get out of bed, and yelling out. She is confused and says she can not fall asleep. Due to her NPO status, she is unable to have her medications that normally help her sleep. Notified Dr. Nigel Tuttle, new orders received, see MAR.

## 2020-08-26 NOTE — PROGRESS NOTES
Hospitalist Progress Note 2020 Admit Date: 2020  9:06 AM  
NAME: Jordon Bradshaw :  1930 MRN:  [id number] Attending: Howard Navarrete MD 
PCP:  Barbara Dozier MD 
 
SUBJECTIVE:  
Patient is a 81yo F with hx dementia, MM, HFpEF, HTN, and AF who presented with fall and L femoral neck fracture and UTI. Complicated by some iatrogenic fluid overload in postop period, now resolved after lasix. Failed SLP eval so was made NPO. Not a PEG candidate. PC consulted.  - pleasant, feeling well. On room air still. No SOB, CP. No fevers. Pleasantly confused. PHYSICAL EXAM  
 
Patient Vitals for the past 24 hrs: 
 Temp Pulse Resp BP SpO2  
20 0750 98.1 °F (36.7 °C) 62 17 166/75 93 % 20 0429 99 °F (37.2 °C) 60 18 152/72 93 % 20 2354 99.1 °F (37.3 °C) 60 18 144/67 90 % 20 1927 98.3 °F (36.8 °C) 63 18 148/78 96 % 20 1609 98 °F (36.7 °C) 65 17 136/82 97 % 20 1149 97.9 °F (36.6 °C) 67 18 120/55 98 % Oxygen Therapy O2 Sat (%): 93 % (20 0750) Pulse via Oximetry: 68 beats per minute (20 2200) O2 Device: Nasal cannula (20 0806) O2 Flow Rate (L/min): 2 l/min (20 0806) O2 Temperature: 87.8 °F (31 °C) (20 1554) FIO2 (%): 35 % (20 1554) No intake or output data in the 24 hours ending 20 1019 General: Elderly, NAD.  pleasant Resp:  No distress Extremities: No cyanosis/edema. Neurologic:  No focal deficits XR SWALLOW FUNC VIDEO Final Result IMPRESSION: Aspiration and laryngeal penetration with multiple consistencies of  
barium. See above description. XR CHEST SNGL V Final Result XR HIP LT W OR WO PELV 2-3 VWS Final Result Impression: A total left hip prosthesis placement. XR HIP LT W OR WO PELV 2-3 VWS Final Result IMPRESSION:  Basicervical femoral neck fracture. LEFT FEMUR 2 view(s). HISTORY: Pain following fall TECHNIQUE: AP and lateral views. COMPARISON: None. FINDINGS: Femoral neck fracture is present. Remainder the femoral shaft is  
intact. 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 Arterial calcifications are  
identified. IMPRESSION: Femoral neck fracture. Remainder of the femoral shaft is intact. CHEST X-RAY, one view. HISTORY:  Proper evaluation for femoral neck fracture repair  November 2019 TECHNIQUE:  AP supine view. COMPARISON: November 2019 FINDINGS:   
Lungs: are clear. Costophrenic angles: are sharp. Heart size: Borderline. Pulmonary vasculature: is unremarkable. Aorta: Calcifications with normal caliber. Included portion of the upper abdomen: is unremarkable. Bones: No gross bony lesions. Other: Dual lead left-sided cardiac pacemaker is present. IMPRESSION:  Borderline cardiomegaly and aortic atherosclerosis and cardiac  
pacemaker. [place] XR FEMUR LT 2 V Final Result IMPRESSION:  Basicervical femoral neck fracture. LEFT FEMUR 2 view(s). HISTORY: Pain following fall TECHNIQUE: AP and lateral views. COMPARISON: None. FINDINGS: Femoral neck fracture is present. Remainder the femoral shaft is  
intact. 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 Arterial calcifications are  
identified. IMPRESSION: Femoral neck fracture. Remainder of the femoral shaft is intact. CHEST X-RAY, one view. HISTORY:  Proper evaluation for femoral neck fracture repair  November 2019 TECHNIQUE:  AP supine view. COMPARISON: November 2019 FINDINGS:   
Lungs: are clear. Costophrenic angles: are sharp. Heart size: Borderline. Pulmonary vasculature: is unremarkable. Aorta: Calcifications with normal caliber. Included portion of the upper abdomen: is unremarkable. Bones: No gross bony lesions. Other: Dual lead left-sided cardiac pacemaker is present. IMPRESSION:  Borderline cardiomegaly and aortic atherosclerosis and cardiac  
pacemaker. [employer] XR CHEST SNGL V Final Result IMPRESSION:  Basicervical femoral neck fracture. LEFT FEMUR 2 view(s). HISTORY: Pain following fall TECHNIQUE: AP and lateral views. COMPARISON: None. FINDINGS: Femoral neck fracture is present. Remainder the femoral shaft is  
intact. 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 Arterial calcifications are  
identified. IMPRESSION: Femoral neck fracture. Remainder of the femoral shaft is intact. CHEST X-RAY, one view. HISTORY:  Proper evaluation for femoral neck fracture repair  November 2019 TECHNIQUE:  AP supine view. COMPARISON: November 2019 FINDINGS:   
Lungs: are clear. Costophrenic angles: are sharp. Heart size: Borderline. Pulmonary vasculature: is unremarkable. Aorta: Calcifications with normal caliber. Included portion of the upper abdomen: is unremarkable. Bones: No gross bony lesions. Other: Dual lead left-sided cardiac pacemaker is present. IMPRESSION:  Borderline cardiomegaly and aortic atherosclerosis and cardiac  
pacemaker. [employer] ASSESSMENT Active Hospital Problems Diagnosis Date Noted  DNR (do not resuscitate) 08/26/2020  Oropharyngeal dysphagia 08/25/2020  Closed left hip fracture ([address] 08/21/2020  Acute cystitis without hematuria 08/21/2020  Multiple myeloma ([address] 11/20/2019  Stage 3 chronic kidney disease ([address] 11/27/2017  Essential hypertension with goal blood pressure less than 130/85 12/22/2016  Chronic diastolic heart failure ([address] 11/16/2016  Dementia without behavioral disturbance ([address] 08/16/2016  Persistent atrial fibrillation ([address] 06/18/2016  Iron deficiency anemia 06/18/2016  Debility 06/18/2016  Presence of cardiac pacemaker 03/21/2016  Chronic anxiety 03/21/2016  Acquired hypothyroidism 07/09/2015  Essential hypertension, benign 07/09/2015 Plan: 
L hip fracture Hemiarthroplasty 8/22 Pain control PT/OT 
ASA 
STR placement vs home hospice. Swallowing issues aside, she is ready for discharge Dysphagia MBS showing aspiration. Strict NPO 
PC consulted, appreciate help IVF while NPO 
SLP following but I doubt we will see much improvement Acute hypoxic respiratory failure from iatrogenic volume overload Resolved with lasix. Cont home lasix dose UTI Rocephin for a few more days. Unfortunately no urine cx sent. Got zosyn earlier in stay FRANK Cont home lasix. monitor Cr 
 
MM Sees Dr. Eliane Marcano. Taking home pomalyst 
 
Hypothyroid Home synthroid AF Home amiodarone Dispo: SNF vs home hospice Signed By: Liz Daniel MD   
 August 26, 2020

## 2020-08-26 NOTE — PROGRESS NOTES
Problem: Dysphagia (Adult) Goal: *Speech Goal: (INSERT TEXT) Outcome: Progressing Towards Goal 
Note: LTG: Patient will tolerate least restrictive diet without overt signs or symptoms of airway compromise by discharge. STG: Patient will tolerate PO trial with SLP only for ongoing swallowing assessment without overt signs or symptoms of airway compromise. Discontinue 8/25/20. SPEECH LANGUAGE PATHOLOGY: DYSPHAGIA- Initial Assessment NAME/AGE/GENDER: Meg Mohs is a 80 y.o. female DATE: 8/26/2020 PRIMARY DIAGNOSIS: Closed left hip fracture ([facility] [address] Estela Abraham Procedure(s) (LRB): HIP HEMIARTHROPLASTY (Left) 4 Days Post-Op ICD-10: Treatment Diagnosis: R13.12 Dysphagia, Oropharyngeal Phase RECOMMENDATIONS  
DIET:  
? NPO  pending goals of care MEDICATIONS: Non-oral 
  
ASPIRATION PRECAUTIONS 
oral care 2-3x/daily OTHER RECOMMENDATIONS (including follow up treatment recommendations):  
· Patient/family education · Palliative care consult  
   
CONTINUATION OF SKILLED SERVICES/MEDICAL NECESSITY: 
? Patient is expected to demonstrate progress in  swallow strength, swallow timeliness, swallow function and swallow safety in order to  improve swallow safety, work toward diet advancement and decrease aspiration risk. ? Patient continues to require skilled intervention due to dysphagia identified on modified barium swallow study/NPO status. RECOMMENDATIONS for CONTINUED SPEECH THERAPY:  
YES: Anticipate need for ongoing speech therapy during this hospitalization. ASSESSMENT Patient presents with moderate oral and severe pharyngeal dysphagia per modified barium swallow study 8/25. Attempted dysphagia exercises with ice chips, however patient unable to functionally participate in exercises at this time due to poor command following. Not appropriate for additional po trials given known severe pharyngeal dysphagia, including silent airway compromise. Recommend continue NPO until goals of care established. May want to consider palliative care consult given severe pharyngeal dysphagia, advanced age, and dementia. Will follow as indicated pending family wishes/goals. COMPLIANCE WITH PROGRAM/EXERCISES: Will assess as treatment progresses REHABILITATION POTENTIAL FOR STATED GOALS: Fair PLAN   
FREQUENCY/DURATION: PRN pending wishes/plan of care. Recommendations for next treatment session: to be determined. SUBJECTIVE Drowsy. No command following. Problem List:  (Impairments causing functional limitations): 1. Oropharyngeal dysphagia Orientation:  
disoriented Modified barium swallow study 8/25/2020 
moderate oral and severe pharyngeal dysphagia. Poor oral containment, delayed swallow initiation, and reduced hyolaryngeal excursion with reduced laryngeal closure resulted in aspiration of thin by teaspoon with ineffective cough response, non-clearing penetration with nectar by cup/straw, silent aspiration of honey by teaspoon, and nonclearing penetration with pudding. Reduced tongue base retraction and decreased epiglottic inversion resulted in mild- mod vallecular residue post swallow with nectar, honey, and pudding trials. Following trial of honey by teaspoon, vallecular residue spilled over epiglottic rim and was silently aspirated after the swallow.  
  
Recommend NPO with non-oral medications until goals of care are discussed. Can have 1-2 ice chips for pleasure after oral care. Patient would benefit from palliative care consult, per MD discretion, to discuss patient/family wishes and goals of care. Poor candidate for long term alternate means of nutrition due to advanced age and dementia. If family accepts risk for aspiration resume baseline oral diet. Pain: Pain Scale 1: FLACC Pain Intensity 1: 0 OBJECTIVE Patient seen for laryngeal exercises. Presented ice chips x2.  Patient unable to follow commands to complete effortful swallow. Slowed oral manipulation and verbal cues to maintain alertness. Eyes opened but talking nonsense. No overt s/sx airway compromise with ice chips. Unable to elicit dry swallows between trials with verbal cues. INTERDISCIPLINARY COLLABORATION: n/a PRECAUTIONS/ALLERGIES: Patient has no known allergies. Tool Used: Dysphagia Outcome and Severity Scale (HUMBERTO) Score Comments Normal Diet  [] 7 With no strategies or extra time needed Functional Swallow  [] 6 May have mild oral or pharyngeal delay Mild Dysphagia  [] 5 Which may require one diet consistency restricted Mild-Moderate Dysphagia  [] 4 With 1-2 diet consistencies restricted Moderate Dysphagia  [] 3 With 2 or more diet consistencies restricted Moderate-Severe Dysphagia  [] 2 With partial PO strategies (trials with ST only) Severe Dysphagia  [] 1 With inability to tolerate any PO safely Score:  Initial:1 Most Recent: 1 (Date 08/26/20 ) Interpretation of Tool: The Dysphagia Outcome and Severity Scale (HUMBERTO) is a simple, easy-to-use, 7-point scale developed to systematically rate the functional severity of dysphagia based on objective assessment and make recommendations for diet level, independence level, and type of nutrition. After treatment position/precautions: · Upright in bed · Call light within reach · lapbelt Total Treatment Duration:  
Time In: 5067 Time Out: [address] CCC-SLP

## 2020-08-26 NOTE — PROGRESS NOTES
August 26, 2020 Post Op day: 4 Days Post-Op Procedure(s) (LRB): HIP HEMIARTHROPLASTY (Left) Admit Date: 8/21/2020 Admit Diagnosis: Closed left hip fracture ([facility] [address] Dionisio Paula Principle Problem: [facility] Subjective: Doing ok, No complaints, Denies any hip pain,  No SOB, No Chest Pain, No Nausea or Vomiting Objective:  
Vital Signs are Stable, No Acute Distress, Alert,  Dressing is Dry,  Neurovascular exam is normal.  
 
Assessment / Plan : 
Patient Active Problem List  
Diagnosis Code  Essential hypertension, benign I10  
 Other and unspecified hyperlipidemia E78.5  Acquired hypothyroidism E03.9  Postmenopausal atrophic vaginitis N95.2  Fatigue R53.83  
 Presence of cardiac pacemaker Z95.0  Persistent atrial fibrillation (HCC) I48.19  Chronic anxiety F41.9  Acute respiratory failure with hypoxemia ([facility]) J96.01  
 Hypoxemia R09.02  
 Pleural effusion on left J90  
 Pleural effusion on right J90  
 Iron deficiency anemia D50.9  Debility R53.81  
 Melena K92.1  Mixed hyperlipidemia E78.2  Episodic atrial fibrillation (HCC) I48.0  Dementia without behavioral disturbance ([facility]) F03.90  Chronic diastolic heart failure ([facility] [address] I50.32  
 Pacemaker Z95.0  Macrocytic anemia D53.9  Essential hypertension with goal blood pressure less than 130/85 I10  
 Anxiety F41.9  Dyspnea R06.00  
 Multiple myeloma in remission ([facility]) C90.01  
 On amiodarone therapy Z79.899  Stage 3 chronic kidney disease ([facility]) N18.3  Anemia due to chronic renal failure treated with erythropoietin, unspecified stage N18.9, D63.1  Multiple myeloma not having achieved remission ([facility]) C90.00  Leg swelling M79.89  Bilateral pneumonia J18.9  Pleural effusion, bilateral J90  
 Acute hypokalemia E87.6  Dyspnea on effort R06.00  
 Multiple myeloma ([facility]) C90.00  
 HANSEN (dyspnea on exertion) R06.00  
  Closed left hip fracture ([facility] [address] S72.002A  Acute cystitis without hematuria N30.00  
 Oropharyngeal dysphagia R13.12 Patient Vitals for the past 8 hrs: 
 BP Temp Pulse Resp SpO2  
20 0750 166/75 98.1 °F (36.7 °C) 62 17 93 % 20 0429 152/72 99 °F (37.2 °C) 60 18 93 % Temp (24hrs), Av.4 °F (36.9 °C), Min:97.9 °F (36.6 °C), Max:99.1 °F (37.3 °C) Body mass index is 24.45 kg/m². Lab Results Component Value Date/Time HGB 8.2 (L) 2020 06:01 AM  
  
 
 
S/P Left hip samantha  Minimize narcotics Medical Mgmt per hospitalist 
Anticoagulation plan: ASA 325mg daily  
Continue PT Fall Precautions DC disp: rehab placement  
  
 
Signed By: CADEN Martinez 
2020,  8:31 AM

## 2020-08-26 NOTE — PROGRESS NOTES
Problem: Self Care Deficits Care Plan (Adult) Goal: *Acute Goals and Plan of Care (Insert Text) Outcome: Progressing Towards Goal 
Note: 1. Pt will toilet with min A 2. Pt will complete functional mobility for ADLs with min A 3. Pt will complete lower body dressing with min A using AE as needed 4. Pt will complete grooming and hygiene with set up 5. Pt will demonstrate independence with HEP to promote increased BUE strength and functional use for ADLs 6. Pt will tolerate 23 minutes functional activity with min or fewer rest breaks to promote increased endurance for ADLs 7. Pt will complete bed mobility with min A in prep for ADLs Timeframe: 7 days OCCUPATIONAL THERAPY: Daily Note and PM 8/26/2020 INPATIENT: OT Visit Days: 4 [place] / [place] B / Product Type: Medicare /  
  
NAME/AGE/GENDER: Rand Parmar is a 80 y.o. female PRIMARY DIAGNOSIS:  Closed left hip fracture ([address] [S72.002A] Closed left hip fracture (HCC) Closed left hip fracture ([address] Procedure(s) (LRB): HIP HEMIARTHROPLASTY (Left) 4 Days Post-Op ICD-10: Treatment Diagnosis:  
 · History of falling (Z91.81) Precautions/Allergies: 
  falls, hip, WBAT  Patient has no known allergies. ASSESSMENT:  
 
Ms. Ignacia Gamez presents with L hip fx d/t falling at home, now s/p L MAURICE. Pt lives alone and is independent with ADLs with the exception of bathing; pt's children live nearby and take turns staying with pt and assisting with bathing. Pt uses a RW for mobility. Pt presents sitting up in chair upon arrival. Pt alert and cooperative for therapy. Pt was able to perform UE exercises this session to increase strength and activity tolerance to perform mobility and ADLs. Pt did well with exercises and following commands. Pt required brief rest breaks in between sets.  Pt issued yellow theraband to continue to perform exercises daily. Pt making some progress with goals above. Will continue to benefit from skilled OT during stay. This section established at most recent assessment PROBLEM LIST (Impairments causing functional limitations): 1. Decreased Strength 2. Decreased ADL/Functional Activities 3. Decreased Transfer Abilities 4. Decreased Balance 5. Decreased Activity Tolerance 6. Increased Fatigue 7. Increased Shortness of Breath 8. Decreased Knowledge of Precautions 9. Decreased Cognition INTERVENTIONS PLANNED: (Benefits and precautions of occupational therapy have been discussed with the patient.) 1. Activities of daily living training 2. Adaptive equipment training 3. Balance training 4. Therapeutic activity 5. Therapeutic exercise TREATMENT PLAN: Frequency/Duration: Follow patient 3 times/ week to address above goals. Rehabilitation Potential For Stated Goals: Good REHAB RECOMMENDATIONS (at time of discharge pending progress):   
Placement: It is my opinion, based on this patient's performance to date, that Ms. Lisa Talamantes may benefit from intensive therapy at a [address] [place] [address] after discharge due to the functional deficits listed above that are likely to improve with skilled rehabilitation and concerns that he/she may be unsafe to be unsupervised at home due to fall risk,  inability to complete ADLs . Equipment:  
? None at this time OCCUPATIONAL PROFILE AND HISTORY:  
History of Present Injury/Illness (Reason for Referral): 
See H&P Past Medical History/Comorbidities:  
Ms. Lisa Talamantes  has a past medical history of Anemia, unspecified, Chest pain, unspecified (3/21/2016), Chronic anxiety (4/16/4991), Diastolic heart failure ([address] (3/21/2016), Essential hypertension, benign, Flatulence, eructation, and gas pain, Lump or mass in breast, Other and unspecified hyperlipidemia, Paroxysmal atrial fibrillation ([address] (3/21/2016), Paroxysmal tachycardia ([address] (3/21/2016), Pernicious anemia, Postmenopausal atrophic vaginitis, Unspecified deficiency anemia, Unspecified hypothyroidism, and Vitamin D deficiency. Ms. Ignacia Gamez  has a past surgical history that includes hx hysterectomy; hx orthopaedic; and hx vein stripping. Social History/Living Environment:  
Home Environment: Private residence # Steps to Enter: 2 Rails to Enter: Yes Hand Rails : Left One/Two Story Residence: One story Living Alone: Yes Support Systems: Family member(s) Patient Expects to be Discharged to[de-identified] Rehabilitation facility Current DME Used/Available at Home: Walker, rolling, Shower chair, Catha Fritter, straight Tub or Shower Type: Tub/Shower combination Prior Level of Function/Work/Activity: 
See assessment Number of Personal Factors/Comorbidities that affect the Plan of Care: Expanded review of therapy/medical records (1-2):  MODERATE COMPLEXITY ASSESSMENT OF OCCUPATIONAL PERFORMANCE[de-identified]  
Activities of Daily Living:  
Basic ADLs (From Assessment) Complex ADLs (From Assessment) Feeding: Setup, Stand-by assistance Oral Facial Hygiene/Grooming: Minimum assistance Bathing: Moderate assistance Upper Body Dressing: Minimum assistance Lower Body Dressing: Maximum assistance Toileting: Maximum assistance Instrumental ADL Meal Preparation: Total assistance Homemaking: Total assistance Grooming/Bathing/Dressing Activities of Daily Living Cognitive Retraining Safety/Judgement: Decreased awareness of environment; Fall prevention Bed/Mat Mobility Rolling: Maximum assistance Supine to Sit: Moderate assistance;Maximum assistance;Assist x2 Sit to Stand: Moderate assistance;Assist x2 Stand to Sit: Moderate assistance Scooting: Maximum assistance Most Recent Physical Functioning:  
Gross Assessment: 
  
         
  
Posture: 
Posture (WDL): Exceptions to [facility] Posture Assessment: Trunk flexion, Increased, Rounded shoulders Balance: 
Sitting: Impaired Sitting - Static: Fair (occasional) Sitting - Dynamic: Fair (occasional) Standing - Static: Fair Standing - Dynamic : Poor Bed Mobility: 
Rolling: Maximum assistance Supine to Sit: Moderate assistance;Maximum assistance;Assist x2 Scooting: Maximum assistance Wheelchair Mobility: 
  
Transfers: 
Sit to Stand: Moderate assistance;Assist x2 Stand to Sit: Moderate assistance Patient Vitals for the past 6 hrs: 
 BP BP Patient Position SpO2 Pulse 20 1111 162/71 Sitting 96 % 61 Mental Status Neurologic State: Alert, Confused Orientation Level: Oriented to person Cognition: Follows commands Perception: Appears intact Perseveration: No perseveration noted Safety/Judgement: Decreased awareness of environment, Fall prevention Physical Skills Involved: 
1. Balance 2. Strength 3. Activity Tolerance Cognitive Skills Affected (resulting in the inability to perform in a timely and safe manner): 1. Executive Function 2. Sustained Attention 3. Divided Attention 4. Comprehension Psychosocial Skills Affected: 1. Habits/Routines 2. Environmental Adaptation 3. Self-Awareness Number of elements that affect the Plan of Care: 5+:  HIGH COMPLEXITY CLINICAL DECISION MAKIN[address] [place] [id number] AM-PAC 6 Clicks Daily Activity Inpatient Short Form How much help from another person does the patient currently need. .. Total A Lot A Little None 1. Putting on and taking off regular lower body clothing? [] 1   [x] 2   [] 3   [] 4  
2. Bathing (including washing, rinsing, drying)? [] 1   [x] 2   [] 3   [] 4  
3. Toileting, which includes using toilet, bedpan or urinal?   [] 1   [x] 2   [] 3   [] 4  
4. Putting on and taking off regular upper body clothing? [] 1   [] 2   [x] 3   [] 4  
5. Taking care of personal grooming such as brushing teeth? [] 1   [] 2   [x] 3   [] 4  
6. Eating meals? [] 1   [] 2   [] 3   [x] 4  
© , Trustees of [address] [place] [address] [address] [id number], under license to [employer].  All rights reserved Score:  Initial: 16 Most Recent: X (Date: -- ) Interpretation of Tool:  Represents activities that are increasingly more difficult (i.e. Bed mobility, Transfers, Gait). Medical Necessity:    
· Patient demonstrates · fair ·  rehab potential due to higher previous functional level. Reason for Services/Other Comments: 
· Patient · continues to require present interventions due to patient's inability to complete ADLs · . Use of outcome tool(s) and clinical judgement create a POC that gives a: MODERATE COMPLEXITY  
 
 
 
TREATMENT:  
(In addition to Assessment/Re-Assessment sessions the following treatments were rendered) Pre-treatment Symptoms/Complaints:   
Pain: Initial:  
Pain Intensity 1: 0  Post Session:  5 (hip, with movement. Repositioned) Therapeutic Exercise: (15 minutes):  Exercises per grid below to improve mobility, strength and activity tolerance. Required minimal visual, verbal and tactile cues to promote proper body alignment and promote proper body mechanics. Progressed resistance and repetitions as indicated. Date: 
8/26 Date: 
 Date: Activity/Exercise Parameters Parameters Parameters Shoulder Abd/Adduction 10 reps Shoulder Flexion 10 reps Elbow Flexion 10 reps Chest Press 10 reps Braces/Orthotics/Lines/Etc:  
· O2 Device: Heated, Hi flow nasal cannula Treatment/Session Assessment:   
· Response to Treatment:  no adverse reaction · Interdisciplinary Collaboration:  
o Certified Occupational Therapy Assistant 
o Registered Nurse · After treatment position/precautions:  
o Up in chair 
o Bed alarm/tab alert on 
o Bed/Chair-wheels locked 
o Call light within reach · Compliance with Program/Exercises: Will assess as treatment progresses. · Recommendations/Intent for next treatment session: \"Next visit will focus on advancements to more challenging activities and reduction in assistance provided\". Total Treatment Duration: OT Patient Time In/Time Out Time In: 1520 Time Out: 6638 [address] [place]

## 2020-08-26 NOTE — PROGRESS NOTES
Problem: Mobility Impaired (Adult and Pediatric) Goal: *Acute Goals and Plan of Care (Insert Text) Outcome: Progressing Towards Goal 
Note: STG: 
(1.)Ms. Sukumar lCeary will move from supine to sit and sit to supine , scoot up and down, and roll side to side with CONTACT GUARD ASSIST within 3 treatment day(s). (2.)Ms. Sukumar Cleary will transfer from bed to chair and chair to bed with CONTACT GUARD ASSIST using the least restrictive device within 3 treatment day(s). (3.)Ms. Sukumar Cleary will ambulate with MINIMAL ASSIST for 75 feet with the least restrictive device within 3 treatment day(s). LTG: 
(1.)Ms. Sukumar Cleary will move from supine to sit and sit to supine , scoot up and down, and roll side to side in bed with SUPERVISION within 7 treatment day(s). (2.)Ms. Sukumar Cleary will transfer from bed to chair and chair to bed with SUPERVISION using the least restrictive device within 7 treatment day(s). (3.)Ms. Sukumar Cleary will ambulate with STAND BY ASSIST for 250 feet with the least restrictive device within 7 treatment day(s). _________________________________________________________________________________________ PHYSICAL THERAPY: Daily Note and AM 8/26/2020 INPATIENT: PT Visit Days : 4 [place] / Product Type: Medicare /   
  
NAME/AGE/GENDER: Lady Martini is a 80 y.o. female PRIMARY DIAGNOSIS: Closed left hip fracture ([facility] [address] [S72.002A] Closed left hip fracture (HCC) Closed left hip fracture ([facility] [address] Procedure(s) (LRB): HIP HEMIARTHROPLASTY (Left) 4 Days Post-Op ICD-10: Treatment Diagnosis:  
 · Generalized Muscle Weakness (M62.81) · Other lack of cordination (R27.8) · Difficulty in walking, Not elsewhere classified (R26.2) · Other abnormalities of gait and mobility (R26.89) · History of falling (Z91.81) Precaution/Allergies: 
Patient has no known allergies.   
  
ASSESSMENT:  
 
Ms. Sukumar Cleary  is a 80year old female who has been admitted to hospital on 08/21/20 s/p LT hip hemiarthroplasty. Prior to hospital admission pt lives alone in a 1 story home with 2 step(s) to enter with LT side railing. Pt endorses 1 falls in past 6 months. Prior to admission MOD I for ADLs and mobility. This morning, pt presents supine in bed and is agreeable to therapy. Pt performed supine to sit with mod assist x 2. Pt with fair+ static sitting balance. Pt stood 3 times with mod assist x 2 and on 3rd time was able to ambulate 12' to chair using rolling walker and mod assist.  Pt requires cues for walker management, gait safety, and guidance. Pt sat in chair and performed below LE exercises with some assist to stay on task and complete correctly. Pt was left reclined in chair, alarm on, needs in reach, and RN aware. Pt making slow progress towards goals. Will continue with POC. This section established at most recent assessment PROBLEM LIST (Impairments causing functional limitations): 1. Decreased Strength 2. Decreased ADL/Functional Activities 3. Decreased Transfer Abilities 4. Decreased Ambulation Ability/Technique 5. Decreased Balance 6. Increased Pain 7. Decreased Flexibility/Joint Mobility 8. Decreased [place] with Home Exercise Program 
 INTERVENTIONS PLANNED: (Benefits and precautions of physical therapy have been discussed with the patient.) 1. Balance Exercise 2. Bed Mobility 3. Family Education 4. Gait Training 5. Group Therapy 6. Home Exercise Program (HEP) 7. Manual Therapy 8. Neuromuscular Re-education/Strengthening 9. Range of Motion (ROM) 10. Therapeutic Activites 11. Therapeutic Exercise/Strengthening 12. Transfer Training TREATMENT PLAN: Frequency/Duration: twice daily for duration of hospital stay Rehabilitation Potential For Stated Goals: Good REHAB RECOMMENDATIONS (at time of discharge pending progress):   
Placement:  
It is my opinion, based on this patient's performance to date, that Ms. Elvis Snowden may benefit from participating in 1-2 additional therapy sessions in order to continue to assess for rehab potential and then make recommendation for disposition at discharge. Equipment:  
? None at this time HISTORY:  
History of Present Injury/Illness (Reason for Referral): 
Per  Physician Note: Whitney Mills is a 80 y.o. female with medical history significant for anxiety, chronic diastolic heart failure, essential hypertension, atrial fibrillation s/p PPM,  multiple myeloma, hypothyroidism and mild dementia who presented from home after falling out of her recliner. Patient is very hard of hearing and therefore difficult to get detailed history. Patient denies any head trauma, presyncopal symptoms prior to falling. As per daughter, patient's has been having some worsening dementia lately and slight confusions similar to when she had UTI in the past.  Patient reports she was in her usual health prior to the fall and daughter reports that patient has not complaint about any issues except for occasional shortness of breath on exertions. Denies fever, chills, sob at rest, chest pains. Reports using 2L O2 at bedtime but has not required O2 during day time or on exertion. Past Medical History/Comorbidities:  
Ms. Elvis Snowden  has a past medical history of Anemia, unspecified, Chest pain, unspecified (3/21/2016), Chronic anxiety (8/20/6761), Diastolic heart failure ([address] (3/21/2016), Essential hypertension, benign, Flatulence, eructation, and gas pain, Lump or mass in breast, Other and unspecified hyperlipidemia, Paroxysmal atrial fibrillation ([address] (3/21/2016), Paroxysmal tachycardia ([address] (3/21/2016), Pernicious anemia, Postmenopausal atrophic vaginitis, Unspecified deficiency anemia, Unspecified hypothyroidism, and Vitamin D deficiency. Ms. Elvis Snowden  has a past surgical history that includes hx hysterectomy; hx orthopaedic; and hx vein stripping. Social History/Living Environment: Home Environment: Private residence # Steps to Enter: 2 Rails to Enter: Yes Hand Rails : Left One/Two Story Residence: One story Living Alone: Yes Support Systems: Family member(s) Patient Expects to be Discharged to[de-identified] Rehabilitation facility Current DME Used/Available at Home: Walker, rolling, Shower chair, [place], straight Tub or Shower Type: Tub/Shower combination Prior Level of Function/Work/Activity: Mod I with ADLs and mobility Number of Personal Factors/Comorbidities that affect the Plan of Care: 3+: HIGH COMPLEXITY EXAMINATION:  
Most Recent Physical Functioning:  
Gross Assessment: 
  
         
  
Posture: 
  
Balance: 
Sitting - Static: Fair (occasional) Sitting - Dynamic: Fair (occasional) Standing - Static: Fair Standing - Dynamic : Poor Bed Mobility: 
Rolling: Maximum assistance Supine to Sit: Moderate assistance;Maximum assistance;Assist x2 Scooting: Maximum assistance Wheelchair Mobility: 
  
Transfers: 
Sit to Stand: Moderate assistance;Assist x2 Stand to Sit: Moderate assistance Gait: 
Left Side Weight Bearing: As tolerated Speed/Giana: Fluctuations; Slow Step Length: Left shortened;Right shortened Gait Abnormalities: Decreased step clearance; Step to gait;Trunk sway increased Distance (ft): 12 Feet (ft) Assistive Device: Walker, rolling Ambulation - Level of Assistance: Moderate assistance Body Structures Involved: 1. Nerves 2. Bones 3. Joints 4. Muscles 5. Ligaments Body Functions Affected: 1. Sensory/Pain 2. Movement Related Activities and Participation Affected: 1. Mobility 2. Self Care 3. Domestic Life 4. Interpersonal Interactions and Relationships 5. Community, Social and [place] Number of elements that affect the Plan of Care: 4+: HIGH COMPLEXITY CLINICAL PRESENTATION:  
Presentation: Stable and uncomplicated: LOW COMPLEXITY CLINICAL DECISION MAKIN [facility] [address] [address] AM-PAC 6 Clicks Basic Mobility Inpatient Short Form How much difficulty does the patient currently have. .. Unable A Lot A Little None 1. Turning over in bed (including adjusting bedclothes, sheets and blankets)? [] 1   [] 2   [x] 3   [] 4  
2. Sitting down on and standing up from a chair with arms ( e.g., wheelchair, bedside commode, etc.)   [] 1   [x] 2   [] 3   [] 4  
3. Moving from lying on back to sitting on the side of the bed? [] 1   [] 2   [x] 3   [] 4 How much help from another person does the patient currently need. .. Total A Lot A Little None 4. Moving to and from a bed to a chair (including a wheelchair)? [] 1   [x] 2   [] 3   [] 4  
5. Need to walk in hospital room? [] 1   [x] 2   [] 3   [] 4  
6. Climbing 3-5 steps with a railing? [x] 1   [] 2   [] 3   [] 4  
© 2007, Trustees of [address] [place] [address], under license to [employer]. All rights reserved Score:  Initial: 13 Most Recent: X (Date: -- ) Interpretation of Tool:  Represents activities that are increasingly more difficult (i.e. Bed mobility, Transfers, Gait). Medical Necessity:    
· Patient is expected to demonstrate progress in  
· strength, range of motion, balance, coordination, and functional technique ·  to  
· increase independence with functional mobility and ambulation · . Reason for Services/Other Comments: 
· Patient continues to require skilled intervention due to · LLE weakness, pain, decreased ROM and increased fall risk · . Use of outcome tool(s) and clinical judgement create a POC that gives a: Questionable prediction of patient's progress: MODERATE COMPLEXITY  
  
 
 
 
TREATMENT:  
(In addition to Assessment/Re-Assessment sessions the following treatments were rendered) Pre-treatment Symptoms/Complaints: \"Can I take a shower? \" 
Pain: Initial: RN notified for pain med request.  
Pain Intensity 1: 0  Post Session:    
 
Therapeutic Activity: (    18 minutes):   Therapeutic activities including Bed mobility, Chair transfers, Ambulation on level ground to improve mobility, strength, balance, and coordination. Required moderate visual, verbal and tactile cues to promote static and dynamic balance in standing and promote coordination of bilateral, lower extremity(s). Therapeutic Exercise: ( 10 minutes):  Exercises per grid below to improve mobility, strength, balance and coordination. Required moderate visual, verbal and manual cues to promote proper body posture and promote proper body mechanics. Progressed range and repetitions as indicated. Date: 
8/25/20 Date: 
8/26/20 Date: 
  
ACTIVITY/EXERCISE AM PM AM PM AM PM  
Ambulation:           Distance Device Duration Seated Heel Raises X 15 B AA  X 15 B Seated Toe Raises X 15 B AA  X 15 B Seated Long Arc Quads X 10 L AA  X 15 B Seated Marching X 10 L AA Seated Hip Abduction X 10 L AA  X 10 L AA     
        
B = bilateral; AA = active assistive; A = active; P = passive Braces/Orthotics/Lines/Etc:  
· IV 
· geronimo catheter · O2 via Nasal cannula Treatment/Session Assessment:   
· Response to Treatment:  See Above · Interdisciplinary Collaboration:  
o Physical Therapy Assistant 
o Registered Nurse 
o Certified Nursing Assistant/Patient Care Technician · After treatment position/precautions:  
o Up in chair 
o Bed alarm/tab alert on 
o Bed/Chair-wheels locked 
o Call light within reach 
o RN notified · Compliance with Program/Exercises: Will assess as treatment progresses · Recommendations/Intent for next treatment session: \"Next visit will focus on advancements to more challenging activities\". Total Treatment Duration: PT Patient Time In/Time Out Time In: 4043 Time Out: 3051 [facility]

## 2020-08-27 NOTE — PROGRESS NOTES
Problem: Mobility Impaired (Adult and Pediatric) Goal: *Acute Goals and Plan of Care (Insert Text) Outcome: Progressing Towards Goal 
Note: STG: 
(1.)Ms. Phani Mack will move from supine to sit and sit to supine , scoot up and down, and roll side to side with CONTACT GUARD ASSIST within 3 treatment day(s). (2.)Ms. Phani Mack will transfer from bed to chair and chair to bed with CONTACT GUARD ASSIST using the least restrictive device within 3 treatment day(s). (3.)Ms. Phani Mack will ambulate with MINIMAL ASSIST for 75 feet with the least restrictive device within 3 treatment day(s). LTG: 
(1.)Ms. Phani Mack will move from supine to sit and sit to supine , scoot up and down, and roll side to side in bed with SUPERVISION within 7 treatment day(s). (2.)Ms. Phani Mack will transfer from bed to chair and chair to bed with SUPERVISION using the least restrictive device within 7 treatment day(s). (3.)Ms. Phani Mack will ambulate with STAND BY ASSIST for 250 feet with the least restrictive device within 7 treatment day(s). _________________________________________________________________________________________ PHYSICAL THERAPY: Daily Note and AM 8/27/2020 INPATIENT: PT Visit Days : 5 [place] / Product Type: Medicare /   
  
NAME/AGE/GENDER: Garima Cotter is a 80 y.o. female PRIMARY DIAGNOSIS: Closed left hip fracture ([facility] [address] [S72.002A] Closed left hip fracture (HCC) Closed left hip fracture ([facility] [address] Procedure(s) (LRB): HIP HEMIARTHROPLASTY (Left) 5 Days Post-Op ICD-10: Treatment Diagnosis:  
 · Generalized Muscle Weakness (M62.81) · Other lack of cordination (R27.8) · Difficulty in walking, Not elsewhere classified (R26.2) · Other abnormalities of gait and mobility (R26.89) · History of falling (Z91.81) Precaution/Allergies: 
Patient has no known allergies.   
  
ASSESSMENT:  
 
Ms. Phani Mack  is a 80year old female who has been admitted to hospital on 08/21/20 s/p LT hip hemiarthroplasty. Prior to hospital admission pt lives alone in a 1 story home with 2 step(s) to enter with LT side railing. Pt endorses 1 falls in past 6 months. Prior to admission MOD I for ADLs and mobility. Pt presents sitting up in recliner on contact. Pt performed below LE exercises with improved ability today. Pt stood x 3 to rolling walker with mod assist and cues for hand placement. Pt ambulated 20' with rolling walker and min/mod assist.  Pt does require some cues for walker management, safety awareness, and guidance. Pt returned to chair and was left sitting up with lunch tray in front of her. Pt is making progress towards goals this date. Will continue with POC. This section established at most recent assessment PROBLEM LIST (Impairments causing functional limitations): 1. Decreased Strength 2. Decreased ADL/Functional Activities 3. Decreased Transfer Abilities 4. Decreased Ambulation Ability/Technique 5. Decreased Balance 6. Increased Pain 7. Decreased Flexibility/Joint Mobility 8. Decreased [place] with Home Exercise Program 
 INTERVENTIONS PLANNED: (Benefits and precautions of physical therapy have been discussed with the patient.) 1. Balance Exercise 2. Bed Mobility 3. Family Education 4. Gait Training 5. Group Therapy 6. Home Exercise Program (HEP) 7. Manual Therapy 8. Neuromuscular Re-education/Strengthening 9. Range of Motion (ROM) 10. Therapeutic Activites 11. Therapeutic Exercise/Strengthening 12. Transfer Training TREATMENT PLAN: Frequency/Duration: twice daily for duration of hospital stay Rehabilitation Potential For Stated Goals: Good REHAB RECOMMENDATIONS (at time of discharge pending progress):   
Placement: It is my opinion, based on this patient's performance to date, that Ms. Broderick Espinosa may benefit from participating in 1-2 additional therapy sessions in order to continue to assess for rehab potential and then make recommendation for disposition at discharge. Equipment:  
? None at this time HISTORY:  
History of Present Injury/Illness (Reason for Referral): 
Per  Physician Note: Tran King is a 80 y.o. female with medical history significant for anxiety, chronic diastolic heart failure, essential hypertension, atrial fibrillation s/p PPM,  multiple myeloma, hypothyroidism and mild dementia who presented from home after falling out of her recliner. Patient is very hard of hearing and therefore difficult to get detailed history. Patient denies any head trauma, presyncopal symptoms prior to falling. As per daughter, patient's has been having some worsening dementia lately and slight confusions similar to when she had UTI in the past.  Patient reports she was in her usual health prior to the fall and daughter reports that patient has not complaint about any issues except for occasional shortness of breath on exertions. Denies fever, chills, sob at rest, chest pains. Reports using 2L O2 at bedtime but has not required O2 during day time or on exertion. Past Medical History/Comorbidities:  
Ms. Kelley Lindsey  has a past medical history of Anemia, unspecified, Chest pain, unspecified (3/21/2016), Chronic anxiety (0/10/8103), Diastolic heart failure ([address] (3/21/2016), Essential hypertension, benign, Flatulence, eructation, and gas pain, Lump or mass in breast, Other and unspecified hyperlipidemia, Paroxysmal atrial fibrillation ([address] (3/21/2016), Paroxysmal tachycardia ([address] (3/21/2016), Pernicious anemia, Postmenopausal atrophic vaginitis, Unspecified deficiency anemia, Unspecified hypothyroidism, and Vitamin D deficiency. Ms. Kelley Lindsey  has a past surgical history that includes hx hysterectomy; hx orthopaedic; and hx vein stripping. Social History/Living Environment:  
Home Environment: Private residence # Steps to Enter: 2 Rails to Enter: Yes Hand Rails : Left One/Two Story Residence: One story Living Alone: Yes Support Systems: Family member(s) Patient Expects to be Discharged to[de-identified] Rehabilitation facility Current DME Used/Available at Home: Walker, rolling, Shower chair, [address] [facility] [place], straight Tub or Shower Type: Tub/Shower combination Prior Level of Function/Work/Activity: Mod I with ADLs and mobility Number of Personal Factors/Comorbidities that affect the Plan of Care: 3+: HIGH COMPLEXITY EXAMINATION:  
Most Recent Physical Functioning:  
Gross Assessment: 
  
         
  
Posture: 
  
Balance: 
Sitting - Static: Fair (occasional)(+) Sitting - Dynamic: Fair (occasional) Standing - Static: Fair;Constant support Standing - Dynamic : Fair;Constant support Bed Mobility: 
  
Wheelchair Mobility: 
  
Transfers: 
Sit to Stand: Moderate assistance Stand to Sit: Moderate assistance Gait: 
Left Side Weight Bearing: As tolerated Speed/Giana: Slow;Shuffled Step Length: Left shortened;Right shortened Gait Abnormalities: Decreased step clearance;Shuffling gait; Path deviations Distance (ft): 20 Feet (ft) Assistive Device: Walker, rolling Ambulation - Level of Assistance: Minimal assistance; Moderate assistance Body Structures Involved: 1. Nerves 2. Bones 3. Joints 4. Muscles 5. Ligaments Body Functions Affected: 1. Sensory/Pain 2. Movement Related Activities and Participation Affected: 1. Mobility 2. Self Care 3. Domestic Life 4. Interpersonal Interactions and Relationships 5. Community, Social and [place] Number of elements that affect the Plan of Care: 4+: HIGH COMPLEXITY CLINICAL PRESENTATION:  
Presentation: Stable and uncomplicated: LOW COMPLEXITY CLINICAL DECISION MAKIN [facility] [address] [address] AM-PAC 6 Clicks Basic Mobility Inpatient Short Form How much difficulty does the patient currently have. .. Unable A Lot A Little None 1. Turning over in bed (including adjusting bedclothes, sheets and blankets)?    [] 1   [] 2   [x] 3   [] 4  
 2.  Sitting down on and standing up from a chair with arms ( e.g., wheelchair, bedside commode, etc.)   [] 1   [x] 2   [] 3   [] 4  
3. Moving from lying on back to sitting on the side of the bed? [] 1   [] 2   [x] 3   [] 4 How much help from another person does the patient currently need. .. Total A Lot A Little None 4. Moving to and from a bed to a chair (including a wheelchair)? [] 1   [x] 2   [] 3   [] 4  
5. Need to walk in hospital room? [] 1   [x] 2   [] 3   [] 4  
6. Climbing 3-5 steps with a railing? [x] 1   [] 2   [] 3   [] 4  
© 2007, Trustees of [address] [place] [address] [address] [address], under license to [employer]. All rights reserved Score:  Initial: 13 Most Recent: X (Date: -- ) Interpretation of Tool:  Represents activities that are increasingly more difficult (i.e. Bed mobility, Transfers, Gait). Medical Necessity:    
· Patient is expected to demonstrate progress in  
· strength, range of motion, balance, coordination, and functional technique ·  to  
· increase independence with functional mobility and ambulation · . Reason for Services/Other Comments: 
· Patient continues to require skilled intervention due to · LLE weakness, pain, decreased ROM and increased fall risk · . Use of outcome tool(s) and clinical judgement create a POC that gives a: Questionable prediction of patient's progress: MODERATE COMPLEXITY  
  
 
 
 
TREATMENT:  
(In addition to Assessment/Re-Assessment sessions the following treatments were rendered) Pre-treatment Symptoms/Complaints: \"Can I take a shower? \" 
Pain: Initial: RN notified for pain med request.  
Pain Intensity 1: 5  Post Session:    
 
Therapeutic Activity: (    18 minutes): Therapeutic activities including Bed mobility, Chair transfers, Ambulation on level ground to improve mobility, strength, balance, and coordination.   Required moderate visual, verbal and tactile cues to promote static and dynamic balance in standing and promote coordination of bilateral, lower extremity(s). Therapeutic Exercise: ( 10 minutes):  Exercises per grid below to improve mobility, strength, balance and coordination. Required moderate visual, verbal and manual cues to promote proper body posture and promote proper body mechanics. Progressed range and repetitions as indicated. Date: 
8/25/20 Date: 
8/26/20 Date: 
8/27/20 ACTIVITY/EXERCISE AM PM AM PM AM PM  
Ambulation:           Distance Device Duration Seated Heel Raises X 15 B AA  X 15 B  X 15 B Seated Toe Raises X 15 B AA  X 15 B  X 15 B Seated Long Arc Quads X 10 L AA  X 15 B  X 15 B Seated Marching X 10 L AA Seated Hip Abduction X 10 L AA  X 10 L AA  X 15 L AA   
        
B = bilateral; AA = active assistive; A = active; P = passive Braces/Orthotics/Lines/Etc:  
· IV 
· geronimo catheter · O2 via Nasal cannula Treatment/Session Assessment:   
· Response to Treatment:  See Above · Interdisciplinary Collaboration:  
o Physical Therapy Assistant 
o Registered Nurse 
o Rehabilitation Attendant · After treatment position/precautions:  
o Up in chair 
o Bed alarm/tab alert on 
o Bed/Chair-wheels locked 
o Call light within reach 
o RN notified · Compliance with Program/Exercises: Will assess as treatment progresses · Recommendations/Intent for next treatment session: \"Next visit will focus on advancements to more challenging activities\". Total Treatment Duration: PT Patient Time In/Time Out Time In: 1100 Time Out: 1128 Salome Obrien PTA

## 2020-08-27 NOTE — PROGRESS NOTES
Received consult for PEG placement on Mrs. Christina Corrales. I have reviewed the discharge summary and discussed with Dr. Machelle Ventura. We will make her NPO after midnight. Hold the AM dose of Aspirin. We will discuss with family in the morning and if all are in agreement and the patient is felt a candidate, then we will plan for PEG on Friday. Yves Sousa NP 
[facility].

## 2020-08-27 NOTE — DISCHARGE SUMMARY
Hospitalist Discharge Summary Admit Date:  2020  9:06 AM  
DC note date: 2020 Name:  Paz Rogers Age:  80 y.o. 
:  1930 MRN:  [id number] PCP:  Pauly Massey MD 
Treatment Team: Attending Provider: Emmanuel Hastings MD; Surgeon: Louise Weber MD; Consulting Provider: Jorge Austin MD; Care Manager: Nino Clemens RN; Consulting Provider: Eduardo Fairchild MD; Consulting Provider: Keara Lira MD; Physical Therapy Assistant: Yenny Ames PTA; Speech Language Pathologist: Arvel Baumgarten, SLP; Occupational Therapy Assistant: Briseida Shine Problem List for this Hospitalization: 
Hospital Problems as of 2020 Date Reviewed: 2020 Codes Class Noted - Resolved POA  
 DNR (do not resuscitate) (Chronic) ICD-10-CM: B41 ICD-9-CM: V49.86  2020 - Present Yes Oropharyngeal dysphagia ICD-10-CM: R13.12 
ICD-9-CM: 787.22  2020 - Present Yes * (Principal) Closed left hip fracture ([address] ICD-10-CM: O23.215S ICD-9-CM: 820.8  2020 - Present Yes Acute cystitis without hematuria ICD-10-CM: N30.00 ICD-9-CM: 595.0  2020 - Present Yes Multiple myeloma (HCC) (Chronic) ICD-10-CM: C90.00 ICD-9-CM: 203.00  2019 - Present Yes Stage 3 chronic kidney disease (HCC) (Chronic) ICD-10-CM: N18.3 ICD-9-CM: 585.3  2017 - Present Yes Essential hypertension with goal blood pressure less than 130/85 (Chronic) ICD-10-CM: I10 
ICD-9-CM: 401.9  2016 - Present Yes Chronic diastolic heart failure (HCC) (Chronic) ICD-10-CM: I50.32 
ICD-9-CM: 428.32  2016 - Present Yes Dementia without behavioral disturbance (HCC) (Chronic) ICD-10-CM: F03.90 ICD-9-CM: 294.20  2016 - Present Yes Persistent atrial fibrillation (HCC) (Chronic) ICD-10-CM: I48.19 ICD-9-CM: 427.31  2016 - Present Yes Iron deficiency anemia (Chronic) ICD-10-CM: D50.9 ICD-9-CM: 280.9  6/18/2016 - Present Yes Debility (Chronic) ICD-10-CM: R53.81 ICD-9-CM: 799.3  6/18/2016 - Present Yes Presence of cardiac pacemaker (Chronic) ICD-10-CM: Z95.0 ICD-9-CM: V45.01  3/21/2016 - Present Yes Chronic anxiety (Chronic) ICD-10-CM: F41.9 ICD-9-CM: 300.00  3/21/2016 - Present Yes Essential hypertension, benign (Chronic) ICD-10-CM: I10 
ICD-9-CM: 401.1  7/9/2015 - Present Yes Acquired hypothyroidism (Chronic) ICD-10-CM: E03.9 ICD-9-CM: 244.9  7/9/2015 - Present Yes Admission HPI from 8/21/2020:   
\" Paz Rogers is a 80 y.o. female with medical history significant for anxiety, chronic diastolic heart failure, essential hypertension, atrial fibrillation s/p PPM,  multiple myeloma, hypothyroidism and mild dementia who presented from home after falling out of her recliner. Patient is very hard of hearing and therefore difficult to get detailed history. Patient denies any head trauma, presyncopal symptoms prior to falling. As per daughter, patient's has been having some worsening dementia lately and slight confusions similar to when she had UTI in the past.  Patient reports she was in her usual health prior to the fall and daughter reports that patient has not complaint about any issues except for occasional shortness of breath on exertions. Denies fever, chills, sob at rest, chest pains. Reports using 2L O2 at bedtime but has not required O2 during day time or on exertion. In the ED, labs were notable for hemoglobin 10.6, hematocrit 32.5, creatinine 1.96, BUN 28. Urinalysis shows 4+ bacteria with more than 100 WBC. X-ray shows left basicervical femoral neck fracture. Hospitalist service was contacted for admission. Ortho surgery notified. \" 
 
Hospital Course: 
Patient is a 79yo F with hx dementia, MM, HFpEF, HTN, and AF who presented with fall and L femoral neck fracture and UTI.   Complicated by some iatrogenic fluid overload in postop period, resolved after lasix. Failed SLP eval so was made NPO. Not a PEG candidate. PC consulted. She does not meet hospice house criteria at this time. So essentially the only options are home hospice or accepting risk of aspiration and going to SNF for STR. Family cannot care for her at home with home hospice so option 1 is out. Pt is happy, pleasantly confused, interactive, and otherwise doing well and seems to still have some quality of life so I think it is reasonable to accept risk of aspiration rather than hospice. Family understands she will aspirate and at some point will likely decline. At that point hospice could be arranged if family desires. She is OK to resume whatever diet she was on before. Would focus on quality of life rather than dietary restriction and let her have what she wants. Aspiration precautions should still be observed at SNF and SLP should continue to follow pt there. She completed course of IV abx for UTI here. ADDENDUM 8/28: 
Discharge held yesterday due to family deciding they wanted PEG. It was scheduled for today but then family changed their mind this morning and decided they want home hospice. We will arrange this. Up to family whether to continue meds or not. SPEECH PATHOLOGY NOTE: 
\"Speech therapy following for dysphagia. Modified barium swallow study completed 8/25-moderate oral and severe pharyngeal dysphagia. Poor oral containment, delayed swallow initiation, and reduced hyolaryngeal excursion with reduced laryngeal closure resulted in aspiration of thin by teaspoon with ineffective cough response, non-clearing penetration with nectar by cup/straw, silent aspiration of honey by teaspoon, and nonclearing penetration with pudding. Reduced tongue base retraction and decreased epiglottic inversion resulted in mild- mod vallecular residue post swallow with nectar, honey, and pudding trials.  Following trial of honey by teaspoon, vallecular residue spilled over epiglottic rim and was silently aspirated after the swallow. Recommend NPO with non-oral medications until goals of care are discussed. Can have 1-2 ice chips for pleasure after oral care. Patient would benefit from palliative care consult, per MD discretion, to discuss patient/family wishes and goals of care. Poor candidate for long term alternate means of nutrition due to advanced age and dementia. If family accepts risk for aspiration resume baseline oral diet. 
  
Patient at risk of aspiration with any/all po intake per swallow study. Family has been discussing goals/wishes with palliative care and hospitalist. Notified by Dr. Anupama Moran via [facility] that family has decided to accept risks of aspiration with po intake at this time and do not wish for alternative means of nutrition. Therefore, resume baseline diet with known aspiration risks. \" 
 
Disposition: Home Hospice Activity: PT/OT Eval and Treat, SLP also Diet: DIET NPO Code Status: DNR Follow Up Orders: No orders of the defined types were placed in this encounter. Follow-up Information Follow up With Specialties Details Why Contact Teo Garcia  On 9/9/2020 10:10 am [address] [place] [phone] 
[phone] Neal Sy MD Family Medicine Call As needed [facility] [address] [id number] [phone] Discharge meds at bottom of this note. Plan was discussed with pt, family. All questions answered. Patient was stable at time of discharge. Given instructions to call a physician or return if any concerns. Discharge summary and encounter summary was sent to PCP electronically via \"Comm Mgt\" link in [employer] [facility], if possible. Diagnostic Imaging/Tests:  
Xr Chest Sngl V Result Date: 8/23/2020 EXAM: Chest x-ray. INDICATION: Dyspnea. COMPARISON: August 21, 2020. TECHNIQUE: Frontal view chest x-ray. FINDINGS: There is new left lung base atelectasis or infiltrate, obscuring the diaphragm. A small left pleural effusion is not excluded. There is also new minimal linear atelectasis in the right lung base. No pneumothorax or vascular congestion is seen. Again noted is a left chest wall pacemaker. IMPRESSION: New left lung base atelectasis or infiltrate. Xr Chest Sngl V Result Date: 8/21/2020 PELVIS AND LEFT HIP, 3 views. HISTORY: Hip pain following fall. TECHNIQUE: AP view of the pelvis and coned down AP and frogleg lateral of the hip. FINDINGS: -Bony pelvic ring: Appears intact. -SI joints: Appear symmetric. -Pubic rami: Appear intact. -Femoral head:  Has a round smooth contour. -Femoral neck and proximal femoral shaft:  Bases cervical fracture of the femoral neck with varus deformity. -Lower lumbar spine: Mild DJD. IMPRESSION:  Basicervical femoral neck fracture. LEFT FEMUR 2 view(s). HISTORY: Pain following fall TECHNIQUE: AP and lateral views. COMPARISON: None. FINDINGS: Femoral neck fracture is present. Remainder the femoral shaft is intact. 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 Arterial calcifications are identified. IMPRESSION: Femoral neck fracture. Remainder of the femoral shaft is intact. CHEST X-RAY, one view. HISTORY:  Proper evaluation for femoral neck fracture repair  November 2019 TECHNIQUE:  AP supine view. COMPARISON: November 2019 FINDINGS: Lungs: are clear. Costophrenic angles: are sharp. Heart size: Borderline. Pulmonary vasculature: is unremarkable. Aorta: Calcifications with normal caliber. Included portion of the upper abdomen: is unremarkable. Bones: No gross bony lesions. Other: Dual lead left-sided cardiac pacemaker is present. IMPRESSION:  Borderline cardiomegaly and aortic atherosclerosis and cardiac pacemaker. [place] Xr Hip Lt W Or Wo Pelv 2-3 Vws Result Date: 8/22/2020 Clinical History: The Female patient is 80years old  presenting with symptoms of Postop. Comparison:  none Findings: 3 views of the pelvis and left hip were obtained. A bipolar left hip prosthesis is in place. Anatomic alignment positioning is demonstrated. There are overlying surgical staples. There is moderate joint space loss throughout the visualized right hip. Impression: A total left hip prosthesis placement. Xr Hip Lt W Or Wo Pelv 2-3 Vws Result Date: 8/21/2020 PELVIS AND LEFT HIP, 3 views. HISTORY: Hip pain following fall. TECHNIQUE: AP view of the pelvis and coned down AP and frogleg lateral of the hip. FINDINGS: -Bony pelvic ring: Appears intact. -SI joints: Appear symmetric. -Pubic rami: Appear intact. -Femoral head:  Has a round smooth contour. -Femoral neck and proximal femoral shaft:  Bases cervical fracture of the femoral neck with varus deformity. -Lower lumbar spine: Mild DJD. IMPRESSION:  Basicervical femoral neck fracture. LEFT FEMUR 2 view(s). HISTORY: Pain following fall TECHNIQUE: AP and lateral views. COMPARISON: None. FINDINGS: Femoral neck fracture is present. Remainder the femoral shaft is intact. 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 Arterial calcifications are identified. IMPRESSION: Femoral neck fracture. Remainder of the femoral shaft is intact. CHEST X-RAY, one view. HISTORY:  Proper evaluation for femoral neck fracture repair  November 2019 TECHNIQUE:  AP supine view. COMPARISON: November 2019 FINDINGS: Lungs: are clear. Costophrenic angles: are sharp. Heart size: Borderline. Pulmonary vasculature: is unremarkable. Aorta: Calcifications with normal caliber. Included portion of the upper abdomen: is unremarkable. Bones: No gross bony lesions. Other: Dual lead left-sided cardiac pacemaker is present. IMPRESSION:  Borderline cardiomegaly and aortic atherosclerosis and cardiac pacemaker. T[place] Jara Xr Femur Lt 2 V Result Date: 8/21/2020 PELVIS AND LEFT HIP, 3 views. HISTORY: Hip pain following fall. TECHNIQUE: AP view of the pelvis and coned down AP and frogleg lateral of the hip. FINDINGS: -Bony pelvic ring: Appears intact. -SI joints: Appear symmetric. -Pubic rami: Appear intact. -Femoral head:  Has a round smooth contour. -Femoral neck and proximal femoral shaft:  Bases cervical fracture of the femoral neck with varus deformity. -Lower lumbar spine: Mild DJD. IMPRESSION:  Basicervical femoral neck fracture. LEFT FEMUR 2 view(s). HISTORY: Pain following fall TECHNIQUE: AP and lateral views. COMPARISON: None. FINDINGS: Femoral neck fracture is present. Remainder the femoral shaft is intact. 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 Arterial calcifications are identified. IMPRESSION: Femoral neck fracture. Remainder of the femoral shaft is intact. CHEST X-RAY, one view. HISTORY:  Proper evaluation for femoral neck fracture repair  November 2019 TECHNIQUE:  AP supine view. COMPARISON: November 2019 FINDINGS: Lungs: are clear. Costophrenic angles: are sharp. Heart size: Borderline. Pulmonary vasculature: is unremarkable. Aorta: Calcifications with normal caliber. Included portion of the upper abdomen: is unremarkable. Bones: No gross bony lesions. Other: Dual lead left-sided cardiac pacemaker is present. IMPRESSION:  Borderline cardiomegaly and aortic atherosclerosis and cardiac pacemaker. Melvenia Shreya Xr Swallow [facility] Video Result Date: 8/25/2020 MODIFIED BARIUM SWALLOW ESOPHAGRAM 8/25/2020 HISTORY: Oropharyngeal dysphagia. Hip fracture. Dementia. UTI. COVID-19 rule out. TECHNIQUE: 0 Spot fluoroscopic images were saved. 2.5 minutes of fluoroscopy time was utilized. The patient ingested various consistencies of barium under direct fluoroscopic observation. FINDINGS: There was aspiration with thin liquids as well as deep laryngeal penetration with honey consistency and transient laryngeal penetration with pudding consistency. IMPRESSION: Aspiration and laryngeal penetration with multiple consistencies of barium. See above description. Echocardiogram results: No results found for this visit on 08/21/20. Procedures done this admission: 
Procedure(s): HIP HEMIARTHROPLASTY All Micro Results Procedure Component Value Units Date/Time CULTURE, BLOOD [[id number]] Collected:  08/23/20 5643 Order Status:  Completed Specimen:  Blood Updated:  08/27/20 1309 Special Requests: --     
  LEFT 
HAND Culture result: NO GROWTH 4 DAYS     
 CULTURE, BLOOD [[id number]] Collected:  08/23/20 9463 Order Status:  Completed Specimen:  Blood Updated:  08/27/20 1309 Special Requests: --     
  LEFT 
ARM Culture result: NO GROWTH 4 DAYS     
 MSSA/MRSA SC BY PCR, NASAL SWAB [[id number]] Collected:  08/23/20 1018 Order Status:  Completed Specimen:  Nasal swab Updated:  08/23/20 1403 Special Requests: NO SPECIAL REQUESTS Culture result:    
  SA target not detected. A MRSA NEGATIVE, SA NEGATIVE test result does not preclude MRSA or SA nasal colonization. SARS-CoV-2 Lab Results \"Novel Coronavirus\" Test: No results found for: COV2NT \"Emergent Disease\" Test: No results found for: EDPR \"SARS-COV-2\" Test: No results found for: XGCOVT \"Precision Labs\" Test: No results found for: RSLT Rapid Test:  
Lab Results Component Value Date/Time COVR Not detected 08/24/2020 11:37 PM  
 
  
 
 
Labs: Results:  
   
BMP, Mg, Phos Recent Labs  
  08/28/20 
0538 08/27/20 
1107 08/26/20 
0601  146* 150*  
K 2.9* 3.0* 2.8*  
 109* 111* CO2 29 31 32 AGAP 8 6* 7  
BUN 21 23 28* CREA 1.13* 1.22* 1.25* CA 7.7* 8.2* 8.9 * 112* 110* MG 2.4  --   --   
  
CBC Recent Labs  
  08/28/20 
0538 08/26/20 
0601 WBC 8.2 5.6  
RBC 2.65* 2.74* HGB 7.9* 8.2* HCT 26.3* 27.4*  
 166 LFT No results for input(s): ALT, TBIL, AP, TP, ALB, GLOB, AGRAT in the last 72 hours. No lab exists for component: SGOT, GPT Cardiac Testing Lab Results Component Value Date/Time  (H) 01/11/2019 12:49 PM  
  10/09/2017 11:02 AM  
  01/25/2017 02:17 PM  
  06/16/2016 11:30 AM  
  06/13/2016 08:51 AM  
  
Coagulation Tests Lab Results Component Value Date/Time Prothrombin time 13.6 08/21/2020 12:09 PM  
 INR 1.0 08/21/2020 12:09 PM  
  
A1c No results found for: HBA1C, HGBE8, HBI4WDBJ, BPU8WCBN Lipid Panel Lab Results Component Value Date/Time Cholesterol, total 144 01/15/2018 03:56 PM  
 HDL Cholesterol 62 01/15/2018 03:56 PM  
 LDL, calculated 60 01/15/2018 03:56 PM  
 VLDL, calculated 22 01/15/2018 03:56 PM  
 Triglyceride 110 01/15/2018 03:56 PM  
  
Thyroid Panel Lab Results Component Value Date/Time TSH 3.440 02/09/2019 02:08 PM  
 TSH 2.420 01/15/2018 03:56 PM  
 T4, Total 12.0 (H) 08/04/2008 10:59 AM  
 T3 Uptake 24 08/04/2008 10:59 AM  
    
Most Recent UA Lab Results Component Value Date/Time Color YELLOW 02/25/2019 01:45 PM  
 Appearance CLEAR 02/25/2019 01:45 PM  
 Specific gravity 1.006 02/25/2019 01:45 PM  
 pH (UA) 6.5 02/25/2019 01:45 PM  
 Protein NEGATIVE  02/25/2019 01:45 PM  
 Glucose NEGATIVE  02/25/2019 01:45 PM  
 Ketone NEGATIVE  02/25/2019 01:45 PM  
 Bilirubin NEGATIVE  02/25/2019 01:45 PM  
 Blood MODERATE (A) 02/25/2019 01:45 PM  
 Urobilinogen 0.2 02/25/2019 01:45 PM  
 Nitrites NEGATIVE  02/25/2019 01:45 PM  
 Leukocyte Esterase TRACE (A) 02/25/2019 01:45 PM  
 WBC >100 (H) 08/21/2020 12:08 PM  
 RBC 20-50 08/21/2020 12:08 PM  
 Epithelial cells 0 08/21/2020 12:08 PM  
 Bacteria 4+ (H) 08/21/2020 12:08 PM  
 Casts 0-3 08/21/2020 12:08 PM  
 Crystals, urine 0 02/09/2019 02:32 PM  
 Mucus 0 02/09/2019 02:32 PM  
  
 
No Known Allergies Immunization History Administered Date(s) Administered  TB Skin Test (PPD) Intradermal 04/07/2016, 06/13/2016, 12/30/2018, 08/22/2020 All Labs from Last 24 Hrs: 
Recent Results (from the past 24 hour(s)) METABOLIC PANEL, BASIC Collection Time: 08/27/20 11:07 AM  
Result Value Ref Range Sodium 146 (H) 136 - 145 mmol/L Potassium 3.0 (L) 3.5 - 5.1 mmol/L Chloride 109 (H) 98 - 107 mmol/L  
 CO2 31 21 - 32 mmol/L Anion gap 6 (L) 7 - 16 mmol/L Glucose 112 (H) 65 - 100 mg/dL BUN 23 8 - 23 MG/DL Creatinine 1.22 (H) 0.6 - 1.0 MG/DL  
 GFR est AA 53 (L) >60 ml/min/1.73m2 GFR est non-AA 44 (L) >60 ml/min/1.73m2 Calcium 8.2 (L) 8.3 - 10.4 MG/DL MAGNESIUM Collection Time: 08/28/20  5:38 AM  
Result Value Ref Range Magnesium 2.4 1.8 - 2.4 mg/dL CBC W/O DIFF Collection Time: 08/28/20  5:38 AM  
Result Value Ref Range WBC 8.2 4.3 - 11.1 K/uL  
 RBC 2.65 (L) 4.05 - 5.2 M/uL HGB 7.9 (L) 11.7 - 15.4 g/dL HCT 26.3 (L) 35.8 - 46.3 % MCV 99.2 (H) 79.6 - 97.8 FL  
 MCH 29.8 26.1 - 32.9 PG  
 MCHC 30.0 (L) 31.4 - 35.0 g/dL  
 RDW 15.2 (H) 11.9 - 14.6 % PLATELET 643 533 - 225 K/uL MPV 12.1 9.4 - 12.3 FL ABSOLUTE NRBC 0.02 0.0 - 0.2 K/uL METABOLIC PANEL, BASIC Collection Time: 08/28/20  5:38 AM  
Result Value Ref Range Sodium 141 136 - 145 mmol/L Potassium 2.9 (LL) 3.5 - 5.1 mmol/L Chloride 104 98 - 107 mmol/L  
 CO2 29 21 - 32 mmol/L Anion gap 8 7 - 16 mmol/L Glucose 113 (H) 65 - 100 mg/dL BUN 21 8 - 23 MG/DL Creatinine 1.13 (H) 0.6 - 1.0 MG/DL  
 GFR est AA 58 (L) >60 ml/min/1.73m2 GFR est non-AA 48 (L) >60 ml/min/1.73m2 Calcium 7.7 (L) 8.3 - 10.4 MG/DL Discharge Exam: 
Patient Vitals for the past 24 hrs: 
 Temp Pulse Resp BP SpO2  
08/28/20 0752 99.2 °F (37.3 °C) 60 18 112/60 97 % 08/28/20 0417 99 °F (37.2 °C) 60 16 135/62 95 % 08/28/20 0022 98.3 °F (36.8 °C) 63 18 130/57 97 % 08/27/20 2050 98.7 °F (37.1 °C) 64 18 108/60 99 % 08/27/20 1620 97.6 °F (36.4 °C) 68 18 126/66 95 % 08/27/20 1145 97.5 °F (36.4 °C) 66 18 137/70 94 % Oxygen Therapy O2 Sat (%): 97 % (08/28/20 0752) Pulse via Oximetry: 68 beats per minute (08/24/20 2200) O2 Device: Nasal cannula (08/25/20 0806) O2 Flow Rate (L/min): 2 l/min (08/25/20 0806) O2 Temperature: 87.8 °F (31 °C) (08/23/20 1554) FIO2 (%): 35 % (08/23/20 1554) Estimated body mass index is 24.45 kg/m² as calculated from the following: 
  Height as of this encounter: 5' 3\" (1.6 m). Weight as of this encounter: 62.6 kg (138 lb). Intake/Output Summary (Last 24 hours) at 8/28/2020 7854 Last data filed at 8/28/2020 8277 Gross per 24 hour Intake  Output 500 ml Net -500 ml *Note that automatically entered I/Os may not be accurate; dependent on patient compliance with collection and accurate  by assistants. General:    Well nourished. Alert. Eyes:   Normal sclerae. Extraocular movements intact. ENT:  Normocephalic, atraumatic. Moist mucous membranes CV:   Regular rate and rhythm. No murmur, rub, or gallop. Lungs:  Clear to auscultation bilaterally. No wheezing, rhonchi, or rales. Abdomen: Soft, nontender, nondistended. Extremities: Warm and dry. No cyanosis or edema. Neurologic: CN II-XII grossly intact. No gross focal deficits Skin:     No rashes or jaundice. Psych:  Normal mood and affect. Current Med List in Hospital:  
Current Facility-Administered Medications Medication Dose Route Frequency  potassium chloride 20 mEq in 100 ml IVPB  20 mEq IntraVENous Q2H  
 amLODIPine (NORVASC) tablet 5 mg  5 mg Oral DAILY  dextrose 5% infusion  75 mL/hr IntraVENous CONTINUOUS  
 lip protectant (BLISTEX) ointment 1 Each  1 Each Topical PRN  
 traMADoL (ULTRAM) tablet 50 mg  50 mg Oral Q6H PRN  pomalidomide cap 1 mg (Patient Supplied)  1 mg Oral DAILY  lidocaine (XYLOCAINE) 10 mg/mL (1 %) injection 0.1 mL  0.1 mL SubCUTAneous PRN  
  fentaNYL citrate (PF) injection 100 mcg  100 mcg IntraVENous PRN  
 sodium chloride (NS) flush 5-40 mL  5-40 mL IntraVENous Q8H  
 sodium chloride (NS) flush 5-40 mL  5-40 mL IntraVENous PRN  
 alum-mag hydroxide-simeth (MYLANTA) oral suspension 30 mL  30 mL Oral Q4H PRN  
 calcium-vitamin D (OS-KEEGAN) 500 mg-200 unit tablet  1 Tab Oral TID WITH MEALS  
 [Held by provider] aspirin tablet 325 mg  325 mg Oral DAILY  acetaminophen (TYLENOL) tablet 650 mg  650 mg Oral Q6H PRN Or  
 acetaminophen (TYLENOL) suppository 650 mg  650 mg Rectal Q6H PRN  polyethylene glycol (MIRALAX) packet 17 g  17 g Oral DAILY PRN  promethazine (PHENERGAN) tablet 12.5 mg  12.5 mg Oral Q6H PRN Or  
 ondansetron (ZOFRAN) injection 4 mg  4 mg IntraVENous Q6H PRN  
 amiodarone (CORDARONE) tablet 200 mg  200 mg Oral DAILY  clorazepate (TRANXENE) tablet 7.5 mg  7.5 mg Oral BID  furosemide (LASIX) tablet 40 mg  40 mg Oral DAILY  levothyroxine (SYNTHROID) tablet 50 mcg  50 mcg Oral 6am  
 mirtazapine (REMERON) tablet 15 mg  15 mg Oral QHS  nadoloL (CORGARD) tablet 80 mg  80 mg Oral DAILY  traZODone (DESYREL) tablet 100 mg  100 mg Oral QHS Discharge Info:  
Current Discharge Medication List  
  
START taking these medications Details  
calcium-vitamin D (OYSTER SHELL) 500 mg(1,250mg) -200 unit per tablet Take 1 Tab by mouth three (3) times daily (with meals). Qty: 90 Tab, Refills: 1  
  
aspirin (ASPIRIN) 325 mg tablet Take 1 Tab by mouth daily for 4 days. Qty: 4 Tab, Refills: 0 CONTINUE these medications which have CHANGED Details  
furosemide (LASIX) 40 mg tablet Take 1 Tab by mouth daily. Qty: 30 Tab, Refills: 1  
  
lactulose (Kristalose) 20 gram packet Take 1 Packet by mouth three (3) times daily as needed (constipation). Qty: 60 Packet, Refills: 3 Associated Diagnoses: Slow transit constipation CONTINUE these medications which have NOT CHANGED Details pomalidomide (Pomalyst) 1 mg cap Take 1 Cap by mouth daily. Take 1 capsule daily for 14 days and then she will be off 14 days Qty: 14 Cap, Refills: 0 Associated Diagnoses: Multiple myeloma not having achieved remission ([facility] [address] levothyroxine (SYNTHROID) 50 mcg tablet TAKE 1 TABLET BY MOUTH DAILY BEFORE BREAKFAST Qty: 30 Tab, Refills: 0  
  
amiodarone (CORDARONE) 200 mg tablet TAKE 1 TABLET BY MOUTH DAILY Qty: 180 Tab, Refills: 3  
  
potassium chloride SR (KLOR-CON 10) 10 mEq tablet TAKE 2 TABLETS BY MOUTH DAILY. Qty: 60 Tab, Refills: 0  
  
ergocalciferol (ERGOCALCIFEROL) 1,250 mcg (50,000 unit) capsule Take 1 Cap by mouth every seven (7) days. Qty: 4 Cap, Refills: 1  
  
clorazepate (TRANXENE) 7.5 mg tablet TAKE 1 TABLET BY MOUTH TWICE A DAY. Qty: 60 Tab, Refills: 2 Associated Diagnoses: Anxiety  
  
ipratropium (ATROVENT) 42 mcg (0.06 %) nasal spray 1 Era by Both Nostrils route three (3) times daily. Qty: 15 mL, Refills: 0 Associated Diagnoses: Allergic rhinitis due to pollen, unspecified seasonality  
  
traZODone (DESYREL) 100 mg tablet Take 1 Tab by mouth nightly. Qty: 30 Tab, Refills: 5  
  
atorvastatin (LIPITOR) 80 mg tablet TAKE 1 TABLET BY MOUTH DAILY Qty: 60 Tab, Refills: 11  
  
ferrous sulfate 325 mg (65 mg iron) tablet TAKE 1 TABLET BY MOUTH DAILY. Qty: 30 Tab, Refills: 11  
 Associated Diagnoses: Anemia, unspecified type  
  
docusate sodium (STOOL SOFTENER) 100 mg capsule Take 1 Cap by mouth two (2) times a day. Qty: 180 Cap, Refills: 3 Associated Diagnoses: Hypothyroidism  
  
amLODIPine (NORVASC) 2.5 mg tablet Take 1 Tab by mouth daily. Qty: 90 Tab, Refills: 3  
  
estradiol (ESTRACE) 1 mg tablet Take 1 Tab by mouth daily. Qty: 90 Tab, Refills: 3 Associated Diagnoses: Menopause  
  
mupirocin (BACTROBAN) 2 % ointment Apply  to affected area daily. Qty: 22 g, Refills: 0 Associated Diagnoses: Skin ulcer of right foot, limited to breakdown of skin ([facility] [address] mirtazapine (REMERON) 15 mg tablet nightly. DISABLED PLACARD (DISABLED PLACARD) DMV Apply to car. Qty: 1 Each, Refills: 0 Associated Diagnoses: Acute cystitis without hematuria  
  
nadolol (CORGARD) 80 mg tablet Take 1 Tab by mouth daily. Qty: 30 Tab, Refills: 11 OXYGEN-AIR DELIVERY SYSTEMS 3 L by Does Not Apply route nightly. Associated Diagnoses: Pleural effusion associated with pulmonary infection; Pleural effusion on left; Pleural effusion on right; Debility; Hypoxemia Aspirin, Buffered 81 mg tab Take  by mouth daily. Associated Diagnoses: Anemia, unspecified Time spent in patient discharge planning and coordination 35 minutes.  
 
Signed: 
Kalyan Marroquin MD

## 2020-08-27 NOTE — PROGRESS NOTES
Updated clinicals sent to Geoffrey Padilla with [facility] to confirm accepting of patient. Patient is medically ready for discharge, PPD and Covid completed.

## 2020-08-27 NOTE — PROGRESS NOTES
[facility] will not accept patient with known aspiration risk. Lengthy discussion had at bedside with patient, daughter and then phone call with son and daughter in law. They are all agreeable to PEG to give patient opportunity to continue working with speech therapy at [facility]. They understand that this may not be feasible as the risks may outweigh the benefit. They also know that a bed at [facility] is not guaranteed. [facility] liaison updated and Dr Hilda Noonan notified. GI consult to be placed.

## 2020-08-27 NOTE — PROGRESS NOTES
Problem: Pressure Injury - Risk of 
Goal: *Prevention of pressure injury Description: Document José Manuel Scale and appropriate interventions in the flowsheet. Outcome: Progressing Towards Goal 
Note: Pressure Injury Interventions: 
Sensory Interventions: Assess changes in LOC Moisture Interventions: Absorbent underpads Activity Interventions: Increase time out of bed, Pressure redistribution bed/mattress(bed type) Mobility Interventions: Pressure redistribution bed/mattress (bed type) Nutrition Interventions: Document food/fluid/supplement intake Friction and Shear Interventions: Apply protective barrier, creams and emollients, Minimize layers Problem: Patient Education: Go to Patient Education Activity Goal: Patient/Family Education Outcome: Progressing Towards Goal 
  
Problem: Falls - Risk of 
Goal: *Absence of Falls Description: Document Devere Lizemores Fall Risk and appropriate interventions in the flowsheet. Outcome: Progressing Towards Goal 
Note: Fall Risk Interventions: 
Mobility Interventions: Bed/chair exit alarm, Communicate number of staff needed for ambulation/transfer Mentation Interventions: Adequate sleep, hydration, pain control, Bed/chair exit alarm, Door open when patient unattended Medication Interventions: Bed/chair exit alarm Elimination Interventions: Bed/chair exit alarm History of Falls Interventions: Bed/chair exit alarm, Door open when patient unattended Problem: Patient Education: Go to Patient Education Activity Goal: Patient/Family Education Outcome: Progressing Towards Goal 
  
Problem: Patient Education: Go to Patient Education Activity Goal: Patient/Family Education Outcome: Progressing Towards Goal 
  
Problem: Hip Fracture: Post-Op Day 4 Goal: Off [facility] (Use only if patient is Off [facility]) Outcome: Progressing Towards Goal 
Goal: Activity/Safety Outcome: Progressing Towards Goal 
Goal: Diagnostic Test/Procedures Outcome: Progressing Towards Goal 
Goal: Nutrition/Diet Outcome: Progressing Towards Goal 
Goal: Medications Outcome: Progressing Towards Goal 
Goal: Respiratory Outcome: Progressing Towards Goal 
Goal: Treatments/Interventions/Procedures Outcome: Progressing Towards Goal 
Goal: Psychosocial 
Outcome: Progressing Towards Goal 
Goal: Discharge Planning Outcome: Progressing Towards Goal 
Goal: *Met physical therapy criteria for discharge to next level of care Outcome: Progressing Towards Goal 
Goal: *Optimal pain control at patient's stated goal 
Outcome: Progressing Towards Goal 
Goal: *Hemodynamically stable Outcome: Progressing Towards Goal 
Goal: *Tolerating diet Outcome: Progressing Towards Goal 
Goal: *Active bowel function Outcome: Progressing Towards Goal 
Goal: *Adequate urinary output Outcome: Progressing Towards Goal 
Goal: *Absence of skin breakdown Outcome: Progressing Towards Goal 
Goal: *Patient verbalizes understanding of discharge instructions Outcome: Progressing Towards Goal 
  
Problem: Dysphagia (Adult) Goal: *Speech Goal: (INSERT TEXT) Outcome: Progressing Towards Goal 
  
Problem: Patient Education: Go to Patient Education Activity Goal: Patient/Family Education Outcome: Progressing Towards Goal 
  
Problem: Patient Education: Go to Patient Education Activity Goal: Patient/Family Education Outcome: Progressing Towards Goal 
  
Problem: Patient Education: Go to Patient Education Activity Goal: Patient/Family Education Outcome: Progressing Towards Goal

## 2020-08-27 NOTE — PROGRESS NOTES
Palliative Care Progress Note Patient: Brittani Dunn MRN: [id number]  SSN: [id number] YOB: 1930  Age: 80 y.o. Sex: female Assessment/Plan: Chief Complaint/Interval History: pt remains confused. Pleasant and calm Principal Diagnosis: · Dysphagia  R13.10 Additional Diagnoses: · Altered Mental Status R41.82 
· Debility, Unspecified  R53.81 
· Frailty  R54 · Counseling, Encounter for Medical Advice  Z71.9 
· Encounter for Palliative Care  Z51.5 Palliative Performance Scale (PPS) Medical Decision Making:  
Reviewed and summarized labs and imaging. Pt alert and pleasant. Remains confused. Spoke with pt daughter via phone and discussed allowing pt to eat but understanding she is aspirating and eventually will lead to respiratory issues. This is likely to occur regardless of feeding tube placement. Pt does not currently have a hospice diagnosis as failure to thrive is not an acceptable diagnosis for hospice. Pt would benefit from rehab given recent repair of hip fracture. Updated hospitalist and CM. Will discuss findings with members of the interdisciplinary team.   
 
More than 50% of this 25 minute visit was spent counseling and coordination of care as outlined above. Subjective:  
 
Review of Systems negative with the exception of as noted above Objective:  
 
Visit Vitals /65 (BP 1 Location: Left arm, BP Patient Position: At rest) Pulse 62 Temp 98.3 °F (36.8 °C) Resp 18 Ht 5' 3\" (1.6 m) Wt 138 lb (62.6 kg) SpO2 93% BMI 24.45 kg/m² Physical Exam: 
 
General:  Confused. No acute distress. Eyes:  Conjunctivae/corneas clear Nose: Nares normal. Septum midline. Neck: Supple, symmetrical, trachea midline, no JVD Lungs:   Clear to auscultation bilaterally, unlabored Heart:  Regular rate and rhythm, no murmur Abdomen:   Soft, non-tender, non-distended Extremities: Normal, atraumatic, no cyanosis or edema Skin: Skin color, texture, turgor normal. No rash or lesions. Neurologic: Nonfocal  
Psych: Alert but confused Signed By: Antonieta Doe MD   
 August 27, 2020

## 2020-08-27 NOTE — PROGRESS NOTES
August 27, 2020 Post Op day: 5 Days Post-Op Procedure(s) (LRB): HIP HEMIARTHROPLASTY (Left) Admit Date: 8/21/2020 Admit Diagnosis: Closed left hip fracture ([facility] [address] Tyrell Stockton Principle Problem: Closed left hip fracture ([facility] [address] Subjective: Patient awoken but is disoriented. Objective:  
Vital Signs are Stable, No Acute Distress, Alert,  Dressing is Dry,  Neurovascular exam is normal.  
 
Assessment / Plan : 
Patient Active Problem List  
Diagnosis Code  Essential hypertension, benign I10  
 Other and unspecified hyperlipidemia E78.5  Acquired hypothyroidism E03.9  Postmenopausal atrophic vaginitis N95.2  Fatigue R53.83  
 Presence of cardiac pacemaker Z95.0  Persistent atrial fibrillation (HCC) I48.19  Chronic anxiety F41.9  Acute respiratory failure with hypoxemia ([facility]) J96.01  
 Hypoxemia R09.02  
 Pleural effusion on left J90  
 Pleural effusion on right J90  
 Iron deficiency anemia D50.9  Debility R53.81  
 Melena K92.1  Mixed hyperlipidemia E78.2  Episodic atrial fibrillation (HCC) I48.0  Dementia without behavioral disturbance ([facility]) F03.90  Chronic diastolic heart failure ([facility] [address] I50.32  
 Pacemaker Z95.0  Macrocytic anemia D53.9  Essential hypertension with goal blood pressure less than 130/85 I10  
 Anxiety F41.9  Dyspnea R06.00  
 Multiple myeloma in remission ([facility]) C90.01  
 On amiodarone therapy Z79.899  Stage 3 chronic kidney disease (HCC) N18.3  Anemia due to chronic renal failure treated with erythropoietin, unspecified stage N18.9, D63.1  Multiple myeloma not having achieved remission ([facility]) C90.00  Leg swelling M79.89  Bilateral pneumonia J18.9  Pleural effusion, bilateral J90  
 Acute hypokalemia E87.6  Dyspnea on effort R06.00  
 Multiple myeloma ([facility]) C90.00  
 HANSEN (dyspnea on exertion) R06.00  Closed left hip fracture ([facility] [address] S72.002A  Acute cystitis without hematuria N30.00  Oropharyngeal dysphagia R13.12  
 DNR (do not resuscitate) Z66 Patient Vitals for the past 8 hrs: 
 BP Temp Pulse Resp SpO2  
20 0740 134/65 98.3 °F (36.8 °C) 62 18 93 % 20 0446 128/62 98.1 °F (36.7 °C) 67 18 92 % 20 0054 132/61 99.2 °F (37.3 °C) 61 18 93 % Temp (24hrs), Av.2 °F (36.8 °C), Min:97.5 °F (36.4 °C), Max:99.2 °F (37.3 °C) Body mass index is 24.45 kg/m². Lab Results Component Value Date/Time HGB 8.2 (L) 2020 06:01 AM  
  
 
 
S/P Left hip samantha  Palliative care saw patient yesterday Minimize narcotics  
Medical Mgmt per hospitalist 
Anticoagulation plan: ASA 325mg daily  
Continue PT Fall Precautions DC disp: rehab placement  
  
 
Signed By: CADEN Stark 
2020,  8:11 AM

## 2020-08-27 NOTE — PROGRESS NOTES
The documentation for this period [id number] is being entered following the guidelines as defined in the [facility] downtime policy by Wenceslao Ferguson.

## 2020-08-28 NOTE — PROGRESS NOTES
[facility] will arrange DME setup at patient's residence and transport is set up for 1500 per their request.  DNR to be completed before discharge and attending notified. Care Management Interventions PCP Verified by CM: Sohail Coronado) Mode of Transport at Discharge: [facility] Transport Time of Discharge: 1500 Transition of Care Consult (CM Consult): Home [facility]ospice [facility]: No 
Partner SNF: No 
Reason Why Partner SNF Not Chosen: Location, Positive previous encounter Discharge Durable Medical Equipment: No 
Physical Therapy Consult: Yes Occupational Therapy Consult: Yes Speech Therapy Consult: Yes Current Support Network: Own Home, Lives with Caregiver Confirm Follow Up Transport: Family The Plan for Transition of Care is Related to the Following Treatment Goals : Patient will transfer home with [facility] for comfort care The Patient and/or Patient Representative was Provided with a Choice of Provider and Agrees with the Discharge Plan?: Yes Name of the Patient Representative Who was Provided with a Choice of Provider and Agrees with the Discharge Plan: Doris Castorena Freedom of Choice List was Provided with Basic Dialogue that Supports the Patient's Individualized Plan of Care/Goals, Treatment Preferences and Shares the Quality Data Associated with the Providers?: Yes Discharge Location Discharge Placement: Home with hospice

## 2020-08-28 NOTE — PROGRESS NOTES
Problem: Falls - Risk of 
Goal: *Absence of Falls Description: Document Herbert Perez Fall Risk and appropriate interventions in the flowsheet. Outcome: Progressing Towards Goal 
Note: Fall Risk Interventions: 
Mobility Interventions: Bed/chair exit alarm Mentation Interventions: Adequate sleep, hydration, pain control, Bed/chair exit alarm Medication Interventions: Bed/chair exit alarm Elimination Interventions: Call light in reach History of Falls Interventions: Bed/chair exit alarm Problem: Patient Education: Go to Patient Education Activity Goal: Patient/Family Education Outcome: Progressing Towards Goal

## 2020-08-28 NOTE — PROGRESS NOTES
August 28, 2020 Post Op day: 6 Days Post-Op Procedure(s) (LRB): HIP HEMIARTHROPLASTY (Left) Admit Date: 8/21/2020 Admit Diagnosis: [facility] Landon Marci Principle Problem: Closed [facility] Subjective: Doing well, No complaints, No SOB, No Chest Pain, No Nausea or Vomiting Objective:  
Vital Signs are Stable, No Acute Distress, Alert,  Dressing is Dry,  Neurovascular exam is normal.  
 
Assessment / Plan : 
Patient Active Problem List  
Diagnosis Code  Essential hypertension, benign I10  
 Other and unspecified hyperlipidemia E78.5  Acquired hypothyroidism E03.9  Postmenopausal atrophic vaginitis N95.2  Fatigue R53.83  
 Presence of cardiac pacemaker Z95.0  Persistent atrial fibrillation (HCC) I48.19  Chronic anxiety F41.9  Acute respiratory failure with hypoxemia ([facility]) J96.01  
 Hypoxemia R09.02  
 Pleural effusion on left J90  
 Pleural effusion on right J90  
 Iron deficiency anemia D50.9  Debility R53.81  
 Melena K92.1  Mixed hyperlipidemia E78.2  Episodic atrial fibrillation (HCC) I48.0  Dementia without behavioral disturbance ([facility]) F03.90  Chronic diastolic heart failure ([facility] [address] I50.32  
 Pacemaker Z95.0  Macrocytic anemia D53.9  Essential hypertension with goal blood pressure less than 130/85 I10  
 Anxiety F41.9  Dyspnea R06.00  
 Multiple myeloma in remission ([facility]) C90.01  
 On amiodarone therapy Z79.899  Stage 3 chronic kidney disease ([facility]) N18.3  Anemia due to chronic renal failure treated with erythropoietin, unspecified stage N18.9, D63.1  Multiple myeloma not having achieved remission ([facility]) C90.00  Leg swelling M79.89  Bilateral pneumonia J18.9  Pleural effusion, bilateral J90  
 Acute hypokalemia E87.6  Dyspnea on effort R06.00  
 Multiple myeloma ([facility]) C90.00  
 HANSEN (dyspnea on exertion) R06.00  [facility] S72.002A  Acute cystitis without hematuria N30.00  
 Oropharyngeal dysphagia R13.12  
 DNR (do not resuscitate) Z66 Patient Vitals for the past 8 hrs: 
 BP Temp Pulse Resp SpO2  
20 0752 112/60 99.2 °F (37.3 °C) 60 18 97 % 20 0417 135/62 99 °F (37.2 °C) 60 16 95 % Temp (24hrs), Av.4 °F (36.9 °C), Min:97.5 °F (36.4 °C), Max:99.2 °F (37.3 °C) Body mass index is 24.45 kg/m². Lab Results Component Value Date/Time HGB 7.9 (L) 2020 05:38 AM  
  
 
S/P Left hip samantha  Awaiting PEG placement as SNF will not accept patient with known aspiration risk Minimize narcotics  
Medical Mgmt per hospitalist 
Anticoagulation plan: ASA 325mg daily  
Continue PT Fall Precautions DC disp: rehab placement when cleared medically  
  
  
 
Signed By: Addison Boxer, PA 
2020,  8:29 AM

## 2020-08-28 NOTE — PROGRESS NOTES
Problem: Mobility Impaired (Adult and Pediatric) Goal: *Acute Goals and Plan of Care (Insert Text) Outcome: Progressing Towards Goal 
Note: STG: 
(1.)Ms. Kelley Lindsey will move from supine to sit and sit to supine , scoot up and down, and roll side to side with CONTACT GUARD ASSIST within 3 treatment day(s). (2.)Ms. Kelley Lindsey will transfer from bed to chair and chair to bed with CONTACT GUARD ASSIST using the least restrictive device within 3 treatment day(s). (3.)Ms. Kelley Lindsey will ambulate with MINIMAL ASSIST for 75 feet with the least restrictive device within 3 treatment day(s). LTG: 
(1.)Ms. Kelley Lindsey will move from supine to sit and sit to supine , scoot up and down, and roll side to side in bed with SUPERVISION within 7 treatment day(s). (2.)Ms. Kelley Lindsey will transfer from bed to chair and chair to bed with SUPERVISION using the least restrictive device within 7 treatment day(s). (3.)Ms. Kelley Lindsey will ambulate with STAND BY ASSIST for 250 feet with the least restrictive device within 7 treatment day(s). _________________________________________________________________________________________ PHYSICAL THERAPY: Daily Note and AM 8/28/2020 INPATIENT: PT Visit Days : 6 [place] / Product Type: Medicare /   
  
NAME/AGE/GENDER: Tran King is a 80 y.o. female PRIMARY DIAGNOSIS: Closed left hip fracture ([facility] [address] [S72.002A] Closed left hip fracture (HCC) Closed left hip fracture ([facility] [place] [address] Procedure(s) (LRB): HIP HEMIARTHROPLASTY (Left) 6 Days Post-Op ICD-10: Treatment Diagnosis:  
 · Generalized Muscle Weakness (M62.81) · Other lack of cordination (R27.8) · Difficulty in walking, Not elsewhere classified (R26.2) · Other abnormalities of gait and mobility (R26.89) · History of falling (Z91.81) Precaution/Allergies: 
Patient has no known allergies.   
  
ASSESSMENT:  
 
Ms. Kelley Lindsey  is a 80year old female who has been admitted to hospital on 08/21/20 s/p LT hip hemiarthroplasty. Prior to hospital admission pt lives alone in a 1 story home with 2 step(s) to enter with LT side railing. Pt endorses 1 falls in past 6 months. Prior to admission MOD I for ADLs and mobility. Pt presents supine in bed and is agreeable to therapy. Pt performed supine to sit with mod assist and additional time. Pt stood mod assist and ambulated 25' with rolling walker and min assist.  Pt does require cues for hand placement, walker management, safety, and guidance. Pt sat in chair and performed below LE exercises. Pt coughing more today. Pt was left up with needs in reach and alarm on. Pt making progress towards goals with improved ambulation, decreased level of assistance, and improved ability with exercises. Will continue with POC. This section established at most recent assessment PROBLEM LIST (Impairments causing functional limitations): 1. Decreased Strength 2. Decreased ADL/Functional Activities 3. Decreased Transfer Abilities 4. Decreased Ambulation Ability/Technique 5. Decreased Balance 6. Increased Pain 7. Decreased Flexibility/Joint Mobility 8. Decreased [place] with Home Exercise Program 
 INTERVENTIONS PLANNED: (Benefits and precautions of physical therapy have been discussed with the patient.) 1. Balance Exercise 2. Bed Mobility 3. Family Education 4. Gait Training 5. Group Therapy 6. Home Exercise Program (HEP) 7. Manual Therapy 8. Neuromuscular Re-education/Strengthening 9. Range of Motion (ROM) 10. Therapeutic Activites 11. Therapeutic Exercise/Strengthening 12. Transfer Training TREATMENT PLAN: Frequency/Duration: twice daily for duration of hospital stay Rehabilitation Potential For Stated Goals: Good REHAB RECOMMENDATIONS (at time of discharge pending progress):   
Placement:  
It is my opinion, based on this patient's performance to date, that Ms. Sukumar Cleary may benefit from participating in 1-2 additional therapy sessions in order to continue to assess for rehab potential and then make recommendation for disposition at discharge. Equipment:  
? None at this time HISTORY:  
History of Present Injury/Illness (Reason for Referral): 
Per  Physician Note: Lady Martini is a 80 y.o. female with medical history significant for anxiety, chronic diastolic heart failure, essential hypertension, atrial fibrillation s/p PPM,  multiple myeloma, hypothyroidism and mild dementia who presented from home after falling out of her recliner. Patient is very hard of hearing and therefore difficult to get detailed history. Patient denies any head trauma, presyncopal symptoms prior to falling. As per daughter, patient's has been having some worsening dementia lately and slight confusions similar to when she had UTI in the past.  Patient reports she was in her usual health prior to the fall and daughter reports that patient has not complaint about any issues except for occasional shortness of breath on exertions. Denies fever, chills, sob at rest, chest pains. Reports using 2L O2 at bedtime but has not required O2 during day time or on exertion. Past Medical History/Comorbidities:  
Ms. Sukumar Cleary  has a past medical history of Anemia, unspecified, Chest pain, unspecified (3/21/2016), Chronic anxiety (9/92/1651), Diastolic heart failure ([address] (3/21/2016), Essential hypertension, benign, Flatulence, eructation, and gas pain, Lump or mass in breast, Other and unspecified hyperlipidemia, Paroxysmal atrial fibrillation ([address] (3/21/2016), Paroxysmal tachycardia ([address] (3/21/2016), Pernicious anemia, Postmenopausal atrophic vaginitis, Unspecified deficiency anemia, Unspecified hypothyroidism, and Vitamin D deficiency. Ms. Sukumar Cleary  has a past surgical history that includes hx hysterectomy; hx orthopaedic; and hx vein stripping. Social History/Living Environment: Home Environment: Private residence # Steps to Enter: 2 Rails to Enter: Yes Hand Rails : Left One/Two Story Residence: One story Living Alone: Yes Support Systems: Family member(s) Patient Expects to be Discharged to[de-identified] Rehabilitation facility Current DME Used/Available at Home: Walker, rolling, Shower chair, [place], straight Tub or Shower Type: Tub/Shower combination Prior Level of Function/Work/Activity: Mod I with ADLs and mobility Number of Personal Factors/Comorbidities that affect the Plan of Care: 3+: HIGH COMPLEXITY EXAMINATION:  
Most Recent Physical Functioning:  
Gross Assessment: 
  
         
  
Posture: 
  
Balance: 
Sitting - Static: Fair (occasional)(+) Sitting - Dynamic: Fair (occasional) Standing - Static: Fair;Constant support(+) Standing - Dynamic : Fair;Constant support Bed Mobility: 
  
Wheelchair Mobility: 
  
Transfers: 
Sit to Stand: Moderate assistance Stand to Sit: Moderate assistance Gait: 
Left Side Weight Bearing: As tolerated Speed/Giana: Slow;Shuffled Step Length: Left shortened;Right shortened Gait Abnormalities: Decreased step clearance;Shuffling gait Distance (ft): 25 Feet (ft) Assistive Device: Walker, rolling Ambulation - Level of Assistance: Minimal assistance Body Structures Involved: 1. Nerves 2. Bones 3. Joints 4. Muscles 5. Ligaments Body Functions Affected: 1. Sensory/Pain 2. Movement Related Activities and Participation Affected: 1. Mobility 2. Self Care 3. Domestic Life 4. Interpersonal Interactions and Relationships 5. Community, Social and [place] Number of elements that affect the Plan of Care: 4+: HIGH COMPLEXITY CLINICAL PRESENTATION:  
Presentation: Stable and uncomplicated: LOW COMPLEXITY CLINICAL DECISION MAKIN [facility] [address] [address] AM-PAC 6 Clicks Basic Mobility Inpatient Short Form How much difficulty does the patient currently have. .. Unable A Lot A Little None 1.  Turning over in bed (including adjusting bedclothes, sheets and blankets)? [] 1   [] 2   [x] 3   [] 4  
2. Sitting down on and standing up from a chair with arms ( e.g., wheelchair, bedside commode, etc.)   [] 1   [x] 2   [] 3   [] 4  
3. Moving from lying on back to sitting on the side of the bed? [] 1   [] 2   [x] 3   [] 4 How much help from another person does the patient currently need. .. Total A Lot A Little None 4. Moving to and from a bed to a chair (including a wheelchair)? [] 1   [x] 2   [] 3   [] 4  
5. Need to walk in hospital room? [] 1   [x] 2   [] 3   [] 4  
6. Climbing 3-5 steps with a railing? [x] 1   [] 2   [] 3   [] 4  
© 2007, Trustees of [address] [place] [address], under license to [employer]. All rights reserved Score:  Initial: 13 Most Recent: X (Date: -- ) Interpretation of Tool:  Represents activities that are increasingly more difficult (i.e. Bed mobility, Transfers, Gait). Medical Necessity:    
· Patient is expected to demonstrate progress in  
· strength, range of motion, balance, coordination, and functional technique ·  to  
· increase independence with functional mobility and ambulation · . Reason for Services/Other Comments: 
· Patient continues to require skilled intervention due to · LLE weakness, pain, decreased ROM and increased fall risk · . Use of outcome tool(s) and clinical judgement create a POC that gives a: Questionable prediction of patient's progress: MODERATE COMPLEXITY  
  
 
 
 
TREATMENT:  
(In addition to Assessment/Re-Assessment sessions the following treatments were rendered) Pre-treatment Symptoms/Complaints: \"Can I take a shower? \" 
Pain: Initial: RN notified for pain med request.  
Pain Intensity 1: 0  Post Session:    
 
Therapeutic Activity: (    15 minutes):   Therapeutic activities including Bed mobility, Chair transfers, Ambulation on level ground to improve mobility, strength, balance, and coordination. Required moderate visual, verbal and tactile cues to promote static and dynamic balance in standing and promote coordination of bilateral, lower extremity(s). Therapeutic Exercise: ( 10 minutes):  Exercises per grid below to improve mobility, strength, balance and coordination. Required moderate visual, verbal and manual cues to promote proper body posture and promote proper body mechanics. Progressed range and repetitions as indicated. Date: 
8/25/20 Date: 
8/26/20 Date: 
8/27/20 Date: 
8/28/20 ACTIVITY/EXERCISE AM PM AM PM AM PM AM  
Ambulation:           Distance Device Duration Seated Heel Raises X 15 B AA  X 15 B  X 15 B  X 15 B Seated Toe Raises X 15 B AA  X 15 B  X 15 B  X 15 B Seated Long Arc Quads X 10 L AA  X 15 B  X 15 B  X 15 B Seated Marching X 10 L AA Seated Hip Abduction X 10 L AA  X 10 L AA  X 15 L AA  X 15 B  
         
B = bilateral; AA = active assistive; A = active; P = passive Braces/Orthotics/Lines/Etc:  
· IV 
· geronimo catheter · O2 via Nasal cannula Treatment/Session Assessment:   
· Response to Treatment:  See Above · Interdisciplinary Collaboration:  
o Physical Therapy Assistant 
o Registered Nurse · After treatment position/precautions:  
o Up in chair 
o Bed alarm/tab alert on 
o Bed/Chair-wheels locked 
o Call light within reach 
o RN notified · Compliance with Program/Exercises: Will assess as treatment progresses · Recommendations/Intent for next treatment session: \"Next visit will focus on advancements to more challenging activities\". Total Treatment Duration: PT Patient Time In/Time Out Time In: 7166 Time Out: 4787 Sarita Canavan, PTA

## 2020-08-28 NOTE — PROGRESS NOTES
Problem: Self Care Deficits Care Plan (Adult) Goal: *Acute Goals and Plan of Care (Insert Text) Outcome: Progressing Towards Goal 
Note: 1. Pt will toilet with min A 2. Pt will complete functional mobility for ADLs with min A 3. Pt will complete lower body dressing with min A using AE as needed 4. Pt will complete grooming and hygiene with set up 5. Pt will demonstrate independence with HEP to promote increased BUE strength and functional use for ADLs 6. Pt will tolerate 23 minutes functional activity with min or fewer rest breaks to promote increased endurance for ADLs 7. Pt will complete bed mobility with min A in prep for ADLs Timeframe: 7 days OCCUPATIONAL THERAPY: Daily Note, AM and PM 8/28/2020 INPATIENT: OT Visit Days: 5 [place] / Product Type: Medicare /  
  
NAME/AGE/GENDER: Laya Morton is a 80 y.o. female PRIMARY DIAGNOSIS:  Closed left hip fracture ([facility] [address] [S72.002A] Closed left hip fracture (HCC) Closed left hip fracture ([facility] [address] Procedure(s) (LRB): HIP HEMIARTHROPLASTY (Left) 6 Days Post-Op ICD-10: Treatment Diagnosis:  
 · History of falling (Z91.81) Precautions/Allergies: 
  falls, hip, WBAT  Patient has no known allergies. ASSESSMENT:  
 
Ms. Shaun Mack presents with L hip fx d/t falling at home, now s/p L MAURICE. Pt lives alone and is independent with ADLs with the exception of bathing; pt's children live nearby and take turns staying with pt and assisting with bathing. Pt uses a RW for mobility. Pt presents sitting up in chair upon arrival. Pt alert and cooperative for therapy. Pt was able to perform sit to stand from chair with mod assist and min assist from [facility]. Pt required more assistance with bowel hygiene than bladder for thoroughness. Pt demonstrates fair standing balance during hygiene. Pt doing well with transferring to and from [facility].  Pt assist multiple times today back and forth to [facility]. Pt making some progress with goals above. Pt is to be discharged home with Hospice services. This section established at most recent assessment PROBLEM LIST (Impairments causing functional limitations): 1. Decreased Strength 2. Decreased ADL/Functional Activities 3. Decreased Transfer Abilities 4. Decreased Balance 5. Decreased Activity Tolerance 6. Increased Fatigue 7. Increased Shortness of Breath 8. Decreased Knowledge of Precautions 9. Decreased Cognition INTERVENTIONS PLANNED: (Benefits and precautions of occupational therapy have been discussed with the patient.) 1. Activities of daily living training 2. Adaptive equipment training 3. Balance training 4. Therapeutic activity 5. Therapeutic exercise TREATMENT PLAN: Frequency/Duration: Follow patient 3 times/ week to address above goals. Rehabilitation Potential For Stated Goals: Good REHAB RECOMMENDATIONS (at time of discharge pending progress):   
Placement: It is my opinion, based on this patient's performance to date, that Ms. Savita Gonzalez may benefit from intensive therapy at a [address] [place] [address] after discharge due to the functional deficits listed above that are likely to improve with skilled rehabilitation and concerns that he/she may be unsafe to be unsupervised at home due to fall risk,  inability to complete ADLs . Equipment:  
? None at this time OCCUPATIONAL PROFILE AND HISTORY:  
History of Present Injury/Illness (Reason for Referral): 
See H&P Past Medical History/Comorbidities:  
Ms. Savita Gonzalez  has a past medical history of Anemia, unspecified, Chest pain, unspecified (3/21/2016), Chronic anxiety (6/67/7115), Diastolic heart failure ([address] (3/21/2016), Essential hypertension, benign, Flatulence, eructation, and gas pain, Lump or mass in breast, Other and unspecified hyperlipidemia, Paroxysmal atrial fibrillation ([address] (3/21/2016), Paroxysmal tachycardia ([facility] [address] (3/21/2016), Pernicious anemia, Postmenopausal atrophic vaginitis, Unspecified deficiency anemia, Unspecified hypothyroidism, and Vitamin D deficiency. Ms. Vanda Young  has a past surgical history that includes hx hysterectomy; hx orthopaedic; and hx vein stripping. Social History/Living Environment:  
Home Environment: Private residence # Steps to Enter: 2 Rails to Enter: Yes Hand Rails : Left One/Two Story Residence: One story Living Alone: Yes Support Systems: Family member(s) Patient Expects to be Discharged to[de-identified] Rehabilitation facility Current DME Used/Available at Home: Walker, rolling, Shower chair, [place] Siad, straight Tub or Shower Type: Tub/Shower combination Prior Level of Function/Work/Activity: 
See assessment Number of Personal Factors/Comorbidities that affect the Plan of Care: Expanded review of therapy/medical records (1-2):  MODERATE COMPLEXITY ASSESSMENT OF OCCUPATIONAL PERFORMANCE[de-identified]  
Activities of Daily Living:  
Basic ADLs (From Assessment) Complex ADLs (From Assessment) Feeding: Setup, Stand-by assistance Oral Facial Hygiene/Grooming: Minimum assistance Bathing: Moderate assistance Upper Body Dressing: Minimum assistance Lower Body Dressing: Maximum assistance Toileting: Maximum assistance Instrumental ADL Meal Preparation: Total assistance Homemaking: Total assistance Grooming/Bathing/Dressing Activities of Daily Living Cognitive Retraining Safety/Judgement: Decreased awareness of environment; Fall prevention Toileting Bladder Hygiene: Moderate assistance(for thoroughness) Bowel Hygiene: Moderate assistance(for thoroughness) Clothing Management: Maximum assistance Functional Transfers Toilet Transfer : Minimum assistance Bed/Mat Mobility Sit to Stand: Minimum assistance; Moderate assistance(more assistance from chair than BSC.) Stand to Sit: Moderate assistance Most Recent Physical Functioning:  
Gross Assessment: Posture: 
Posture (WDL): Exceptions to [facility] Posture Assessment: Trunk flexion, Increased, Rounded shoulders Balance: 
Sitting: Impaired Sitting - Static: Good (unsupported) Sitting - Dynamic: Fair (occasional) Standing: Impaired Standing - Static: Fair Standing - Dynamic : Fair Bed Mobility: 
  
Wheelchair Mobility: 
  
Transfers: 
Sit to Stand: Minimum assistance; Moderate assistance(more assistance from chair than BSC.) Stand to Sit: Moderate assistance Patient Vitals for the past 6 hrs: 
 BP BP Patient Position SpO2 Pulse 20 1203 103/62 At rest 98 % 64 Mental Status Neurologic State: Alert, Appropriate for age Orientation Level: Oriented to person, Oriented to place Cognition: Follows commands Perception: Appears intact Perseveration: No perseveration noted Safety/Judgement: Decreased awareness of environment, Fall prevention Physical Skills Involved: 
1. Balance 2. Strength 3. Activity Tolerance Cognitive Skills Affected (resulting in the inability to perform in a timely and safe manner): 1. Executive Function 2. Sustained Attention 3. Divided Attention 4. Comprehension Psychosocial Skills Affected: 1. Habits/Routines 2. Environmental Adaptation 3. Self-Awareness Number of elements that affect the Plan of Care: 5+:  HIGH COMPLEXITY CLINICAL DECISION MAKIN [facility] [address] [address] AM-PAC 6 Clicks Daily Activity Inpatient Short Form How much help from another person does the patient currently need. .. Total A Lot A Little None 1. Putting on and taking off regular lower body clothing? [] 1   [x] 2   [] 3   [] 4  
2. Bathing (including washing, rinsing, drying)? [] 1   [x] 2   [] 3   [] 4  
3. Toileting, which includes using toilet, bedpan or urinal?   [] 1   [x] 2   [] 3   [] 4  
4. Putting on and taking off regular upper body clothing?    [] 1   [] 2   [x] 3   [] 4  
 5. Taking care of personal grooming such as brushing teeth? [] 1   [] 2   [x] 3   [] 4  
6. Eating meals? [] 1   [] 2   [] 3   [x] 4  
© 2007, Trustees of [address] [place] [address] [address] [address], under license to [employer]. All rights reserved Score:  Initial: 16 Most Recent: X (Date: -- ) Interpretation of Tool:  Represents activities that are increasingly more difficult (i.e. Bed mobility, Transfers, Gait). Medical Necessity:    
· Patient demonstrates · fair ·  rehab potential due to higher previous functional level. Reason for Services/Other Comments: 
· Patient · continues to require present interventions due to patient's inability to complete ADLs · . Use of outcome tool(s) and clinical judgement create a POC that gives a: MODERATE COMPLEXITY  
 
 
 
TREATMENT:  
(In addition to Assessment/Re-Assessment sessions the following treatments were rendered) Pre-treatment Symptoms/Complaints:   
Pain: Initial:  
Pain Intensity 1: 0  Post Session:  5 (hip, with movement. Repositioned) Self Care: (15 minutes): Procedure(s) (per grid) utilized to improve and/or restore self-care/home management as related to toileting. Required moderate verbal and tactile cueing to facilitate activities of daily living skills. Therapeutic Activity: (10 minutes): Therapeutic activities including Chair transfers and Toilet transfers to improve mobility, strength and balance. Required minimal assistance to promote static and dynamic balance in standing. Date: 
8/26 Date: 
 Date: Activity/Exercise Parameters Parameters Parameters Shoulder Abd/Adduction 10 reps Shoulder Flexion 10 reps Elbow Flexion 10 reps Chest Press 10 reps Braces/Orthotics/Lines/Etc:  
· O2 Device: Heated, Hi flow nasal cannula Treatment/Session Assessment:   
· Response to Treatment:  no adverse reaction · Interdisciplinary Collaboration:  
o Certified Occupational Therapy Assistant o Registered Nurse · After treatment position/precautions:  
o Up in chair 
o Bed alarm/tab alert on 
o Bed/Chair-wheels locked 
o Call light within reach · Compliance with Program/Exercises: Will assess as treatment progresses. · Recommendations/Intent for next treatment session: \"Next visit will focus on advancements to more challenging activities and reduction in assistance provided\". Total Treatment Duration: OT Patient Time In/Time Out Time In: 0723(2405) Time Out: 2313(1458) Shante Murdock Stands

## 2020-08-28 NOTE — CONSULTS
[facility] Consult Note Primary GI Physician: Dr. Vieira Patel Referring Provider:  Dr. Shayy Andersen Consult Date:  8/28/2020 Admit Date:  8/21/2020 Chief Complaint:  Feeding Difficulties Subjective:  
 
History of Present Illness:  Patient is a 80 y.o. female with PMH of but not limited to anxiety, chronic diastolic heart failure, essential hypertension, atrial fibrillation s/p PPM, HFpEF,  multiple myeloma, hypothyroidism and mild dementia  , who is seen in consultation at the request of Dr. Shayy Andersen for feeding difficulties. Mrs. Shaun Mack was brought to the ED on 8/21/2020 following a fall from her recliner at home with findings of a UTI and hip fracture. She underwent left hip hemiarthroplasty for femoral neck fracture/open treatment of left femoral neck fracture with prosthetic replacement on 8/22/2020. Hospitalization has been complicated by some iatrogenic fluid overload in postop period, resolved after lasix.  She failed SLP eval so was made NPO. There was discussed with family about PEG placement. Family did not wish for PEG and agreed to pleasure feeds and understood the risk of aspiration. Hospice was consulted but she did not meet criteria for failure to thrive. She was to be discharged to a Skilled nursing facility on 8/27/2020, however, they would not accept here with pleasure feeds and aspiration risk. Therefore, we have been consulted for PEG placement. She has been NPO after midnight. ASA is on hold. Labs this morning with WBC 8.2, HGB 7.9, , K 2.9. She is receiving 20mEq  KCl IV. PMH: 
Past Medical History:  
Diagnosis Date  Anemia, unspecified  Chest pain, unspecified 3/21/2016  Chronic anxiety 3/21/2016  Diastolic heart failure ([address] 3/21/2016  Essential hypertension, benign  Flatulence, eructation, and gas pain  Lump or mass in breast   
 Other and unspecified hyperlipidemia  Paroxysmal atrial fibrillation ([address] 3/21/2016  [address]oxysmal tachycardia [address] [address] 3/21/2016  Pernicious anemia  Postmenopausal atrophic vaginitis  Unspecified deficiency anemia  Unspecified hypothyroidism  Vitamin D deficiency PSH: 
Past Surgical History:  
Procedure Laterality Date  HX HYSTERECTOMY  HX ORTHOPAEDIC    
 heel spur  HX VEIN STRIPPING Allergies: 
No Known Allergies Home Medications: 
Prior to Admission medications Medication Sig Start Date End Date Taking? Authorizing Provider  
aspirin (ASPIRIN) 325 mg tablet Take 1 Tab by mouth daily for 4 days. 8/28/20 9/1/20 Yes Estee Vega MD  
calcium-vitamin D (OYSTER SHELL) 500 mg(1,250mg) -200 unit per tablet Take 1 Tab by mouth three (3) times daily (with meals). 8/27/20  Yes Estee Vega MD  
furosemide (LASIX) 40 mg tablet Take 1 Tab by mouth daily. 8/27/20  Yes Estee Vega MD  
lactulose Broderick Pee) 20 gram packet Take 1 Packet by mouth three (3) times daily as needed (constipation). 8/27/20  Yes Estee Vega MD  
pomalidomide (Pomalyst) 1 mg cap Take 1 Cap by mouth daily. Take 1 capsule daily for 14 days and then she will be off 14 days 8/17/20   Kailash Copeland MD  
levothyroxine (SYNTHROID) 50 mcg tablet TAKE 1 TABLET BY MOUTH DAILY BEFORE BREAKFAST 8/11/20   Giovanna Sales MD  
amiodarone (CORDARONE) 200 mg tablet TAKE 1 TABLET BY MOUTH DAILY 8/7/20   Lindsey Rodriguez MD  
potassium chloride SR (KLOR-CON 10) 10 mEq tablet TAKE 2 TABLETS BY MOUTH DAILY. 7/30/20   Lindsey Rodriguez MD  
ergocalciferol (ERGOCALCIFEROL) 1,250 mcg (50,000 unit) capsule Take 1 Cap by mouth every seven (7) days. 6/4/20   Kailash Copeland MD  
clorazepate (TRANXENE) 7.5 mg tablet TAKE 1 TABLET BY MOUTH TWICE A DAY. 3/23/20   Lindsey Rodriguez MD  
ipratropium (ATROVENT) 42 mcg (0.06 %) nasal spray 1 [place] by Both Nostrils route three (3) times daily.  12/3/19   Yair Arvizu NP  
 traZODone (DESYREL) 100 mg tablet Take 1 Tab by mouth nightly. 11/20/19   Chandan Haynes MD  
atorvastatin (LIPITOR) 80 mg tablet TAKE 1 TABLET BY MOUTH DAILY 9/9/19   Chandan Haynes MD  
ferrous sulfate 325 mg (65 mg iron) tablet TAKE 1 TABLET BY MOUTH DAILY. 9/9/19   Chandan Haynes MD  
docusate sodium (STOOL SOFTENER) 100 mg capsule Take 1 Cap by mouth two (2) times a day. 6/25/19   Chandan Haynes MD  
amLODIPine (NORVASC) 2.5 mg tablet Take 1 Tab by mouth daily. 6/25/19   Chandan Haynes MD  
estradiol (ESTRACE) 1 mg tablet Take 1 Tab by mouth daily. 6/25/19   Chandan Haynes MD  
furosemide (LASIX) 40 mg tablet Take 1 Tab by mouth daily. Taking 2 po qd Patient taking differently: Take 40 mg by mouth daily. 10/25/18   Patti Sidhu MD  
pirocin [facility]) 2 % ointment Apply  to affected area daily. 5/21/18   Chandan Haynes MD  
mirtazapine (REMERON) 15 mg tablet nightly. 1/12/18   Provider, Historical  
DISABLED PLACARD (DISABLED PLACARD) DMV Apply to car. 3/14/17   Chandan Haynes MD  
nadolol (CORGARD) 80 mg tablet Take 1 Tab by mouth daily. 2/27/17   Sabine Allen MD  
OXYGEN-AIR DELIVERY SYSTEMS 3 L by Does Not Apply route nightly. Daniele Herman RN Aspirin, Buffered 81 mg tab Take  by mouth daily. Provider, Historical  
 
 
Hospital Medications: 
Current Facility-Administered Medications Medication Dose Route Frequency  amLODIPine (NORVASC) tablet 5 mg  5 mg Oral DAILY  dextrose 5% infusion  75 mL/hr IntraVENous CONTINUOUS  
 lip protectant (BLISTEX) ointment 1 Each  1 Each Topical PRN  
 traMADoL (ULTRAM) tablet 50 mg  50 mg Oral Q6H PRN  pomalidomide cap 1 mg (Patient Supplied)  1 mg Oral DAILY  lidocaine (XYLOCAINE) 10 mg/mL (1 %) injection 0.1 mL  0.1 mL SubCUTAneous PRN  
 fentaNYL citrate (PF) injection 100 mcg  100 mcg IntraVENous PRN  
 sodium chloride (NS) flush 5-40 mL  5-40 mL IntraVENous Q8H  
  sodium chloride (NS) flush 5-40 mL  5-40 mL IntraVENous PRN  
 alum-mag hydroxide-simeth (MYLANTA) oral suspension 30 mL  30 mL Oral Q4H PRN  
 calcium-vitamin D (OS-KEEGAN) 500 mg-200 unit tablet  1 Tab Oral TID WITH MEALS  
 [Held by provider] aspirin tablet 325 mg  325 mg Oral DAILY  acetaminophen (TYLENOL) tablet 650 mg  650 mg Oral Q6H PRN Or  
 acetaminophen (TYLENOL) suppository 650 mg  650 mg Rectal Q6H PRN  polyethylene glycol (MIRALAX) packet 17 g  17 g Oral DAILY PRN  promethazine (PHENERGAN) tablet 12.5 mg  12.5 mg Oral Q6H PRN Or  
 ondansetron (ZOFRAN) injection 4 mg  4 mg IntraVENous Q6H PRN  
 amiodarone (CORDARONE) tablet 200 mg  200 mg Oral DAILY  clorazepate (TRANXENE) tablet 7.5 mg  7.5 mg Oral BID  furosemide (LASIX) tablet 40 mg  40 mg Oral DAILY  levothyroxine (SYNTHROID) tablet 50 mcg  50 mcg Oral 6am  
 mirtazapine (REMERON) tablet 15 mg  15 mg Oral QHS  nadoloL (CORGARD) tablet 80 mg  80 mg Oral DAILY  traZODone (DESYREL) tablet 100 mg  100 mg Oral QHS Social History: 
Social History Tobacco Use  Smoking status: Never Smoker  Smokeless tobacco: Never Used Substance Use Topics  Alcohol use: No  
  Alcohol/week: 0.0 standard drinks Family History: 
Family History Problem Relation Age of Onset  No Known Problems Mother  Heart Disease Father Review of Systems:not able to obtain Diet:  NPO Objective:  
 
Physical Exam: 
Vitals: 
Visit Vitals /60 (BP 1 Location: Left arm, BP Patient Position: At rest) Pulse 60 Temp 99.2 °F (37.3 °C) Resp 18 Ht 5' 3\" (1.6 m) Wt 62.6 kg (138 lb) SpO2 97% BMI 24.45 kg/m² Gen:  Pt is alert,no acute distress, Frail appearing elderly female. Skin:  Extremities and face reveal no rashes. HEENT: Sclerae anicteric. No oral ulcers. No abnormal pigmentation of the lips. The neck is supple. Cardiovascular: Regular rate and rhythm. No murmurs, gallops, or rubs. Respiratory:  Comfortable breathing with no accessory muscle use. Clear breath sounds anteriorly with no wheezes, rales, or rhonchi. GI:  Abdomen nondistended, soft, and nontender. Normal active bowel sounds. No enlargement of the liver or spleen. No masses palpable. Rectal:  Deferred Musculoskeletal:  No pitting edema of the lower legs. Psychiatric:  Mood appears appropriate with judgement intact. Lymphatic:  No cervical or supraclavicular adenopathy. Laboratory:   
Recent Labs  
  08/28/20 
1647 08/27/20 
1107 08/26/20 
0601 WBC 8.2  --  5.6 HGB 7.9*  --  8.2* HCT 26.3*  --  27.4*  
  --  166 MCV 99.2*  --  100.0*  146* 150*  
K 2.9* 3.0* 2.8*  
 109* 111* CO2 29 31 32 BUN 21 23 28* CREA 1.13* 1.22* 1.25* CA 7.7* 8.2* 8.9 MG 2.4  --   --   
* 112* 110* SPEECH PATHOLOGY NOTE: 
\"Speech therapy following for dysphagia. Modified barium swallow study completed 8/25-moderate oral and severe pharyngeal dysphagia. Poor oral containment, delayed swallow initiation, and reduced hyolaryngeal excursion with reduced laryngeal closure resulted in aspiration of thin by teaspoon with ineffective cough response, non-clearing penetration with nectar by cup/straw, silent aspiration of honey by teaspoon, and nonclearing penetration with pudding. Reduced tongue base retraction and decreased epiglottic inversion resulted in mild- mod vallecular residue post swallow with nectar, honey, and pudding trials. Following trial of honey by teaspoon, vallecular residue spilled over epiglottic rim and was silently aspirated after the swallow. Recommend NPO with non-oral medications until goals of care are discussed. Can have 1-2 ice chips for pleasure after oral care.  Patient would benefit from palliative care consult, per MD discretion, to discuss patient/family wishes and goals of care. Poor candidate for long term alternate means of nutrition due to advanced age and dementia. If family accepts risk for aspiration resume baseline oral diet. 
  
Patient at risk of aspiration with any/all po intake per swallow study. Family has been discussing goals/wishes with palliative care and hospitalist. Notified by Dr. Lei Bland via [employer] that family has decided to accept risks of aspiration with po intake at this time and do not wish for alternative means of nutrition. Therefore, resume baseline diet with known aspiration risks. \" 
 
Assessment:  
 
Principal Problem: 
  Closed left hip fracture ([address] (8/21/2020) Active Problems: 
  Essential hypertension, benign (7/9/2015) Acquired hypothyroidism (7/9/2015) Presence of cardiac pacemaker (3/21/2016) Persistent atrial fibrillation ([address] (6/18/2016) Chronic anxiety (3/21/2016) Iron deficiency anemia (6/18/2016) Debility (6/18/2016) Dementia without behavioral disturbance ([address] (8/16/2016) Chronic diastolic heart failure ([address] (11/16/2016) Essential hypertension with goal blood pressure less than 130/85 (12/22/2016) Stage 3 chronic kidney disease ([address] (11/27/2017) Multiple myeloma ([address] (11/20/2019) Acute cystitis without hematuria (8/21/2020) Oropharyngeal dysphagia (8/25/2020) DNR (do not resuscitate) (8/26/2020) Patient is a 80 y.o. female with PMH of but not limited to anxiety, chronic diastolic heart failure, essential hypertension, atrial fibrillation s/p PPM, HFpEF 60-65% in 12/2019,  multiple myeloma, hypothyroidism and mild dementia  , who is seen in consultation at the request of Dr. Lei Bland for feeding difficulties. Modified Barium Swallow with Speech shows Aspiration and Speech has recommended NPO. Family did not wish for PEG placement at first and wanted to assume risk of aspiration and allow pleasure feeds.  However, facility where she will be discharged will not allow this. Family was in agreement yesterday for PEG. Plan: I called Mrs. Kennedy's daughter, Avinash Natarajan,  this morning. The family met last night and decided against PEG tube. They also decided against a nursing home. They wish to take her home and surround her with family in her last days and to give her pleasure feeds. Please call us back, if needed. I will communicate this with social work and [address] Riya Marley NP Patient is seen and examined in collaboration with Dr. Brad Stephenson. .  Assessment and plan as per Dr. Brad Stephenson.

## 2020-08-28 NOTE — PROGRESS NOTES
CM met with GI and ST regarding patient's discharge plan. GI has spoken with the patient's daughter Smooth Bucio and family has now decided to decline PEG and take patient home. Call placed to daughter, Smooth Nelsonr, and she is interested in having a hospice company evaluate patient for potential acceptance. Daughter is agreeable to [facility] and all patient information sent for review. Dr Brisa Quesada updated.

## 2020-08-28 NOTE — PROGRESS NOTES
SPEECH PATHOLOGY NOTE: 
 
Patient discharging home with hospice care. Discontinue speech therapy services at this time.  
 
 
Oracio Sanders MS, CCC-SLP

## 2022-11-11 NOTE — PROGRESS NOTES
Initial visit by  to convey care and concern and to explore spiritual needs. No spiritual needs were voiced during the visit. Chaplains remain available for follow-up care. Gonzalo Rosa MDiv Board Certified [address] [place] [address] Not applicable

## (undated) DEVICE — REM POLYHESIVE ADULT PATIENT RETURN ELECTRODE: Brand: VALLEYLAB

## (undated) DEVICE — SOL IRR SOD CL 0.9% 3000ML --

## (undated) DEVICE — SUTURE STRATAFIX SPRL SZ 1 L14IN ABSRB VLT L48CM CTX 1/2 SXPD2B405

## (undated) DEVICE — 7 DAY SILVER-COATED ANTIMICROBIAL BARRIER DRESSING: Brand: ACTICOAT 7  4" X 5"

## (undated) DEVICE — PREP SKN CHLRAPRP APL 26ML STR --

## (undated) DEVICE — SUTURE VCRL SZ 1 L36IN ABSRB UD CTX L48MM 1/2 CIR J977H

## (undated) DEVICE — SPLINT KNEE L20IN FOR 32IN THGH UNIV FOAM 3 PC DSGN TRIMMED

## (undated) DEVICE — DRAPE,U/SHT,SPLIT,FILM,60X84,STERILE: Brand: MEDLINE

## (undated) DEVICE — 3000CC GUARDIAN II: Brand: GUARDIAN

## (undated) DEVICE — SUTURE STRATAFIX SPRL SZ 3-0 L9IN ABSRB VLT FS L26MM 3/8 SXPD2B419

## (undated) DEVICE — STRYKER PERFORMANCE SERIES SAGITTAL BLADE: Brand: STRYKER PERFORMANCE SERIES

## (undated) DEVICE — DRAPE SHT 3 QTR PROXIMA 53X77 --

## (undated) DEVICE — IMMOBILIZER KNEE PREMIER PRO TRI PNL 24INCH FOAM TIETEX PAT

## (undated) DEVICE — GAUZE,SPONGE,4"X4",16PLY,STRL,LF,10/TRAY: Brand: MEDLINE

## (undated) DEVICE — PAD,ABDOMINAL,5"X9",ST,LF,25/BX: Brand: MEDLINE INDUSTRIES, INC.

## (undated) DEVICE — HOOD: Brand: FLYTE

## (undated) DEVICE — DRAPE,HIP,W/POUCHES,STERILE: Brand: MEDLINE

## (undated) DEVICE — Device

## (undated) DEVICE — 2108 SERIES SAGITTAL BLADE, NO OFFSET  (24.8 X 1.27 X 82.1MM)

## (undated) DEVICE — 3M™ IOBAN™ 2 ANTIMICROBIAL INCISE DRAPE 6650EZ: Brand: IOBAN™ 2

## (undated) DEVICE — HIP HEMIARTHROPLASTY: Brand: MEDLINE INDUSTRIES, INC.